# Patient Record
Sex: FEMALE | Race: WHITE | NOT HISPANIC OR LATINO | Employment: OTHER | URBAN - METROPOLITAN AREA
[De-identification: names, ages, dates, MRNs, and addresses within clinical notes are randomized per-mention and may not be internally consistent; named-entity substitution may affect disease eponyms.]

---

## 2017-07-06 ENCOUNTER — TRANSCRIBE ORDERS (OUTPATIENT)
Dept: PHYSICAL THERAPY | Facility: HOSPITAL | Age: 67
End: 2017-07-06

## 2017-07-06 DIAGNOSIS — M79.7 FIBROMYALGIA: Primary | ICD-10-CM

## 2017-07-25 ENCOUNTER — APPOINTMENT (OUTPATIENT)
Dept: PHYSICAL THERAPY | Facility: HOSPITAL | Age: 67
End: 2017-07-25

## 2017-08-01 ENCOUNTER — HOSPITAL ENCOUNTER (OUTPATIENT)
Dept: PHYSICAL THERAPY | Facility: HOSPITAL | Age: 67
Setting detail: THERAPIES SERIES
Discharge: HOME | End: 2017-08-01

## 2017-08-01 DIAGNOSIS — M25.561 CHRONIC PAIN OF RIGHT KNEE: ICD-10-CM

## 2017-08-01 DIAGNOSIS — G89.29 CHRONIC PAIN OF RIGHT KNEE: ICD-10-CM

## 2017-08-01 DIAGNOSIS — M79.7 FIBROMYALGIA: Primary | ICD-10-CM

## 2017-08-01 PROCEDURE — 97163 PT EVAL HIGH COMPLEX 45 MIN: CPT | Performed by: PHYSICAL THERAPIST

## 2017-08-01 NOTE — THERAPY EVALUATION
Outpatient Physical Therapy Ortho Initial Evaluation  [facility]     Patient Name: Clarissa Matos  : 1950  MRN: [id number]  Today's Date: 2017    Visit Date: 2017  There is no problem list on file for this patient.     Past Medical History:   Diagnosis Date   • Anxiety    • Arthritis    • Depression    • Fibromyalgia    • Hypertension       Past Surgical History:   Procedure Laterality Date   • HYSTERECTOMY     • REPLACEMENT TOTAL KNEE Right    • ROTATOR CUFF REPAIR     Visit Dx:     ICD-10-CM ICD-9-CM   1. Fibromyalgia M79.7 729.1   2. Chronic pain of right knee M25.561 719.46    G89.29 338.29           Patient History       17 1500          History    Chief Complaint Pain  -DM      Type of Pain Back pain;Neck pain;Shoulder pain;Knee pain  -DM      Date Current Problem(s) Began --   chronic  -DM      Brief Description of Current Complaint Pt is diagnosed with Fibromyalgia.  She was originally diagnosed with Fibromyalgia 6 years ago.  She has had progressively worsening pain, especially in her neck, upper and low back, shoulders and right knee.  She had right TKA in  but had complications during surgery with a ligament torn and was braced for 6 months.  Her right knee has been problematic since that time.  She is also being treated by Pain Management Dr. Beltre to include epidural injections and trigger point injections.  She has been treated with several medications including Cymbalta and Lyrica but had side effects that she could not tolerate.  She is now on Wellbutrin but is having HA that she relates to this medication.  She also suffers with depression and anxiety and does see a psychiatrist and has been referred to a Social Psychologist.  She has not had PT for fibromyalgia.  -DM      Previous treatment for THIS PROBLEM Injections;Pain Management;Chiropractor;Medication  -DM      Patient/Caregiver Goals Relieve pain;Improve mobility  -DM      Current Tobacco Use none  -DM       Patient's Rating of General Health Fair  -DM      Occupation/sports/leisure activities Retired RN  -DM      Patient seeing anyone else for problem(s)? Yes  -DM      Pain     Pain Location Neck;Back;Knee;Shoulder  -DM      Pain at Present 5  -DM      Pain at Best 3  -DM      Pain at Worst 7  -DM      Pain Frequency Constant/continuous  -DM      Pain Description Aching;Burning;Sore;Pins and needles;Tender;Tightness;Dull;Discomfort  -DM      What Performance Factors Make the Current Problem(s) WORSE? Prolonged sitting, walking and standing, Use of stairs  -DM      What Performance Factors Make the Current Problem(s) BETTER? Ice, pain meds, stretches  -DM      Tolerance Time- Standing 3-5 minutes  -DM      Tolerance Time- Sitting 20-30 minutes  -DM      Tolerance Time- Walking 10 minutes  -DM      Is your sleep disturbed? Yes  -DM      Is medication used to assist with sleep? Yes   just started ambein 10 mg  -DM      Difficulties with ADL's? yes  -DM      Difficulties with recreational activities? yes  -DM      Daily Activities    Primary Language English  -DM      Are you able to read Yes  -DM      Are you able to write Yes  -DM      How does patient learn best? Listening;Reading;Demonstration;Pictures/Video  -DM      Teaching needs identified Home Exercise Program;Management of Condition  -DM      Does patient have problems with the following? Depression;Anxiety  -DM      Barriers to learning None  -DM      Action taken for identified issues observation for worsening signs of depression.  -DM      Pt Participated in POC and Goals Yes  -DM      Safety    Are you being hurt, hit, or frightened by anyone at home or in your life? No  -DM      Are you being neglected by a caregiver No  -DM        User Key  (r) = Recorded By, (t) = Taken By, (c) = Cosigned By    Initials Name Provider Type    AKILAH Morataya, PT Physical Therapist              PT Ortho       08/01/17 1400    Posture/Observations    Posture/Observations  Comments Mild FH, rounded shoulders, increased lordosis, right knee in slight flexion  -DM    Cervical Palpation    Occiput Bilateral:;Tender;Guarded/taut  -DM    Suboccipital Bilateral:;Tender;Guarded/taut  -DM    Scalenes Bilateral:;Guarded/taut  -DM    Levator Scapula Bilateral:;Tender;Guarded/taut;Elicits spasm  -DM    Upper Traps Bilateral:;Tender;Guarded/taut;Trigger point  -DM    Middle Traps Bilateral:;Tender;Guarded/taut  -DM    Rhomboids Bilateral:;Guarded/taut  -DM    Lower Traps Bilateral:;Guarded/taut  -DM    Lumbosacral Palpation    Piriformis Bilateral:;Tender;Guarded/taut;Elicits spasm  -DM    Quadratus Lumborum Bilateral:;Tender;Guarded/taut  -DM    Erector Spinae (Paraspinals) Bilateral:;Tender;Guarded/taut  -DM    Knee Palpation    Pes Anserine Bursa Right:;Tender  -DM    Medial Joint Line Right:;Swollen;Guarded/taut  -DM    Lateral Joint Line Right:;Swollen;Guarded/taut  -DM    Lateral Condyle Right:;Swollen;Guarded/taut  -DM    Medial Condyle Right:;Swollen;Guarded/taut  -DM    Neck    Flexion AROM --   decreased 50%  -DM    Extension AROM --   decreased 50%  -DM    Left Lateral Flexion AROM --   decreased 50%  -DM    Right Lateral Flexion AROM --   decreased 50%  -DM    Left Rotation AROM --   decreased 25%  -DM    Right Rotation AROM --   decreased 25%  -DM    Trunk    Flexion AROM WNL (0-80 degrees)  -DM    Extension AROM --   decreased 50%  -DM    Left Lateral Flexion AROM --   decreased 50%  -DM    Right Lateral Flexion AROM --   decreased 50%  -DM    Trunk    Trunk Flexion Gross Movement (3+/5) fair plus  -DM    Trunk Extension Gross Movement (3+/5) fair plus  -DM    Left Shoulder    Flexion Gross Movement (4-/5) good minus  -DM    Extension Gross Movement (4-/5) good minus  -DM    ABduction Gross Movement (4-/5) good minus  -DM    Int Rotation Gross Movement (4-/5) good minus  -DM    Ext Rotation Gross Movement (4-/5) good minus  -DM    Right Shoulder    Flexion Gross Movement (4-/5) good  minus  -DM    Extension Gross Movement (4-/5) good minus  -DM    ABduction Gross Movement (4-/5) good minus  -DM    Int Rotation Gross Movement (4-/5) good minus  -DM    Ext Rotation Gross Movement (4-/5) good minus  -DM    Left Hip    Hip Flexion Gross Movement (4-/5) good minus  -DM    Hip Extension Gross Movement (3/5) fair  -DM    Hip ABduction Gross Movement (3+/5) fair plus  -DM    Right Hip    Hip Flexion Gross Movement (4-/5) good minus  -DM    Hip Extension Gross Movement (3+/5) fair plus  -DM    Hip ABduction Gross Movement (4-/5) good minus  -DM    Left Elbow/Forearm    Elbow Flexion Gross Movement (4-/5) good minus  -DM    Elbow Extension Gross Movement (4-/5) good minus  -DM    Right Elbow/Forearm    Elbow Flexion Gross Movement (4-/5) good minus  -DM    Elbow Extension Gross Movement (4-/5) good minus  -DM    Left Knee    Knee Extension Gross Movement (4+/5) good plus  -DM    Knee Flexion Gross Movement (4+/5) good plus  -DM    Right Knee    Knee Extension Gross Movement (4/5) good  -DM    Knee Flexion Gross Movement (4-/5) good minus  -DM    Upper Extremity Flexibility    Overall UE Flexibility Bilateral:  -DM    Lower Extremity Flexibility    Hamstrings Moderately limited  -DM    Hip Flexors Moderately limited  -DM    Hip External Rotators Moderately limited  -DM    Hip Internal Rotators Mildly limited  -DM      User Key  (r) = Recorded By, (t) = Taken By, (c) = Cosigned By    Initials Name Provider Type    DM Leny Morataya, PT Physical Therapist                PT OP Goals       08/01/17 1600       PT Short Term Goals    STG Date to Achieve 08/30/17  -DM     STG 1 Pt will be independend with water walking and flexiblity exercises for improved mobility with ADL's  -DM     STG 1 Progress New  -DM     STG 2 Pt will tolerate 45 minutes of continuous aquatic exercise without esacerbatin of symptoms.  -DM     STG 2 Progress New  -DM     STG 3 Pt will report pain decreased to 5/10 at worst with ADL's  -DM      STG 3 Progress New  -DM     STG 4 Pt will tolerate sitting for greater 30 minutes.  -DM     STG 4 Progress New  -DM     Long Term Goals    LTG Date to Achieve 09/13/17  -DM     LTG 1 Pt will be independent with advanced aquatic exercise program for improved strength and flexibility.  -DM     LTG 1 Progress New  -DM     LTG 2 Pt will be independent with land flexibility program for improved mobiltiy with all weight bearing tasks.  -DM     LTG 2 Progress New  -DM     LTG 3 Pt will demonstrate improved UE/LE strength by 1/2 grade for improved stability with all functional tasks.  -DM     LTG 3 Progress New  -DM     LTG 4 Pt will report improved function via FIQR from 70.88% to 50% or less disability.  -DM     LTG 4 Progress New  -DM     Time Calculation    PT Goal Re-Cert Due Date 08/30/17  -DM       User Key  (r) = Recorded By, (t) = Taken By, (c) = Cosigned By    Initials Name Provider Type    DM Leny Morataya, PT Physical Therapist              PT Assessment/Plan       08/01/17 1605       PT Assessment    Functional Limitations Limitation in home management;Limitations in community activities;Limitations in functional capacity and performance;Performance in leisure activities  -DM     Impairments Pain;Muscle strength;Range of motion;Impaired flexibility  -DM     Assessment Comments Pt is diagnosed with Fibromyalgia.  She also has complaints of right knee pain from complicated right TKA in 2014.  She presents with decreased cervical/lumbar ROM, decrease UE/LE and core strength, moderate amount of tenderness, especially in fibromyalgia trigger points, limited LE flexiblity and pain with all functional tasks.  She has not had formal physical therapy to address these issues.  She will benefit from a combination of aquatic and land skilled therapy for improved mobility with all functional tasks.   -DM     Please refer to paper survey for additional self-reported information Yes  -DM     Rehab Potential Good  -DM      Patient/caregiver participated in establishment of treatment plan and goals Yes  -DM     Patient would benefit from skilled therapy intervention Yes  -DM     PT Plan    PT Frequency 2x/week;3x/week  -DM     Predicted Duration of Therapy Intervention (days/wks) 6 weeks  -DM     Planned CPT's? PT EVAL HIGH COMPLEXITY: 70949;PT MANUAL THERAPY EA 15 MIN: 10986;PT NEUROMUSC RE-EDUCATION EA 15 MIN: 63828;PT AQUATIC THERAPY EA 15 MIN: 64716;PT HOT OR COLD PACK TREAT MCARE;PT ELECTRICAL STIM UNATTEND: ;PT ULTRASOUND EA 15 MIN: 97085;PT THER PROC EA 15 MIN: 37924  -DM     Physical Therapy Interventions (Optional Details) aquatics exercise;balance training;home exercise program;postural re-education;neuromuscular re-education;modalities;manual therapy techniques;lumbar stabilization;ROM (Range of Motion);strengthening;stretching  -DM     PT Plan Comments Begin aquatic therapy with focus on flexibility and strengthening and on land HEP plus modalities for knee pain.  -DM       User Key  (r) = Recorded By, (t) = Taken By, (c) = Cosigned By    Initials Name Provider Type    DM Leny Morataya, SHOBHA Physical Therapist              Outcome Measures       08/01/17 1500 08/01/17 1400       5 Times Sit to Stand    5 Times Sit to Stand (seconds)  15.55 seconds  -DM     5 Times Sit to Stand Comments  used arm rests  -DM     Functional Assessment    Outcome Measure Options --   FIQR 70.88  -DM 5x Sit to Stand  -DM       User Key  (r) = Recorded By, (t) = Taken By, (c) = Cosigned By    Initials Name Provider Type    DM Leny Morataya, SHOBHA Physical Therapist      Time Calculation:   Start Time: 1430  Stop Time: 1515  Time Calculation (min): 45 min     Therapy Charges for Today     Code Description Service Date Service Provider Modifiers Qty    00463155356 HC PT AQUA EVAL HIGH  COMPLEXITY 3 8/1/2017 Leny Morataya, PT GP 1        PT G-Codes  Outcome Measure Options:  (FIQR 70.88)     Leny Morataya, PT, DPT  8/1/2017

## 2017-08-03 ENCOUNTER — HOSPITAL ENCOUNTER (OUTPATIENT)
Dept: PHYSICAL THERAPY | Facility: HOSPITAL | Age: 67
Setting detail: THERAPIES SERIES
Discharge: HOME | End: 2017-08-03

## 2017-08-03 DIAGNOSIS — G89.29 CHRONIC PAIN OF RIGHT KNEE: ICD-10-CM

## 2017-08-03 DIAGNOSIS — M25.561 CHRONIC PAIN OF RIGHT KNEE: ICD-10-CM

## 2017-08-03 DIAGNOSIS — M79.7 FIBROMYALGIA: Primary | ICD-10-CM

## 2017-08-03 PROCEDURE — 97113 AQUATIC THERAPY/EXERCISES: CPT | Performed by: PHYSICAL THERAPIST

## 2017-08-03 NOTE — THERAPY TREATMENT NOTE
Outpatient Physical Therapy Ortho Treatment Note  [facility]     Patient Name: Clarissa Matos  : 1950  MRN: [id number]  Today's Date: 8/3/2017      Visit Date: 2017    Visit Dx:    ICD-10-CM ICD-9-CM   1. Fibromyalgia M79.7 729.1   2. Chronic pain of right knee M25.561 719.46    G89.29 338.29       There is no problem list on file for this patient.       Past Medical History:   Diagnosis Date   • Anxiety    • Arthritis    • Depression    • Fibromyalgia    • Hypertension         Past Surgical History:   Procedure Laterality Date   • HYSTERECTOMY     • REPLACEMENT TOTAL KNEE Right    • ROTATOR CUFF REPAIR               PT Ortho       17 1400    Posture/Observations    Posture/Observations Comments Mild FH, rounded shoulders, increased lordosis, right knee in slight flexion  -DM    Cervical Palpation    Occiput Bilateral:;Tender;Guarded/taut  -DM    Suboccipital Bilateral:;Tender;Guarded/taut  -DM    Scalenes Bilateral:;Guarded/taut  -DM    Levator Scapula Bilateral:;Tender;Guarded/taut;Elicits spasm  -DM    Upper Traps Bilateral:;Tender;Guarded/taut;Trigger point  -DM    Middle Traps Bilateral:;Tender;Guarded/taut  -DM    Rhomboids Bilateral:;Guarded/taut  -DM    Lower Traps Bilateral:;Guarded/taut  -DM    Lumbosacral Palpation    Piriformis Bilateral:;Tender;Guarded/taut;Elicits spasm  -DM    Quadratus Lumborum Bilateral:;Tender;Guarded/taut  -DM    Erector Spinae (Paraspinals) Bilateral:;Tender;Guarded/taut  -DM    Knee Palpation    Pes Anserine Bursa Right:;Tender  -DM    Medial Joint Line Right:;Swollen;Guarded/taut  -DM    Lateral Joint Line Right:;Swollen;Guarded/taut  -DM    Lateral Condyle Right:;Swollen;Guarded/taut  -DM    Medial Condyle Right:;Swollen;Guarded/taut  -DM    Neck    Flexion AROM --   decreased 50%  -DM    Extension AROM --   decreased 50%  -DM    Left Lateral Flexion AROM --   decreased 50%  -DM    Right Lateral Flexion AROM --   decreased 50%  -DM    Left Rotation  AROM --   decreased 25%  -DM    Right Rotation AROM --   decreased 25%  -DM    Trunk    Flexion AROM WNL (0-80 degrees)  -DM    Extension AROM --   decreased 50%  -DM    Left Lateral Flexion AROM --   decreased 50%  -DM    Right Lateral Flexion AROM --   decreased 50%  -DM    Trunk    Trunk Flexion Gross Movement (3+/5) fair plus  -DM    Trunk Extension Gross Movement (3+/5) fair plus  -DM    Left Shoulder    Flexion Gross Movement (4-/5) good minus  -DM    Extension Gross Movement (4-/5) good minus  -DM    ABduction Gross Movement (4-/5) good minus  -DM    Int Rotation Gross Movement (4-/5) good minus  -DM    Ext Rotation Gross Movement (4-/5) good minus  -DM    Right Shoulder    Flexion Gross Movement (4-/5) good minus  -DM    Extension Gross Movement (4-/5) good minus  -DM    ABduction Gross Movement (4-/5) good minus  -DM    Int Rotation Gross Movement (4-/5) good minus  -DM    Ext Rotation Gross Movement (4-/5) good minus  -DM    Left Hip    Hip Flexion Gross Movement (4-/5) good minus  -DM    Hip Extension Gross Movement (3/5) fair  -DM    Hip ABduction Gross Movement (3+/5) fair plus  -DM    Right Hip    Hip Flexion Gross Movement (4-/5) good minus  -DM    Hip Extension Gross Movement (3+/5) fair plus  -DM    Hip ABduction Gross Movement (4-/5) good minus  -DM    Left Elbow/Forearm    Elbow Flexion Gross Movement (4-/5) good minus  -DM    Elbow Extension Gross Movement (4-/5) good minus  -DM    Right Elbow/Forearm    Elbow Flexion Gross Movement (4-/5) good minus  -DM    Elbow Extension Gross Movement (4-/5) good minus  -DM    Left Knee    Knee Extension Gross Movement (4+/5) good plus  -DM    Knee Flexion Gross Movement (4+/5) good plus  -DM    Right Knee    Knee Extension Gross Movement (4/5) good  -DM    Knee Flexion Gross Movement (4-/5) good minus  -DM    Upper Extremity Flexibility    Overall UE Flexibility Bilateral:  -DM    Lower Extremity Flexibility    Hamstrings Moderately limited  -DM    Hip  "Flexors Moderately limited  -DM    Hip External Rotators Moderately limited  -DM    Hip Internal Rotators Mildly limited  -DM      User Key  (r) = Recorded By, (t) = Taken By, (c) = Cosigned By    Initials Name Provider Type    AKILAH Morataya, PT Physical Therapist                            PT Assessment/Plan       08/03/17 1310       PT Assessment    Assessment Comments pt stated she wore a brace just 6 weeks post op vs. 6 months as stated in eval. Still some stress at home caring for her spouse with pancreatic cancer and IDDM. 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   -ZK       User Key  (r) = Recorded By, (t) = Taken By, (c) = Cosigned By    Initials Name Provider Type    ZK Zorre Zeno Kimura, SHOBHA Physical Therapist                    Exercises       08/03/17 1300          Subjective Comments    Subjective Comments traffic jam thus 15 minutes late today. BIked at home last week but doesn't do this too often. Fibro affecting shoulders and back today but wants to work thru this. Uses ya warm on her back at home prn. Knee is stiff but no giving way  -ZK      Subjective Pain    Able to rate subjective pain? yes  -ZK      Pre-Treatment Pain Level 3  -ZK      Post-Treatment Pain Level 3  -ZK      Subjective Pain Comment mid back  -ZK      Aquatics    Aquatics performed? Yes  -ZK      Exercise 1    Exercise Name 1 intro to warm water. light LE AROM. easy ROM of shoulders. biking recumbent caused some neck strain thus pt shown youtube on DNF ex level one to try at home this weekend. 10 reps, 3\" holds. Progress to level 2 when ready.   -ZK      Exercise 2    Exercise Name 2 easy hamstring stretching and did not start resistive stomp push just yet. good tolerance to program today but bothered by heat of 93 degree pool but 82 too cold  -ZK        User Key  (r) = Recorded By, (t) = Taken By, (c) = Cosigned By    Initials Name Provider Type    ZK Zorre Zeno Kimura, SHOBHA Physical Therapist                 "                       Outcome Measures       08/01/17 1500 08/01/17 1400       5 Times Sit to Stand    5 Times Sit to Stand (seconds)  15.55 seconds  -DM     5 Times Sit to Stand Comments  used arm rests  -DM     Functional Assessment    Outcome Measure Options --   FIQR 70.88  -DM 5x Sit to Stand  -DM       User Key  (r) = Recorded By, (t) = Taken By, (c) = Cosigned By    Initials Name Provider Type    AKILAH Morataya, PT Physical Therapist            Time Calculation:   Start Time: 1130  Stop Time: 1200  Time Calculation (min): 30 min    Therapy Charges for Today     Code Description Service Date Service Provider Modifiers Qty    17123255660 HC PT AQUATIC THERAPY EA 15 MIN 8/3/2017 Zorre Zeno Kimura, PT GP 2                    Zorre Zeno Kimura, PT  8/3/2017

## 2017-08-07 ENCOUNTER — HOSPITAL ENCOUNTER (OUTPATIENT)
Dept: PHYSICAL THERAPY | Facility: HOSPITAL | Age: 67
Setting detail: THERAPIES SERIES
End: 2017-08-07

## 2017-08-09 ENCOUNTER — APPOINTMENT (OUTPATIENT)
Dept: PHYSICAL THERAPY | Facility: HOSPITAL | Age: 67
End: 2017-08-09

## 2017-08-10 ENCOUNTER — APPOINTMENT (OUTPATIENT)
Dept: PHYSICAL THERAPY | Facility: HOSPITAL | Age: 67
End: 2017-08-10

## 2017-08-16 ENCOUNTER — HOSPITAL ENCOUNTER (OUTPATIENT)
Dept: PHYSICAL THERAPY | Facility: HOSPITAL | Age: 67
Setting detail: THERAPIES SERIES
Discharge: HOME | End: 2017-08-16

## 2017-08-18 ENCOUNTER — APPOINTMENT (OUTPATIENT)
Dept: PHYSICAL THERAPY | Facility: HOSPITAL | Age: 67
End: 2017-08-18

## 2017-08-21 ENCOUNTER — HOSPITAL ENCOUNTER (OUTPATIENT)
Dept: PHYSICAL THERAPY | Facility: HOSPITAL | Age: 67
Setting detail: THERAPIES SERIES
End: 2017-08-21

## 2017-08-22 ENCOUNTER — DOCUMENTATION (OUTPATIENT)
Dept: PHYSICAL THERAPY | Facility: HOSPITAL | Age: 67
End: 2017-08-22

## 2017-08-22 ENCOUNTER — HOSPITAL ENCOUNTER (OUTPATIENT)
Dept: PHYSICAL THERAPY | Facility: HOSPITAL | Age: 67
Setting detail: THERAPIES SERIES
End: 2017-08-22

## 2017-08-22 NOTE — THERAPY DISCHARGE NOTE
Outpatient Physical Therapy Discharge Summary         Patient Name: Clarissa Matos  : 1950  MRN: [id number]    Today's Date: 2017    Visit Dx:  No diagnosis found.          PT OP Goals       17 1250       PT Short Term Goals    STG Date to Achieve 17  -ZK     STG 1 Pt will be independend with water walking and flexiblity exercises for improved mobility with ADL's  -ZK     STG 1 Progress Not Met  -ZK     STG 2 Pt will tolerate 45 minutes of continuous aquatic exercise without esacerbatin of symptoms.  -ZK     STG 2 Progress Not Met  -ZK     STG 3 Pt will report pain decreased to 5/10 at worst with ADL's  -ZK     STG 3 Progress Not Met  -ZK     STG 4 Pt will tolerate sitting for greater 30 minutes.  -ZK     STG 4 Progress Not Met  -ZK     Long Term Goals    LTG Date to Achieve 17  -ZK     LTG 1 Pt will be independent with advanced aquatic exercise program for improved strength and flexibility.  -ZK     LTG 1 Progress Not Met  -ZK     LTG 2 Pt will be independent with land flexibility program for improved mobiltiy with all weight bearing tasks.  -ZK     LTG 2 Progress Not Met  -ZK     LTG 3 Pt will demonstrate improved UE/LE strength by 1/2 grade for improved stability with all functional tasks.  -ZK     LTG 3 Progress Not Met  -ZK     LTG 4 Pt will report improved function via FIQR from 70.88% to 50% or less disability.  -ZK     LTG 4 Progress Not Met  -ZK       User Key  (r) = Recorded By, (t) = Taken By, (c) = Cosigned By    Initials Name Provider Type    ZK Zorre Zeno Kimura, PT Physical Therapist          OP PT Discharge Summary  Date of Discharge: 17  Reason for Discharge: Lack of progress, Unable to participate, Patient/Caregiver request  Outcomes Achieved: Unable to make functional progress toward goals at this time  Discharge Destination: Home without follow-up  Discharge Instructions: pt seen for eval then just one session in the pool. Since 8-3-17 had to cancel 7 of  "her appointments including today where she called and stated \"MD wants her to get meds under control before resuming PT.\"  No further visits planned at this time. Hopefully fibro and her OA pains and other health issues will allow for more participation in the future.       Time Calculation:                    Zorre Zeno Kimura, PT  8/22/2017         "

## 2017-08-24 ENCOUNTER — APPOINTMENT (OUTPATIENT)
Dept: PHYSICAL THERAPY | Facility: HOSPITAL | Age: 67
End: 2017-08-24

## 2017-08-28 ENCOUNTER — APPOINTMENT (OUTPATIENT)
Dept: PHYSICAL THERAPY | Facility: HOSPITAL | Age: 67
End: 2017-08-28

## 2017-08-31 ENCOUNTER — APPOINTMENT (OUTPATIENT)
Dept: PHYSICAL THERAPY | Facility: HOSPITAL | Age: 67
End: 2017-08-31

## 2017-10-03 ENCOUNTER — TRANSCRIBE ORDERS (OUTPATIENT)
Dept: PHYSICAL THERAPY | Facility: HOSPITAL | Age: 67
End: 2017-10-03

## 2017-10-03 DIAGNOSIS — M79.7 FIBROMYALGIA: Primary | ICD-10-CM

## 2017-10-26 ENCOUNTER — HOSPITAL ENCOUNTER (OUTPATIENT)
Dept: PHYSICAL THERAPY | Facility: HOSPITAL | Age: 67
Setting detail: THERAPIES SERIES
Discharge: HOME | End: 2017-10-26

## 2017-10-26 DIAGNOSIS — M79.7 FIBROMYALGIA: Primary | ICD-10-CM

## 2017-10-26 DIAGNOSIS — M25.572 ACUTE LEFT ANKLE PAIN: ICD-10-CM

## 2017-10-26 DIAGNOSIS — M54.5 CHRONIC BILATERAL LOW BACK PAIN, WITH SCIATICA PRESENCE UNSPECIFIED: ICD-10-CM

## 2017-10-26 DIAGNOSIS — M25.561 CHRONIC PAIN OF RIGHT KNEE: ICD-10-CM

## 2017-10-26 DIAGNOSIS — G89.29 CHRONIC PAIN OF RIGHT KNEE: ICD-10-CM

## 2017-10-26 DIAGNOSIS — M54.2 NECK PAIN: ICD-10-CM

## 2017-10-26 DIAGNOSIS — G89.29 CHRONIC BILATERAL LOW BACK PAIN, WITH SCIATICA PRESENCE UNSPECIFIED: ICD-10-CM

## 2017-10-26 PROCEDURE — 97163 PT EVAL HIGH COMPLEX 45 MIN: CPT | Performed by: PHYSICAL THERAPIST

## 2017-10-26 NOTE — THERAPY EVALUATION
Outpatient Physical Therapy Ortho Initial Evaluation  [facility]     Patient Name: Clarissa Matos  : 1950  MRN: [id number]  Today's Date: 10/26/2017      Visit Date: 10/26/2017    Past Medical History:   Diagnosis Date   • Anxiety    • Arthritis    • Depression    • Fibromyalgia    • Hypertension       Past Surgical History:   Procedure Laterality Date   • CATARACT EXTRACTION WITH INTRAOCULAR LENS IMPLANT     •  SECTION      x 2   • EYE SURGERY     • HYSTERECTOMY     • REPLACEMENT TOTAL KNEE Right    • ROTATOR CUFF REPAIR       Visit Dx:     ICD-10-CM ICD-9-CM   1. Fibromyalgia M79.7 729.1   2. Neck pain M54.2 723.1   3. Chronic bilateral low back pain, with sciatica presence unspecified M54.5 724.2    G89.29 338.29   4. Chronic pain of right knee M25.561 719.46    G89.29 338.29   5. Acute left ankle pain M25.572 719.47           Patient History       10/26/17 1100          History    Chief Complaint Pain  -DM      Type of Pain Back pain;Neck pain;Shoulder pain;Knee pain;Ankle pain   twisted left ankle walking on sand.  Wears a brace x 2 weeks  -DM      Date Current Problem(s) Began --   chronic  -DM      Brief Description of Current Complaint Pt is diagnosed with Fibromyalgia.  She was originally diagnosed with Fibromyalgia 6 years ago.  She has had progressively worsening pain, especially in her neck, upper and low back, shoulders and right knee.  She had right TKA in  but had complications during surgery with a ligament torn and was braced for 6 months.  Her right knee has been problematic since that time.  She is also being treated by Pain Management Dr. Beltre to include epidural injections and trigger point injections.  She has been treated with several medications including Welbutrion and Lyrica but had side effects that she could not tolerate. She had one session of aquatic PT but could not tolerate the warmth of the pool due to Wellbutrion.  Is now back on Cymbalta and is doing  better.  She also suffers with depression and anxiety and does see a psychiatrist and has being seen by a Social Psychologist. Is feeling better and treatment is helping.   -DM      Previous treatment for THIS PROBLEM Injections;Pain Management  -DM      Patient/Caregiver Goals Relieve pain;Improve mobility  -DM      Current Tobacco Use none  -DM      Smoking Status none  -DM      Patient's Rating of General Health Good   depression is less  -DM      Hand Dominance right-handed  -DM      Patient seeing anyone else for problem(s)? Yes  -DM      Are you or can you be pregnant No  -DM      Pain     Pain Location Ankle;Back;Hip;Neck;Shoulder  -DM      Pain at Present 4  -DM      Pain at Best 3  -DM      Pain at Worst 7  -DM      What Performance Factors Make the Current Problem(s) WORSE? Walking on uneven ground, stand to prepare meal, bending over picking things up, drive up to 30-40 minutes  -DM      What Performance Factors Make the Current Problem(s) BETTER? change position, lie down, ice to neck, back, knees, hydrocodone 3 a day   -DM      Tolerance Time- Standing 15-20 minutes  -DM      Tolerance Time- Sitting  up to 34-40 minutes  -DM      Tolerance Time- Walking 20 minutes  -DM      Is your sleep disturbed? Yes   occasionally  -DM      Is medication used to assist with sleep? Yes   ambein  -DM      Difficulties at work? retired  -DM      Difficulties with ADL's? yes  -DM      Difficulties with recreational activities? yes  -DM      Fall Risk Assessment    Any falls in the past year: No  -DM      Daily Activities    Primary Language English  -DM      Are you able to read Yes  -DM      Are you able to write Yes  -DM      How does patient learn best? Listening;Reading;Demonstration;Pictures/Video  -DM      Teaching needs identified Home Exercise Program;Management of Condition  -DM      Does patient have problems with the following? Depression;Anxiety  -DM      Barriers to learning None  -DM      Pt Participated in  POC and Goals Yes  -DM      Safety    Are you being hurt, hit, or frightened by anyone at home or in your life? No  -DM      Are you being neglected by a caregiver No  -DM        User Key  (r) = Recorded By, (t) = Taken By, (c) = Cosigned By    Initials Name Provider Type    DM Leny Morataya, PT Physical Therapist              PT Ortho       10/26/17 1100    Posture/Observations    Posture/Observations Comments Mild FH, rounded shoulders, increased lordosis, right knee in slight flexion  -DM    Ankle/Foot Special Tests    Anterior drawer (ATFL lesion) Negative  -DM    Inversion Stress Test Negative  -DM    Eversion Stress Test Negative  -DM    Trunk    Flexion AROM --   Decreased 25%  -DM    Extension AROM --   Decreased 25%  -DM    Left Lateral Flexion AROM --   Decreased 50%  -DM    Right Lateral Flexion AROM --   Decreased 25%  -DM    Trunk    Trunk Flexion Gross Movement (3+/5) fair plus  -DM    Trunk Extension Gross Movement (3+/5) fair plus  -DM    Left Shoulder    Flexion Gross Movement (4/5) good  -DM    Extension Gross Movement (4/5) good  -DM    ABduction Gross Movement (4/5) good  -DM    Int Rotation Gross Movement (4/5) good  -DM    Ext Rotation Gross Movement (4/5) good  -DM    Right Shoulder    Flexion Gross Movement (4/5) good  -DM    Extension Gross Movement (4/5) good  -DM    ABduction Gross Movement (4/5) good  -DM    Int Rotation Gross Movement (4/5) good  -DM    Ext Rotation Gross Movement (4/5) good  -DM    Left Hip    Hip Flexion Gross Movement (4-/5) good minus  -DM    Hip Extension Gross Movement (3+/5) fair plus  -DM    Hip ABduction Gross Movement (4-/5) good minus  -DM    Right Hip    Hip Flexion Gross Movement (4-/5) good minus  -DM    Hip Extension Gross Movement (3+/5) fair plus  -DM    Hip ABduction Gross Movement (4-/5) good minus  -DM    Left Elbow/Forearm    Elbow Flexion Gross Movement (5/5) normal  -DM    Elbow Extension Gross Movement (5/5) normal  -DM    Right Elbow/Forearm     Elbow Flexion Gross Movement (5/5) normal  -DM    Elbow Extension Gross Movement (5/5) normal  -DM    Left Knee    Knee Extension Gross Movement (4-/5) good minus  -DM    Knee Flexion Gross Movement (4-/5) good minus  -DM    Right Knee    Knee Extension Gross Movement (4-/5) good minus  -DM    Knee Flexion Gross Movement (4-/5) good minus  -DM    Left Ankle/Foot    Ankle PF Gross Movement (4-/5) good minus   only able to do 5 heel raises due to pain.  -DM    Ankle Dorsiflexion Gross Movement (3+/5) fair plus  -DM    Subtalar Inversion Gross Movement (3+/5) fair plus  -DM    Subtalar Eversion Gross Movement (3/5) fair   pain  -DM    Right Ankle/Foot    Ankle PF Gross Movement (5/5) normal  -DM    Ankle Dorsiflexion Gross Movement (5/5) normal  -DM    Subtalar Inversion Gross Movement (5/5) normal  -DM    Subtalar Eversion Gross Movement (5/5) normal  -DM      User Key  (r) = Recorded By, (t) = Taken By, (c) = Cosigned By    Initials Name Provider Type    DM Leny Morataya, PT Physical Therapist                PT OP Goals       10/26/17 1200       PT Short Term Goals    STG Date to Achieve 11/24/17  -DM     STG 1 Pt will be independend with water walking and flexiblity exercises for improved mobility with ADL's  -DM     STG 1 Progress New  -DM     STG 2 Pt will tolerate 45 minutes of continuous aquatic exercise without esacerbatin of symptoms.  -DM     STG 2 Progress New  -DM     STG 3 Pt will report pain decreased to 5/10 at worst with ADL's  -DM     STG 3 Progress New  -DM     STG 4 Pt will tolerate sitting for greater 60 minutes.  -DM     STG 4 Progress New  -DM     Long Term Goals    LTG 1 Pt will be independent with advanced aquatic exercise program for improved strength and flexibility.  -DM     LTG 1 Progress New  -DM     LTG 2 Pt will be independent with land flexibility program for improved mobiltiy with all weight bearing tasks.  -DM     LTG 2 Progress New  -DM     LTG 3 Pt will demonstrate improved UE/LE  strength by 1/2 grade for improved stability with all functional tasks.  -DM     LTG 3 Progress New  -DM     LTG 4 Pt will report improved function via FIQR from 57.3% to 40% or less disability.  -DM     LTG 4 Progress New  -DM     LTG 5 Pt will demonstrate improved 5 x sit to stand from 17.55 seconds to 14 seconds or less.   -DM     LTG 5 Progress New  -DM     Time Calculation    PT Goal Re-Cert Due Date 01/26/18  -DM       User Key  (r) = Recorded By, (t) = Taken By, (c) = Cosigned By    Initials Name Provider Type    DM Leny Morataya, PT Physical Therapist              PT Assessment/Plan       10/26/17 8525       PT Assessment    Functional Limitations Limitation in home management;Limitations in community activities;Limitations in functional capacity and performance;Performance in self-care ADL;Performance in leisure activities  -DM     Impairments Pain;Muscle strength;Endurance;Impaired flexibility  -DM     Assessment Comments 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 She reports progressively worsening pain, especially in her neck, upper and low back, shoulders and right knee. from complicated right TKA in 2014. she aslo has left ankle pain from a sprain while on vacation a few weeks ago.  She presents with decreased cervical/lumbar ROM, decrease UE/LE and core strength, moderate amount of tenderness, especially in fibromyalgia trigger points, limited LE flexiblity and pain with all functional tasks.  Functional outcome measure FIQR 57.3% places her in [place] FM range (43-59). She will benefit from a combination of aquatic and land skilled therapy for improved mobility with all functional tasks  -DM     Please refer to paper survey for additional self-reported information Yes  -DM     Rehab Potential Good  -DM     Patient/caregiver participated in establishment of treatment plan and goals Yes  -DM     Patient would benefit from skilled therapy intervention Yes  -DM     PT Plan     PT Frequency 2x/week  -DM     Predicted Duration of Therapy Intervention (days/wks) 6 weeks  -DM     Planned CPT's? PT EVAL HIGH COMPLEXITY: 99889;PT THER PROC EA 15 MIN: 79229;PT AQUATIC THERAPY EA 15 MIN: 54338;PT MANUAL THERAPY EA 15 MIN: 74034;PT NEUROMUSC RE-EDUCATION EA 15 MIN: 55407;PT HOT OR COLD PACK TREAT MCARE  -DM     Physical Therapy Interventions (Optional Details) aquatics exercise;home exercise program;patient/family education;postural re-education;neuromuscular re-education;modalities;strengthening;stretching  -DM     PT Plan Comments Begin with general ROM/flexiblity primarily in the pool with progression to strengthening.  Plan to see her 3 more times on land for HEP.   -DM       User Key  (r) = Recorded By, (t) = Taken By, (c) = Cosigned By    Initials Name Provider Type    DM Leny Morataya, PT Physical Therapist                Outcome Measures       10/26/17 1200 10/26/17 1100       5 Times Sit to Stand    5 Times Sit to Stand (seconds)  17.55 seconds  -DM     5 Times Sit to Stand Comments  did not use hand to push up  -DM     Functional Assessment    Outcome Measure Options --   FIQR 57.3% placing her in [place] FM range (43-59)  -DM 5x Sit to Stand  -DM       User Key  (r) = Recorded By, (t) = Taken By, (c) = Cosigned By    Initials Name Provider Type    DM Leny Morataya, SHOBHA Physical Therapist      Time Calculation:   Start Time: 1115  Stop Time: 1200  Time Calculation (min): 45 min     Therapy Charges for Today     Code Description Service Date Service Provider Modifiers Qty    08331687644  PT AQUA EVAL HIGH  COMPLEXITY 3 10/26/2017 Leny Morataya, PT GP 1        PT G-Codes  Outcome Measure Options:  (FIQR 57.3% placing her in [place] FM range (43-59))     Leny Morataya, PT, DPT  10/26/2017

## 2017-11-06 ENCOUNTER — HOSPITAL ENCOUNTER (OUTPATIENT)
Dept: PHYSICAL THERAPY | Facility: HOSPITAL | Age: 67
Setting detail: THERAPIES SERIES
Discharge: HOME | End: 2017-11-06

## 2017-11-06 DIAGNOSIS — G89.29 CHRONIC PAIN OF RIGHT KNEE: ICD-10-CM

## 2017-11-06 DIAGNOSIS — M25.572 ACUTE LEFT ANKLE PAIN: ICD-10-CM

## 2017-11-06 DIAGNOSIS — G89.29 CHRONIC BILATERAL LOW BACK PAIN, WITH SCIATICA PRESENCE UNSPECIFIED: ICD-10-CM

## 2017-11-06 DIAGNOSIS — M54.2 NECK PAIN: ICD-10-CM

## 2017-11-06 DIAGNOSIS — M54.5 CHRONIC BILATERAL LOW BACK PAIN, WITH SCIATICA PRESENCE UNSPECIFIED: ICD-10-CM

## 2017-11-06 DIAGNOSIS — M79.7 FIBROMYALGIA: Primary | ICD-10-CM

## 2017-11-06 DIAGNOSIS — M25.561 CHRONIC PAIN OF RIGHT KNEE: ICD-10-CM

## 2017-11-06 PROCEDURE — 97113 AQUATIC THERAPY/EXERCISES: CPT

## 2017-11-06 NOTE — THERAPY TREATMENT NOTE
Outpatient Physical Therapy Ortho Treatment Note  [facility]     Patient Name: Clarissa Matos  : 1950  MRN: [id number]  Today's Date: 2017      Visit Date: 2017    Visit Dx:    ICD-10-CM ICD-9-CM   1. Fibromyalgia M79.7 729.1   2. Neck pain M54.2 723.1   3. Chronic bilateral low back pain, with sciatica presence unspecified M54.5 724.2    G89.29 338.29   4. Chronic pain of right knee M25.561 719.46    G89.29 338.29   5. Acute left ankle pain M25.572 719.47       There is no problem list on file for this patient.       Past Medical History:   Diagnosis Date   • Anxiety    • Arthritis    • Depression    • Fibromyalgia    • Hypertension         Past Surgical History:   Procedure Laterality Date   • CATARACT EXTRACTION WITH INTRAOCULAR LENS IMPLANT     •  SECTION      x 2   • EYE SURGERY     • HYSTERECTOMY     • REPLACEMENT TOTAL KNEE Right    • ROTATOR CUFF REPAIR                               PT Assessment/Plan       17 1559       PT Assessment    Assessment Comments Pt tolerated therapy well. Pt performed exercises slowly and continues to appear very tight in B LE with stretches. Pt is appropriate to continue with aquatic therapy in order to decrease pain and improve strength.  -KH     PT Plan    PT Plan Comments (P)  Assess pt response to last session. Consider adding paddlework.  -NS       User Key  (r) = Recorded By, (t) = Taken By, (c) = Cosigned By    Initials Name Provider Type    TERESO Zamora, PT Physical Therapist    RAJAT Sheriff, PT Student PT Student                    Exercises       17 1500          Subjective Comments    Subjective Comments (P)  Doing okay today. I did something to my L ankle last week walking on the sand and it's hurting a little bit.  -NS      Subjective Pain    Able to rate subjective pain? (P)  yes  -NS      Pre-Treatment Pain Level (P)  3  -NS      Post-Treatment Pain Level 3  -KH      Aquatics    Aquatics performed? (P)  Yes  -NS       Aquatics LE    Water Walk (P)  forward;backward;side   x 3 laps  -NS      Stretch 1 (P)  HS sweep x10, SN  -NS      Stretch 2 (P)  Quad stretch x30s  -NS      Stretch 3 (P)  Piriformis stretch x30s  -NS      Stretch Other 1 (P)  calf stretch x 30s  -NS      Abdominals (P)  noodle   SN x 10  -NS      Hip Abd/Add (P)  x15  -NS      Hip Flex/Ext (P)  blue ring pushdown x 15  -NS      March in Place (P)  x 3 laps  -NS      Toe/Heel Raises (P)  tiptoe/tandem x 2 laps  -NS      Uni-Clock (P)  hip circles 10/10  -NS      Bicycle (P)  seated x 2 min  -NS        User Key  (r) = Recorded By, (t) = Taken By, (c) = Cosigned By    Initials Name Provider Type     Tere Zamora, PT Physical Therapist    RAJAT Sheriff, PT Student PT Student                                       Time Calculation:   Start Time: (P) 1520  Stop Time: (P) 1603  Time Calculation (min): (P) 43 min    Therapy Charges for Today     Code Description Service Date Service Provider Modifiers Qty    56594931261  PT AQUATIC THERAPY EA 15 MIN 11/6/2017 Ean Sheriff, PT Student GP 3                    Ean Sheriff PT Student  11/6/2017

## 2017-11-08 ENCOUNTER — HOSPITAL ENCOUNTER (OUTPATIENT)
Dept: PHYSICAL THERAPY | Facility: HOSPITAL | Age: 67
Setting detail: THERAPIES SERIES
Discharge: HOME | End: 2017-11-08

## 2017-11-08 DIAGNOSIS — G89.29 CHRONIC BILATERAL LOW BACK PAIN, WITH SCIATICA PRESENCE UNSPECIFIED: ICD-10-CM

## 2017-11-08 DIAGNOSIS — M25.561 CHRONIC PAIN OF RIGHT KNEE: ICD-10-CM

## 2017-11-08 DIAGNOSIS — M79.7 FIBROMYALGIA: Primary | ICD-10-CM

## 2017-11-08 DIAGNOSIS — M25.572 ACUTE LEFT ANKLE PAIN: ICD-10-CM

## 2017-11-08 DIAGNOSIS — M54.2 NECK PAIN: ICD-10-CM

## 2017-11-08 DIAGNOSIS — G89.29 CHRONIC PAIN OF RIGHT KNEE: ICD-10-CM

## 2017-11-08 DIAGNOSIS — M54.5 CHRONIC BILATERAL LOW BACK PAIN, WITH SCIATICA PRESENCE UNSPECIFIED: ICD-10-CM

## 2017-11-08 PROCEDURE — 97113 AQUATIC THERAPY/EXERCISES: CPT | Performed by: PHYSICAL THERAPIST

## 2017-11-08 NOTE — THERAPY TREATMENT NOTE
Outpatient Physical Therapy Ortho Treatment Note  [facility]     Patient Name: Clarissa Matos  : 1950  MRN: [id number]  Today's Date: 2017      Visit Date: 2017    Visit Dx:    ICD-10-CM ICD-9-CM   1. Fibromyalgia M79.7 729.1   2. Neck pain M54.2 723.1   3. Chronic bilateral low back pain, with sciatica presence unspecified M54.5 724.2    G89.29 338.29   4. Chronic pain of right knee M25.561 719.46    G89.29 338.29   5. Acute left ankle pain M25.572 719.47       There is no problem list on file for this patient.       Past Medical History:   Diagnosis Date   • Anxiety    • Arthritis    • Depression    • Fibromyalgia    • Hypertension         Past Surgical History:   Procedure Laterality Date   • CATARACT EXTRACTION WITH INTRAOCULAR LENS IMPLANT     •  SECTION      x 2   • EYE SURGERY     • HYSTERECTOMY     • REPLACEMENT TOTAL KNEE Right    • ROTATOR CUFF REPAIR                               PT Assessment/Plan       17 1603       PT Assessment    Assessment Comments cont aqua program and work on HEP and  chi handout for indep ex. Pt stressed lately caring for elderly parents who are far away in [place], but new help there should lessen this  -ZK       User Key  (r) = Recorded By, (t) = Taken By, (c) = Cosigned By    Initials Name Provider Type    ZK Zorre Zeno Kimura, PT Physical Therapist                    Exercises       17 1600          Subjective Comments    Subjective Comments not bad after last session. little sore. wants to work on flexibility mostly for fibro issues  -ZK      Subjective Pain    Able to rate subjective pain? yes  -ZK      Pre-Treatment Pain Level 2  -ZK      Post-Treatment Pain Level 1  -ZK      Subjective Pain Comment neck scap area  -ZK      Aquatics    Aquatics performed? Yes  -ZK      Aquatics LE    Stretch 1 HS sweep x10, SN  -ZK      Stretch 3 Piriformis stretch x30s  -ZK      Stretch Other 1 post sh and pec stretches using rail  -ZK       Stretch Other 2 neck fwd roll UT  -ZK      Vertical Traction LN  -ZK      Abdominals noodle  -ZK      Hip Flex/Ext SN  -ZK      Bicycle On LN  -ZK        User Key  (r) = Recorded By, (t) = Taken By, (c) = Cosigned By    Initials Name Provider Type    ZK Zorre Zeno Kimura, PT Physical Therapist                                       Time Calculation:   Start Time: 1515  Stop Time: 1600  Time Calculation (min): 45 min    Therapy Charges for Today     Code Description Service Date Service Provider Modifiers Qty    62798505248 HC PT AQUATIC THERAPY EA 15 MIN 11/8/2017 Zorre Zeno Kimura, PT GP 3                    Zorre Zeno Kimura, PT  11/8/2017

## 2017-11-13 ENCOUNTER — HOSPITAL ENCOUNTER (OUTPATIENT)
Dept: PHYSICAL THERAPY | Facility: HOSPITAL | Age: 67
Setting detail: THERAPIES SERIES
Discharge: HOME | End: 2017-11-13

## 2017-11-13 DIAGNOSIS — M54.2 NECK PAIN: ICD-10-CM

## 2017-11-13 DIAGNOSIS — G89.29 CHRONIC PAIN OF RIGHT KNEE: ICD-10-CM

## 2017-11-13 DIAGNOSIS — M25.572 ACUTE LEFT ANKLE PAIN: ICD-10-CM

## 2017-11-13 DIAGNOSIS — G89.29 CHRONIC BILATERAL LOW BACK PAIN, WITH SCIATICA PRESENCE UNSPECIFIED: ICD-10-CM

## 2017-11-13 DIAGNOSIS — M79.7 FIBROMYALGIA: Primary | ICD-10-CM

## 2017-11-13 DIAGNOSIS — M25.561 CHRONIC PAIN OF RIGHT KNEE: ICD-10-CM

## 2017-11-13 DIAGNOSIS — M54.5 CHRONIC BILATERAL LOW BACK PAIN, WITH SCIATICA PRESENCE UNSPECIFIED: ICD-10-CM

## 2017-11-13 PROCEDURE — 97113 AQUATIC THERAPY/EXERCISES: CPT | Performed by: PHYSICAL THERAPIST

## 2017-11-13 NOTE — THERAPY TREATMENT NOTE
Outpatient Physical Therapy Ortho Treatment Note  [facility]     Patient Name: Clarissa Matos  : 1950  MRN: [id number]  Today's Date: 2017      Visit Date: 2017    Visit Dx:    ICD-10-CM ICD-9-CM   1. Fibromyalgia M79.7 729.1   2. Neck pain M54.2 723.1   3. Chronic bilateral low back pain, with sciatica presence unspecified M54.5 724.2    G89.29 338.29   4. Chronic pain of right knee M25.561 719.46    G89.29 338.29   5. Acute left ankle pain M25.572 719.47       There is no problem list on file for this patient.       Past Medical History:   Diagnosis Date   • Anxiety    • Arthritis    • Depression    • Fibromyalgia    • Hypertension         Past Surgical History:   Procedure Laterality Date   • CATARACT EXTRACTION WITH INTRAOCULAR LENS IMPLANT     •  SECTION      x 2   • EYE SURGERY     • HYSTERECTOMY     • REPLACEMENT TOTAL KNEE Right    • ROTATOR CUFF REPAIR                               PT Assessment/Plan       17 1755       PT Assessment    Assessment Comments pt running late so shorter session today. will give pt  chi program next visit.   -ZK       User Key  (r) = Recorded By, (t) = Taken By, (c) = Cosigned By    Initials Name Provider Type    ZK Zorre Zeno Kimura, PT Physical Therapist                    Exercises       17 1700          Subjective Comments    Subjective Comments busy weekend celebrating her birthday. still with left neck issues and will try to get out her water pillow which is better for side sleeping  -ZK      Subjective Pain    Able to rate subjective pain? yes  -ZK      Pre-Treatment Pain Level 4  -ZK      Post-Treatment Pain Level 3  -ZK      Aquatics    Aquatics performed? Yes  -ZK      Aquatics LE    Stretch 1 HS sweep x10, SN  -ZK      Stretch 2 Quad stretch x30s  -ZK      Stretch 3 Piriformis stretch x30s  -ZK      Stretch Other 1 post sh and pec stretches using rail  -ZK      Stretch Other 2 neck fwd roll UT  -ZK      Vertical  Traction LN  -ZK      Abdominals noodle  -ZK      Hip Flex/Ext LN  -ZK      March in Place 10  -ZK      Mini Squat 10  -ZK      Toe/Heel Raises heel raises 15  -ZK      Uni-Clock hip circles 10/10  -ZK      Bicycle On LN  -ZK      Aquatics UE    Stretch 1 UE sweeps  -ZK        User Key  (r) = Recorded By, (t) = Taken By, (c) = Cosigned By    Initials Name Provider Type    ZK Zorre Zeno Kimura, PT Physical Therapist                                       Time Calculation:   Start Time: 1315  Stop Time: 1345  Time Calculation (min): 30 min    Therapy Charges for Today     Code Description Service Date Service Provider Modifiers Qty    09244310625 HC PT AQUATIC THERAPY EA 15 MIN 11/13/2017 Zorre Zeno Kimura, PT GP 2                    Zorre Zeno Kimura, PT  11/13/2017

## 2017-11-15 ENCOUNTER — HOSPITAL ENCOUNTER (OUTPATIENT)
Dept: PHYSICAL THERAPY | Facility: HOSPITAL | Age: 67
Setting detail: THERAPIES SERIES
Discharge: HOME | End: 2017-11-15

## 2017-11-15 DIAGNOSIS — M54.2 NECK PAIN: ICD-10-CM

## 2017-11-15 DIAGNOSIS — M54.5 CHRONIC BILATERAL LOW BACK PAIN, WITH SCIATICA PRESENCE UNSPECIFIED: ICD-10-CM

## 2017-11-15 DIAGNOSIS — M79.7 FIBROMYALGIA: Primary | ICD-10-CM

## 2017-11-15 DIAGNOSIS — G89.29 CHRONIC BILATERAL LOW BACK PAIN, WITH SCIATICA PRESENCE UNSPECIFIED: ICD-10-CM

## 2017-11-15 DIAGNOSIS — M25.561 CHRONIC PAIN OF RIGHT KNEE: ICD-10-CM

## 2017-11-15 DIAGNOSIS — G89.29 CHRONIC PAIN OF RIGHT KNEE: ICD-10-CM

## 2017-11-15 DIAGNOSIS — M25.572 ACUTE LEFT ANKLE PAIN: ICD-10-CM

## 2017-11-15 PROCEDURE — 97113 AQUATIC THERAPY/EXERCISES: CPT | Performed by: PHYSICAL THERAPIST

## 2017-11-15 NOTE — THERAPY TREATMENT NOTE
"    Outpatient Physical Therapy Ortho Treatment Note  [facility]     Patient Name: Clarissa Matos  : 1950  MRN: [id number]  Today's Date: 11/15/2017      Visit Date: 11/15/2017    Visit Dx:    ICD-10-CM ICD-9-CM   1. Fibromyalgia M79.7 729.1   2. Neck pain M54.2 723.1   3. Chronic bilateral low back pain, with sciatica presence unspecified M54.5 724.2    G89.29 338.29   4. Chronic pain of right knee M25.561 719.46    G89.29 338.29   5. Acute left ankle pain M25.572 719.47       There is no problem list on file for this patient.       Past Medical History:   Diagnosis Date   • Anxiety    • Arthritis    • Depression    • Fibromyalgia    • Hypertension         Past Surgical History:   Procedure Laterality Date   • CATARACT EXTRACTION WITH INTRAOCULAR LENS IMPLANT     •  SECTION      x 2   • EYE SURGERY     • HYSTERECTOMY     • REPLACEMENT TOTAL KNEE Right    • ROTATOR CUFF REPAIR                               PT Assessment/Plan       11/15/17 1621       PT Assessment    Assessment Comments following program well thus far. ended with ai chi ex just \"surrendering\" or suspending under water for 5 minutes. will incorporate more of the ai chi next rx.   -ZK       User Key  (r) = Recorded By, (t) = Taken By, (c) = Cosigned By    Initials Name Provider Type    ZK Zorre Zeno Kimura, PT Physical Therapist                    Exercises       11/15/17 1600          Subjective Comments    Subjective Comments busy taking care of mother in law now along with mother in [place]. also her spouse with recent health issues but getting better. likes water program thus far  -ZK      Subjective Pain    Able to rate subjective pain? yes  -ZK      Pre-Treatment Pain Level 3  -ZK      Post-Treatment Pain Level 2  -ZK      Aquatics    Aquatics performed? Yes  -ZK      Aquatics LE    Water Walk forward;backward;side  -ZK      Stretch 1 HS sweep x10, SN  -ZK      Stretch 3 Piriformis stretch x30s  -ZK      Stretch Other 1 post " sh and pec stretches using rail  -ZK      Stretch Other 2 neck fwd roll UT  -ZK      Vertical Traction LN  -ZK      Abdominals noodle  -ZK      Hip Flex/Ext flex only  -ZK      March in Place 10  -ZK      Mini Squat 10  -ZK      Uni-Clock hip circles 10/10  -ZK      Bicycle On LN  -ZK      Aquatics UE    Stretch 1 UE sweeps  -ZK        User Key  (r) = Recorded By, (t) = Taken By, (c) = Cosigned By    Initials Name Provider Type    ZK Zorre Zeno Kimura, PT Physical Therapist                                       Time Calculation:   Start Time: 1345  Stop Time: 1430  Time Calculation (min): 45 min    Therapy Charges for Today     Code Description Service Date Service Provider Modifiers Qty    22291956808 HC PT AQUATIC THERAPY EA 15 MIN 11/15/2017 Zorre Zeno Kimura, PT GP 3                    Zorre Zeno Kimura, PT  11/15/2017

## 2017-11-20 ENCOUNTER — HOSPITAL ENCOUNTER (OUTPATIENT)
Dept: PHYSICAL THERAPY | Facility: HOSPITAL | Age: 67
Setting detail: THERAPIES SERIES
Discharge: HOME | End: 2017-11-20

## 2017-11-20 DIAGNOSIS — M54.5 CHRONIC BILATERAL LOW BACK PAIN, WITH SCIATICA PRESENCE UNSPECIFIED: ICD-10-CM

## 2017-11-20 DIAGNOSIS — M25.561 CHRONIC PAIN OF RIGHT KNEE: ICD-10-CM

## 2017-11-20 DIAGNOSIS — G89.29 CHRONIC PAIN OF RIGHT KNEE: ICD-10-CM

## 2017-11-20 DIAGNOSIS — M25.572 ACUTE LEFT ANKLE PAIN: ICD-10-CM

## 2017-11-20 DIAGNOSIS — M54.2 NECK PAIN: ICD-10-CM

## 2017-11-20 DIAGNOSIS — G89.29 CHRONIC BILATERAL LOW BACK PAIN, WITH SCIATICA PRESENCE UNSPECIFIED: ICD-10-CM

## 2017-11-20 DIAGNOSIS — M79.7 FIBROMYALGIA: Primary | ICD-10-CM

## 2017-11-20 PROCEDURE — 97113 AQUATIC THERAPY/EXERCISES: CPT | Performed by: PHYSICAL THERAPIST

## 2017-11-20 NOTE — THERAPY TREATMENT NOTE
Outpatient Physical Therapy Ortho Treatment Note  [facility]     Patient Name: Clarissa Matos  : 1950  MRN: [id number]  Today's Date: 2017      Visit Date: 2017    Visit Dx:    ICD-10-CM ICD-9-CM   1. Fibromyalgia M79.7 729.1   2. Neck pain M54.2 723.1   3. Chronic bilateral low back pain, with sciatica presence unspecified M54.5 724.2    G89.29 338.29   4. Chronic pain of right knee M25.561 719.46    G89.29 338.29   5. Acute left ankle pain M25.572 719.47       There is no problem list on file for this patient.       Past Medical History:   Diagnosis Date   • Anxiety    • Arthritis    • Depression    • Fibromyalgia    • Hypertension         Past Surgical History:   Procedure Laterality Date   • CATARACT EXTRACTION WITH INTRAOCULAR LENS IMPLANT     •  SECTION      x 2   • EYE SURGERY     • HYSTERECTOMY     • REPLACEMENT TOTAL KNEE Right    • ROTATOR CUFF REPAIR                               PT Assessment/Plan       17 1632       PT Assessment    Assessment Comments pt doing well so far. getting better with program and adding on more ai chi stretches from handout on her own.   -ZK       User Key  (r) = Recorded By, (t) = Taken By, (c) = Cosigned By    Initials Name Provider Type    ZK Zorre Zeno Kimura, PT Physical Therapist                    Exercises       17 1600          Subjective Comments    Subjective Comments little sore but had a good weekend  -ZK      Subjective Pain    Able to rate subjective pain? yes  -ZK      Pre-Treatment Pain Level 3  -ZK      Post-Treatment Pain Level 2  -ZK      Aquatics    Aquatics performed? Yes  -ZK      Aquatics LE    Water Walk forward;backward;side   indep  -ZK      Stretch 1 HS sweep x10, SN  -ZK      Stretch 2 wall stretches for low back. calf stretching. see HEP  -ZK      Stretch 3 Piriformis stretch x30s  -ZK      Stretch Other 1 post sh and pec stretches using rail  -ZK      Stretch Other 2 neck fwd roll UT  -ZK       Vertical Traction LN  -ZK      Abdominals noodle  -ZK      Hip Flex/Ext flex only  -ZK      March in Place 10  -ZK      Mini Squat 10  -ZK      Toe/Heel Raises heel raises 15  -ZK      Uni-Clock hip circles 10/10  -ZK      Bicycle On LN  -ZK      Aquatics UE    Stretch 1 UE sweeps  -ZK      Stretch 2 side bend for trunk  -ZK      Stretch 3 wrist stretching  -ZK        User Key  (r) = Recorded By, (t) = Taken By, (c) = Cosigned By    Initials Name Provider Type    ZK Zorre Zeno Kimura, PT Physical Therapist                                       Time Calculation:   Start Time: 1345  Stop Time: 1430  Time Calculation (min): 45 min    Therapy Charges for Today     Code Description Service Date Service Provider Modifiers Qty    89635246444 HC PT AQUATIC THERAPY EA 15 MIN 11/20/2017 Zorre Zeno Kimura, PT GP 3                    Zorre Zeno Kimura, PT  11/20/2017

## 2017-11-22 ENCOUNTER — HOSPITAL ENCOUNTER (OUTPATIENT)
Dept: PHYSICAL THERAPY | Facility: HOSPITAL | Age: 67
Setting detail: THERAPIES SERIES
Discharge: HOME | End: 2017-11-22

## 2017-11-22 DIAGNOSIS — G89.29 CHRONIC BILATERAL LOW BACK PAIN, WITH SCIATICA PRESENCE UNSPECIFIED: ICD-10-CM

## 2017-11-22 DIAGNOSIS — M25.561 CHRONIC PAIN OF RIGHT KNEE: ICD-10-CM

## 2017-11-22 DIAGNOSIS — G89.29 CHRONIC PAIN OF RIGHT KNEE: ICD-10-CM

## 2017-11-22 DIAGNOSIS — M54.2 NECK PAIN: ICD-10-CM

## 2017-11-22 DIAGNOSIS — M79.7 FIBROMYALGIA: Primary | ICD-10-CM

## 2017-11-22 DIAGNOSIS — M54.5 CHRONIC BILATERAL LOW BACK PAIN, WITH SCIATICA PRESENCE UNSPECIFIED: ICD-10-CM

## 2017-11-22 DIAGNOSIS — M25.572 ACUTE LEFT ANKLE PAIN: ICD-10-CM

## 2017-11-22 PROCEDURE — 97113 AQUATIC THERAPY/EXERCISES: CPT

## 2017-11-22 NOTE — THERAPY TREATMENT NOTE
Outpatient Physical Therapy Ortho Treatment Note  [facility]     Patient Name: Clarissa Matos  : 1950  MRN: [id number]  Today's Date: 2017      Visit Date: 2017    Visit Dx:    ICD-10-CM ICD-9-CM   1. Fibromyalgia M79.7 729.1   2. Neck pain M54.2 723.1   3. Chronic bilateral low back pain, with sciatica presence unspecified M54.5 724.2    G89.29 338.29   4. Chronic pain of right knee M25.561 719.46    G89.29 338.29   5. Acute left ankle pain M25.572 719.47       There is no problem list on file for this patient.       Past Medical History:   Diagnosis Date   • Anxiety    • Arthritis    • Depression    • Fibromyalgia    • Hypertension         Past Surgical History:   Procedure Laterality Date   • CATARACT EXTRACTION WITH INTRAOCULAR LENS IMPLANT     •  SECTION      x 2   • EYE SURGERY     • HYSTERECTOMY     • REPLACEMENT TOTAL KNEE Right    • ROTATOR CUFF REPAIR                               PT Assessment/Plan       17 1503       PT Assessment    Assessment Comments Pt arrived at 2:45 for her 2:30 appt. Pt displayed minor difficulty with balance during abdominal push downs due to leaning back while performing and using rail to aid balance. Pt also displayed minor difficulty with maintaining balance with tandem walk and appeared slightly unsteady.  Pt performed exercises gingerly and appeared afraid of excessive movement.  -CK (r) NS (t) CK (c)     PT Plan    PT Plan Comments Continue with current exercises, consider adding paddlework to improve core strength.  -CK (r) NS (t) CK (c)       User Key  (r) = Recorded By, (t) = Taken By, (c) = Cosigned By    Initials Name Provider Type    CHRISTY Lagunas, PT Physical Therapist    RAJAT Sheriff PT Student PT Student                    Exercises       17 1400          Subjective Comments    Subjective Comments I'm doing well, still getting a headache during the day thanks to this weather. My neck and shoulder are hurting.   -CK (r) NS (t) CK (c)      Subjective Pain    Able to rate subjective pain? yes  -CK (r) NS (t) CK (c)      Pre-Treatment Pain Level 4  -CK (r) NS (t) CK (c)      Post-Treatment Pain Level 4  -CK (r) NS (t) CK (c)      Aquatics LE    Water Walk forward;side   x3 laps  -CK (r) NS (t) CK (c)      Stretch 1 HS sweep x10, LN  -CK (r) NS (t) CK (c)      Stretch 2 DKTC 5x10  -CK (r) NS (t) CK (c)      Stretch 3 Piriformis stretch x30s  -CK (r) NS (t) CK (c)      Stretch Other 1 B calf stretch x30s  -CK (r) NS (t) CK (c)      Vertical Traction x2 min  -CK (r) NS (t) CK (c)      Abdominals noodle   SN X 15  -CK (r) NS (t) CK (c)      Hip Abd/Add x 15  -CK (r) NS (t) CK (c)      Hip Flex/Ext x 15  -CK (r) NS (t) CK (c)      March in Place walking x 2 laps  -CK (r) NS (t) CK (c)      Mini Squat --  -CK (r) NS (t) CK (c)      Toe/Heel Raises tipetoe/tandem x 2 laps  -CK (r) NS (t) CK (c)      Uni-Clock hip circles 10/10  -CK (r) NS (t) CK (c)      Step Ups shoulder rolls x 15  -CK (r) NS (t) CK (c)      Bicycle x2 min  -CK (r) NS (t) CK (c)        User Key  (r) = Recorded By, (t) = Taken By, (c) = Cosigned By    Initials Name Provider Type    CK Rodolfo Lagunas PT Physical Therapist    RAJAT Sheriff, PT Student PT Student                                       Time Calculation:   Start Time: (P) 1430  Stop Time: (P) 1515  Time Calculation (min): (P) 45 min    Therapy Charges for Today     Code Description Service Date Service Provider Modifiers Qty    22427603986 HC PT AQUATIC THERAPY EA 15 MIN 11/22/2017 Ean Sheriff, PT Student GP 3                    Ean Sheriff PT Student  11/22/2017

## 2017-11-28 ENCOUNTER — APPOINTMENT (OUTPATIENT)
Dept: GENERAL RADIOLOGY | Facility: HOSPITAL | Age: 67
End: 2017-11-28

## 2017-11-28 ENCOUNTER — APPOINTMENT (OUTPATIENT)
Dept: ULTRASOUND IMAGING | Facility: HOSPITAL | Age: 67
End: 2017-11-28
Attending: EMERGENCY MEDICINE

## 2017-11-28 ENCOUNTER — HOSPITAL ENCOUNTER (OUTPATIENT)
Facility: HOSPITAL | Age: 67
Setting detail: OBSERVATION
Discharge: HOME | End: 2017-11-28
Attending: EMERGENCY MEDICINE | Admitting: SURGERY

## 2017-11-28 ENCOUNTER — ANESTHESIA (OUTPATIENT)
Dept: PERIOP | Facility: HOSPITAL | Age: 67
End: 2017-11-28

## 2017-11-28 ENCOUNTER — ANESTHESIA EVENT (OUTPATIENT)
Dept: PERIOP | Facility: HOSPITAL | Age: 67
End: 2017-11-28

## 2017-11-28 ENCOUNTER — APPOINTMENT (OUTPATIENT)
Dept: PHYSICAL THERAPY | Facility: HOSPITAL | Age: 67
End: 2017-11-28

## 2017-11-28 ENCOUNTER — APPOINTMENT (OUTPATIENT)
Dept: CT IMAGING | Facility: HOSPITAL | Age: 67
End: 2017-11-28

## 2017-11-28 VITALS
SYSTOLIC BLOOD PRESSURE: 131 MMHG | BODY MASS INDEX: 31.54 KG/M2 | DIASTOLIC BLOOD PRESSURE: 62 MMHG | RESPIRATION RATE: 16 BRPM | OXYGEN SATURATION: 95 % | HEIGHT: 63 IN | WEIGHT: 178 LBS | HEART RATE: 91 BPM | TEMPERATURE: 97.5 F

## 2017-11-28 DIAGNOSIS — K81.9 CHOLECYSTITIS: Primary | ICD-10-CM

## 2017-11-28 DIAGNOSIS — R91.1 PULMONARY NODULE: ICD-10-CM

## 2017-11-28 LAB
ALBUMIN SERPL-MCNC: 4.2 G/DL (ref 3.5–5.2)
ALBUMIN/GLOB SERPL: 1.5 G/DL
ALP SERPL-CCNC: 58 U/L (ref 39–117)
ALT SERPL W P-5'-P-CCNC: 78 U/L (ref 1–33)
ANION GAP SERPL CALCULATED.3IONS-SCNC: 11.5 MMOL/L
AST SERPL-CCNC: 115 U/L (ref 1–32)
BASOPHILS # BLD AUTO: 0.05 10*3/MM3 (ref 0–0.2)
BASOPHILS NFR BLD AUTO: 0.7 % (ref 0–1.5)
BILIRUB SERPL-MCNC: 1.3 MG/DL (ref 0.1–1.2)
BUN BLD-MCNC: 11 MG/DL (ref 8–23)
BUN/CREAT SERPL: 15.7 (ref 7–25)
CALCIUM SPEC-SCNC: 9.3 MG/DL (ref 8.6–10.5)
CHLORIDE SERPL-SCNC: 100 MMOL/L (ref 98–107)
CO2 SERPL-SCNC: 27.5 MMOL/L (ref 22–29)
CREAT BLD-MCNC: 0.7 MG/DL (ref 0.57–1)
DEPRECATED RDW RBC AUTO: 41.1 FL (ref 37–54)
EOSINOPHIL # BLD AUTO: 0.12 10*3/MM3 (ref 0–0.7)
EOSINOPHIL NFR BLD AUTO: 1.6 % (ref 0.3–6.2)
ERYTHROCYTE [DISTWIDTH] IN BLOOD BY AUTOMATED COUNT: 12.1 % (ref 11.7–13)
GFR SERPL CREATININE-BSD FRML MDRD: 84 ML/MIN/1.73
GLOBULIN UR ELPH-MCNC: 2.8 GM/DL
GLUCOSE BLD-MCNC: 113 MG/DL (ref 65–99)
HCT VFR BLD AUTO: 47.6 % (ref 35.6–45.5)
HGB BLD-MCNC: 16.1 G/DL (ref 11.9–15.5)
IMM GRANULOCYTES # BLD: 0 10*3/MM3 (ref 0–0.03)
IMM GRANULOCYTES NFR BLD: 0 % (ref 0–0.5)
LIPASE SERPL-CCNC: 15 U/L (ref 13–60)
LYMPHOCYTES # BLD AUTO: 1.24 10*3/MM3 (ref 0.9–4.8)
LYMPHOCYTES NFR BLD AUTO: 17 % (ref 19.6–45.3)
MCH RBC QN AUTO: 31.9 PG (ref 26.9–32)
MCHC RBC AUTO-ENTMCNC: 33.8 G/DL (ref 32.4–36.3)
MCV RBC AUTO: 94.3 FL (ref 80.5–98.2)
MONOCYTES # BLD AUTO: 0.54 10*3/MM3 (ref 0.2–1.2)
MONOCYTES NFR BLD AUTO: 7.4 % (ref 5–12)
NEUTROPHILS # BLD AUTO: 5.35 10*3/MM3 (ref 1.9–8.1)
NEUTROPHILS NFR BLD AUTO: 73.3 % (ref 42.7–76)
PLATELET # BLD AUTO: 242 10*3/MM3 (ref 140–500)
PMV BLD AUTO: 10.7 FL (ref 6–12)
POTASSIUM BLD-SCNC: 3.3 MMOL/L (ref 3.5–5.2)
PROT SERPL-MCNC: 7 G/DL (ref 6–8.5)
RBC # BLD AUTO: 5.05 10*6/MM3 (ref 3.9–5.2)
SODIUM BLD-SCNC: 139 MMOL/L (ref 136–145)
TROPONIN T SERPL-MCNC: <0.01 NG/ML (ref 0–0.03)
WBC NRBC COR # BLD: 7.3 10*3/MM3 (ref 4.5–10.7)

## 2017-11-28 PROCEDURE — 74300 X-RAY BILE DUCTS/PANCREAS: CPT

## 2017-11-28 PROCEDURE — 25010000002 DEXAMETHASONE PER 1 MG: Performed by: NURSE ANESTHETIST, CERTIFIED REGISTERED

## 2017-11-28 PROCEDURE — G0378 HOSPITAL OBSERVATION PER HR: HCPCS

## 2017-11-28 PROCEDURE — 25010000002 LEVOFLOXACIN PER 250 MG: Performed by: SURGERY

## 2017-11-28 PROCEDURE — 93010 ELECTROCARDIOGRAM REPORT: CPT | Performed by: INTERNAL MEDICINE

## 2017-11-28 PROCEDURE — 80053 COMPREHEN METABOLIC PANEL: CPT | Performed by: EMERGENCY MEDICINE

## 2017-11-28 PROCEDURE — 25010000002 NEOSTIGMINE PER 0.5 MG: Performed by: NURSE ANESTHETIST, CERTIFIED REGISTERED

## 2017-11-28 PROCEDURE — 93005 ELECTROCARDIOGRAM TRACING: CPT | Performed by: EMERGENCY MEDICINE

## 2017-11-28 PROCEDURE — 25010000002 FENTANYL CITRATE (PF) 100 MCG/2ML SOLUTION: Performed by: NURSE ANESTHETIST, CERTIFIED REGISTERED

## 2017-11-28 PROCEDURE — 85025 COMPLETE CBC W/AUTO DIFF WBC: CPT | Performed by: EMERGENCY MEDICINE

## 2017-11-28 PROCEDURE — 25010000002 FENTANYL CITRATE (PF) 100 MCG/2ML SOLUTION: Performed by: ANESTHESIOLOGY

## 2017-11-28 PROCEDURE — 0 IOPAMIDOL 61 % SOLUTION: Performed by: EMERGENCY MEDICINE

## 2017-11-28 PROCEDURE — 83690 ASSAY OF LIPASE: CPT | Performed by: EMERGENCY MEDICINE

## 2017-11-28 PROCEDURE — 96374 THER/PROPH/DIAG INJ IV PUSH: CPT

## 2017-11-28 PROCEDURE — 0 IOTHALAMATE 60 % SOLUTION: Performed by: SURGERY

## 2017-11-28 PROCEDURE — 25010000002 MIDAZOLAM PER 1 MG: Performed by: ANESTHESIOLOGY

## 2017-11-28 PROCEDURE — 25010000002 HYDROMORPHONE PER 4 MG: Performed by: NURSE ANESTHETIST, CERTIFIED REGISTERED

## 2017-11-28 PROCEDURE — 76705 ECHO EXAM OF ABDOMEN: CPT

## 2017-11-28 PROCEDURE — 99284 EMERGENCY DEPT VISIT MOD MDM: CPT

## 2017-11-28 PROCEDURE — S0260 H&P FOR SURGERY: HCPCS | Performed by: SURGERY

## 2017-11-28 PROCEDURE — 25010000002 ONDANSETRON PER 1 MG: Performed by: EMERGENCY MEDICINE

## 2017-11-28 PROCEDURE — 25010000002 ONDANSETRON PER 1 MG: Performed by: NURSE ANESTHETIST, CERTIFIED REGISTERED

## 2017-11-28 PROCEDURE — 25010000002 PROPOFOL 10 MG/ML EMULSION: Performed by: NURSE ANESTHETIST, CERTIFIED REGISTERED

## 2017-11-28 PROCEDURE — 25010000002 KETOROLAC TROMETHAMINE PER 15 MG: Performed by: NURSE ANESTHETIST, CERTIFIED REGISTERED

## 2017-11-28 PROCEDURE — 47563 LAPARO CHOLECYSTECTOMY/GRAPH: CPT | Performed by: SURGERY

## 2017-11-28 PROCEDURE — 88304 TISSUE EXAM BY PATHOLOGIST: CPT | Performed by: SURGERY

## 2017-11-28 PROCEDURE — 84484 ASSAY OF TROPONIN QUANT: CPT | Performed by: EMERGENCY MEDICINE

## 2017-11-28 PROCEDURE — 74177 CT ABD & PELVIS W/CONTRAST: CPT

## 2017-11-28 RX ORDER — PROMETHAZINE HYDROCHLORIDE 25 MG/1
25 SUPPOSITORY RECTAL ONCE AS NEEDED
Status: DISCONTINUED | OUTPATIENT
Start: 2017-11-28 | End: 2017-11-28 | Stop reason: HOSPADM

## 2017-11-28 RX ORDER — PHENOL 1.4 %
600 AEROSOL, SPRAY (ML) MUCOUS MEMBRANE EVERY MORNING
COMMUNITY
End: 2019-07-23 | Stop reason: HOSPADM

## 2017-11-28 RX ORDER — LOSARTAN POTASSIUM AND HYDROCHLOROTHIAZIDE 12.5; 5 MG/1; MG/1
1 TABLET ORAL EVERY MORNING
COMMUNITY
End: 2020-08-10

## 2017-11-28 RX ORDER — PROMETHAZINE HYDROCHLORIDE 25 MG/1
12.5 TABLET ORAL ONCE AS NEEDED
Status: DISCONTINUED | OUTPATIENT
Start: 2017-11-28 | End: 2017-11-28 | Stop reason: HOSPADM

## 2017-11-28 RX ORDER — DEXAMETHASONE SODIUM PHOSPHATE 10 MG/ML
INJECTION INTRAMUSCULAR; INTRAVENOUS AS NEEDED
Status: DISCONTINUED | OUTPATIENT
Start: 2017-11-28 | End: 2017-11-28 | Stop reason: SURG

## 2017-11-28 RX ORDER — SODIUM CHLORIDE, SODIUM LACTATE, POTASSIUM CHLORIDE, CALCIUM CHLORIDE 600; 310; 30; 20 MG/100ML; MG/100ML; MG/100ML; MG/100ML
9 INJECTION, SOLUTION INTRAVENOUS CONTINUOUS PRN
Status: DISCONTINUED | OUTPATIENT
Start: 2017-11-28 | End: 2017-11-28 | Stop reason: HOSPADM

## 2017-11-28 RX ORDER — LIDOCAINE HYDROCHLORIDE 20 MG/ML
INJECTION, SOLUTION INFILTRATION; PERINEURAL AS NEEDED
Status: DISCONTINUED | OUTPATIENT
Start: 2017-11-28 | End: 2017-11-28 | Stop reason: SURG

## 2017-11-28 RX ORDER — HYDRALAZINE HYDROCHLORIDE 20 MG/ML
5 INJECTION INTRAMUSCULAR; INTRAVENOUS
Status: DISCONTINUED | OUTPATIENT
Start: 2017-11-28 | End: 2017-11-28 | Stop reason: HOSPADM

## 2017-11-28 RX ORDER — LEVOFLOXACIN 5 MG/ML
500 INJECTION, SOLUTION INTRAVENOUS EVERY 24 HOURS
Status: COMPLETED | OUTPATIENT
Start: 2017-11-28 | End: 2017-11-28

## 2017-11-28 RX ORDER — ZOLPIDEM TARTRATE 10 MG/1
10 TABLET ORAL NIGHTLY PRN
COMMUNITY

## 2017-11-28 RX ORDER — ONDANSETRON 2 MG/ML
4 INJECTION INTRAMUSCULAR; INTRAVENOUS ONCE AS NEEDED
Status: DISCONTINUED | OUTPATIENT
Start: 2017-11-28 | End: 2017-11-28 | Stop reason: HOSPADM

## 2017-11-28 RX ORDER — MIDAZOLAM HYDROCHLORIDE 1 MG/ML
2 INJECTION INTRAMUSCULAR; INTRAVENOUS
Status: DISCONTINUED | OUTPATIENT
Start: 2017-11-28 | End: 2017-11-28 | Stop reason: HOSPADM

## 2017-11-28 RX ORDER — ECHINACEA 500 MG
2 CAPSULE ORAL AS NEEDED
COMMUNITY
End: 2019-07-23 | Stop reason: HOSPADM

## 2017-11-28 RX ORDER — ACETAMINOPHEN, ASPIRIN AND CAFFEINE 250; 250; 65 MG/1; MG/1; MG/1
1 TABLET, FILM COATED ORAL EVERY 6 HOURS PRN
Status: ON HOLD | COMMUNITY
End: 2018-07-06

## 2017-11-28 RX ORDER — SODIUM CHLORIDE 0.9 % (FLUSH) 0.9 %
1-10 SYRINGE (ML) INJECTION AS NEEDED
Status: DISCONTINUED | OUTPATIENT
Start: 2017-11-28 | End: 2017-11-28 | Stop reason: HOSPADM

## 2017-11-28 RX ORDER — HYDROCODONE BITARTRATE AND ACETAMINOPHEN 7.5; 325 MG/1; MG/1
1 TABLET ORAL ONCE AS NEEDED
Status: COMPLETED | OUTPATIENT
Start: 2017-11-28 | End: 2017-11-28

## 2017-11-28 RX ORDER — OXYCODONE AND ACETAMINOPHEN 7.5; 325 MG/1; MG/1
1 TABLET ORAL ONCE AS NEEDED
Status: DISCONTINUED | OUTPATIENT
Start: 2017-11-28 | End: 2017-11-28 | Stop reason: HOSPADM

## 2017-11-28 RX ORDER — ONDANSETRON 2 MG/ML
4 INJECTION INTRAMUSCULAR; INTRAVENOUS ONCE
Status: COMPLETED | OUTPATIENT
Start: 2017-11-28 | End: 2017-11-28

## 2017-11-28 RX ORDER — ROCURONIUM BROMIDE 10 MG/ML
INJECTION, SOLUTION INTRAVENOUS AS NEEDED
Status: DISCONTINUED | OUTPATIENT
Start: 2017-11-28 | End: 2017-11-28 | Stop reason: SURG

## 2017-11-28 RX ORDER — PROMETHAZINE HYDROCHLORIDE 25 MG/1
25 TABLET ORAL ONCE AS NEEDED
Status: DISCONTINUED | OUTPATIENT
Start: 2017-11-28 | End: 2017-11-28 | Stop reason: HOSPADM

## 2017-11-28 RX ORDER — ONDANSETRON 2 MG/ML
INJECTION INTRAMUSCULAR; INTRAVENOUS AS NEEDED
Status: DISCONTINUED | OUTPATIENT
Start: 2017-11-28 | End: 2017-11-28 | Stop reason: SURG

## 2017-11-28 RX ORDER — DULOXETIN HYDROCHLORIDE 30 MG/1
40 CAPSULE, DELAYED RELEASE ORAL DAILY
COMMUNITY
End: 2018-07-02

## 2017-11-28 RX ORDER — BUPIVACAINE HYDROCHLORIDE AND EPINEPHRINE 2.5; 5 MG/ML; UG/ML
INJECTION, SOLUTION INFILTRATION; PERINEURAL AS NEEDED
Status: DISCONTINUED | OUTPATIENT
Start: 2017-11-28 | End: 2017-11-28 | Stop reason: HOSPADM

## 2017-11-28 RX ORDER — HYDROMORPHONE HYDROCHLORIDE 1 MG/ML
0.5 INJECTION, SOLUTION INTRAMUSCULAR; INTRAVENOUS; SUBCUTANEOUS
Status: COMPLETED | OUTPATIENT
Start: 2017-11-28 | End: 2017-11-28

## 2017-11-28 RX ORDER — LORAZEPAM 0.5 MG/1
0.5 TABLET ORAL 2 TIMES DAILY PRN
COMMUNITY

## 2017-11-28 RX ORDER — MIDAZOLAM HYDROCHLORIDE 1 MG/ML
1 INJECTION INTRAMUSCULAR; INTRAVENOUS
Status: DISCONTINUED | OUTPATIENT
Start: 2017-11-28 | End: 2017-11-28 | Stop reason: HOSPADM

## 2017-11-28 RX ORDER — FENTANYL CITRATE 50 UG/ML
INJECTION, SOLUTION INTRAMUSCULAR; INTRAVENOUS AS NEEDED
Status: DISCONTINUED | OUTPATIENT
Start: 2017-11-28 | End: 2017-11-28 | Stop reason: SURG

## 2017-11-28 RX ORDER — EPHEDRINE SULFATE 50 MG/ML
5 INJECTION, SOLUTION INTRAVENOUS ONCE AS NEEDED
Status: DISCONTINUED | OUTPATIENT
Start: 2017-11-28 | End: 2017-11-28 | Stop reason: HOSPADM

## 2017-11-28 RX ORDER — FENTANYL CITRATE 50 UG/ML
50 INJECTION, SOLUTION INTRAMUSCULAR; INTRAVENOUS
Status: DISCONTINUED | OUTPATIENT
Start: 2017-11-28 | End: 2017-11-28 | Stop reason: HOSPADM

## 2017-11-28 RX ORDER — HYDROCODONE BITARTRATE AND ACETAMINOPHEN 10; 325 MG/1; MG/1
1 TABLET ORAL AS NEEDED
Status: ON HOLD | COMMUNITY
End: 2018-07-05

## 2017-11-28 RX ORDER — PROMETHAZINE HYDROCHLORIDE 25 MG/ML
12.5 INJECTION, SOLUTION INTRAMUSCULAR; INTRAVENOUS ONCE AS NEEDED
Status: DISCONTINUED | OUTPATIENT
Start: 2017-11-28 | End: 2017-11-28 | Stop reason: HOSPADM

## 2017-11-28 RX ORDER — FLUMAZENIL 0.1 MG/ML
0.2 INJECTION INTRAVENOUS AS NEEDED
Status: DISCONTINUED | OUTPATIENT
Start: 2017-11-28 | End: 2017-11-28 | Stop reason: HOSPADM

## 2017-11-28 RX ORDER — NALOXONE HCL 0.4 MG/ML
0.2 VIAL (ML) INJECTION AS NEEDED
Status: DISCONTINUED | OUTPATIENT
Start: 2017-11-28 | End: 2017-11-28 | Stop reason: HOSPADM

## 2017-11-28 RX ORDER — LABETALOL HYDROCHLORIDE 5 MG/ML
5 INJECTION, SOLUTION INTRAVENOUS
Status: DISCONTINUED | OUTPATIENT
Start: 2017-11-28 | End: 2017-11-28 | Stop reason: HOSPADM

## 2017-11-28 RX ORDER — PROMETHAZINE HYDROCHLORIDE 12.5 MG/1
12.5 TABLET ORAL EVERY 6 HOURS PRN
Qty: 10 TABLET | Refills: 0 | Status: SHIPPED | OUTPATIENT
Start: 2017-11-28 | End: 2017-12-28

## 2017-11-28 RX ORDER — FAMOTIDINE 10 MG/ML
20 INJECTION, SOLUTION INTRAVENOUS
Status: DISCONTINUED | OUTPATIENT
Start: 2017-11-28 | End: 2017-11-28 | Stop reason: HOSPADM

## 2017-11-28 RX ORDER — DIPHENHYDRAMINE HYDROCHLORIDE 50 MG/ML
12.5 INJECTION INTRAMUSCULAR; INTRAVENOUS
Status: DISCONTINUED | OUTPATIENT
Start: 2017-11-28 | End: 2017-11-28 | Stop reason: HOSPADM

## 2017-11-28 RX ORDER — FENTANYL CITRATE 50 UG/ML
25 INJECTION, SOLUTION INTRAMUSCULAR; INTRAVENOUS
Status: DISCONTINUED | OUTPATIENT
Start: 2017-11-28 | End: 2017-11-28 | Stop reason: HOSPADM

## 2017-11-28 RX ORDER — CETIRIZINE HYDROCHLORIDE 10 MG/1
10 TABLET ORAL EVERY MORNING
COMMUNITY
End: 2018-07-02

## 2017-11-28 RX ORDER — GLYCOPYRROLATE 0.2 MG/ML
INJECTION INTRAMUSCULAR; INTRAVENOUS AS NEEDED
Status: DISCONTINUED | OUTPATIENT
Start: 2017-11-28 | End: 2017-11-28 | Stop reason: SURG

## 2017-11-28 RX ORDER — KETOROLAC TROMETHAMINE 30 MG/ML
INJECTION, SOLUTION INTRAMUSCULAR; INTRAVENOUS AS NEEDED
Status: DISCONTINUED | OUTPATIENT
Start: 2017-11-28 | End: 2017-11-28 | Stop reason: SURG

## 2017-11-28 RX ORDER — LIDOCAINE HYDROCHLORIDE 40 MG/ML
SOLUTION TOPICAL AS NEEDED
Status: DISCONTINUED | OUTPATIENT
Start: 2017-11-28 | End: 2017-11-28 | Stop reason: SURG

## 2017-11-28 RX ORDER — OXYCODONE HYDROCHLORIDE AND ACETAMINOPHEN 5; 325 MG/1; MG/1
1 TABLET ORAL EVERY 4 HOURS PRN
Qty: 30 TABLET | Refills: 0 | Status: SHIPPED | OUTPATIENT
Start: 2017-11-28 | End: 2017-12-08

## 2017-11-28 RX ORDER — PROPOFOL 10 MG/ML
VIAL (ML) INTRAVENOUS AS NEEDED
Status: DISCONTINUED | OUTPATIENT
Start: 2017-11-28 | End: 2017-11-28 | Stop reason: SURG

## 2017-11-28 RX ORDER — SODIUM CHLORIDE 0.9 % (FLUSH) 0.9 %
10 SYRINGE (ML) INJECTION AS NEEDED
Status: DISCONTINUED | OUTPATIENT
Start: 2017-11-28 | End: 2017-11-28 | Stop reason: HOSPADM

## 2017-11-28 RX ADMIN — NEOSTIGMINE METHYLSULFATE 2 MG: 1 INJECTION INTRAMUSCULAR; INTRAVENOUS; SUBCUTANEOUS at 16:08

## 2017-11-28 RX ADMIN — ONDANSETRON 4 MG: 2 INJECTION INTRAMUSCULAR; INTRAVENOUS at 16:00

## 2017-11-28 RX ADMIN — PROPOFOL 50 MG: 10 INJECTION, EMULSION INTRAVENOUS at 15:55

## 2017-11-28 RX ADMIN — LIDOCAINE HYDROCHLORIDE 60 MG: 20 INJECTION, SOLUTION INFILTRATION; PERINEURAL at 15:06

## 2017-11-28 RX ADMIN — GLYCOPYRROLATE 0.4 MG: 0.2 INJECTION INTRAMUSCULAR; INTRAVENOUS at 16:08

## 2017-11-28 RX ADMIN — FENTANYL CITRATE 100 MCG: 50 INJECTION, SOLUTION INTRAMUSCULAR; INTRAVENOUS at 15:06

## 2017-11-28 RX ADMIN — HYDROMORPHONE HYDROCHLORIDE 0.5 MG: 2 INJECTION INTRAMUSCULAR; INTRAVENOUS; SUBCUTANEOUS at 17:11

## 2017-11-28 RX ADMIN — FENTANYL CITRATE 50 MCG: 50 INJECTION, SOLUTION INTRAMUSCULAR; INTRAVENOUS at 15:55

## 2017-11-28 RX ADMIN — DEXAMETHASONE SODIUM PHOSPHATE 4 MG: 10 INJECTION INTRAMUSCULAR; INTRAVENOUS at 15:15

## 2017-11-28 RX ADMIN — HYDROCODONE BITARTRATE AND ACETAMINOPHEN 1 TABLET: 7.5; 325 TABLET ORAL at 17:00

## 2017-11-28 RX ADMIN — LEVOFLOXACIN 500 MG: 5 INJECTION, SOLUTION INTRAVENOUS at 14:47

## 2017-11-28 RX ADMIN — HYDROMORPHONE HYDROCHLORIDE 0.5 MG: 2 INJECTION INTRAMUSCULAR; INTRAVENOUS; SUBCUTANEOUS at 16:42

## 2017-11-28 RX ADMIN — FENTANYL CITRATE 25 MCG: 50 INJECTION, SOLUTION INTRAMUSCULAR; INTRAVENOUS at 14:47

## 2017-11-28 RX ADMIN — SODIUM CHLORIDE, POTASSIUM CHLORIDE, SODIUM LACTATE AND CALCIUM CHLORIDE: 600; 310; 30; 20 INJECTION, SOLUTION INTRAVENOUS at 16:10

## 2017-11-28 RX ADMIN — KETOROLAC TROMETHAMINE 30 MG: 30 INJECTION, SOLUTION INTRAMUSCULAR; INTRAVENOUS at 15:55

## 2017-11-28 RX ADMIN — LIDOCAINE HYDROCHLORIDE 1 EACH: 40 SPRAY LARYNGEAL; TRANSTRACHEAL at 15:08

## 2017-11-28 RX ADMIN — ROCURONIUM BROMIDE 35 MG: 10 INJECTION INTRAVENOUS at 15:06

## 2017-11-28 RX ADMIN — PROPOFOL 150 MG: 10 INJECTION, EMULSION INTRAVENOUS at 15:06

## 2017-11-28 RX ADMIN — FENTANYL CITRATE 25 MCG: 50 INJECTION, SOLUTION INTRAMUSCULAR; INTRAVENOUS at 13:53

## 2017-11-28 RX ADMIN — FENTANYL CITRATE 50 MCG: 50 INJECTION, SOLUTION INTRAMUSCULAR; INTRAVENOUS at 16:35

## 2017-11-28 RX ADMIN — FAMOTIDINE 20 MG: 10 INJECTION, SOLUTION INTRAVENOUS at 13:53

## 2017-11-28 RX ADMIN — IOPAMIDOL 85 ML: 612 INJECTION, SOLUTION INTRAVENOUS at 11:31

## 2017-11-28 RX ADMIN — ONDANSETRON 4 MG: 2 INJECTION INTRAMUSCULAR; INTRAVENOUS at 09:24

## 2017-11-28 RX ADMIN — FENTANYL CITRATE 50 MCG: 50 INJECTION, SOLUTION INTRAMUSCULAR; INTRAVENOUS at 16:52

## 2017-11-28 RX ADMIN — FENTANYL CITRATE 50 MCG: 50 INJECTION, SOLUTION INTRAMUSCULAR; INTRAVENOUS at 16:28

## 2017-11-28 RX ADMIN — HYDROMORPHONE HYDROCHLORIDE 0.5 MG: 2 INJECTION INTRAMUSCULAR; INTRAVENOUS; SUBCUTANEOUS at 17:01

## 2017-11-28 RX ADMIN — MIDAZOLAM 1 MG: 1 INJECTION INTRAMUSCULAR; INTRAVENOUS at 14:47

## 2017-11-28 RX ADMIN — HYDROMORPHONE HYDROCHLORIDE 0.5 MG: 2 INJECTION INTRAMUSCULAR; INTRAVENOUS; SUBCUTANEOUS at 16:36

## 2017-11-28 RX ADMIN — SODIUM CHLORIDE, POTASSIUM CHLORIDE, SODIUM LACTATE AND CALCIUM CHLORIDE 9 ML/HR: 600; 310; 30; 20 INJECTION, SOLUTION INTRAVENOUS at 13:53

## 2017-11-28 NOTE — PLAN OF CARE
Problem: Patient Care Overview (Adult)  Goal: Plan of Care Review  Outcome: Ongoing (interventions implemented as appropriate)    11/28/17 6599   Coping/Psychosocial Response Interventions   Plan Of Care Reviewed With patient   Patient Care Overview   Progress improving   Outcome Evaluation   Outcome Summary/Follow up Plan VSS. Pain controlled with pain medication. Tolerating PO. Denies nausea. Updated family.

## 2017-11-28 NOTE — ANESTHESIA PROCEDURE NOTES
Airway  Urgency: elective    Date/Time: 11/28/2017 3:08 PM  Airway not difficult    General Information and Staff    Patient location during procedure: OR  Anesthesiologist: ED HUANG  CRNA: ATTILA SKINNER    Indications and Patient Condition  Indications for airway management: airway protection    Preoxygenated: yes  MILS maintained throughout  Mask difficulty assessment: 1 - vent by mask    Final Airway Details  Final airway type: endotracheal airway      Successful airway: ETT  Cuffed: yes   Successful intubation technique: direct laryngoscopy  Facilitating devices/methods: cricoid pressure  Endotracheal tube insertion site: oral  Blade: Shorty  Blade size: #3  ETT size: 7.0 mm  Cormack-Lehane Classification: grade IIa - partial view of glottis  Placement verified by: chest auscultation and capnometry   Measured from: teeth  ETT to teeth (cm): 22  Number of attempts at approach: 1

## 2017-11-28 NOTE — ANESTHESIA PREPROCEDURE EVALUATION
Anesthesia Evaluation     Patient summary reviewed   NPO Solid Status: > 8 hours       Airway   Mallampati: II  Neck ROM: full  no difficulty expected  Dental      Pulmonary    Cardiovascular     Rhythm: regular    (+) hypertension,       Neuro/Psych  GI/Hepatic/Renal/Endo      Musculoskeletal     Abdominal    Substance History      OB/GYN          Other                                        Anesthesia Plan    ASA 3     general     intravenous induction   Anesthetic plan and risks discussed with patient.  Use of blood products discussed with patient .

## 2017-11-28 NOTE — ED NOTES
Patient undressed.   Belongings at bedside with daughter. Shoes, bra, sweater, pants, socks, underwear.      Inderjit Carlos  11/28/17 6947

## 2017-11-28 NOTE — ANESTHESIA POSTPROCEDURE EVALUATION
Patient: Clarissa Matos    Procedure Summary     Date Anesthesia Start Anesthesia Stop Room / Location    11/28/17 8133 0194  [facility] / [facility]       Procedure Diagnosis Surgeon Provider    CHOLECYSTECTOMY LAPAROSCOPIC INTRAOPERATIVE CHOLANGIOGRAM (N/A Abdomen) No diagnosis on file. MD Radha Ramirez MD          Anesthesia Type: general  Last vitals  BP   131/62 (11/28/17 1815)   Temp   36.4 °C (97.5 °F) (11/28/17 1715)   Pulse   91 (11/28/17 1815)   Resp   16 (11/28/17 1815)     SpO2   95 % (11/28/17 1815)     Post Anesthesia Care and Evaluation    Patient location during evaluation: bedside  Pain management: adequate  Airway patency: patent  Anesthetic complications: No anesthetic complications    Cardiovascular status: acceptable  Respiratory status: acceptable  Hydration status: acceptable

## 2017-11-28 NOTE — PLAN OF CARE
Problem: Patient Care Overview (Adult)  Goal: Plan of Care Review  Outcome: Outcome(s) achieved Date Met:  11/28/17 11/28/17 1837   Coping/Psychosocial Response Interventions   Plan Of Care Reviewed With patient;spouse;daughter   Patient Care Overview   Progress improving   Outcome Evaluation   Outcome Summary/Follow up Plan ready for discharge       Goal: Adult Individualization and Mutuality  Outcome: Outcome(s) achieved Date Met:  11/28/17  Goal: Discharge Needs Assessment  Outcome: Outcome(s) achieved Date Met:  11/28/17    Problem: Perioperative Period (Adult)  Goal: Signs and Symptoms of Listed Potential Problems Will be Absent or Manageable (Perioperative Period)  Outcome: Outcome(s) achieved Date Met:  11/28/17 11/28/17 1837   Perioperative Period   Problems Assessed (Perioperative Period) pain;situational response;physiologic stress response;wound complications;perioperative injury   Problems Present (Perioperative Period) pain

## 2017-11-28 NOTE — DISCHARGE INSTRUCTIONS
GALLBLADDER  POST OP RECOMMENDATIONS  DR. SAM  [phone]  ACTIVITIES:  1. Expect to rest most of the day of surgery, but do get up several times daily to reduce the risk of getting a clot in your legs.  2. No strenuous activity or lifting over 10 lbs. for 2 week.  3. Do not drive while on pain medicine.  You must be off pain meds 24 hours before driving.    SYMPTOMS:  1. Diarrhea and loose stools are common after gall bladder surgery.  This should resolve over the next few weeks.  Constipation is common when taking pain medication for surgery.  Over the counter laxatives, such as Miralax, can be used temporarily.   2. Fatigue and decreased stamina is not unusual for about a week or so after surgery.  Try to take walks and some mild activity between resting.  3. Shoulder pain is not unusual from the “gas” used in laparoscopy which will dissipate within 1-3 days.  If it does not, call your physician.    WOUND SITE:  1. Dressings can be removed 2 days after procedure if desired.  The “tega-derm” may be removed in 2 days if instructed to.  2. Dressings may occasionally have spots of blood on them.  As long as it is dry, these do not need to be changed.  If it is soaked, then the dressing should be removed and a new dressing placed.  3. Skin irritation, redness or itching can prompt removal of the bandage earlier if present.  4. Steri-strips are to be left in place until they fall off on their own in 1-2 weeks.  If they are irritating then they may be removed sooner.  5. Showering may occur while the “tega-derm” is in place.  If it is off, then you must wait 2 days after surgery before showering.    MEALS:  1. Eat and Drink very lightly when returning home following surgery.  Jell-O, ginger ale, chicken noodle soup and crackers are good examples.  The day after surgery you may broaden your diet.  2. Do NOT take pain pills on an empty stomach.    WORK:  1. In general if you have a sedentary job, you can return to work  in 7-10 days.  If heavy lifting is required it may be 2 weeks.    2. Use discretion and remember that your stamina will be decreased post operatively.  3. Return to work notes can be provided at the time of your post-operative appointment.    FOLLOW UP:  1. Call and make a post-operative appointment for approximately 2 weeks after the procedure.      Demi Madden MD  General, Minimally Invasive and Endoscopic Surgery  [facility]    [address], [address]  [place], [place], [address]  P: [phone]  F: [phone]    Cc:  Yaquelin Valdez MD

## 2017-11-28 NOTE — ED PROVIDER NOTES
EMERGENCY DEPARTMENT ENCOUNTER    CHIEF COMPLAINT  Chief Complaint: abd pain  History given by: patient  History limited by: nothing  Room Number:   PMD: Yaquelin Valdez MD      HPI:  Pt is a 67 y.o. female who presents complaining of RUQ abd pain, which began around 0130 this morning. Pt states that the pain then began to radiate to her right lower chest and right posterior shoulder. Pt states that she took her BP and it was 140/90 at the onset of her pain. Pt also complains of nausea but denies vomiting. Pt states that she then took GasX, Pepto Bismol and Ativan without relief of her pain. Pt denies a history of gallbladder issues, liver issues or similar symptoms previously.      Duration:  7.5 hours  Onset: gradual  Timing: constant  Location: RUQ  Radiation: right lower chest, right posterior shoulder  Quality: unspecified  Intensity/Severity: moderate  Progression: worsening  Associated Symptoms: nausea  Aggravating Factors: none  Alleviating Factors: none  Previous Episodes: Pt denies similar symptoms previously  Treatment before arrival: Pt states that she then took GasX, Pepto Bismol and Ativan without relief of her pain.    PAST MEDICAL HISTORY  Active Ambulatory Problems     Diagnosis Date Noted   • No Active Ambulatory Problems     Resolved Ambulatory Problems     Diagnosis Date Noted   • No Resolved Ambulatory Problems     Past Medical History:   Diagnosis Date   • Anxiety    • Arthritis    • Depression    • Fibromyalgia    • Hypertension        PAST SURGICAL HISTORY  Past Surgical History:   Procedure Laterality Date   • CATARACT EXTRACTION WITH INTRAOCULAR LENS IMPLANT     •  SECTION      x 2   • EYE SURGERY     • HYSTERECTOMY     • REPLACEMENT TOTAL KNEE Right    • ROTATOR CUFF REPAIR         FAMILY HISTORY  History reviewed. No pertinent family history.    SOCIAL HISTORY  Social History     Social History   • Marital status:      Spouse name: N/A   • Number of children: N/A   •  Years of education: N/A     Occupational History   • Not on file.     Social History Main Topics   • Smoking status: Never Smoker   • Smokeless tobacco: Not on file   • Alcohol use Not on file   • Drug use: Not on file   • Sexual activity: Not on file     Other Topics Concern   • Not on file     Social History Narrative       ALLERGIES  Penicillins    REVIEW OF SYSTEMS  Review of Systems   Constitutional: Negative for fever.   HENT: Negative for sore throat.    Eyes: Negative.    Respiratory: Negative for cough and shortness of breath.    Cardiovascular: Negative for chest pain.   Gastrointestinal: Positive for abdominal pain and nausea. Negative for diarrhea and vomiting.   Genitourinary: Negative for dysuria.   Musculoskeletal: Negative for neck pain.   Skin: Negative for rash.   Allergic/Immunologic: Negative.    Neurological: Negative for weakness, numbness and headaches.   Hematological: Negative.    Psychiatric/Behavioral: Negative.    All other systems reviewed and are negative.      PHYSICAL EXAM  ED Triage Vitals   Temp Heart Rate Resp BP SpO2   11/28/17 0845 11/28/17 0845 11/28/17 0845 -- 11/28/17 0845   97.9 °F (36.6 °C) 92 20  97 %      Temp src Heart Rate Source Patient Position BP Location FiO2 (%)   11/28/17 0845 11/28/17 0845 -- -- --   Tympanic Monitor          Physical Exam   Constitutional: She is oriented to person, place, and time and well-developed, well-nourished, and in no distress. No distress.   HENT:   Head: Normocephalic and atraumatic.   Eyes: EOM are normal. Pupils are equal, round, and reactive to light.   Neck: Normal range of motion. Neck supple.   Cardiovascular: Normal rate, regular rhythm and normal heart sounds.    Pulmonary/Chest: Effort normal and breath sounds normal. No respiratory distress.   Abdominal: Soft. There is tenderness (right mid-abdomen, RUQ and epigastric). There is no rebound and no guarding.   Musculoskeletal: Normal range of motion. She exhibits no edema.    Neurological: She is alert and oriented to person, place, and time. She has normal sensation and normal strength.   Skin: Skin is warm and dry. No rash noted.   Psychiatric: Mood and affect normal.   Nursing note and vitals reviewed.      LAB RESULTS  Lab Results (last 24 hours)     Procedure Component Value Units Date/Time    CBC & Differential [[id number]] Collected:  11/28/17 0920    Specimen:  Blood Updated:  11/28/17 0927    Narrative:       The following orders were created for panel order CBC & Differential.  Procedure                               Abnormality         Status                     ---------                               -----------         ------                     CBC Auto Differential[[id number]]        Abnormal            Final result                 Please view results for these tests on the individual orders.    Comprehensive Metabolic Panel [[id number]]  (Abnormal) Collected:  11/28/17 0920    Specimen:  Blood Updated:  11/28/17 0952     Glucose 113 (H) mg/dL      BUN 11 mg/dL      Creatinine 0.70 mg/dL      Sodium 139 mmol/L      Potassium 3.3 (L) mmol/L      Chloride 100 mmol/L      CO2 27.5 mmol/L      Calcium 9.3 mg/dL      Total Protein 7.0 g/dL      Albumin 4.20 g/dL      ALT (SGPT) 78 (H) U/L      AST (SGOT) 115 (H) U/L      Alkaline Phosphatase 58 U/L      Total Bilirubin 1.3 (H) mg/dL      eGFR Non African Amer 84 mL/min/1.73      Globulin 2.8 gm/dL      A/G Ratio 1.5 g/dL      BUN/Creatinine Ratio 15.7     Anion Gap 11.5 mmol/L     Lipase [[id number]]  (Normal) Collected:  11/28/17 0920    Specimen:  Blood Updated:  11/28/17 0952     Lipase 15 U/L     Troponin [[id number]]  (Normal) Collected:  11/28/17 0920    Specimen:  Blood Updated:  11/28/17 0952     Troponin T <0.010 ng/mL     Narrative:       Troponin T Reference Ranges:  Less than 0.03 ng/mL:    Negative for AMI  0.03 to 0.09 ng/mL:      Indeterminant for AMI  Greater than 0.09 ng/mL: Positive for AMI    CBC Auto  Differential [[id number]]  (Abnormal) Collected:  11/28/17 0920    Specimen:  Blood Updated:  11/28/17 0927     WBC 7.30 10*3/mm3      RBC 5.05 10*6/mm3      Hemoglobin 16.1 (H) g/dL      Hematocrit 47.6 (H) %      MCV 94.3 fL      MCH 31.9 pg      MCHC 33.8 g/dL      RDW 12.1 %      RDW-SD 41.1 fl      MPV 10.7 fL      Platelets 242 10*3/mm3      Neutrophil % 73.3 %      Lymphocyte % 17.0 (L) %      Monocyte % 7.4 %      Eosinophil % 1.6 %      Basophil % 0.7 %      Immature Grans % 0.0 %      Neutrophils, Absolute 5.35 10*3/mm3      Lymphocytes, Absolute 1.24 10*3/mm3      Monocytes, Absolute 0.54 10*3/mm3      Eosinophils, Absolute 0.12 10*3/mm3      Basophils, Absolute 0.05 10*3/mm3      Immature Grans, Absolute 0.00 10*3/mm3           I ordered the above labs and reviewed the results    RADIOLOGY  US Gallbladder    (Results Pending)   CT Abdomen Pelvis With Contrast    (Results Pending)      1110- Reviewed pt's US Gallbladder, which shows nothing acute. Independently viewed by me. Interpreted by radiologist. Discussed with US technician.    1153- Reviewed pt's CT Abd/Pelvis, which shows pericholecystic fluid, gallbladder is distended and gallbladder wall is slightly thickened. There is also a left lower lobe nodule that requires follow up. Independently viewed by me. Interpreted by radiologist. Discussed with Dr. Chambers.    I ordered the above noted radiological studies. Interpreted by radiologist. Discussed with radiologist (Dr. Chambers). Reviewed by me in PACS.       PROCEDURES  Procedures  EKG           EKG time: 0850  Rhythm/Rate: NSR with PVCs, 72  P waves and NH: normal  QRS, axis: normal   ST and T waves: normal     Interpreted Contemporaneously by me, independently viewed  changed compared to prior on 2-23-10 (PVCs are new)    PROGRESS AND CONSULTS  ED Course     0858- D/w pt that her symptoms are c/w gallbladder pain. Discussed the plan to order a gallbladder US for further evaluation. Pt and family agree  with the plan and all questions were addressed.    0859- Ordered blood work, lipase, troponin and US Gallbladder for further evaluation. Ordered zofran for nausea.    1113- Ordered CT Abd/Pelvis for further evaluation.    1143- Rechecked pt. Pt is resting comfortably. Notified pt and family of the pt's unremarkable Gallbladder US and lab results and that I am waiting on the official read of the CT. Pt and family agree with the plan and all questions were addressed.    1157- Rechecked pt. Pt is resting comfortably. Notified pt and family of the pt's CT Abd/Pelvis results. Discussed the plan to consult general surgery on call for further evaluation. Pt and family agree with the plan and all questions were addressed.    12:01 PM  Latest vital signs   BP- 137/70 HR- 63 Temp- 97.9 °F (36.6 °C) (Tympanic) O2 sat- 92%    1202- Placed call to general surgery for consult.    1210- Discussed the pt's case with Dr. Madden (general surgery), who agrees to admit the pt to a Med/Surg bed.    MEDICAL DECISION MAKING  Results were reviewed/discussed with the patient and they were also made aware of online access. Pt also made aware that some labs, such as cultures, will not be resulted during ER visit and follow up with PMD is necessary.     MDM  Number of Diagnoses or Management Options  Cholecystitis:   Pulmonary nodule:      Amount and/or Complexity of Data Reviewed  Clinical lab tests: ordered and reviewed (Troponin=<0.010, LFTs are elevated)  Tests in the radiology section of CPT®: ordered and reviewed (CT abd/pelvis shows pericholecystic fluid, gallbladder is distended and gallbladder wall is slightly thickened. There is also a left lower lobe nodule that requires follow up.)  Tests in the medicine section of CPT®: ordered and reviewed (See EKG procedure note)  Discussion of test results with the performing providers: yes (D/w Dr. Chambers)  Decide to obtain previous medical records or to obtain history from someone other than  the patient: yes  Review and summarize past medical records: yes (Pt has numerous PT visits for fibromyalgia and chronic pain.)  Discuss the patient with other providers: yes (D/w Dr. Madden (general surgery))  Independent visualization of images, tracings, or specimens: yes    Patient Progress  Patient progress: stable         DIAGNOSIS  Final diagnoses:   Cholecystitis   Pulmonary nodule       DISPOSITION  ADMISSION    Discussed treatment plan and reason for admission with pt/family and admitting physician.  Pt/family voiced understanding of the plan for admission for further testing/treatment as needed.         Latest Documented Vital Signs:  As of 12:17 PM  BP- 137/70 HR- 63 Temp- 97.9 °F (36.6 °C) (Tympanic) O2 sat- 92%    --  Documentation assistance provided by katie Raygoza for Dr. Underwood.  Information recorded by the katie was done at my direction and has been verified and validated by me.     Meghann Raygoza  11/28/17 1202       Meghann Raygoza  11/28/17 1218       Jose Udnerwood MD  11/28/17 1201

## 2017-11-28 NOTE — OP NOTE
Operative Note:    Pre-op Dx:  Acute Cholecystitis    Post-op Dx: Same    Procedure:  Laparoscopic Cholecystectomy with IOC    Surgeon:  Demi Madden M.D.    Assistant:  OCTAVIANO Lawrence    Anesthesia:  GET    EBL:  Minium    Specimen:  Gallbladder    Complications:  None    Findings: Normal CBD, inflamed GB    Indications:  This is a pleasant 67 y.o. female  that presented to ER with RUQ abdominal pain, CT scan and US revealed acute cholecystitis.    Procedure:   After the patient underwent general endotracheal anesthesia, patient was prepped and draped in the usual sterile fashion.  An infraumbilical linear incision was performed with an 11 blade scalpel and a verus needle was inserted.  A saline drop test was performed which revealed the needle to be in appropriate position.  The abdomen was insufflated to 15 mmHg.  Using an Optiview trocar and a 0° scope, the abdomen was entered under direct visualization.  Upon entering the abdomen, a 10 mm trocar was placed in the subxiphoid area under direct visualization as well as two 5 mm trochars in the right upper quadrant.  Patient was positioned in supine position, reverse Trendelenburg and tilted toward the left. The gallbladder was distended and thickened. A decompression needle was used and 60cc of bilious aspirate was obtained. The gallbladder was grasped with a gallbladder grasper and elevated cephalad and retracted laterally.  The cystic duct and cystic artery were identified and dissected at the base of the gallbladder neck.  A distal clip was placed on the cystic duct and it was incised.  A cholangiogram catheter was inserted and a cholangiogram was performed.  The cholangiogram revealed no filling defects, duodenum filled readily and the right and left intrahepatic biliary ducts were visualized.  The catheter was removed and double clips were placed proximally.  The cystic duct was transected sharply.  The cystic artery was double clipped proximally and  one distal clip and divided sharply.  The gallbladder was then dissected from the gallbladder bed using hook cautery.  The gallbladder was delivered infraumbilically and submitted to pathology.  Copious irrigation and meticulous hemostasis was maintained throughout the procedure.  All trochars were removed under direct visualization without evidence of trocar site bleeding.  The infraumbilical trocar site was closed in a 2 layer fashion.  Closing the fascia with a figure 8, 0 Vicryl and the skin with a 4.0 Vicryl.  All incisions were infiltrated with 1/4% Marcaine and epinephrine and closed with 4.0 Vicryl.  Sterile bandages were applied and all needles and sponge counts were correct ×2.  Patient was transferred to recovery room in stable condition.    Demi Madden MD  General, Minimally Invasive and Endoscopic Surgery  [facility]    [address] [place] [address], [address]  [place], [place], [address]  P: [phone]  F: [phone]    Cc:  Yaquelin Valdez MD

## 2017-11-28 NOTE — PROGRESS NOTES
Clinical Pharmacy Services: Medication History    Clarissa Matos is a 67 y.o. female presenting to [facility] for Cholecystitis [K81.9]    She  has a past medical history of Anxiety; Arthritis; Depression; Fibromyalgia; and Hypertension.    Allergies as of 11/28/2017 - [place] as Reviewed 11/28/2017   Allergen Reaction Noted   • Penicillins  08/01/2017       Medication information was obtained from: Patient and Pharmacy   Pharmacy and Phone Number: [facility], [phone]    Prior to Admission Medications       Prescriptions Last Dose Informant Patient Reported? Taking?      aspirin-acetaminophen-caffeine (EXCEDRIN MIGRAINE) 250-250-65 MG per tablet  Self Yes Yes    Take 1 tablet by mouth Every 6 (Six) Hours As Needed for Headache.    calcium carbonate (OS-CRESCENCIO) 600 MG tablet  Self Yes Yes    Take 600 mg by mouth Daily.    cetirizine (zyrTEC) 10 MG tablet  Self Yes Yes    Take 10 mg by mouth Daily.    DULoxetine (CYMBALTA) 30 MG capsule  Pharmacy Yes Yes    Take 30 mg by mouth Daily.    Echinacea 500 MG capsule  Self Yes Yes    Take 2 tablets by mouth Daily.    HYDROcodone-acetaminophen (NORCO)  MG per tablet  Pharmacy Yes Yes    Take 1 tablet by mouth 5 (Five) Times a Day.    LORazepam (ATIVAN) 0.5 MG tablet  Pharmacy Yes Yes    Take 0.5 mg by mouth 2 (Two) Times a Day As Needed for Anxiety.    losartan-hydrochlorothiazide (HYZAAR) 50-12.5 MG per tablet  Pharmacy Yes Yes    Take 1 tablet by mouth Daily.    Multiple Vitamins-Minerals (MULTIVITAMIN ADULT EXTRA C PO)  Self Yes Yes    Take 1 tablet by mouth Daily.    zolpidem (AMBIEN) 10 MG tablet  Pharmacy Yes Yes    Take 10 mg by mouth At Night As Needed for Sleep.                Medication notes: Spoke to patient in the room and she gave me a medication list but it was incomplete. Called [facility] pharmacy to verify all medication dosages and frequency.    This medication list is complete to the best of my knowledge as of 11/28/2017    Please call if  questions.    Vicente Chiang  PharmD Candidate   11/28/2017 12:43 PM

## 2017-11-28 NOTE — H&P
Chief complaint: Right upper quadrant abdominal pain     Patient is a 67 y.o. female who had been having intermittent right upper quadrant abdominal pain for approximately 2 months after eating.  Patient reports she does not eat a fatty or spicy foods.  At approximately 2 AM this morning patient was awoken from her sleep with right upper quadrant abdominal pain and could not get comfortable.  Patient tried a heating pad and asses and repositioning.  Patient reports nothing was alleviated her pain and she started having a lot of belching and nausea but no vomiting or diarrhea.  The pain then radiated into her right chest and right shoulder.  Patient describes the pain as a constant squeezing sensation.  When the symptoms did not jerry she presented to the emergency room.  Patient denies fever, chills, jaundice, luis a colored stools or dark urine.     Past Medical History:   Diagnosis Date   • Anxiety    • Arthritis    • Depression    • Fibromyalgia    • Hypertension      Past Surgical History:   Procedure Laterality Date   • CATARACT EXTRACTION WITH INTRAOCULAR LENS IMPLANT     •  SECTION      x 2   • EYE SURGERY     • HYSTERECTOMY     • REPLACEMENT TOTAL KNEE Right    • ROTATOR CUFF REPAIR       History reviewed. No pertinent family history.  Social History   Substance Use Topics   • Smoking status: Never Smoker   • Smokeless tobacco: None   • Alcohol use None         Current Facility-Administered Medications:   •  famotidine (PEPCID) injection 20 mg, 20 mg, Intravenous, 60 Min Pre-Op, Nicolás Concepcion MD, 20 mg at 17 1353  •  fentaNYL citrate (PF) (SUBLIMAZE) injection 25 mcg, 25 mcg, Intravenous, Q10 Min PRN, Nicolás Concepcion MD, 25 mcg at 17 1353  •  lactated ringers infusion, 9 mL/hr, Intravenous, Continuous PRN, Nicolás Concepcion MD, Last Rate: 9 mL/hr at 17 1353, 9 mL/hr at 17 1353  •  levoFLOXacin (LEVAQUIN) 500 mg/100 mL D5W (premix) (LEVAQUIN) 500 mg, 500 mg, Intravenous, Q24H,  Demi Madden MD  •  midazolam (VERSED) injection 1 mg, 1 mg, Intravenous, Q5 Min PRN **OR** midazolam (VERSED) injection 2 mg, 2 mg, Intravenous, Q5 Min PRN, Nicolás Concepcion MD  •  sodium chloride 0.9 % flush 1-10 mL, 1-10 mL, Intravenous, PRN, Nicolás Concepcion MD  •  Insert peripheral IV, , , Once **AND** [MAR Hold] sodium chloride 0.9 % flush 10 mL, 10 mL, Intravenous, PRN, Jose Underwood MD    Review of Systems   General: Patient reports good health  Eyes: No eye problems  Ears, nose, mouth and throat: No rhinitis, no hearing problems, no chronic cough  Cardiovascular/heart: Denies palpitations, syncope or chest pain  Respiratory/lung: Denies shortness of breath, hemoptysis, or dyspnea on exertion   Genital/urinary: No frequency, hematuria or dysuria  Hematological/lymphatic: Denies anemia or other problems  Musculoskeletal: Fibromyalgia, joint pain  Skin: No psoriasis or other skin issues  Neurological: No seizures or other neurological problems  Psychiatric: Depression and anxiety  Endocrine: Negative  Gastro-intestinal: No constipation, no diarrhea, no melena, no hematochezia  All other systems have been reviewed and are negative.  Vitals:    11/28/17 1241 11/28/17 1242 11/28/17 1244 11/28/17 1307   BP:    147/87   BP Location:    Left arm   Patient Position:       Pulse: 65   66   Resp:  16  16   Temp:   98.4 °F (36.9 °C) 98 °F (36.7 °C)   TempSrc:   Tympanic Oral   SpO2: 96%   99%   Weight:       Height:           Physical Exam  General/physcological:   Alert and oriented x3 in no acute distress  HEENT: Normal cephalic, atraumatic, PERRLA, EOMI, sclera anicteric, moist mucous membranes, neck is supple, no JVD, no carotid bruits, no thyromegaly no adenopathy  Respiratory: CTA and percussion  CVA: RRR, normal S1-S2, no murmurs, no gallops or rubs  GI: Positive BS, soft, nondistended, positive right upper quadrant abdominal tenderness, no rebound, no guarding, no hernias, no organomegaly and no  masses  Musculoskeletal: Full range of motion, no clubbing, no cyanosis or edema  Neurovascular: Grossly intact    CT scan was reviewed which revealed acute cholecystitis  Ultrasound was reviewed which revealed acute cholecystitis    Results from last 7 days  Lab Units 11/28/17  0920   SODIUM mmol/L 139   POTASSIUM mmol/L 3.3*   CHLORIDE mmol/L 100   CO2 mmol/L 27.5   BUN mg/dL 11   CREATININE mg/dL 0.70   CALCIUM mg/dL 9.3   BILIRUBIN mg/dL 1.3*   ALK PHOS U/L 58   ALT (SGPT) U/L 78*   AST (SGOT) U/L 115*   GLUCOSE mg/dL 113*       Results from last 7 days  Lab Units 11/28/17  0920   WBC 10*3/mm3 7.30   HEMOGLOBIN g/dL 16.1*   HEMATOCRIT % 47.6*   PLATELETS 10*3/mm3 242     Assessment:  Acute cholecystitis  Plan:  I have recommended that the patient undergo a laparoscopic cholecystectomy and intraoperative cholangiogram.  I have discussed this procedure in detail with the patient and her  who is at her bedside.  I have discussed the risks, benefits and alternatives.  I have discussed the risk of anesthesia, bleeding, infection, bowel injuries and common bile duct injuries.  Patient understands these risks, benefits and alternatives and wishes to proceed.    Demi Madden MD  General, Minimally Invasive and Endoscopic Surgery  [facility]    [facility] [facility], [address]  [place], [place], [address]  P: [phone]  F: [phone]    Cc:  Yaquelin Valdez MD

## 2017-11-30 ENCOUNTER — APPOINTMENT (OUTPATIENT)
Dept: PHYSICAL THERAPY | Facility: HOSPITAL | Age: 67
End: 2017-11-30

## 2017-11-30 LAB
LAB AP CASE REPORT: NORMAL
Lab: NORMAL
PATH REPORT.FINAL DX SPEC: NORMAL
PATH REPORT.GROSS SPEC: NORMAL

## 2017-12-04 ENCOUNTER — APPOINTMENT (OUTPATIENT)
Dept: PHYSICAL THERAPY | Facility: HOSPITAL | Age: 67
End: 2017-12-04

## 2017-12-06 ENCOUNTER — APPOINTMENT (OUTPATIENT)
Dept: PHYSICAL THERAPY | Facility: HOSPITAL | Age: 67
End: 2017-12-06

## 2017-12-07 ENCOUNTER — OFFICE VISIT (OUTPATIENT)
Dept: OBSTETRICS AND GYNECOLOGY | Age: 67
End: 2017-12-07

## 2017-12-07 VITALS
HEIGHT: 63 IN | WEIGHT: 177.2 LBS | DIASTOLIC BLOOD PRESSURE: 88 MMHG | BODY MASS INDEX: 31.4 KG/M2 | SYSTOLIC BLOOD PRESSURE: 140 MMHG

## 2017-12-07 DIAGNOSIS — I10 ESSENTIAL HYPERTENSION: Primary | ICD-10-CM

## 2017-12-07 DIAGNOSIS — M79.7 FIBROMYALGIA: ICD-10-CM

## 2017-12-07 DIAGNOSIS — Z00.00 ANNUAL PHYSICAL EXAM: ICD-10-CM

## 2017-12-07 PROCEDURE — 99397 PER PM REEVAL EST PAT 65+ YR: CPT | Performed by: OBSTETRICS & GYNECOLOGY

## 2017-12-07 RX ORDER — ESTRADIOL 10 UG/1
1 INSERT VAGINAL 2 TIMES WEEKLY
Qty: 24 TABLET | Refills: 1 | Status: SHIPPED | OUTPATIENT
Start: 2017-12-07 | End: 2018-06-28

## 2017-12-07 RX ORDER — CYCLOBENZAPRINE HCL 10 MG
TABLET ORAL
Refills: 1 | COMMUNITY
Start: 2017-09-07 | End: 2018-06-28

## 2017-12-07 RX ORDER — ONDANSETRON 4 MG/1
TABLET, FILM COATED ORAL
Refills: 0 | COMMUNITY
Start: 2017-11-29 | End: 2018-06-28

## 2017-12-07 RX ORDER — TIZANIDINE 4 MG/1
TABLET ORAL
Refills: 0 | COMMUNITY
Start: 2017-11-29 | End: 2018-06-28

## 2017-12-07 NOTE — PROGRESS NOTES
Subjective   Clarissa Matos is a 67 y.o. female is being seen today for an annual exam.  Chief Complaint   Patient presents with   • Gynecologic Exam   .    History of Present Illness  Patient is here for an annual check.  She is on recently very recently underwent a cholecystectomy with good results.  She will once a [place] use Vagifem seems to help little vaginal irritation not so much with sex but occasionally just dryness and irritation and I will prescribe that today.  Her daughter is here back from [place] for now trying to locate getting architectural degree delivered her 31 years ago.  Mother had some heart disease had a stent put in an ablation father both about 85 and they will start to need more help soon.  Thousand bladder doing well no other complaints  The following portions of the patient's history were reviewed and updated as appropriate: allergies, current medications, past family history, past medical history, past social history, past surgical history and problem list.    Vitals:    17 1328   BP: 140/88       PAST MEDICAL HISTORY  Past Medical History:   Diagnosis Date   • Anxiety    • Arthritis    • Depression    • Fibromyalgia    • Hypertension      OB History      Para Term  AB Living    2 2 2       SAB TAB Ectopic Multiple Live Births                Past Surgical History:   Procedure Laterality Date   • BREAST BIOPSY     • CATARACT EXTRACTION WITH INTRAOCULAR LENS IMPLANT     •  SECTION      x 2   • CHOLECYSTECTOMY WITH INTRAOPERATIVE CHOLANGIOGRAM N/A 2017    Procedure: CHOLECYSTECTOMY LAPAROSCOPIC INTRAOPERATIVE CHOLANGIOGRAM;  Surgeon: Demi Madden MD;  Location: [facility];  Service:    • EYE SURGERY     • HYSTERECTOMY     • REPLACEMENT TOTAL KNEE Right    • ROTATOR CUFF REPAIR       Family History   Problem Relation Age of Onset   • No Known Problems Mother    • No Known Problems Father    • No Known Problems Sister    • No Known Problems  Brother    • No Known Problems Daughter    • No Known Problems Son    • No Known Problems Maternal Grandmother    • No Known Problems Paternal Grandmother    • No Known Problems Maternal Aunt    • No Known Problems Paternal Aunt    • BRCA 1/2 Neg Hx    • Breast cancer Neg Hx    • Colon cancer Neg Hx    • Endometrial cancer Neg Hx    • Ovarian cancer Neg Hx      History   Smoking Status   • Never Smoker   Smokeless Tobacco   • Never Used       Current Outpatient Prescriptions:   •  aspirin-acetaminophen-caffeine (EXCEDRIN MIGRAINE) 250-250-65 MG per tablet, Take 1 tablet by mouth Every 6 (Six) Hours As Needed for Headache., Disp: , Rfl:   •  calcium carbonate (OS-CRESCENCIO) 600 MG tablet, Take 600 mg by mouth Daily., Disp: , Rfl:   •  cetirizine (zyrTEC) 10 MG tablet, Take 10 mg by mouth Daily., Disp: , Rfl:   •  cyclobenzaprine (FLEXERIL) 10 MG tablet, TK 1/2 TO 1 T PO BID, Disp: , Rfl: 1  •  DULoxetine (CYMBALTA) 30 MG capsule, Take 30 mg by mouth Daily., Disp: , Rfl:   •  Echinacea 500 MG capsule, Take 2 tablets by mouth Daily., Disp: , Rfl:   •  estradiol (VAGIFEM) 10 MCG tablet vaginal tablet, Insert 1 tablet into the vagina 2 (Two) Times a Week., Disp: 24 tablet, Rfl: 1  •  HYDROcodone-acetaminophen (NORCO)  MG per tablet, Take 1 tablet by mouth 5 (Five) Times a Day., Disp: , Rfl:   •  LORazepam (ATIVAN) 0.5 MG tablet, Take 0.5 mg by mouth 2 (Two) Times a Day As Needed for Anxiety., Disp: , Rfl:   •  losartan-hydrochlorothiazide (HYZAAR) 50-12.5 MG per tablet, Take 1 tablet by mouth Daily., Disp: , Rfl:   •  Multiple Vitamins-Minerals (MULTIVITAMIN ADULT EXTRA C PO), Take 1 tablet by mouth Daily., Disp: , Rfl:   •  ondansetron (ZOFRAN) 4 MG tablet, TK 1 T PO Q 4 H PRN, Disp: , Rfl: 0  •  oxyCODONE-acetaminophen (PERCOCET) 5-325 MG per tablet, Take 1 tablet by mouth Every 4 (Four) Hours As Needed (as needed for pain) for up to 10 days., Disp: 30 tablet, Rfl: 0  •  promethazine (PHENERGAN) 12.5 MG tablet, Take 1  tablet by mouth Every 6 (Six) Hours As Needed for Nausea or Vomiting for up to 30 days., Disp: 10 tablet, Rfl: 0  •  tiZANidine (ZANAFLEX) 4 MG tablet, TK 1 T PO  BID, Disp: , Rfl: 0  •  zolpidem (AMBIEN) 10 MG tablet, Take 10 mg by mouth At Night As Needed for Sleep., Disp: , Rfl:     There is no immunization history on file for this patient.    Review of Systems   Constitutional: Negative for chills, fatigue, fever and unexpected weight change.   Respiratory: Negative for shortness of breath and wheezing.    Cardiovascular: Positive for palpitations. Negative for chest pain.   Gastrointestinal: Negative for abdominal distention, abdominal pain, blood in stool, constipation, diarrhea and nausea.   Genitourinary: Negative for difficulty urinating, dyspareunia, dysuria, frequency, hematuria, menstrual problem, pelvic pain, urgency and vaginal discharge.   Skin: Negative for rash.       Objective   Physical Exam   Constitutional: She is oriented to person, place, and time. Vital signs are normal. She appears well-developed and well-nourished.   Neck: No thyromegaly present.   Cardiovascular: Normal rate, regular rhythm and normal heart sounds.    Pulmonary/Chest: Effort normal. Right breast exhibits no inverted nipple, no mass, no nipple discharge, no skin change and no tenderness. Left breast exhibits no inverted nipple, no mass, no nipple discharge, no skin change and no tenderness. Breasts are symmetrical. There is no breast swelling.   Abdominal: Soft.   Genitourinary: Vagina normal and uterus normal. No breast tenderness, discharge or bleeding. Pelvic exam was performed with patient supine. No labial fusion. There is no rash, tenderness, lesion or injury on the right labia. There is no rash, tenderness, lesion or injury on the left labia. Cervix exhibits no motion tenderness, no discharge and no friability. Right adnexum displays no mass, no tenderness and no fullness. Left adnexum displays no mass, no tenderness  and no fullness.   Neurological: She is alert and oriented to person, place, and time.   Psychiatric: She has a normal mood and affect.   Vitals reviewed.        Assessment/Plan   Clarissa was seen today for gynecologic exam.    Diagnoses and all orders for this visit:    Essential hypertension    Fibromyalgia    Annual physical exam    Other orders  -     estradiol (VAGIFEM) 10 MCG tablet vaginal tablet; Insert 1 tablet into the vagina 2 (Two) Times a Week.    Normal exam post-hysterectomy.  She mentioned a little area right at the introitus about 3-4 clock this irritating besides a little scar tissue I did not see anything abnormal.  Pap smear not done today mammogram is being done today.  Recommended colonoscopy again.  And bone density last year was normal.  Diet and excise were discussed come back in a year

## 2017-12-11 ENCOUNTER — TELEPHONE (OUTPATIENT)
Dept: MAMMOGRAPHY | Age: 67
End: 2017-12-11

## 2017-12-11 NOTE — TELEPHONE ENCOUNTER
Informed pt her mammogram was abnormal .  Recommends RT spot and possible US of right breast.  [facility] schedule one will call her to set up appt.  Order in [facility].

## 2017-12-12 ENCOUNTER — APPOINTMENT (OUTPATIENT)
Dept: PHYSICAL THERAPY | Facility: HOSPITAL | Age: 67
End: 2017-12-12

## 2017-12-14 ENCOUNTER — HOSPITAL ENCOUNTER (OUTPATIENT)
Dept: PHYSICAL THERAPY | Facility: HOSPITAL | Age: 67
Setting detail: THERAPIES SERIES
Discharge: HOME | End: 2017-12-14

## 2017-12-14 ENCOUNTER — OFFICE VISIT (OUTPATIENT)
Dept: SURGERY | Facility: CLINIC | Age: 67
End: 2017-12-14

## 2017-12-14 VITALS
WEIGHT: 177.8 LBS | OXYGEN SATURATION: 98 % | BODY MASS INDEX: 31.5 KG/M2 | DIASTOLIC BLOOD PRESSURE: 80 MMHG | SYSTOLIC BLOOD PRESSURE: 140 MMHG | HEIGHT: 63 IN | HEART RATE: 76 BPM

## 2017-12-14 DIAGNOSIS — M54.5 CHRONIC BILATERAL LOW BACK PAIN, WITH SCIATICA PRESENCE UNSPECIFIED: ICD-10-CM

## 2017-12-14 DIAGNOSIS — G89.29 CHRONIC BILATERAL LOW BACK PAIN, WITH SCIATICA PRESENCE UNSPECIFIED: ICD-10-CM

## 2017-12-14 DIAGNOSIS — M25.561 CHRONIC PAIN OF RIGHT KNEE: ICD-10-CM

## 2017-12-14 DIAGNOSIS — M54.2 NECK PAIN: Primary | ICD-10-CM

## 2017-12-14 DIAGNOSIS — M25.572 ACUTE LEFT ANKLE PAIN: ICD-10-CM

## 2017-12-14 DIAGNOSIS — M79.7 FIBROMYALGIA: ICD-10-CM

## 2017-12-14 DIAGNOSIS — Z09 FOLLOW UP: Primary | ICD-10-CM

## 2017-12-14 DIAGNOSIS — G89.29 CHRONIC PAIN OF RIGHT KNEE: ICD-10-CM

## 2017-12-14 PROCEDURE — 99024 POSTOP FOLLOW-UP VISIT: CPT | Performed by: SURGERY

## 2017-12-14 PROCEDURE — 97110 THERAPEUTIC EXERCISES: CPT | Performed by: PHYSICAL THERAPIST

## 2017-12-14 NOTE — THERAPY PROGRESS REPORT/RE-CERT
Outpatient Physical Therapy Ortho Re-Assessment  [facility]     Patient Name: Clarissa Matos  : 1950  MRN: [id number]  Today's Date: 2017      Visit Date: 2017    Patient Active Problem List   Diagnosis   • Cholecystitis   • Fibromyalgia   • Essential hypertension        Past Medical History:   Diagnosis Date   • Anxiety    • Arthritis    • Depression    • Fibromyalgia    • Hypertension         Past Surgical History:   Procedure Laterality Date   • BREAST BIOPSY     • CATARACT EXTRACTION WITH INTRAOCULAR LENS IMPLANT     •  SECTION      x 2   • CHOLECYSTECTOMY WITH INTRAOPERATIVE CHOLANGIOGRAM N/A 2017    Procedure: CHOLECYSTECTOMY LAPAROSCOPIC INTRAOPERATIVE CHOLANGIOGRAM;  Surgeon: Demi Madden MD;  Location: [facility];  Service:    • EYE SURGERY     • HYSTERECTOMY     • REPLACEMENT TOTAL KNEE Right    • ROTATOR CUFF REPAIR         Visit Dx:     ICD-10-CM ICD-9-CM   1. Neck pain M54.2 723.1   2. Fibromyalgia M79.7 729.1   3. Chronic bilateral low back pain, with sciatica presence unspecified M54.5 724.2    G89.29 338.29   4. Chronic pain of right knee M25.561 719.46    G89.29 338.29   5. Acute left ankle pain M25.572 719.47                 PT Ortho       17 1300    Subjective Comments    Subjective Comments I had to have gallbladder surgery the Tuesday after  and is just now released to return to PT.  Having increased pain in the knees and shoulders.   -DM    Subjective Pain    Able to rate subjective pain? yes  -DM    Pre-Treatment Pain Level 4  -DM    Posture/Observations    Posture/Observations Comments Mild FH, rounded shoulders, increased lordosis, right knee in slight flexion  -DM    Ankle/Foot Special Tests    Anterior drawer (ATFL lesion) Negative  -DM    Inversion Stress Test Negative  -DM    Eversion Stress Test Negative  -DM    Trunk    Flexion AROM --   Decreased 25%  -DM    Extension AROM --   Decreased 25%  -DM    Left Lateral  Flexion AROM --   Decreased 25%  -DM    Right Lateral Flexion AROM --   Decreased 25%  -DM    Left Shoulder    Flexion Gross Movement (4/5) good  -DM    Extension Gross Movement (4/5) good  -DM    ABduction Gross Movement (4/5) good  -DM    Int Rotation Gross Movement (4/5) good  -DM    Ext Rotation Gross Movement (4/5) good  -DM    Right Shoulder    Flexion Gross Movement (4/5) good  -DM    Extension Gross Movement (4/5) good  -DM    ABduction Gross Movement (4/5) good  -DM    Int Rotation Gross Movement (4/5) good  -DM    Ext Rotation Gross Movement (4/5) good  -DM    Left Hip    Hip Flexion Gross Movement (4-/5) good minus  -DM    Hip Extension Gross Movement (4-/5) good minus  -DM    Right Hip    Hip Flexion Gross Movement (4-/5) good minus  -DM    Hip Extension Gross Movement (4-/5) good minus  -DM    Left Elbow/Forearm    Elbow Flexion Gross Movement (5/5) normal  -DM    Elbow Extension Gross Movement (5/5) normal  -DM    Right Elbow/Forearm    Elbow Flexion Gross Movement (5/5) normal  -DM    Elbow Extension Gross Movement (5/5) normal  -DM    Left Knee    Knee Extension Gross Movement (4-/5) good minus  -DM    Knee Flexion Gross Movement (4-/5) good minus  -DM    Right Knee    Knee Extension Gross Movement (4-/5) good minus  -DM    Knee Flexion Gross Movement (4-/5) good minus  -DM    Left Ankle/Foot    Ankle PF Gross Movement (4-/5) good minus  -DM    Ankle Dorsiflexion Gross Movement (4-/5) good minus  -DM    Subtalar Inversion Gross Movement (4-/5) good minus  -DM    Subtalar Eversion Gross Movement (3+/5) fair plus  -DM    Right Ankle/Foot    Ankle PF Gross Movement (5/5) normal  -DM    Ankle Dorsiflexion Gross Movement (5/5) normal  -DM    Subtalar Inversion Gross Movement (5/5) normal  -DM    Subtalar Eversion Gross Movement (5/5) normal  -DM      User Key  (r) = Recorded By, (t) = Taken By, (c) = Cosigned By    Initials Name Provider Type    DM Leny Morataya, PT Physical Therapist                                   PT OP Goals       12/14/17 1300       PT Short Term Goals    STG 1 Pt will be independent with water walking and flexiblity exercises for improved mobility with ADL's  -DM     STG 1 Progress Met  -DM     STG 2 Pt will tolerate 45 minutes of continuous aquatic exercise without esacerbatin of symptoms.  -DM     STG 2 Progress Progressing  -DM     STG 3 Pt will report pain decreased to 5/10 at worst with ADL's  -DM     STG 3 Progress Met  -DM     STG 3 Progress Comments Pain is at 4-4/12.  Is worse in knees and shoulders.   -DM     STG 4 Pt will tolerate sitting for greater 60 minutes.  -DM     STG 4 Progress Progressing  -DM     STG 4 Progress Comments Doing better.  Is now able to sit for 45 minutes prior to making change in position.   -DM     Long Term Goals    LTG 1 Pt will be independent with advanced aquatic exercise program for improved strength and flexibility.  -DM     LTG 1 Progress Ongoing  -DM     LTG 2 Pt will be independent with land flexibility program for improved mobiltiy with all weight bearing tasks.  -DM     LTG 2 Progress Ongoing  -DM     LTG 3 Pt will demonstrate improved UE/LE strength by 1/2 grade for improved stability with all functional tasks.  -DM     LTG 3 Progress Ongoing  -DM     LTG 4 Pt will report improved function via FIQR from 57.3% to 40% or less disability.  -DM     LTG 4 Progress Ongoing  -DM     LTG 5 Pt will demonstrate improved 5 x sit to stand from 17.55 seconds to 14 seconds or less.   -DM     LTG 5 Progress Progressing  -DM     LTG 5 Progress Comments Improved to 15.30 seconds  -DM       User Key  (r) = Recorded By, (t) = Taken By, (c) = Cosigned By    Initials Name Provider Type    AKILAH Morataya, PT Physical Therapist                PT Assessment/Plan       12/14/17 4088       PT Assessment    Assessment Comments Clarissa Matos has completed 9 sessions of physical therapy for fibromyalgia, neck pain, back pain, knee pain and ankle pain. She  missed two weeks of physical therapy for an emergency gallbladder surgery and returns today to resume PT.  Overall her mobility is improved and she demonstrates slight improvment in strength.  She would benefit from continuing aquatic therapy for improved strength and stability with functional tasks.   -DM     PT Plan    PT Plan Comments Resume aquatic PT  -DM       User Key  (r) = Recorded By, (t) = Taken By, (c) = Cosigned By    Initials Name Provider Type    AKILAH Morataya, PT Physical Therapist                Modalities       12/14/17 1300          Moist Heat    MH Applied Yes  -DM      Location LB, bilateral knees  -DM      Rx Minutes 15 mins  -DM        User Key  (r) = Recorded By, (t) = Taken By, (c) = Cosigned By    Initials Name Provider Type    AKILAH Morataya, PT Physical Therapist              Exercises       12/14/17 1300          Subjective Comments    Subjective Comments I had to have gallbladder surgery the Tuesday after Thanksgiving and is just now released to return to PT.  Having increased pain in the knees and shoulders.   -DM      Subjective Pain    Able to rate subjective pain? yes  -DM      Pre-Treatment Pain Level 4  -DM      Exercise 1    Exercise Name 1 Instructed in basic HEP to compliment her aquatic program as outlined on flow sheet.  -DM      Cueing 1 Verbal;Tactile;Demo  -DM      Exercise 2    Exercise Name 2 REasessment completed.   -DM        User Key  (r) = Recorded By, (t) = Taken By, (c) = Cosigned By    Initials Name Provider Type    AKILAH Morataya, PT Physical Therapist                        Outcome Measure Options: 5x Sit to Stand  5 Times Sit to Stand  5 Times Sit to Stand (seconds): 15.3 seconds         Time Calculation:   Start Time: 1300  Stop Time: 1345  Time Calculation (min): 45 min     Therapy Charges for Today     Code Description Service Date Service Provider Modifiers Qty    54811117429  PT THER PROC EA 15 MIN 12/14/2017 Leny Morataya, PT GP 2    [id number]  HC PT HOT OR COLD PACK TREAT MCARE 12/14/2017 Leny Morataya, PT GP 1          PT G-Codes  Outcome Measure Options: 5x Sit to Stand         Leny Morataya, PT  12/14/2017

## 2017-12-14 NOTE — PROGRESS NOTES
"Chief complaint:  Post-op  Follow up    History of Present Illness    This is Clarissa Matos 67 y.o. status post laparoscopic cholecystectomy and is doing very well.  Patient denies fever, chills, nausea or vomiting.  Patient's pain is well-controlled.      The following portions of the patient's history were reviewed and updated as appropriate: allergies, current medications, past family history, past medical history, past social history, past surgical history and problem list.    Vitals:    12/14/17 1013   BP: 140/80   Pulse: 76   SpO2: 98%   Weight: 80.6 kg (177 lb 12.8 oz)   Height: 160 cm (63\")       Physical Exam  Incision is well-healed without evidence of infection, herniation or seroma.    Patient does not use tobacco products currently and I have counseled the patient not to start using tobacco products in the future.    Assessment/plan:    This is Clarissa Matos 67 y.o. status post laparoscopic cholecystectomy and is doing very well.  I have instructed the patient not lift greater than 10 pounds for total of 6 weeks from the time of surgery. I have instructed the patient follow-up as needed.    Demi Madden MD  General, Minimally Invasive and Endoscopic Surgery  [facility] [place], operated by [facility]    [address], [address]  [place], [place], [address]  P: [phone]  F: [phone]    Cc:  Yaquelin Valdez MD  "

## 2017-12-15 ENCOUNTER — APPOINTMENT (OUTPATIENT)
Dept: MAMMOGRAPHY | Facility: HOSPITAL | Age: 67
End: 2017-12-15
Attending: OBSTETRICS & GYNECOLOGY

## 2017-12-19 ENCOUNTER — HOSPITAL ENCOUNTER (OUTPATIENT)
Dept: PHYSICAL THERAPY | Facility: HOSPITAL | Age: 67
Setting detail: THERAPIES SERIES
Discharge: HOME | End: 2017-12-19

## 2017-12-19 ENCOUNTER — APPOINTMENT (OUTPATIENT)
Dept: MAMMOGRAPHY | Facility: HOSPITAL | Age: 67
End: 2017-12-19
Attending: OBSTETRICS & GYNECOLOGY

## 2017-12-19 DIAGNOSIS — G89.29 CHRONIC PAIN OF RIGHT KNEE: ICD-10-CM

## 2017-12-19 DIAGNOSIS — M25.561 CHRONIC PAIN OF RIGHT KNEE: ICD-10-CM

## 2017-12-19 DIAGNOSIS — M54.2 NECK PAIN: Primary | ICD-10-CM

## 2017-12-19 DIAGNOSIS — M79.7 FIBROMYALGIA: ICD-10-CM

## 2017-12-19 PROCEDURE — 97113 AQUATIC THERAPY/EXERCISES: CPT | Performed by: PHYSICAL THERAPIST

## 2017-12-19 NOTE — THERAPY TREATMENT NOTE
Outpatient Physical Therapy Ortho Treatment Note  [facility]     Patient Name: Clarissa Matos  : 1950  MRN: [id number]  Today's Date: 2017      Visit Date: 2017    Visit Dx:    ICD-10-CM ICD-9-CM   1. Neck pain M54.2 723.1   2. Fibromyalgia M79.7 729.1   3. Chronic pain of right knee M25.561 719.46    G89.29 338.29       Patient Active Problem List   Diagnosis   • Cholecystitis   • Fibromyalgia   • Essential hypertension        Past Medical History:   Diagnosis Date   • Anxiety    • Arthritis    • Depression    • Fibromyalgia    • Hypertension         Past Surgical History:   Procedure Laterality Date   • BREAST BIOPSY     • CATARACT EXTRACTION WITH INTRAOCULAR LENS IMPLANT     •  SECTION      x 2   • CHOLECYSTECTOMY WITH INTRAOPERATIVE CHOLANGIOGRAM N/A 2017    Procedure: CHOLECYSTECTOMY LAPAROSCOPIC INTRAOPERATIVE CHOLANGIOGRAM;  Surgeon: Demi Madden MD;  Location: [facility];  Service:    • EYE SURGERY     • HYSTERECTOMY     • REPLACEMENT TOTAL KNEE Right    • ROTATOR CUFF REPAIR                               PT Assessment/Plan       17 1321       PT Assessment    Assessment Comments pt hasn't really lost any ground since last seen in the pool on 17. Just needs some refreshing of her routine til indep and also make sure she is okay with her land based program. 7 visits planned but will try to dc one fully indep  -ZK       User Key  (r) = Recorded By, (t) = Taken By, (c) = Cosigned By    Initials Name Provider Type    ZK Zorre Zeno Kimura, PT Physical Therapist                    Exercises       17 1000          Subjective Comments    Subjective Comments able to do land ex ok. still with neck and knee pain but ankle and back is better  -ZK      Subjective Pain    Able to rate subjective pain? yes  -ZK      Pre-Treatment Pain Level 3  -ZK      Post-Treatment Pain Level 2  -ZK      Aquatics    Aquatics performed? Yes  -ZK      Aquatics LE    Water  Walk forward;side;backward  -ZK      Stretch 1 HS sweep x10, LN  -ZK      Stretch 3 Piriformis stretch x30s  -ZK      Stretch Other 1 B calf stretch x30s  -ZK      Vertical Traction x2 min  -ZK      Abdominals noodle  -ZK      Hip Flex/Ext x 15  -ZK      Mini Squat 10  -ZK      Uni-Clock hip circles 10/10  -ZK      Step Ups shoulder rolls x 15  -ZK      Bicycle x2 min  -ZK        User Key  (r) = Recorded By, (t) = Taken By, (c) = Cosigned By    Initials Name Provider Type    ZK Zorre Zeno Kimura, PT Physical Therapist                                            Time Calculation:   Start Time: 0945  Stop Time: 1030  Time Calculation (min): 45 min    Therapy Charges for Today     Code Description Service Date Service Provider Modifiers Qty    66161021646 HC PT AQUATIC THERAPY EA 15 MIN 12/19/2017 Zorre Zeno Kimura, PT GP 3                    Zorre Zeno Kimura, PT  12/19/2017

## 2017-12-20 ENCOUNTER — HOSPITAL ENCOUNTER (OUTPATIENT)
Dept: MAMMOGRAPHY | Facility: HOSPITAL | Age: 67
Discharge: HOME | End: 2017-12-20
Attending: OBSTETRICS & GYNECOLOGY | Admitting: OBSTETRICS & GYNECOLOGY

## 2017-12-20 DIAGNOSIS — R92.2 BREAST DENSITY: ICD-10-CM

## 2017-12-20 PROCEDURE — G0206 DX MAMMO INCL CAD UNI: HCPCS

## 2017-12-21 ENCOUNTER — HOSPITAL ENCOUNTER (OUTPATIENT)
Dept: PHYSICAL THERAPY | Facility: HOSPITAL | Age: 67
Setting detail: THERAPIES SERIES
End: 2017-12-21

## 2017-12-26 ENCOUNTER — HOSPITAL ENCOUNTER (OUTPATIENT)
Dept: PHYSICAL THERAPY | Facility: HOSPITAL | Age: 67
Setting detail: THERAPIES SERIES
Discharge: HOME | End: 2017-12-26

## 2017-12-26 DIAGNOSIS — G89.29 CHRONIC PAIN OF RIGHT KNEE: ICD-10-CM

## 2017-12-26 DIAGNOSIS — M25.572 ACUTE LEFT ANKLE PAIN: ICD-10-CM

## 2017-12-26 DIAGNOSIS — M25.561 CHRONIC PAIN OF RIGHT KNEE: ICD-10-CM

## 2017-12-26 DIAGNOSIS — G89.29 CHRONIC BILATERAL LOW BACK PAIN, WITH SCIATICA PRESENCE UNSPECIFIED: ICD-10-CM

## 2017-12-26 DIAGNOSIS — M79.7 FIBROMYALGIA: ICD-10-CM

## 2017-12-26 DIAGNOSIS — M54.2 NECK PAIN: Primary | ICD-10-CM

## 2017-12-26 DIAGNOSIS — M54.5 CHRONIC BILATERAL LOW BACK PAIN, WITH SCIATICA PRESENCE UNSPECIFIED: ICD-10-CM

## 2017-12-26 PROCEDURE — 97113 AQUATIC THERAPY/EXERCISES: CPT | Performed by: PHYSICAL THERAPIST

## 2017-12-26 NOTE — THERAPY TREATMENT NOTE
Outpatient Physical Therapy Ortho Treatment Note  [facility]     Patient Name: Clarissa Matos  : 1950  MRN: [id number]  Today's Date: 2017      Visit Date: 2017    Visit Dx:    ICD-10-CM ICD-9-CM   1. Neck pain M54.2 723.1   2. Fibromyalgia M79.7 729.1   3. Chronic pain of right knee M25.561 719.46    G89.29 338.29   4. Chronic bilateral low back pain, with sciatica presence unspecified M54.5 724.2    G89.29 338.29   5. Acute left ankle pain M25.572 719.47       Patient Active Problem List   Diagnosis   • Cholecystitis   • Fibromyalgia   • Essential hypertension        Past Medical History:   Diagnosis Date   • Anxiety    • Arthritis    • Depression    • Fibromyalgia    • Hypertension         Past Surgical History:   Procedure Laterality Date   • BREAST BIOPSY     • CATARACT EXTRACTION WITH INTRAOCULAR LENS IMPLANT     •  SECTION      x 2   • CHOLECYSTECTOMY WITH INTRAOPERATIVE CHOLANGIOGRAM N/A 2017    Procedure: CHOLECYSTECTOMY LAPAROSCOPIC INTRAOPERATIVE CHOLANGIOGRAM;  Surgeon: Demi Madden MD;  Location: [facility];  Service:    • EYE SURGERY     • HYSTERECTOMY     • REPLACEMENT TOTAL KNEE Right    • ROTATOR CUFF REPAIR                               PT Assessment/Plan       17 1658       PT Assessment    Assessment Comments pt running late and forgot her home pool ex but has made some new year resolutions to change this issue. doing well overall with mild general aches and pains and able to do L LE stretch without aggravating LBP.   -ZK       User Key  (r) = Recorded By, (t) = Taken By, (c) = Cosigned By    Initials Name Provider Type    ZK Zorre Zeno Kimura, PT Physical Therapist                    Exercises       17 1600          Subjective Comments    Subjective Comments busy with holidays and glad to get some rest today.   -ZK      Subjective Pain    Able to rate subjective pain? yes  -ZK      Pre-Treatment Pain Level 4  -ZK      Post-Treatment  Pain Level 3  -ZK      Subjective Pain Comment some left sided sciatica but okay at the moment  -ZK      Aquatics    Aquatics performed? Yes  -ZK      Aquatics LE    Water Walk forward;side  -ZK      Stretch 1 HS sweep x10, LN  -ZK      Stretch 3 Piriformis stretch x30s  -ZK      Vertical Traction x2 min  -ZK      Abdominals noodle  -ZK      Hip Abd/Add 10  -ZK      Hip Flex/Ext x 15  -ZK      March in Place 10  -ZK      Mini Squat 10  -ZK      Toe/Heel Raises heel raises  -ZK      Uni-Clock hip circles 10/10  -ZK      Step Ups shoulder rolls x 15  -ZK      Bicycle x2 min  -ZK      Aquatics UE    Stretch 1 UE sweeps  -ZK      Stretch 2 side bend for trunk  -ZK      Stretch 3 wrist stretching  -ZK        User Key  (r) = Recorded By, (t) = Taken By, (c) = Cosigned By    Initials Name Provider Type    JOSE MANUELK Zorre Zeno Kimura, PT Physical Therapist                                            Time Calculation:   Start Time: 1445  Stop Time: 1530  Time Calculation (min): 45 min    Therapy Charges for Today     Code Description Service Date Service Provider Modifiers Qty    77211957931 HC PT AQUATIC THERAPY EA 15 MIN 12/26/2017 Zorre Zeno Kimura, PT GP 3                    Zorre Zeno Kimura, PT  12/26/2017

## 2018-01-03 ENCOUNTER — APPOINTMENT (OUTPATIENT)
Dept: PHYSICAL THERAPY | Facility: HOSPITAL | Age: 68
End: 2018-01-03

## 2018-01-04 ENCOUNTER — HOSPITAL ENCOUNTER (OUTPATIENT)
Dept: PHYSICAL THERAPY | Facility: HOSPITAL | Age: 68
Setting detail: THERAPIES SERIES
Discharge: HOME | End: 2018-01-04

## 2018-01-04 DIAGNOSIS — G89.29 CHRONIC PAIN OF RIGHT KNEE: ICD-10-CM

## 2018-01-04 DIAGNOSIS — M54.5 CHRONIC BILATERAL LOW BACK PAIN, WITH SCIATICA PRESENCE UNSPECIFIED: ICD-10-CM

## 2018-01-04 DIAGNOSIS — M25.561 CHRONIC PAIN OF RIGHT KNEE: ICD-10-CM

## 2018-01-04 DIAGNOSIS — G89.29 CHRONIC BILATERAL LOW BACK PAIN, WITH SCIATICA PRESENCE UNSPECIFIED: ICD-10-CM

## 2018-01-04 DIAGNOSIS — M54.2 NECK PAIN: Primary | ICD-10-CM

## 2018-01-04 DIAGNOSIS — M79.7 FIBROMYALGIA: ICD-10-CM

## 2018-01-04 PROCEDURE — 97113 AQUATIC THERAPY/EXERCISES: CPT | Performed by: PHYSICAL THERAPIST

## 2018-01-04 NOTE — THERAPY TREATMENT NOTE
Outpatient Physical Therapy Ortho Treatment Note  [facility]     Patient Name: Clarissa Matos  : 1950  MRN: [id number]  Today's Date: 2018      Visit Date: 2018    Visit Dx:    ICD-10-CM ICD-9-CM   1. Neck pain M54.2 723.1   2. Fibromyalgia M79.7 729.1   3. Chronic pain of right knee M25.561 719.46    G89.29 338.29   4. Chronic bilateral low back pain, with sciatica presence unspecified M54.5 724.2    G89.29 338.29       Patient Active Problem List   Diagnosis   • Cholecystitis   • Fibromyalgia   • Essential hypertension        Past Medical History:   Diagnosis Date   • Anxiety    • Arthritis    • Depression    • Fibromyalgia    • Hypertension         Past Surgical History:   Procedure Laterality Date   • BREAST BIOPSY     • CATARACT EXTRACTION WITH INTRAOCULAR LENS IMPLANT     •  SECTION      x 2   • CHOLECYSTECTOMY WITH INTRAOPERATIVE CHOLANGIOGRAM N/A 2017    Procedure: CHOLECYSTECTOMY LAPAROSCOPIC INTRAOPERATIVE CHOLANGIOGRAM;  Surgeon: Demi Madden MD;  Location[facility];  Service:    • EYE SURGERY     • HYSTERECTOMY     • REPLACEMENT TOTAL KNEE Right    • ROTATOR CUFF REPAIR                               PT Assessment/Plan       18 1524       PT Assessment    Assessment Comments pt feeling lethargic but okay with pain today. getting better at initiating her ex program with less cueing  -JOSE MANUELK       User Key  (r) = Recorded By, (t) = Taken By, (c) = Cosigned By    Initials Name Provider Type    JOSE MANUELK Zorre Zeno Kimura, PT Physical Therapist                    Exercises       18 1500          Subjective Comments    Subjective Comments doing okay with most of ADLs. just tight and sore in general  -ZK      Subjective Pain    Able to rate subjective pain? yes  -ZK      Pre-Treatment Pain Level 3  -ZK      Post-Treatment Pain Level 2  -ZK      Aquatics    Aquatics performed? Yes  -ZK      Aquatics LE    Stretch 1 HS sweep x10, LN  -ZK      Stretch 2 pec and  post shoulder stretching using railing  -ZK      Stretch 3 Piriformis stretch x30s  -ZK      Stretch Other 1 B calf stretch x30s  -ZK      Stretch Other 2 neck fwd roll UT  -ZK      Vertical Traction x2 min  -ZK      Abdominals noodle  -ZK      Hip Flex/Ext x 15  -ZK      March in Place 10  -ZK      Mini Squat 10  -ZK      Uni-Squat 10 each using LN for balance  -ZK      Uni-Clock hip circles 10/10  -ZK      Step Ups shoulder rolls x 15  -ZK      Aquatics UE    Stretch 1 UE sweeps  -ZK      Stretch 2 side bend for trunk  -ZK      Stretch 3 wrist stretching  -ZK      Stretch Other 1 ai chi ex 1,3, 6, 13  -ZK        User Key  (r) = Recorded By, (t) = Taken By, (c) = Cosigned By    Initials Name Provider Type    ZK Zorre Zeno Kimura, PT Physical Therapist                                            Time Calculation:   Start Time: 1430  Stop Time: 1515  Time Calculation (min): 45 min    Therapy Charges for Today     Code Description Service Date Service Provider Modifiers Qty    60212876923 HC PT AQUATIC THERAPY EA 15 MIN 1/4/2018 Zorre Zeno Kimura, PT GP 3                    Zorre Zeno Kimura, PT  1/4/2018

## 2018-01-05 ENCOUNTER — TELEPHONE (OUTPATIENT)
Dept: OBSTETRICS AND GYNECOLOGY | Age: 68
End: 2018-01-05

## 2018-01-05 RX ORDER — FLUCONAZOLE 150 MG/1
150 TABLET ORAL ONCE
Qty: 2 TABLET | Refills: 0 | Status: SHIPPED | OUTPATIENT
Start: 2018-01-05 | End: 2018-01-05

## 2018-01-05 NOTE — TELEPHONE ENCOUNTER
I sent in diflucan for possible yeast, but could be vaginal atrophy/atrophic vaginitis so would recommend otc vagisil or replenz/rephresh topically. If s/s don't improve, I would recommend an appt to further eval

## 2018-01-05 NOTE — TELEPHONE ENCOUNTER
Dr RICHARD pt, who rx'd estradiol vag insert. Pt started exp irritation, redness, itch no d/c, discontinued the estradiol insert a week ago used Monistat 3 insert and has not had any improvement. Please advise

## 2018-01-09 ENCOUNTER — HOSPITAL ENCOUNTER (OUTPATIENT)
Dept: PHYSICAL THERAPY | Facility: HOSPITAL | Age: 68
Setting detail: THERAPIES SERIES
Discharge: HOME | End: 2018-01-09

## 2018-01-09 DIAGNOSIS — M25.572 ACUTE LEFT ANKLE PAIN: ICD-10-CM

## 2018-01-09 DIAGNOSIS — G89.29 CHRONIC PAIN OF RIGHT KNEE: ICD-10-CM

## 2018-01-09 DIAGNOSIS — M54.5 CHRONIC BILATERAL LOW BACK PAIN, WITH SCIATICA PRESENCE UNSPECIFIED: ICD-10-CM

## 2018-01-09 DIAGNOSIS — M79.7 FIBROMYALGIA: ICD-10-CM

## 2018-01-09 DIAGNOSIS — M54.2 NECK PAIN: Primary | ICD-10-CM

## 2018-01-09 DIAGNOSIS — M25.561 CHRONIC PAIN OF RIGHT KNEE: ICD-10-CM

## 2018-01-09 DIAGNOSIS — G89.29 CHRONIC BILATERAL LOW BACK PAIN, WITH SCIATICA PRESENCE UNSPECIFIED: ICD-10-CM

## 2018-01-09 PROCEDURE — 97113 AQUATIC THERAPY/EXERCISES: CPT | Performed by: PHYSICAL THERAPIST

## 2018-01-09 NOTE — THERAPY TREATMENT NOTE
Outpatient Physical Therapy Ortho Treatment Note  [facility]     Patient Name: Clarissa Matos  : 1950  MRN: [id number]  Today's Date: 2018      Visit Date: 2018    Visit Dx:    ICD-10-CM ICD-9-CM   1. Neck pain M54.2 723.1   2. Fibromyalgia M79.7 729.1   3. Chronic pain of right knee M25.561 719.46    G89.29 338.29   4. Chronic bilateral low back pain, with sciatica presence unspecified M54.5 724.2    G89.29 338.29   5. Acute left ankle pain M25.572 719.47       Patient Active Problem List   Diagnosis   • Cholecystitis   • Fibromyalgia   • Essential hypertension        Past Medical History:   Diagnosis Date   • Anxiety    • Arthritis    • Depression    • Fibromyalgia    • Hypertension         Past Surgical History:   Procedure Laterality Date   • BREAST BIOPSY     • CATARACT EXTRACTION WITH INTRAOCULAR LENS IMPLANT     •  SECTION      x 2   • CHOLECYSTECTOMY WITH INTRAOPERATIVE CHOLANGIOGRAM N/A 2017    Procedure: CHOLECYSTECTOMY LAPAROSCOPIC INTRAOPERATIVE CHOLANGIOGRAM;  Surgeon: Demi Madden MD;  Location: [facility];  Service:    • EYE SURGERY     • HYSTERECTOMY     • REPLACEMENT TOTAL KNEE Right    • ROTATOR CUFF REPAIR                               PT Assessment/Plan       18 1521       PT Assessment    Assessment Comments took it easy today with shoulder stretches due to pain with excessive adduction from one particular ai chi ex. still needs cueing but understands her goal to be indep. right now figuring out steps for ex after dc.   -YULIANA       User Key  (r) = Recorded By, (t) = Taken By, (c) = Cosigned By    Initials Name Provider Type    ZK Zorre Zeno Kimura, PT Physical Therapist                    Exercises       18 1500          Subjective Comments    Subjective Comments sore from ai chi shoulder stretch last week. affected L posterior shoulder for about 5 days after rx.   -YULIANA      Subjective Pain    Able to rate subjective pain? yes  -YULIANA       Pre-Treatment Pain Level 5  -ZK      Post-Treatment Pain Level 3  -ZK      Aquatics    Aquatics performed? Yes  -ZK      Aquatics LE    Stretch 1 HS sweep x10, LN  -ZK      Stretch 2 pec and post shoulder stretching using railing  -ZK      Stretch 3 Piriformis stretch x30s  -ZK      Stretch Other 1 B calf stretch x30s  -ZK      Stretch Other 2 neck fwd roll UT  -ZK      Vertical Traction x2 min  -ZK      Abdominals noodle  -ZK      Hip Flex/Ext x 15  -ZK      March in Place 10  -ZK      Mini Squat 10  -ZK      Uni-Squat 10 each using LN for balance  -ZK      Uni-Clock hip circles 10/10  -ZK      Step Ups shoulder rolls x 15  -ZK      Bicycle x2 min  -ZK      Aquatics UE    Stretch 1 UE sweeps  -ZK      Stretch 2 side bend for trunk  -ZK      Stretch 3 wrist stretching  -ZK        User Key  (r) = Recorded By, (t) = Taken By, (c) = Cosigned By    Initials Name Provider Type    ZK Zorre Zeno Kimura, PT Physical Therapist                                            Time Calculation:   Start Time: 1400  Stop Time: 1445  Time Calculation (min): 45 min    Therapy Charges for Today     Code Description Service Date Service Provider Modifiers Qty    85732470821 HC PT AQUATIC THERAPY EA 15 MIN 1/9/2018 Zorre Zeno Kimura, PT GP 3                    Zorre Zeno Kimura, PT  1/9/2018

## 2018-01-11 ENCOUNTER — HOSPITAL ENCOUNTER (OUTPATIENT)
Dept: PHYSICAL THERAPY | Facility: HOSPITAL | Age: 68
Setting detail: THERAPIES SERIES
Discharge: HOME | End: 2018-01-11

## 2018-01-11 DIAGNOSIS — M25.572 ACUTE LEFT ANKLE PAIN: ICD-10-CM

## 2018-01-11 DIAGNOSIS — M25.561 CHRONIC PAIN OF RIGHT KNEE: ICD-10-CM

## 2018-01-11 DIAGNOSIS — G89.29 CHRONIC BILATERAL LOW BACK PAIN, WITH SCIATICA PRESENCE UNSPECIFIED: ICD-10-CM

## 2018-01-11 DIAGNOSIS — M79.7 FIBROMYALGIA: ICD-10-CM

## 2018-01-11 DIAGNOSIS — G89.29 CHRONIC PAIN OF RIGHT KNEE: ICD-10-CM

## 2018-01-11 DIAGNOSIS — M54.2 NECK PAIN: Primary | ICD-10-CM

## 2018-01-11 DIAGNOSIS — M54.5 CHRONIC BILATERAL LOW BACK PAIN, WITH SCIATICA PRESENCE UNSPECIFIED: ICD-10-CM

## 2018-01-11 PROCEDURE — 97113 AQUATIC THERAPY/EXERCISES: CPT | Performed by: PHYSICAL THERAPIST

## 2018-01-11 NOTE — THERAPY TREATMENT NOTE
Outpatient Physical Therapy Ortho Treatment Note  [facility]     Patient Name: Clarissa Matos  : 1950  MRN: [id number]  Today's Date: 2018      Visit Date: 2018    Visit Dx:    ICD-10-CM ICD-9-CM   1. Neck pain M54.2 723.1   2. Fibromyalgia M79.7 729.1   3. Chronic pain of right knee M25.561 719.46    G89.29 338.29   4. Chronic bilateral low back pain, with sciatica presence unspecified M54.5 724.2    G89.29 338.29   5. Acute left ankle pain M25.572 719.47       Patient Active Problem List   Diagnosis   • Cholecystitis   • Fibromyalgia   • Essential hypertension        Past Medical History:   Diagnosis Date   • Anxiety    • Arthritis    • Depression    • Fibromyalgia    • Hypertension         Past Surgical History:   Procedure Laterality Date   • BREAST BIOPSY     • CATARACT EXTRACTION WITH INTRAOCULAR LENS IMPLANT     •  SECTION      x 2   • CHOLECYSTECTOMY WITH INTRAOPERATIVE CHOLANGIOGRAM N/A 2017    Procedure: CHOLECYSTECTOMY LAPAROSCOPIC INTRAOPERATIVE CHOLANGIOGRAM;  Surgeon: Demi Madden MD;  Location: [facility];  Service:    • EYE SURGERY     • HYSTERECTOMY     • REPLACEMENT TOTAL KNEE Right    • ROTATOR CUFF REPAIR                               PT Assessment/Plan       18 1449       PT Assessment    Assessment Comments followed her HEP today and pt close to being indep. 2 more sessions til dc. proud of pt being on time today and set a goal for better health thus joined for 3 months to ex during winter.   -YULIANA       User Key  (r) = Recorded By, (t) = Taken By, (c) = Cosigned By    Initials Name Provider Type    ZK Zorre Zeno Kimura, PT Physical Therapist                    Exercises       18 1400          Subjective Comments    Subjective Comments L shoulder still sore but better. Joined this gym for 3 months and will have a  for 2 sessions for land based gym ex.   -YULIANA      Subjective Pain    Able to rate subjective pain? yes  -YULIANA       Pre-Treatment Pain Level 3  -ZK      Post-Treatment Pain Level 2  -ZK      Aquatics    Aquatics performed? Yes  -ZK      Aquatics LE    Water Walk forward;side;backward  -ZK      Stretch 1 HS sweep x10, LN  -ZK      Stretch 2 pec and post shoulder stretching using railing  -ZK      Stretch 3 Piriformis stretch x30s  -ZK      Stretch Other 1 B calf stretch x30s  -ZK      Stretch Other 2 neck fwd roll UT  -ZK      Vertical Traction x2 min  -ZK      Abdominals noodle  -ZK      Hip Flex/Ext x 15  -ZK      March in Place 10  -ZK      Mini Squat 10  -ZK      Uni-Clock hip circles 10/10  -ZK      Step Ups shoulder rolls x 15  -ZK      Bicycle x2 min  -ZK      Flutter/Scissor next on bench  -ZK      Aquatics UE    Stretch 1 UE sweeps  -ZK      Stretch 2 side bend for trunk  -ZK      Stretch 3 wrist stretching  -ZK      Stretch Other 1 wall stretches single and DL for low back hams  -ZK      Stretch Other 2 easy trunk rotation using LN  -ZK        User Key  (r) = Recorded By, (t) = Taken By, (c) = Cosigned By    Initials Name Provider Type    ZK Zorre Zeno Kimura, PT Physical Therapist                                            Time Calculation:   Start Time: 1345  Stop Time: 1430  Time Calculation (min): 45 min    Therapy Charges for Today     Code Description Service Date Service Provider Modifiers Qty    07458210797 HC PT AQUATIC THERAPY EA 15 MIN 1/11/2018 Zorre Zeno Kimura, PT GP 3                    Zorre Zeno Kimura, PT  1/11/2018

## 2018-01-16 ENCOUNTER — HOSPITAL ENCOUNTER (OUTPATIENT)
Dept: PHYSICAL THERAPY | Facility: HOSPITAL | Age: 68
Setting detail: THERAPIES SERIES
End: 2018-01-16

## 2018-01-18 ENCOUNTER — HOSPITAL ENCOUNTER (OUTPATIENT)
Dept: PHYSICAL THERAPY | Facility: HOSPITAL | Age: 68
Setting detail: THERAPIES SERIES
Discharge: HOME | End: 2018-01-18

## 2018-01-18 ENCOUNTER — DOCUMENTATION (OUTPATIENT)
Dept: PHYSICAL THERAPY | Facility: HOSPITAL | Age: 68
End: 2018-01-18

## 2018-01-18 NOTE — THERAPY DISCHARGE NOTE
Outpatient Physical Therapy Discharge Summary         Patient Name: Clarissa Matos  : 1950  MRN: [id number]    Today's Date: 2018    Visit Dx:  No diagnosis found.          PT OP Goals       18 1156       PT Short Term Goals    STG Date to Achieve 17  -ZK     STG 1 Pt will be independent with water walking and flexiblity exercises for improved mobility with ADL's  -ZK     STG 1 Progress Met  -ZK     STG 2 Pt will tolerate 45 minutes of continuous aquatic exercise without esacerbatin of symptoms.  -ZK     STG 2 Progress Met  -ZK     STG 3 Pt will report pain decreased to 5/10 at worst with ADL's  -ZK     STG 3 Progress Met  -ZK     STG 4 Pt will tolerate sitting for greater 60 minutes.  -ZK     STG 4 Progress Met  -ZK     Long Term Goals    LTG Date to Achieve 17  -ZK     LTG 1 Pt will be independent with advanced aquatic exercise program for improved strength and flexibility.  -ZK     LTG 1 Progress Met  -ZK     LTG 2 Pt will be independent with land flexibility program for improved mobiltiy with all weight bearing tasks.  -ZK     LTG 2 Progress Partially Met  -ZK     LTG 2 Progress Comments will work with a  for some gym ex as she joined our center last week  -ZK     LTG 3 Pt will demonstrate improved UE/LE strength by 1/2 grade for improved stability with all functional tasks.  -ZK     LTG 3 Progress Progressing;Not Met  -ZK     LTG 4 Pt will report improved function via FIQR from 57.3% to 40% or less disability.  -ZK     LTG 4 Progress Ongoing  -ZK     LTG 4 Progress Comments did not show for last 2 sessions so not documented   -ZK     LTG 5 Pt will demonstrate improved 5 x sit to stand from 17.55 seconds to 14 seconds or less.   -ZK     LTG 5 Progress Progressing  -ZK     LTG 5 Progress Comments did not show for last session so not tested  -       User Key  (r) = Recorded By, (t) = Taken By, (c) = Cosigned By    Initials Name Provider Type    ZK Zorre Zeno Kimura, PT  Physical Therapist          OP PT Discharge Summary  Date of Discharge: 01/18/18  Reason for Discharge: Independent, Patient/Caregiver request  Outcomes Achieved: Patient able to partially acheive established goals  Discharge Destination: Home with home program  Discharge Instructions: pt seen 14 times with 13 sessions in the pool. stopped for 3 weeks in the middle of our rx due to minor abdominal surgery but bounced back pretty quickly. pt needed lots of encouragement to ex on a regular basis so I'm happy she has joined her for 3 months to keep her motivated, hopefully.       Time Calculation:                    Zorre Zeno Kimura, PT  1/18/2018

## 2018-01-26 ENCOUNTER — TELEPHONE (OUTPATIENT)
Dept: OBSTETRICS AND GYNECOLOGY | Age: 68
End: 2018-01-26

## 2018-01-26 RX ORDER — DIPHENHYDRAMINE, LIDOCAINE, NYSTATIN
5 KIT ORAL 4 TIMES DAILY
Qty: 60 ML | Refills: 0 | Status: SHIPPED | OUTPATIENT
Start: 2018-01-26 | End: 2018-06-28

## 2018-01-26 NOTE — TELEPHONE ENCOUNTER
Dr RICHARD pt has been treated with Diflucan for a yeast inf which has gotten better, she now has thrush, her pcp sent in lozengers - clotrimazole - it is not working. Pt requesting we send in a mouth rinse. Allergic to pcn

## 2018-03-22 ENCOUNTER — TRANSCRIBE ORDERS (OUTPATIENT)
Dept: ADMINISTRATIVE | Facility: HOSPITAL | Age: 68
End: 2018-03-22

## 2018-03-22 DIAGNOSIS — R10.13 DYSPEPSIA: Primary | ICD-10-CM

## 2018-04-10 ENCOUNTER — APPOINTMENT (OUTPATIENT)
Dept: GENERAL RADIOLOGY | Facility: HOSPITAL | Age: 68
End: 2018-04-10

## 2018-04-25 ENCOUNTER — HOSPITAL ENCOUNTER (OUTPATIENT)
Dept: GENERAL RADIOLOGY | Facility: HOSPITAL | Age: 68
Discharge: HOME | End: 2018-04-25
Admitting: INTERNAL MEDICINE

## 2018-04-25 DIAGNOSIS — R10.13 DYSPEPSIA: ICD-10-CM

## 2018-04-25 PROCEDURE — 74247: CPT

## 2018-04-25 RX ADMIN — ANTACID/ANTIFLATULENT 1 TABLET: 380; 550; 10; 10 GRANULE, EFFERVESCENT ORAL at 10:45

## 2018-04-25 RX ADMIN — BARIUM SULFATE 183 ML: 960 POWDER, FOR SUSPENSION ORAL at 10:40

## 2018-04-25 RX ADMIN — BARIUM SULFATE 135 ML: 980 POWDER, FOR SUSPENSION ORAL at 10:40

## 2018-06-28 ENCOUNTER — APPOINTMENT (OUTPATIENT)
Dept: CT IMAGING | Facility: HOSPITAL | Age: 68
End: 2018-06-28

## 2018-06-28 ENCOUNTER — APPOINTMENT (OUTPATIENT)
Dept: GENERAL RADIOLOGY | Facility: HOSPITAL | Age: 68
End: 2018-06-28

## 2018-06-28 ENCOUNTER — HOSPITAL ENCOUNTER (EMERGENCY)
Facility: HOSPITAL | Age: 68
Discharge: HOME | End: 2018-06-28
Attending: EMERGENCY MEDICINE | Admitting: EMERGENCY MEDICINE

## 2018-06-28 VITALS
BODY MASS INDEX: 33.31 KG/M2 | RESPIRATION RATE: 16 BRPM | HEART RATE: 80 BPM | OXYGEN SATURATION: 94 % | DIASTOLIC BLOOD PRESSURE: 71 MMHG | TEMPERATURE: 97.4 F | WEIGHT: 188 LBS | HEIGHT: 63 IN | SYSTOLIC BLOOD PRESSURE: 131 MMHG

## 2018-06-28 DIAGNOSIS — S42.92XA CLOSED FRACTURE OF LEFT SHOULDER, INITIAL ENCOUNTER: Primary | ICD-10-CM

## 2018-06-28 PROCEDURE — 99284 EMERGENCY DEPT VISIT MOD MDM: CPT

## 2018-06-28 PROCEDURE — 96376 TX/PRO/DX INJ SAME DRUG ADON: CPT

## 2018-06-28 PROCEDURE — 73200 CT UPPER EXTREMITY W/O DYE: CPT

## 2018-06-28 PROCEDURE — 73070 X-RAY EXAM OF ELBOW: CPT

## 2018-06-28 PROCEDURE — 96374 THER/PROPH/DIAG INJ IV PUSH: CPT

## 2018-06-28 PROCEDURE — 73030 X-RAY EXAM OF SHOULDER: CPT

## 2018-06-28 PROCEDURE — 25010000002 HYDROMORPHONE PER 4 MG: Performed by: EMERGENCY MEDICINE

## 2018-06-28 PROCEDURE — 73110 X-RAY EXAM OF WRIST: CPT

## 2018-06-28 PROCEDURE — 25010000002 MORPHINE PER 10 MG: Performed by: NURSE PRACTITIONER

## 2018-06-28 PROCEDURE — 73060 X-RAY EXAM OF HUMERUS: CPT

## 2018-06-28 PROCEDURE — 96375 TX/PRO/DX INJ NEW DRUG ADDON: CPT

## 2018-06-28 PROCEDURE — 25010000002 ONDANSETRON PER 1 MG: Performed by: NURSE PRACTITIONER

## 2018-06-28 RX ORDER — SODIUM CHLORIDE 0.9 % (FLUSH) 0.9 %
10 SYRINGE (ML) INJECTION AS NEEDED
Status: DISCONTINUED | OUTPATIENT
Start: 2018-06-28 | End: 2018-06-29 | Stop reason: HOSPADM

## 2018-06-28 RX ORDER — CYCLOBENZAPRINE HCL 10 MG
10 TABLET ORAL 3 TIMES DAILY PRN
Qty: 10 TABLET | Refills: 0 | Status: SHIPPED | OUTPATIENT
Start: 2018-06-28 | End: 2019-06-25

## 2018-06-28 RX ORDER — ONDANSETRON 2 MG/ML
4 INJECTION INTRAMUSCULAR; INTRAVENOUS ONCE
Status: COMPLETED | OUTPATIENT
Start: 2018-06-28 | End: 2018-06-28

## 2018-06-28 RX ADMIN — HYDROMORPHONE HYDROCHLORIDE 0.5 MG: 1 INJECTION, SOLUTION INTRAMUSCULAR; INTRAVENOUS; SUBCUTANEOUS at 21:50

## 2018-06-28 RX ADMIN — HYDROMORPHONE HYDROCHLORIDE 1 MG: 1 INJECTION, SOLUTION INTRAMUSCULAR; INTRAVENOUS; SUBCUTANEOUS at 19:13

## 2018-06-28 RX ADMIN — ONDANSETRON 4 MG: 2 INJECTION, SOLUTION INTRAMUSCULAR; INTRAVENOUS at 18:01

## 2018-06-28 RX ADMIN — MORPHINE SULFATE 4 MG: 4 INJECTION INTRAVENOUS at 18:01

## 2018-06-29 ENCOUNTER — OFFICE VISIT (OUTPATIENT)
Dept: ORTHOPEDIC SURGERY | Facility: CLINIC | Age: 68
End: 2018-06-29

## 2018-06-29 ENCOUNTER — TELEPHONE (OUTPATIENT)
Dept: ORTHOPEDIC SURGERY | Facility: CLINIC | Age: 68
End: 2018-06-29

## 2018-06-29 VITALS — BODY MASS INDEX: 31.89 KG/M2 | WEIGHT: 180 LBS | HEIGHT: 63 IN | TEMPERATURE: 98.8 F

## 2018-06-29 DIAGNOSIS — S42.209A CLOSED FRACTURE OF PROXIMAL END OF HUMERUS, UNSPECIFIED FRACTURE MORPHOLOGY, UNSPECIFIED LATERALITY, INITIAL ENCOUNTER: Primary | ICD-10-CM

## 2018-06-29 PROCEDURE — 99204 OFFICE O/P NEW MOD 45 MIN: CPT | Performed by: ORTHOPAEDIC SURGERY

## 2018-06-29 RX ORDER — CEFAZOLIN SODIUM 2 G/100ML
2 INJECTION, SOLUTION INTRAVENOUS ONCE
Status: CANCELLED | OUTPATIENT
Start: 2018-07-05 | End: 2018-07-05

## 2018-06-29 RX ORDER — OXYCODONE AND ACETAMINOPHEN 7.5; 325 MG/1; MG/1
1 TABLET ORAL EVERY 4 HOURS PRN
Qty: 50 TABLET | Refills: 0 | Status: SHIPPED | OUTPATIENT
Start: 2018-06-29 | End: 2018-07-02

## 2018-06-29 RX ORDER — PREGABALIN 75 MG/1
150 CAPSULE ORAL ONCE
Status: CANCELLED | OUTPATIENT
Start: 2018-07-05 | End: 2018-07-05

## 2018-06-29 RX ORDER — MELOXICAM 15 MG/1
15 TABLET ORAL ONCE
Status: CANCELLED | OUTPATIENT
Start: 2018-07-05 | End: 2018-07-05

## 2018-06-29 NOTE — TELEPHONE ENCOUNTER
Li had a fall on 6/28 while walking up her front steps. She went to the ED last night because she landed on her lt arm. She had Xr's done and she has a Comminuted left humeral head fracture with distraction of the  dominant humeral head fragment away from the glenoid with gross disarticulation. Li req to be seen by [facility] today, please advise. Thank you!

## 2018-07-01 NOTE — PROGRESS NOTES
Patient: Clarissa Matos    YOB: 1950    Medical Record Number: [id number]    Attending Physician: Lydia att. providers found    Chief Complaints: Left shoulder injury    History of Present Illness: 67 y.o. female presents for evaluation of the left shoulder.  The injury was sustained in a fall walking up some stairs into her home.  She landed awkwardly on the left side.  She was seen in the emergency room after the injury and diagnosed with a proximal humerus fracture.  She was placed in a sling.  She does tell me that she has a history of a rotator cuff repair on the right side.  She had been having significant pain in the left shoulder even prior to this injury and she tells me that she had been concerned that she may have a tear in the shoulder as well.  Current pain is described as moderate, constant, and aching.  Rest, the sling, and pain medicine and have all helped.  The patient reports good use and function of the arm prior to this incident.    Allergies:   Allergies   Allergen Reactions   • Nsaids GI Intolerance   • Penicillins        Home Medications:      Current Outpatient Prescriptions:   •  aspirin-acetaminophen-caffeine (EXCEDRIN MIGRAINE) 250-250-65 MG per tablet, Take 1 tablet by mouth Every 6 (Six) Hours As Needed for Headache., Disp: , Rfl:   •  calcium carbonate (OS-CRESCENCIO) 600 MG tablet, Take 600 mg by mouth Daily., Disp: , Rfl:   •  cetirizine (zyrTEC) 10 MG tablet, Take 10 mg by mouth Daily., Disp: , Rfl:   •  cyclobenzaprine (FLEXERIL) 10 MG tablet, Take 1 tablet by mouth 3 (Three) Times a Day As Needed for Muscle Spasms., Disp: 10 tablet, Rfl: 0  •  diclofenac (FLECTOR) 1.3 % patch patch, Apply 1 patch topically 2 (Two) Times a Day As Needed., Disp: , Rfl:   •  DULoxetine (CYMBALTA) 30 MG capsule, Take 40 mg by mouth Daily., Disp: , Rfl:   •  Echinacea 500 MG capsule, Take 2 tablets by mouth Daily., Disp: , Rfl:   •  HYDROcodone-acetaminophen (NORCO)  MG per tablet, Take 1  tablet by mouth 5 (Five) Times a Day., Disp: , Rfl:   •  LORazepam (ATIVAN) 0.5 MG tablet, Take 0.5 mg by mouth 2 (Two) Times a Day As Needed for Anxiety., Disp: , Rfl:   •  losartan-hydrochlorothiazide (HYZAAR) 50-12.5 MG per tablet, Take 1 tablet by mouth Daily., Disp: , Rfl:   •  Multiple Vitamins-Minerals (MULTIVITAMIN ADULT EXTRA C PO), Take 1 tablet by mouth Daily., Disp: , Rfl:   •  Unable to find, 1 each 1 (One) Time. CBD oil, Disp: , Rfl:   •  zolpidem (AMBIEN) 10 MG tablet, Take 10 mg by mouth At Night As Needed for Sleep., Disp: , Rfl:   •  oxyCODONE-acetaminophen (PERCOCET) 7.5-325 MG per tablet, Take 1 tablet by mouth Every 4 (Four) Hours As Needed for Moderate Pain ., Disp: 50 tablet, Rfl: 0    Past Medical History:   Diagnosis Date   • Anxiety    • Arthritis    • Depression    • Fibromyalgia    • Hypertension           Past Surgical History:   Procedure Laterality Date   • BREAST BIOPSY Right    • CATARACT EXTRACTION WITH INTRAOCULAR LENS IMPLANT     •  SECTION      x 2   • CHOLECYSTECTOMY  2017   • CHOLECYSTECTOMY WITH INTRAOPERATIVE CHOLANGIOGRAM N/A 2017    Procedure: CHOLECYSTECTOMY LAPAROSCOPIC INTRAOPERATIVE CHOLANGIOGRAM;  Surgeon: Demi Madden MD;  Location: [facility];  Service:    • EYE SURGERY Bilateral     eye lense implants   • HYSTERECTOMY     • REPLACEMENT TOTAL KNEE Right    • ROTATOR CUFF REPAIR            Social History     Occupational History   • Not on file.     Social History Main Topics   • Smoking status: Never Smoker   • Smokeless tobacco: Never Used   • Alcohol use No   • Drug use: Unknown   • Sexual activity: Defer      Social History     Social History Narrative   • No narrative on file          Family History   Problem Relation Age of Onset   • Heart disease Mother    • Hypertension Mother    • Heart disease Father    • Hypertension Father    • No Known Problems Sister    • No Known Problems Brother    • No Known Problems Daughter    • No Known  "Problems Son    • No Known Problems Maternal Grandmother    • No Known Problems Paternal Grandmother    • No Known Problems Maternal Aunt    • No Known Problems Paternal Aunt    • BRCA 1/2 Neg Hx    • Breast cancer Neg Hx    • Colon cancer Neg Hx    • Endometrial cancer Neg Hx    • Ovarian cancer Neg Hx        Review of Systems:      Constitutional: Denies fever, shaking or chills   Eyes: Denies change in visual acuity   HEENT: Denies nasal congestion or sore throat   Respiratory: Denies cough or shortness of breath   Cardiovascular: Denies chest pain or edema  Endocrine: Denies tremors, palpitations, intolerance of heat or cold, polyuria, polydipsia.  GI: Denies abdominal pain, nausea, vomiting, bloody stools or diarrhea  : Denies frequency, urgency, incontinence, retention, or nocturia.  Musculoskeletal: Denies numbness tingling or loss of motor function except as above  Integument: Denies rash, lesion or ulceration   Neurologic: Denies headache or focal weakness, deficits  Heme: Denies epistaxis, spontaneous or excessive bleeding, epistaxis, hematuria, melena, fatigue, enlarged or tender lymph nodes.      All other pertinent positives and negatives as noted above in HPI.    Physical Exam: 67 y.o. female    Vitals:    06/29/18 1303   Temp: 98.8 °F (37.1 °C)   Weight: 81.6 kg (180 lb)   Height: 160 cm (63\")       General:  Patient is awake and alert.  Appears in no acute distress or discomfort.    Psych:  Affect and demeanor are appropriate.    Eyes:  Conjunctiva and sclera appear grossly normal.  Eyes track well and EOM seem to be intact.    Dentition:  No gross abnormalities noted.    Ears:  No gross abnormalities.  Hearing adequate for the exam.    Cardiovascular:  Regular rate and rhythm.    Lungs:  Good chest expansion.  Breathing unlabored.    Lymph:  No palpable masses or adenopathy in the affected extremity    Left upper extremity:  Sling was in place and removed.  There is anterior edema.  Skin appears " benign.  No gross malalignment, lacerations or abrasions.  Focal tenderness noted over the proximal humerus and upper arm.  No tenderness down along the lower arm, elbow, forearm, wrist or hand.  No palpable masses or adenopathy.  Compartments soft.  Painful, limited ROM of the shoulder.  Could not assess stability due to discomfort with shoulder motion.  Good strength in the wrist with flexion and extension as well as , pinch, finger and thumb abduction.  Intact sensation. Palpable radial pulse.  Brisk capillary refill.    Diagnostic Tests:  Lab Results   Component Value Date    GLUCOSE 113 (H) 11/28/2017    CALCIUM 9.3 11/28/2017     11/28/2017    K 3.3 (L) 11/28/2017    CO2 27.5 11/28/2017     11/28/2017    BUN 11 11/28/2017    CREATININE 0.70 11/28/2017    EGFRIFNONA 84 11/28/2017    BCR 15.7 11/28/2017    ANIONGAP 11.5 11/28/2017     Lab Results   Component Value Date    WBC 7.30 11/28/2017    HGB 16.1 (H) 11/28/2017    HCT 47.6 (H) 11/28/2017    MCV 94.3 11/28/2017     11/28/2017     No results found for: INR, PROTIME    Imaging:  Outside AP and lateral views of the left shoulder are reviewed along with the associated report.  There is a displaced fracture of the proximal humerus.  There is an apparent head split component and the fracture appears quite comminuted.  CT scan of the shoulder is reviewed along with the associated report.  She has a comminuted, four-part, head split proximal humerus fracture    Assessment:  Displaced left proximal humerus fracture    Plan:  We had a thorough discussion regarding the patient's options and the risks of non-surgical management versus surgery.  I explained that there are 3 options at this point:  conservative treatment, surgical fixation of the fracture or an arthroplasty.   Although there is good evidence that patients can function well with proximal humerus malunions, the chances of a poor outcome with this particular fracture pattern are  high.  Surgical fixation of the fracture is also likely to have a poor success rate given the head split and degree of comminution.  I think that an arthroplasty is probably the best option with the most predictable outcome, barring any complications.      We had a thorough discussion regarding the risks, benefits and alternatives to an arthroplasty and non-surgical management versus surgery.  I explained that surgical risks include infection, hematoma, hardware related complications including failure of fixation, loosening, fracture, persistent pain and/or loss of motion, iatrogenic nerve and/or blood vessel injury resulting in permanent weakness, numbness or dysfunction, DVT, PE, positioning related neuropraxia, and anesthesia related complications resulting in death.  We discussed the indication for a reverse as opposed to a standard total shoulder and the risks inherent to the reverse including notching, glenoid loosening, instability, and traction related neuropraxia, any of which could result in persistent pain or problems requiring further surgery.  Lastly, we discussed the rehab and all that will be expected of the patient post operatively to ensure an optimal outcome.  The patient voiced understanding of the risks, benefits, and alternative forms of treatment that were discussed and the patient consents to proceed with the reverse arthroplasty.  I did agree to give her a prescription for Percocet.  Risks were discussed.    Date: 7/1/2018    Louis Prasad MD

## 2018-07-02 ENCOUNTER — APPOINTMENT (OUTPATIENT)
Dept: PREADMISSION TESTING | Facility: HOSPITAL | Age: 68
End: 2018-07-02

## 2018-07-02 VITALS
HEART RATE: 84 BPM | WEIGHT: 189 LBS | TEMPERATURE: 97.8 F | BODY MASS INDEX: 33.49 KG/M2 | DIASTOLIC BLOOD PRESSURE: 70 MMHG | OXYGEN SATURATION: 98 % | HEIGHT: 63 IN | SYSTOLIC BLOOD PRESSURE: 128 MMHG | RESPIRATION RATE: 20 BRPM

## 2018-07-02 DIAGNOSIS — S42.209A CLOSED FRACTURE OF PROXIMAL END OF HUMERUS, UNSPECIFIED FRACTURE MORPHOLOGY, UNSPECIFIED LATERALITY, INITIAL ENCOUNTER: ICD-10-CM

## 2018-07-02 LAB
ANION GAP SERPL CALCULATED.3IONS-SCNC: 9.1 MMOL/L
BACTERIA UR QL AUTO: ABNORMAL /HPF
BILIRUB UR QL STRIP: NEGATIVE
BUN BLD-MCNC: 9 MG/DL (ref 8–23)
BUN/CREAT SERPL: 12.9 (ref 7–25)
CALCIUM SPEC-SCNC: 9.3 MG/DL (ref 8.6–10.5)
CHLORIDE SERPL-SCNC: 94 MMOL/L (ref 98–107)
CLARITY UR: CLEAR
CO2 SERPL-SCNC: 32.9 MMOL/L (ref 22–29)
COLOR UR: YELLOW
CREAT BLD-MCNC: 0.7 MG/DL (ref 0.57–1)
DEPRECATED RDW RBC AUTO: 42.6 FL (ref 37–54)
ERYTHROCYTE [DISTWIDTH] IN BLOOD BY AUTOMATED COUNT: 12.5 % (ref 11.7–13)
GFR SERPL CREATININE-BSD FRML MDRD: 84 ML/MIN/1.73
GLUCOSE BLD-MCNC: 134 MG/DL (ref 65–99)
GLUCOSE UR STRIP-MCNC: NEGATIVE MG/DL
HCT VFR BLD AUTO: 43.6 % (ref 35.6–45.5)
HGB BLD-MCNC: 15.1 G/DL (ref 11.9–15.5)
HGB UR QL STRIP.AUTO: NEGATIVE
HYALINE CASTS UR QL AUTO: ABNORMAL /LPF
KETONES UR QL STRIP: NEGATIVE
LEUKOCYTE ESTERASE UR QL STRIP.AUTO: ABNORMAL
MCH RBC QN AUTO: 32.6 PG (ref 26.9–32)
MCHC RBC AUTO-ENTMCNC: 34.6 G/DL (ref 32.4–36.3)
MCV RBC AUTO: 94.2 FL (ref 80.5–98.2)
NITRITE UR QL STRIP: NEGATIVE
PH UR STRIP.AUTO: 7 [PH] (ref 5–8)
PLATELET # BLD AUTO: 277 10*3/MM3 (ref 140–500)
PMV BLD AUTO: 10.4 FL (ref 6–12)
POTASSIUM BLD-SCNC: 3.6 MMOL/L (ref 3.5–5.2)
PROT UR QL STRIP: NEGATIVE
RBC # BLD AUTO: 4.63 10*6/MM3 (ref 3.9–5.2)
RBC # UR: ABNORMAL /HPF
REF LAB TEST METHOD: ABNORMAL
SODIUM BLD-SCNC: 136 MMOL/L (ref 136–145)
SP GR UR STRIP: 1.01 (ref 1–1.03)
SQUAMOUS #/AREA URNS HPF: ABNORMAL /HPF
UROBILINOGEN UR QL STRIP: ABNORMAL
WBC NRBC COR # BLD: 8.87 10*3/MM3 (ref 4.5–10.7)
WBC UR QL AUTO: ABNORMAL /HPF

## 2018-07-02 PROCEDURE — 85027 COMPLETE CBC AUTOMATED: CPT | Performed by: ORTHOPAEDIC SURGERY

## 2018-07-02 PROCEDURE — 36415 COLL VENOUS BLD VENIPUNCTURE: CPT

## 2018-07-02 PROCEDURE — 93005 ELECTROCARDIOGRAM TRACING: CPT

## 2018-07-02 PROCEDURE — 81001 URINALYSIS AUTO W/SCOPE: CPT | Performed by: ORTHOPAEDIC SURGERY

## 2018-07-02 PROCEDURE — 80048 BASIC METABOLIC PNL TOTAL CA: CPT | Performed by: ORTHOPAEDIC SURGERY

## 2018-07-02 PROCEDURE — 93010 ELECTROCARDIOGRAM REPORT: CPT | Performed by: INTERNAL MEDICINE

## 2018-07-02 RX ORDER — DULOXETIN HYDROCHLORIDE 20 MG/1
40 CAPSULE, DELAYED RELEASE ORAL EVERY MORNING
COMMUNITY
End: 2019-06-25

## 2018-07-02 RX ORDER — OMEPRAZOLE 20 MG/1
20 CAPSULE, DELAYED RELEASE ORAL EVERY MORNING
COMMUNITY
End: 2020-08-10

## 2018-07-02 RX ORDER — MAGNESIUM 30 MG
30 TABLET ORAL NIGHTLY
COMMUNITY
End: 2019-07-23 | Stop reason: HOSPADM

## 2018-07-02 RX ORDER — LORATADINE 10 MG/1
10 TABLET ORAL EVERY MORNING
COMMUNITY
End: 2020-08-10

## 2018-07-02 NOTE — DISCHARGE INSTRUCTIONS
Take the following medications the morning of surgery with a small sip of water:  prilosec,  Pain med if needed        General Instructions:  • Do not eat solid food after midnight the night before surgery.  • You may drink clear liquids day of surgery but must stop at least one hour before your hospital arrival time.  • It is beneficial for you to have a clear drink that contains carbohydrates the day of surgery.  We suggest a 12 to 20 ounce bottle of Gatorade or Powerade for non-diabetic patients or a 12 to 20 ounce bottle of G2 or Powerade Zero for diabetic patients. (Pediatric patients, are not advised to drink a 12 to 20 ounce carbohydrate drink)    Clear liquids are liquids you can see through.  Nothing red in color.     Plain water                               Sports drinks  Sodas                                   Gelatin (Jell-O)  Fruit juices without pulp such as white grape juice and apple juice  Popsicles that contain no fruit or yogurt  Tea or coffee (no cream or milk added)  Gatorade / Powerade  G2 / Powerade Zero    • Infants may have breast milk up to four hours before surgery.  • Infants drinking formula may drink formula up to six hours before surgery.   • Patients who avoid smoking, chewing tobacco and alcohol for 4 weeks prior to surgery have a reduced risk of post-operative complications.  Quit smoking as many days before surgery as you can.  • Do not smoke, use chewing tobacco or drink alcohol the day of surgery.   • If applicable bring your C-PAP/ BI-PAP machine.  • Bring any papers given to you in the doctor’s office.  • Wear clean comfortable clothes and socks.  • Do not wear contact lenses or make-up.  Bring a case for your glasses.   • Bring crutches or walker if applicable.  • Remove all piercings.  Leave jewelry and any other valuables at home.  • Hair extensions with metal clips must be removed prior to surgery.  • The Pre-Admission Testing nurse will instruct you to bring medications  if unable to obtain an accurate list in Pre-Admission Testing.        If you were given a blood bank ID arm band remember to bring it with you the day of surgery.    Preventing a Surgical Site Infection:  • For 2 to 3 days before surgery, avoid shaving with a razor because the razor can irritate skin and make it easier to develop an infection.    • Any areas of open skin can increase the risk of a post-operative wound infection by allowing bacteria to enter and travel throughout the body.  Notify your surgeon if you have any skin wounds / rashes even if it is not near the expected surgical site.  The area will need assessed to determine if surgery should be delayed until it is healed.  • The night prior to surgery sleep in a clean bed with clean clothing.  Do not allow pets to sleep with you.  • Shower on the morning of surgery using a fresh bar of anti-bacterial soap (such as Dial) and clean washcloth.  Dry with a clean towel and dress in clean clothing.  • Ask your surgeon if you will be receiving antibiotics prior to surgery.  • Make sure you, your family, and all healthcare providers clean their hands with soap and water or an alcohol based hand  before caring for you or your wound.    Day of surgery:  Upon arrival, a Pre-op nurse and Anesthesiologist will review your health history, obtain vital signs, and answer questions you may have.  The only belongings needed at this time will be your home medications and if applicable your C-PAP/BI-PAP machine.  If you are staying overnight your family can leave the rest of your belongings in the car and bring them to your room later.  A Pre-op nurse will start an IV and you may receive medication in preparation for surgery, including something to help you relax.  Your family will be able to see you in the Pre-op area.  While you are in surgery your family should notify the waiting room  if they leave the waiting room area and provide a contact phone  number.    Please be aware that surgery does come with discomfort.  We want to make every effort to control your discomfort so please discuss any uncontrolled symptoms with your nurse.   Your doctor will most likely have prescribed pain medications.      If you are going home after surgery you will receive individualized written care instructions before being discharged.  A responsible adult must drive you to and from the hospital on the day of your surgery and stay with you for 24 hours.    If you are staying overnight following surgery, you will be transported to your hospital room following the recovery period.  [facility] has all private rooms.    You have received a list of surgical assistants for your reference.  If you have any questions please call Pre-Admission Testing at [phone].  Deductibles and co-payments are collected on the day of service. Please be prepared to pay the required co-pay, deductible or deposit on the day of service as defined by your plan.    2% CHLORAHEXIDINE GLUCONATE* CLOTH  Preparing or “prepping” skin before surgery can reduce the risk of infection at the surgical site. To make the process easier, [facility] has chosen disposable cloths moistened with a rinse-free, 2% Chlorhexidine Gluconate (CHG) antiseptic solution. The steps below outline the prepping process and should be carefully followed.        Use the prep cloth on the area that is circled in the diagram             Directions Night before Surgery  1) Shower using a fresh bar of anti-bacterial soap (such as Dial) and clean washcloth.  Use a clean towel to completely dry your skin.  2) Do not use any lotions, oils or creams on your skin.  3) Open the package and remove 1 cloth, wipe your skin for 30 seconds in a circular motion.  Allow to dry for 3 minutes.  4) Repeat #3 with second cloth.  5) Do not touch your eyes, ears, or mouth with the prep cloth.  6) Allow the wet prep solution to air  dry.  7) Discard the prep cloth and wash your hands with soap and water.   8) Dress in clean bed clothes and sleep on fresh clean bed sheets.   9) You may experience some temporary itching after the prep.    Directions Day of Surgery  1) Repeat steps 1,2,3,4,5,6,7, and 9.   2) Dress in clean clothes before coming to the hospital.    BACTROBAN NASAL OINTMENT  There are many germs normally in your nose. Bactroban is an ointment that will help reduce these germs. Please follow these instructions for Bactroban use:      ____The day before surgery in the morning  Date________    ____The day before surgery in the evening              Date________    ____The day of surgery in the morning    Date________    **Squirt ½ package of Bactroban Ointment onto a cotton applicator and apply to inside of 1st nostril.  Squirt the remaining Bactroban and apply to the inside of the other nostril.    PERIDEX- ORAL:  Use only if your surgeon has ordered  Use the night before and morning of surgery - Swish, gargle, and spit - do not swallow.

## 2018-07-05 ENCOUNTER — ANESTHESIA EVENT (OUTPATIENT)
Dept: PERIOP | Facility: HOSPITAL | Age: 68
End: 2018-07-05

## 2018-07-05 ENCOUNTER — APPOINTMENT (OUTPATIENT)
Dept: GENERAL RADIOLOGY | Facility: HOSPITAL | Age: 68
End: 2018-07-05

## 2018-07-05 ENCOUNTER — HOSPITAL ENCOUNTER (INPATIENT)
Facility: HOSPITAL | Age: 68
LOS: 1 days | Discharge: HOME | End: 2018-07-06
Attending: ORTHOPAEDIC SURGERY | Admitting: ORTHOPAEDIC SURGERY

## 2018-07-05 ENCOUNTER — ANESTHESIA (OUTPATIENT)
Dept: PERIOP | Facility: HOSPITAL | Age: 68
End: 2018-07-05

## 2018-07-05 DIAGNOSIS — S42.209A CLOSED FRACTURE OF PROXIMAL END OF HUMERUS, UNSPECIFIED FRACTURE MORPHOLOGY, UNSPECIFIED LATERALITY, INITIAL ENCOUNTER: Primary | ICD-10-CM

## 2018-07-05 PROBLEM — M12.819 ROTATOR CUFF ARTHROPATHY: Status: ACTIVE | Noted: 2018-07-05

## 2018-07-05 PROCEDURE — 25010000002 ROPIVACAINE PER 1 MG: Performed by: ANESTHESIOLOGY

## 2018-07-05 PROCEDURE — 23472 RECONSTRUCT SHOULDER JOINT: CPT | Performed by: ORTHOPAEDIC SURGERY

## 2018-07-05 PROCEDURE — 25010000002 MIDAZOLAM PER 1 MG: Performed by: ANESTHESIOLOGY

## 2018-07-05 PROCEDURE — 25010000002 NEOSTIGMINE 0.5 MG/ML SOLUTION: Performed by: NURSE ANESTHETIST, CERTIFIED REGISTERED

## 2018-07-05 PROCEDURE — C1776 JOINT DEVICE (IMPLANTABLE): HCPCS | Performed by: ORTHOPAEDIC SURGERY

## 2018-07-05 PROCEDURE — 73020 X-RAY EXAM OF SHOULDER: CPT

## 2018-07-05 PROCEDURE — L3670 SO ACRO/CLAV CAN WEB PRE OTS: HCPCS | Performed by: ORTHOPAEDIC SURGERY

## 2018-07-05 PROCEDURE — 25010000002 DEXAMETHASONE PER 1 MG: Performed by: NURSE ANESTHETIST, CERTIFIED REGISTERED

## 2018-07-05 PROCEDURE — 25010000003 CEFAZOLIN IN DEXTROSE 2-4 GM/100ML-% SOLUTION: Performed by: ORTHOPAEDIC SURGERY

## 2018-07-05 PROCEDURE — 25010000002 FENTANYL CITRATE (PF) 100 MCG/2ML SOLUTION: Performed by: NURSE ANESTHETIST, CERTIFIED REGISTERED

## 2018-07-05 PROCEDURE — C1713 ANCHOR/SCREW BN/BN,TIS/BN: HCPCS | Performed by: ORTHOPAEDIC SURGERY

## 2018-07-05 PROCEDURE — 25010000002 METHYLPREDNISOLONE PER 125 MG: Performed by: ANESTHESIOLOGY

## 2018-07-05 PROCEDURE — 25010000002 FENTANYL CITRATE (PF) 100 MCG/2ML SOLUTION: Performed by: ANESTHESIOLOGY

## 2018-07-05 PROCEDURE — 25010000002 PHENYLEPHRINE PER 1 ML: Performed by: NURSE ANESTHETIST, CERTIFIED REGISTERED

## 2018-07-05 PROCEDURE — 25010000002 VANCOMYCIN 10 G RECONSTITUTED SOLUTION: Performed by: ORTHOPAEDIC SURGERY

## 2018-07-05 PROCEDURE — 25010000002 KETOROLAC TROMETHAMINE PER 15 MG: Performed by: ORTHOPAEDIC SURGERY

## 2018-07-05 PROCEDURE — 0RRK00Z REPLACEMENT OF LEFT SHOULDER JOINT WITH REVERSE BALL AND SOCKET SYNTHETIC SUBSTITUTE, OPEN APPROACH: ICD-10-PCS | Performed by: ORTHOPAEDIC SURGERY

## 2018-07-05 PROCEDURE — 25010000002 SUCCINYLCHOLINE PER 20 MG: Performed by: NURSE ANESTHETIST, CERTIFIED REGISTERED

## 2018-07-05 PROCEDURE — 25010000002 ONDANSETRON PER 1 MG: Performed by: NURSE ANESTHETIST, CERTIFIED REGISTERED

## 2018-07-05 PROCEDURE — 25010000002 PROPOFOL 10 MG/ML EMULSION: Performed by: NURSE ANESTHETIST, CERTIFIED REGISTERED

## 2018-07-05 DEVICE — SCRW FIX LK HEX 4.75X30MM: Type: IMPLANTABLE DEVICE | Site: SHOULDER | Status: FUNCTIONAL

## 2018-07-05 DEVICE — PLUG IM CANAL 8TO10MM SM: Type: IMPLANTABLE DEVICE | Site: SHOULDER | Status: FUNCTIONAL

## 2018-07-05 DEVICE — SCRW FIX LK HEX 4.75X15MM: Type: IMPLANTABLE DEVICE | Site: SHOULDER | Status: FUNCTIONAL

## 2018-07-05 DEVICE — STEM SHLDR PPS 8X122MM: Type: IMPLANTABLE DEVICE | Site: SHOULDER | Status: FUNCTIONAL

## 2018-07-05 DEVICE — BASEPLT GLEN COMPR MINI W TPR ADAPTR 25: Type: IMPLANTABLE DEVICE | Site: SHOULDER | Status: FUNCTIONAL

## 2018-07-05 DEVICE — GLENOSPHERE VERSA DIAL FIX STD 36MM: Type: IMPLANTABLE DEVICE | Site: SHOULDER | Status: FUNCTIONAL

## 2018-07-05 DEVICE — SCRW FIX LK HEX 4.75X25MM: Type: IMPLANTABLE DEVICE | Site: SHOULDER | Status: FUNCTIONAL

## 2018-07-05 DEVICE — IMPLANTABLE DEVICE: Type: IMPLANTABLE DEVICE | Site: SHOULDER | Status: FUNCTIONAL

## 2018-07-05 DEVICE — CMT BONE PALACOS R HI/VISC 1X40: Type: IMPLANTABLE DEVICE | Site: SHOULDER | Status: FUNCTIONAL

## 2018-07-05 DEVICE — TRY HUM STD COBLT 44MM: Type: IMPLANTABLE DEVICE | Site: SHOULDER | Status: FUNCTIONAL

## 2018-07-05 DEVICE — SCRW COMPRNSV CNTRL 6.5X30MM REUS: Type: IMPLANTABLE DEVICE | Site: SHOULDER | Status: FUNCTIONAL

## 2018-07-05 DEVICE — BEAR HUM COMPRNSV ARCOMXL PLS3 44 36: Type: IMPLANTABLE DEVICE | Site: SHOULDER | Status: FUNCTIONAL

## 2018-07-05 RX ORDER — LORAZEPAM 0.5 MG/1
0.5 TABLET ORAL 2 TIMES DAILY PRN
Status: DISCONTINUED | OUTPATIENT
Start: 2018-07-05 | End: 2018-07-06 | Stop reason: HOSPADM

## 2018-07-05 RX ORDER — NALOXONE HCL 0.4 MG/ML
0.1 VIAL (ML) INJECTION
Status: DISCONTINUED | OUTPATIENT
Start: 2018-07-05 | End: 2018-07-06 | Stop reason: HOSPADM

## 2018-07-05 RX ORDER — HYDROCODONE BITARTRATE AND ACETAMINOPHEN 10; 325 MG/1; MG/1
2 TABLET ORAL EVERY 4 HOURS PRN
Status: DISCONTINUED | OUTPATIENT
Start: 2018-07-05 | End: 2018-07-06 | Stop reason: HOSPADM

## 2018-07-05 RX ORDER — LIDOCAINE HYDROCHLORIDE 20 MG/ML
INJECTION, SOLUTION INFILTRATION; PERINEURAL AS NEEDED
Status: DISCONTINUED | OUTPATIENT
Start: 2018-07-05 | End: 2018-07-05 | Stop reason: SURG

## 2018-07-05 RX ORDER — DOCUSATE SODIUM 100 MG/1
100 CAPSULE, LIQUID FILLED ORAL 2 TIMES DAILY
Status: DISCONTINUED | OUTPATIENT
Start: 2018-07-05 | End: 2018-07-06 | Stop reason: HOSPADM

## 2018-07-05 RX ORDER — CYCLOBENZAPRINE HCL 10 MG
10 TABLET ORAL 3 TIMES DAILY PRN
Status: DISCONTINUED | OUTPATIENT
Start: 2018-07-05 | End: 2018-07-06 | Stop reason: HOSPADM

## 2018-07-05 RX ORDER — TRANEXAMIC ACID 100 MG/ML
INJECTION, SOLUTION INTRAVENOUS AS NEEDED
Status: DISCONTINUED | OUTPATIENT
Start: 2018-07-05 | End: 2018-07-05 | Stop reason: SURG

## 2018-07-05 RX ORDER — OXYCODONE AND ACETAMINOPHEN 7.5; 325 MG/1; MG/1
1 TABLET ORAL ONCE AS NEEDED
Status: DISCONTINUED | OUTPATIENT
Start: 2018-07-05 | End: 2018-07-05 | Stop reason: HOSPADM

## 2018-07-05 RX ORDER — ONDANSETRON 2 MG/ML
INJECTION INTRAMUSCULAR; INTRAVENOUS AS NEEDED
Status: DISCONTINUED | OUTPATIENT
Start: 2018-07-05 | End: 2018-07-05 | Stop reason: SURG

## 2018-07-05 RX ORDER — PROPOFOL 10 MG/ML
VIAL (ML) INTRAVENOUS AS NEEDED
Status: DISCONTINUED | OUTPATIENT
Start: 2018-07-05 | End: 2018-07-05 | Stop reason: SURG

## 2018-07-05 RX ORDER — MELOXICAM 15 MG/1
15 TABLET ORAL ONCE
Status: COMPLETED | OUTPATIENT
Start: 2018-07-05 | End: 2018-07-05

## 2018-07-05 RX ORDER — ONDANSETRON 4 MG/1
4 TABLET, FILM COATED ORAL EVERY 6 HOURS PRN
Status: DISCONTINUED | OUTPATIENT
Start: 2018-07-05 | End: 2018-07-06 | Stop reason: HOSPADM

## 2018-07-05 RX ORDER — MIDAZOLAM HYDROCHLORIDE 1 MG/ML
2 INJECTION INTRAMUSCULAR; INTRAVENOUS
Status: DISCONTINUED | OUTPATIENT
Start: 2018-07-05 | End: 2018-07-05 | Stop reason: HOSPADM

## 2018-07-05 RX ORDER — DIPHENHYDRAMINE HYDROCHLORIDE 50 MG/ML
12.5 INJECTION INTRAMUSCULAR; INTRAVENOUS
Status: DISCONTINUED | OUTPATIENT
Start: 2018-07-05 | End: 2018-07-05 | Stop reason: HOSPADM

## 2018-07-05 RX ORDER — DULOXETIN HYDROCHLORIDE 20 MG/1
40 CAPSULE, DELAYED RELEASE ORAL EVERY MORNING
Status: DISCONTINUED | OUTPATIENT
Start: 2018-07-06 | End: 2018-07-06 | Stop reason: HOSPADM

## 2018-07-05 RX ORDER — ROPIVACAINE HYDROCHLORIDE 5 MG/ML
INJECTION, SOLUTION EPIDURAL; INFILTRATION; PERINEURAL AS NEEDED
Status: DISCONTINUED | OUTPATIENT
Start: 2018-07-05 | End: 2018-07-05 | Stop reason: SURG

## 2018-07-05 RX ORDER — MEPERIDINE HYDROCHLORIDE 25 MG/ML
12.5 INJECTION INTRAMUSCULAR; INTRAVENOUS; SUBCUTANEOUS
Status: DISCONTINUED | OUTPATIENT
Start: 2018-07-05 | End: 2018-07-05 | Stop reason: HOSPADM

## 2018-07-05 RX ORDER — FENTANYL CITRATE 50 UG/ML
50 INJECTION, SOLUTION INTRAMUSCULAR; INTRAVENOUS
Status: DISCONTINUED | OUTPATIENT
Start: 2018-07-05 | End: 2018-07-05 | Stop reason: HOSPADM

## 2018-07-05 RX ORDER — CEFAZOLIN SODIUM 2 G/100ML
2 INJECTION, SOLUTION INTRAVENOUS ONCE
Status: COMPLETED | OUTPATIENT
Start: 2018-07-05 | End: 2018-07-05

## 2018-07-05 RX ORDER — FLUMAZENIL 0.1 MG/ML
0.2 INJECTION INTRAVENOUS AS NEEDED
Status: DISCONTINUED | OUTPATIENT
Start: 2018-07-05 | End: 2018-07-05 | Stop reason: HOSPADM

## 2018-07-05 RX ORDER — SODIUM CHLORIDE 0.9 % (FLUSH) 0.9 %
1-10 SYRINGE (ML) INJECTION AS NEEDED
Status: DISCONTINUED | OUTPATIENT
Start: 2018-07-05 | End: 2018-07-05 | Stop reason: HOSPADM

## 2018-07-05 RX ORDER — NALOXONE HCL 0.4 MG/ML
0.2 VIAL (ML) INJECTION AS NEEDED
Status: DISCONTINUED | OUTPATIENT
Start: 2018-07-05 | End: 2018-07-05 | Stop reason: HOSPADM

## 2018-07-05 RX ORDER — SODIUM CHLORIDE, SODIUM LACTATE, POTASSIUM CHLORIDE, CALCIUM CHLORIDE 600; 310; 30; 20 MG/100ML; MG/100ML; MG/100ML; MG/100ML
9 INJECTION, SOLUTION INTRAVENOUS CONTINUOUS
Status: DISCONTINUED | OUTPATIENT
Start: 2018-07-05 | End: 2018-07-05 | Stop reason: HOSPADM

## 2018-07-05 RX ORDER — LIDOCAINE HYDROCHLORIDE 10 MG/ML
0.5 INJECTION, SOLUTION EPIDURAL; INFILTRATION; INTRACAUDAL; PERINEURAL ONCE AS NEEDED
Status: DISCONTINUED | OUTPATIENT
Start: 2018-07-05 | End: 2018-07-05 | Stop reason: HOSPADM

## 2018-07-05 RX ORDER — PREGABALIN 75 MG/1
150 CAPSULE ORAL ONCE
Status: COMPLETED | OUTPATIENT
Start: 2018-07-05 | End: 2018-07-05

## 2018-07-05 RX ORDER — LABETALOL HYDROCHLORIDE 5 MG/ML
5 INJECTION, SOLUTION INTRAVENOUS
Status: DISCONTINUED | OUTPATIENT
Start: 2018-07-05 | End: 2018-07-05 | Stop reason: HOSPADM

## 2018-07-05 RX ORDER — SODIUM CHLORIDE, SODIUM LACTATE, POTASSIUM CHLORIDE, CALCIUM CHLORIDE 600; 310; 30; 20 MG/100ML; MG/100ML; MG/100ML; MG/100ML
100 INJECTION, SOLUTION INTRAVENOUS CONTINUOUS
Status: DISCONTINUED | OUTPATIENT
Start: 2018-07-05 | End: 2018-07-06 | Stop reason: HOSPADM

## 2018-07-05 RX ORDER — PROMETHAZINE HYDROCHLORIDE 25 MG/1
25 SUPPOSITORY RECTAL ONCE AS NEEDED
Status: DISCONTINUED | OUTPATIENT
Start: 2018-07-05 | End: 2018-07-05 | Stop reason: HOSPADM

## 2018-07-05 RX ORDER — HYDROCODONE BITARTRATE AND ACETAMINOPHEN 7.5; 325 MG/1; MG/1
1 TABLET ORAL ONCE AS NEEDED
Status: DISCONTINUED | OUTPATIENT
Start: 2018-07-05 | End: 2018-07-05 | Stop reason: HOSPADM

## 2018-07-05 RX ORDER — ALBUTEROL SULFATE 2.5 MG/3ML
2.5 SOLUTION RESPIRATORY (INHALATION) ONCE AS NEEDED
Status: DISCONTINUED | OUTPATIENT
Start: 2018-07-05 | End: 2018-07-05 | Stop reason: HOSPADM

## 2018-07-05 RX ORDER — GLYCOPYRROLATE 0.2 MG/ML
INJECTION INTRAMUSCULAR; INTRAVENOUS AS NEEDED
Status: DISCONTINUED | OUTPATIENT
Start: 2018-07-05 | End: 2018-07-05 | Stop reason: SURG

## 2018-07-05 RX ORDER — PROMETHAZINE HYDROCHLORIDE 25 MG/1
12.5 TABLET ORAL ONCE AS NEEDED
Status: DISCONTINUED | OUTPATIENT
Start: 2018-07-05 | End: 2018-07-05 | Stop reason: HOSPADM

## 2018-07-05 RX ORDER — ROCURONIUM BROMIDE 10 MG/ML
INJECTION, SOLUTION INTRAVENOUS AS NEEDED
Status: DISCONTINUED | OUTPATIENT
Start: 2018-07-05 | End: 2018-07-05 | Stop reason: SURG

## 2018-07-05 RX ORDER — OXYCODONE AND ACETAMINOPHEN 7.5; 325 MG/1; MG/1
2 TABLET ORAL EVERY 4 HOURS PRN
Status: DISCONTINUED | OUTPATIENT
Start: 2018-07-05 | End: 2018-07-06 | Stop reason: HOSPADM

## 2018-07-05 RX ORDER — ONDANSETRON 2 MG/ML
4 INJECTION INTRAMUSCULAR; INTRAVENOUS EVERY 6 HOURS PRN
Status: DISCONTINUED | OUTPATIENT
Start: 2018-07-05 | End: 2018-07-06 | Stop reason: HOSPADM

## 2018-07-05 RX ORDER — ONDANSETRON 2 MG/ML
4 INJECTION INTRAMUSCULAR; INTRAVENOUS ONCE AS NEEDED
Status: DISCONTINUED | OUTPATIENT
Start: 2018-07-05 | End: 2018-07-05 | Stop reason: HOSPADM

## 2018-07-05 RX ORDER — FENTANYL CITRATE 50 UG/ML
INJECTION, SOLUTION INTRAMUSCULAR; INTRAVENOUS AS NEEDED
Status: DISCONTINUED | OUTPATIENT
Start: 2018-07-05 | End: 2018-07-05 | Stop reason: SURG

## 2018-07-05 RX ORDER — ACETAMINOPHEN 325 MG/1
650 TABLET ORAL EVERY 4 HOURS PRN
Status: DISCONTINUED | OUTPATIENT
Start: 2018-07-05 | End: 2018-07-06 | Stop reason: HOSPADM

## 2018-07-05 RX ORDER — PROMETHAZINE HYDROCHLORIDE 25 MG/1
25 TABLET ORAL ONCE AS NEEDED
Status: DISCONTINUED | OUTPATIENT
Start: 2018-07-05 | End: 2018-07-05 | Stop reason: HOSPADM

## 2018-07-05 RX ORDER — EPHEDRINE SULFATE 50 MG/ML
5 INJECTION, SOLUTION INTRAVENOUS ONCE AS NEEDED
Status: DISCONTINUED | OUTPATIENT
Start: 2018-07-05 | End: 2018-07-05 | Stop reason: HOSPADM

## 2018-07-05 RX ORDER — MAGNESIUM HYDROXIDE 1200 MG/15ML
LIQUID ORAL AS NEEDED
Status: DISCONTINUED | OUTPATIENT
Start: 2018-07-05 | End: 2018-07-05 | Stop reason: HOSPADM

## 2018-07-05 RX ORDER — BISACODYL 10 MG
10 SUPPOSITORY, RECTAL RECTAL DAILY PRN
Status: DISCONTINUED | OUTPATIENT
Start: 2018-07-05 | End: 2018-07-06 | Stop reason: HOSPADM

## 2018-07-05 RX ORDER — DEXAMETHASONE SODIUM PHOSPHATE 10 MG/ML
INJECTION INTRAMUSCULAR; INTRAVENOUS AS NEEDED
Status: DISCONTINUED | OUTPATIENT
Start: 2018-07-05 | End: 2018-07-05 | Stop reason: SURG

## 2018-07-05 RX ORDER — KETOROLAC TROMETHAMINE 30 MG/ML
30 INJECTION, SOLUTION INTRAMUSCULAR; INTRAVENOUS EVERY 6 HOURS PRN
Status: DISCONTINUED | OUTPATIENT
Start: 2018-07-05 | End: 2018-07-06 | Stop reason: HOSPADM

## 2018-07-05 RX ORDER — ZOLPIDEM TARTRATE 5 MG/1
10 TABLET ORAL NIGHTLY PRN
Status: DISCONTINUED | OUTPATIENT
Start: 2018-07-05 | End: 2018-07-06 | Stop reason: HOSPADM

## 2018-07-05 RX ORDER — CEFAZOLIN SODIUM 2 G/100ML
2 INJECTION, SOLUTION INTRAVENOUS EVERY 8 HOURS
Status: COMPLETED | OUTPATIENT
Start: 2018-07-05 | End: 2018-07-06

## 2018-07-05 RX ORDER — PROMETHAZINE HYDROCHLORIDE 25 MG/ML
12.5 INJECTION, SOLUTION INTRAMUSCULAR; INTRAVENOUS ONCE AS NEEDED
Status: DISCONTINUED | OUTPATIENT
Start: 2018-07-05 | End: 2018-07-05 | Stop reason: HOSPADM

## 2018-07-05 RX ORDER — PANTOPRAZOLE SODIUM 40 MG/1
40 TABLET, DELAYED RELEASE ORAL EVERY MORNING
Status: DISCONTINUED | OUTPATIENT
Start: 2018-07-06 | End: 2018-07-06 | Stop reason: HOSPADM

## 2018-07-05 RX ORDER — FAMOTIDINE 10 MG/ML
20 INJECTION, SOLUTION INTRAVENOUS ONCE
Status: COMPLETED | OUTPATIENT
Start: 2018-07-05 | End: 2018-07-05

## 2018-07-05 RX ORDER — MIDAZOLAM HYDROCHLORIDE 1 MG/ML
1 INJECTION INTRAMUSCULAR; INTRAVENOUS
Status: DISCONTINUED | OUTPATIENT
Start: 2018-07-05 | End: 2018-07-05 | Stop reason: HOSPADM

## 2018-07-05 RX ORDER — METHYLPREDNISOLONE SODIUM SUCCINATE 125 MG/2ML
INJECTION, POWDER, LYOPHILIZED, FOR SOLUTION INTRAMUSCULAR; INTRAVENOUS AS NEEDED
Status: DISCONTINUED | OUTPATIENT
Start: 2018-07-05 | End: 2018-07-05 | Stop reason: SURG

## 2018-07-05 RX ORDER — ONDANSETRON 4 MG/1
4 TABLET, ORALLY DISINTEGRATING ORAL EVERY 6 HOURS PRN
Status: DISCONTINUED | OUTPATIENT
Start: 2018-07-05 | End: 2018-07-06 | Stop reason: HOSPADM

## 2018-07-05 RX ORDER — SUCCINYLCHOLINE CHLORIDE 20 MG/ML
INJECTION INTRAMUSCULAR; INTRAVENOUS AS NEEDED
Status: DISCONTINUED | OUTPATIENT
Start: 2018-07-05 | End: 2018-07-05 | Stop reason: SURG

## 2018-07-05 RX ORDER — PROMETHAZINE HYDROCHLORIDE 25 MG/ML
5 INJECTION, SOLUTION INTRAMUSCULAR; INTRAVENOUS
Status: DISCONTINUED | OUTPATIENT
Start: 2018-07-05 | End: 2018-07-05 | Stop reason: HOSPADM

## 2018-07-05 RX ADMIN — CEFAZOLIN SODIUM 2 G: 2 INJECTION, SOLUTION INTRAVENOUS at 20:33

## 2018-07-05 RX ADMIN — SODIUM CHLORIDE, POTASSIUM CHLORIDE, SODIUM LACTATE AND CALCIUM CHLORIDE: 600; 310; 30; 20 INJECTION, SOLUTION INTRAVENOUS at 11:42

## 2018-07-05 RX ADMIN — FAMOTIDINE 20 MG: 10 INJECTION INTRAVENOUS at 09:59

## 2018-07-05 RX ADMIN — PROPOFOL 50 MG: 10 INJECTION, EMULSION INTRAVENOUS at 12:00

## 2018-07-05 RX ADMIN — VANCOMYCIN HYDROCHLORIDE 1250 MG: 10 INJECTION, POWDER, LYOPHILIZED, FOR SOLUTION INTRAVENOUS at 09:51

## 2018-07-05 RX ADMIN — MIDAZOLAM 1 MG: 1 INJECTION INTRAMUSCULAR; INTRAVENOUS at 09:59

## 2018-07-05 RX ADMIN — PHENYLEPHRINE HYDROCHLORIDE 100 MCG: 10 INJECTION INTRAVENOUS at 13:13

## 2018-07-05 RX ADMIN — HYDROCODONE BITARTRATE AND ACETAMINOPHEN 2 TABLET: 10; 325 TABLET ORAL at 22:03

## 2018-07-05 RX ADMIN — ONDANSETRON 4 MG: 2 INJECTION INTRAMUSCULAR; INTRAVENOUS at 13:29

## 2018-07-05 RX ADMIN — KETOROLAC TROMETHAMINE 30 MG: 30 INJECTION, SOLUTION INTRAMUSCULAR at 15:06

## 2018-07-05 RX ADMIN — PHENYLEPHRINE HYDROCHLORIDE 100 MCG: 10 INJECTION INTRAVENOUS at 13:23

## 2018-07-05 RX ADMIN — NEOSTIGMINE METHYLSULFATE 3 MG: 5 INJECTION, SOLUTION INTRAMUSCULAR; INTRAVENOUS; SUBCUTANEOUS at 13:29

## 2018-07-05 RX ADMIN — ROPIVACAINE HYDROCHLORIDE 25 ML: 5 INJECTION, SOLUTION EPIDURAL; INFILTRATION; PERINEURAL at 10:06

## 2018-07-05 RX ADMIN — DOCUSATE SODIUM 100 MG: 100 CAPSULE, LIQUID FILLED ORAL at 21:39

## 2018-07-05 RX ADMIN — PHENYLEPHRINE HYDROCHLORIDE 100 MCG: 10 INJECTION INTRAVENOUS at 12:40

## 2018-07-05 RX ADMIN — PHENYLEPHRINE HYDROCHLORIDE 100 MCG: 10 INJECTION INTRAVENOUS at 12:20

## 2018-07-05 RX ADMIN — SODIUM CHLORIDE, POTASSIUM CHLORIDE, SODIUM LACTATE AND CALCIUM CHLORIDE 500 ML: 600; 310; 30; 20 INJECTION, SOLUTION INTRAVENOUS at 09:05

## 2018-07-05 RX ADMIN — PREGABALIN 150 MG: 75 CAPSULE ORAL at 09:20

## 2018-07-05 RX ADMIN — ROCURONIUM BROMIDE 20 MG: 10 INJECTION INTRAVENOUS at 11:57

## 2018-07-05 RX ADMIN — PHENYLEPHRINE HYDROCHLORIDE 200 MCG: 10 INJECTION INTRAVENOUS at 12:48

## 2018-07-05 RX ADMIN — TRANEXAMIC ACID 1000 MG: 100 INJECTION, SOLUTION INTRAVENOUS at 12:04

## 2018-07-05 RX ADMIN — ROCURONIUM BROMIDE 5 MG: 10 INJECTION INTRAVENOUS at 11:49

## 2018-07-05 RX ADMIN — ZOLPIDEM TARTRATE 10 MG: 5 TABLET ORAL at 22:04

## 2018-07-05 RX ADMIN — MUPIROCIN: 20 OINTMENT TOPICAL at 21:39

## 2018-07-05 RX ADMIN — METHYLPREDNISOLONE SODIUM SUCCINATE 40 MG: 125 INJECTION, POWDER, FOR SOLUTION INTRAMUSCULAR; INTRAVENOUS at 10:06

## 2018-07-05 RX ADMIN — FENTANYL CITRATE 25 MCG: 50 INJECTION INTRAMUSCULAR; INTRAVENOUS at 13:50

## 2018-07-05 RX ADMIN — MELOXICAM 15 MG: 15 TABLET ORAL at 09:20

## 2018-07-05 RX ADMIN — CEFAZOLIN SODIUM 2 G: 2 INJECTION, SOLUTION INTRAVENOUS at 11:42

## 2018-07-05 RX ADMIN — PHENYLEPHRINE HYDROCHLORIDE 200 MCG: 10 INJECTION INTRAVENOUS at 12:53

## 2018-07-05 RX ADMIN — PHENYLEPHRINE HYDROCHLORIDE 100 MCG: 10 INJECTION INTRAVENOUS at 12:15

## 2018-07-05 RX ADMIN — SUCCINYLCHOLINE CHLORIDE 100 MG: 20 INJECTION, SOLUTION INTRAMUSCULAR; INTRAVENOUS; PARENTERAL at 11:49

## 2018-07-05 RX ADMIN — PROPOFOL 50 MG: 10 INJECTION, EMULSION INTRAVENOUS at 11:57

## 2018-07-05 RX ADMIN — PROPOFOL 50 MG: 10 INJECTION, EMULSION INTRAVENOUS at 12:02

## 2018-07-05 RX ADMIN — GLYCOPYRROLATE 0.4 MG: 0.2 INJECTION INTRAMUSCULAR; INTRAVENOUS at 13:29

## 2018-07-05 RX ADMIN — PROPOFOL 50 MG: 10 INJECTION, EMULSION INTRAVENOUS at 11:53

## 2018-07-05 RX ADMIN — PROPOFOL 50 MG: 10 INJECTION, EMULSION INTRAVENOUS at 11:50

## 2018-07-05 RX ADMIN — FENTANYL CITRATE 25 MCG: 50 INJECTION INTRAMUSCULAR; INTRAVENOUS at 13:45

## 2018-07-05 RX ADMIN — PROPOFOL 150 MG: 10 INJECTION, EMULSION INTRAVENOUS at 11:49

## 2018-07-05 RX ADMIN — FENTANYL CITRATE 50 MCG: 50 INJECTION, SOLUTION INTRAMUSCULAR; INTRAVENOUS at 09:59

## 2018-07-05 RX ADMIN — VANCOMYCIN HYDROCHLORIDE 1250 MG: 10 INJECTION, POWDER, LYOPHILIZED, FOR SOLUTION INTRAVENOUS at 22:06

## 2018-07-05 RX ADMIN — PHENYLEPHRINE HYDROCHLORIDE 100 MCG: 10 INJECTION INTRAVENOUS at 12:25

## 2018-07-05 RX ADMIN — HYDROCODONE BITARTRATE AND ACETAMINOPHEN 2 TABLET: 10; 325 TABLET ORAL at 17:58

## 2018-07-05 RX ADMIN — PHENYLEPHRINE HYDROCHLORIDE 100 MCG: 10 INJECTION INTRAVENOUS at 12:58

## 2018-07-05 RX ADMIN — DEXAMETHASONE SODIUM PHOSPHATE 10 MG: 10 INJECTION INTRAMUSCULAR; INTRAVENOUS at 12:10

## 2018-07-05 RX ADMIN — LIDOCAINE HYDROCHLORIDE 40 MG: 20 INJECTION, SOLUTION INFILTRATION; PERINEURAL at 11:48

## 2018-07-05 NOTE — ANESTHESIA PREPROCEDURE EVALUATION
Anesthesia Evaluation     no history of anesthetic complications:  NPO Solid Status: > 8 hours             Airway   Mallampati: III  Small opening  Dental      Pulmonary - normal exam   (-) COPD, asthma, sleep apnea, not a smoker  Cardiovascular - normal exam    (+) hypertension,   (-) CAD, angina, BERUMEN      Neuro/Psych  (+) psychiatric history Anxiety and Depression,     GI/Hepatic/Renal/Endo    (+) obesity,       Musculoskeletal     Abdominal    Substance History      OB/GYN          Other                        Anesthesia Plan    ASA 3     general   (Block placed for postoperative pain control per surgeon request.  )  Anesthetic plan and risks discussed with patient.

## 2018-07-05 NOTE — BRIEF OP NOTE
TOTAL SHOULDER REVERSE ARTHROPLASTY  Progress Note    Clarissa Matos  7/5/2018    Pre-op Diagnosis:   Closed fracture of proximal end of humerus, unspecified fracture morphology, unspecified laterality, initial encounter [S42.209A]       Post-Op Diagnosis Codes:     * Closed fracture of proximal end of humerus, unspecified fracture morphology, unspecified laterality, initial encounter [S42.209A]    Procedure/CPT® Codes:      Procedure(s):  Reverse Total Shoulder Arthroplasty    Surgeon(s):  Louis Prasad MD    Anesthesia: General with Block    Staff:   Circulator: Tennille eMyer RN  Scrub Person: Alexandrea Packer; Ruthie Mazariegos    Estimated Blood Loss: minimal    Urine Voided: * No values recorded between 7/5/2018 12:00 AM and 7/5/2018 12:15 PM *    Specimens:                None      Drains:      Findings: see dictation    Complications: none      Louis Prasad MD     Date: 7/5/2018  Time: 12:15 PM

## 2018-07-05 NOTE — ANESTHESIA PROCEDURE NOTES
Airway  Urgency: elective    Airway not difficult    General Information and Staff    Patient location during procedure: OR  Anesthesiologist: ORLANDO MENCHACA  CRNA: CAMILLE SPENCER    Indications and Patient Condition  Indications for airway management: airway protection    Preoxygenated: yes  MILS maintained throughout  Mask difficulty assessment: 1 - vent by mask    Final Airway Details  Final airway type: endotracheal airway      Successful airway: ETT  Cuffed: yes   Successful intubation technique: direct laryngoscopy  Endotracheal tube insertion site: oral  Blade: CMAC  ETT size: 7.0 mm  Cormack-Lehane Classification: grade III - view of epiglottis only  Placement verified by: chest auscultation and capnometry   Cuff volume (mL): 6  Measured from: lips  ETT to lips (cm): 21  Number of attempts at approach: 3 or more    Additional Comments  Very anterior, small mouth opening, attempted DL with 3mac unsuccessful with boogie. Attempted with CMAC D blade view of cords, anterior, unable to pass tube on first 2 attempts. Pt vent with ease. Pillow under shoulders and attempted better sniffing, stylet with severe J on tube able to pass ETT direct view of VC. Stylet removed, pt vent with ease.  Teeth as pre-op.  BLEBS.  -ABD sounds.  +ET CO2.  Secured to face.

## 2018-07-05 NOTE — ANESTHESIA PROCEDURE NOTES
Peripheral Block    Patient location during procedure: holding area  Start time: 7/5/2018 10:00 AM  Stop time: 7/5/2018 10:06 AM  Reason for block: at surgeon's request and post-op pain management  Performed by  Anesthesiologist: ERON MARLOW  Preanesthetic Checklist  Completed: patient identified and risks and benefits discussed  Prep:  Pt Position: supine  Sterile barriers:gloves  Prep: ChloraPrep  Patient monitoring: blood pressure monitoring, continuous pulse oximetry and EKG  Procedure  Sedation:yes  Performed under: local infiltration  Guidance:ultrasound guided  ULTRASOUND INTERPRETATION.  Using ultrasound guidance a 22 G gauge needle was placed in close proximity to the brachial plexus nerve, at which point, under ultrasound guidance anesthetic was injected in the area of the nerve and spread of the anesthesia was seen on ultrasound in close proximity thereto.  There were no abnormalities seen on ultrasound; a digital image was taken; and the patient tolerated the procedure with no complications. Images:still images obtained    Laterality:left  Block Type:interscalene  Injection Technique:single-shot  Needle Type:short-bevel  Needle Gauge:22 G  Resistance on Injection: none  Medications  Depomedrol:40 mg  Comment:    Local Injected:bupivacaine 0.5% with epinephrine and ropivacaine 0.5% Local Amount Injected:25mL  Post Assessment  Injection Assessment: negative aspiration for heme, no paresthesia on injection and incremental injection  Patient Tolerance:comfortable throughout block  Complications:no  Additional Notes  Ultrasound Interpretation:  Using ultrasound guidance the needle was placed in close proximity to the brachial plexus and anesthetic was injected in the area of the brachial plexus and spread of the anesthetic was seen on ultrasound in close proximity thereto.  There were no abnormalities seen on ultrasound; a digital image was taken; and the patient tolerated the procedure with no  complications.    Block placed for postoperative pain control per surgeon request.

## 2018-07-05 NOTE — ANESTHESIA POSTPROCEDURE EVALUATION
"Patient: Clarissa Matos    Procedure Summary     Date:  07/05/18 Room / Location:  [facility] OR [address] [place] [address] MAIN OR    Anesthesia Start:  1142 Anesthesia Stop:  1405    Procedure:  Reverse Total Shoulder Arthroplsty for fracture (Left Shoulder) Diagnosis:       Closed fracture of proximal end of humerus, unspecified fracture morphology, unspecified laterality, initial encounter      (Closed fracture of proximal end of humerus, unspecified fracture morphology, unspecified laterality, initial encounter [S42.847Q])    Surgeon:  Louis Prasad MD Provider:  Tammy Boogie MD    Anesthesia Type:  general ASA Status:  3          Anesthesia Type: general  Last vitals  BP   137/84 (07/05/18 1510)   Temp   36.9 °C (98.4 °F) (07/05/18 1400)   Pulse   84 (07/05/18 1440)   Resp   16 (07/05/18 1510)     SpO2   98 % (07/05/18 1440)     Post Anesthesia Care and Evaluation    Patient location during evaluation: bedside  Patient participation: complete - patient participated  Level of consciousness: awake and alert  Pain management: adequate  Airway patency: patent  Anesthetic complications: No anesthetic complications    Cardiovascular status: acceptable  Respiratory status: acceptable  Hydration status: acceptable    Comments: /84   Pulse 84   Temp 36.9 °C (98.4 °F) (Oral)   Resp 16   Ht 160 cm (62.99\")   Wt 85.7 kg (189 lb)   LMP  (LMP Unknown)   SpO2 98%   Breastfeeding? No   BMI 33.49 kg/m²           "

## 2018-07-05 NOTE — H&P (VIEW-ONLY)
Patient: Clarissa Matos    YOB: 1950    Medical Record Number: [id number]    Attending Physician: Lydia att. providers found    Chief Complaints: Left shoulder injury    History of Present Illness: 67 y.o. female presents for evaluation of the left shoulder.  The injury was sustained in a fall walking up some stairs into her home.  She landed awkwardly on the left side.  She was seen in the emergency room after the injury and diagnosed with a proximal humerus fracture.  She was placed in a sling.  She does tell me that she has a history of a rotator cuff repair on the right side.  She had been having significant pain in the left shoulder even prior to this injury and she tells me that she had been concerned that she may have a tear in the shoulder as well.  Current pain is described as moderate, constant, and aching.  Rest, the sling, and pain medicine and have all helped.  The patient reports good use and function of the arm prior to this incident.    Allergies:   Allergies   Allergen Reactions   • Nsaids GI Intolerance   • Penicillins        Home Medications:      Current Outpatient Prescriptions:   •  aspirin-acetaminophen-caffeine (EXCEDRIN MIGRAINE) 250-250-65 MG per tablet, Take 1 tablet by mouth Every 6 (Six) Hours As Needed for Headache., Disp: , Rfl:   •  calcium carbonate (OS-CRESCENCIO) 600 MG tablet, Take 600 mg by mouth Daily., Disp: , Rfl:   •  cetirizine (zyrTEC) 10 MG tablet, Take 10 mg by mouth Daily., Disp: , Rfl:   •  cyclobenzaprine (FLEXERIL) 10 MG tablet, Take 1 tablet by mouth 3 (Three) Times a Day As Needed for Muscle Spasms., Disp: 10 tablet, Rfl: 0  •  diclofenac (FLECTOR) 1.3 % patch patch, Apply 1 patch topically 2 (Two) Times a Day As Needed., Disp: , Rfl:   •  DULoxetine (CYMBALTA) 30 MG capsule, Take 40 mg by mouth Daily., Disp: , Rfl:   •  Echinacea 500 MG capsule, Take 2 tablets by mouth Daily., Disp: , Rfl:   •  HYDROcodone-acetaminophen (NORCO)  MG per tablet, Take 1  tablet by mouth 5 (Five) Times a Day., Disp: , Rfl:   •  LORazepam (ATIVAN) 0.5 MG tablet, Take 0.5 mg by mouth 2 (Two) Times a Day As Needed for Anxiety., Disp: , Rfl:   •  losartan-hydrochlorothiazide (HYZAAR) 50-12.5 MG per tablet, Take 1 tablet by mouth Daily., Disp: , Rfl:   •  Multiple Vitamins-Minerals (MULTIVITAMIN ADULT EXTRA C PO), Take 1 tablet by mouth Daily., Disp: , Rfl:   •  Unable to find, 1 each 1 (One) Time. CBD oil, Disp: , Rfl:   •  zolpidem (AMBIEN) 10 MG tablet, Take 10 mg by mouth At Night As Needed for Sleep., Disp: , Rfl:   •  oxyCODONE-acetaminophen (PERCOCET) 7.5-325 MG per tablet, Take 1 tablet by mouth Every 4 (Four) Hours As Needed for Moderate Pain ., Disp: 50 tablet, Rfl: 0    Past Medical History:   Diagnosis Date   • Anxiety    • Arthritis    • Depression    • Fibromyalgia    • Hypertension           Past Surgical History:   Procedure Laterality Date   • BREAST BIOPSY Right    • CATARACT EXTRACTION WITH INTRAOCULAR LENS IMPLANT     •  SECTION      x 2   • CHOLECYSTECTOMY  2017   • CHOLECYSTECTOMY WITH INTRAOPERATIVE CHOLANGIOGRAM N/A 2017    Procedure: CHOLECYSTECTOMY LAPAROSCOPIC INTRAOPERATIVE CHOLANGIOGRAM;  Surgeon: Demi Madden MD;  Location: [facility];  Service:    • EYE SURGERY Bilateral     eye lense implants   • HYSTERECTOMY     • REPLACEMENT TOTAL KNEE Right    • ROTATOR CUFF REPAIR            Social History     Occupational History   • Not on file.     Social History Main Topics   • Smoking status: Never Smoker   • Smokeless tobacco: Never Used   • Alcohol use No   • Drug use: Unknown   • Sexual activity: Defer      Social History     Social History Narrative   • No narrative on file          Family History   Problem Relation Age of Onset   • Heart disease Mother    • Hypertension Mother    • Heart disease Father    • Hypertension Father    • No Known Problems Sister    • No Known Problems Brother    • No Known Problems Daughter    • No Known  "Problems Son    • No Known Problems Maternal Grandmother    • No Known Problems Paternal Grandmother    • No Known Problems Maternal Aunt    • No Known Problems Paternal Aunt    • BRCA 1/2 Neg Hx    • Breast cancer Neg Hx    • Colon cancer Neg Hx    • Endometrial cancer Neg Hx    • Ovarian cancer Neg Hx        Review of Systems:      Constitutional: Denies fever, shaking or chills   Eyes: Denies change in visual acuity   HEENT: Denies nasal congestion or sore throat   Respiratory: Denies cough or shortness of breath   Cardiovascular: Denies chest pain or edema  Endocrine: Denies tremors, palpitations, intolerance of heat or cold, polyuria, polydipsia.  GI: Denies abdominal pain, nausea, vomiting, bloody stools or diarrhea  : Denies frequency, urgency, incontinence, retention, or nocturia.  Musculoskeletal: Denies numbness tingling or loss of motor function except as above  Integument: Denies rash, lesion or ulceration   Neurologic: Denies headache or focal weakness, deficits  Heme: Denies epistaxis, spontaneous or excessive bleeding, epistaxis, hematuria, melena, fatigue, enlarged or tender lymph nodes.      All other pertinent positives and negatives as noted above in HPI.    Physical Exam: 67 y.o. female    Vitals:    06/29/18 1303   Temp: 98.8 °F (37.1 °C)   Weight: 81.6 kg (180 lb)   Height: 160 cm (63\")       General:  Patient is awake and alert.  Appears in no acute distress or discomfort.    Psych:  Affect and demeanor are appropriate.    Eyes:  Conjunctiva and sclera appear grossly normal.  Eyes track well and EOM seem to be intact.    Dentition:  No gross abnormalities noted.    Ears:  No gross abnormalities.  Hearing adequate for the exam.    Cardiovascular:  Regular rate and rhythm.    Lungs:  Good chest expansion.  Breathing unlabored.    Lymph:  No palpable masses or adenopathy in the affected extremity    Left upper extremity:  Sling was in place and removed.  There is anterior edema.  Skin appears " benign.  No gross malalignment, lacerations or abrasions.  Focal tenderness noted over the proximal humerus and upper arm.  No tenderness down along the lower arm, elbow, forearm, wrist or hand.  No palpable masses or adenopathy.  Compartments soft.  Painful, limited ROM of the shoulder.  Could not assess stability due to discomfort with shoulder motion.  Good strength in the wrist with flexion and extension as well as , pinch, finger and thumb abduction.  Intact sensation. Palpable radial pulse.  Brisk capillary refill.    Diagnostic Tests:  Lab Results   Component Value Date    GLUCOSE 113 (H) 11/28/2017    CALCIUM 9.3 11/28/2017     11/28/2017    K 3.3 (L) 11/28/2017    CO2 27.5 11/28/2017     11/28/2017    BUN 11 11/28/2017    CREATININE 0.70 11/28/2017    EGFRIFNONA 84 11/28/2017    BCR 15.7 11/28/2017    ANIONGAP 11.5 11/28/2017     Lab Results   Component Value Date    WBC 7.30 11/28/2017    HGB 16.1 (H) 11/28/2017    HCT 47.6 (H) 11/28/2017    MCV 94.3 11/28/2017     11/28/2017     No results found for: INR, PROTIME    Imaging:  Outside AP and lateral views of the left shoulder are reviewed along with the associated report.  There is a displaced fracture of the proximal humerus.  There is an apparent head split component and the fracture appears quite comminuted.  CT scan of the shoulder is reviewed along with the associated report.  She has a comminuted, four-part, head split proximal humerus fracture    Assessment:  Displaced left proximal humerus fracture    Plan:  We had a thorough discussion regarding the patient's options and the risks of non-surgical management versus surgery.  I explained that there are 3 options at this point:  conservative treatment, surgical fixation of the fracture or an arthroplasty.   Although there is good evidence that patients can function well with proximal humerus malunions, the chances of a poor outcome with this particular fracture pattern are  high.  Surgical fixation of the fracture is also likely to have a poor success rate given the head split and degree of comminution.  I think that an arthroplasty is probably the best option with the most predictable outcome, barring any complications.      We had a thorough discussion regarding the risks, benefits and alternatives to an arthroplasty and non-surgical management versus surgery.  I explained that surgical risks include infection, hematoma, hardware related complications including failure of fixation, loosening, fracture, persistent pain and/or loss of motion, iatrogenic nerve and/or blood vessel injury resulting in permanent weakness, numbness or dysfunction, DVT, PE, positioning related neuropraxia, and anesthesia related complications resulting in death.  We discussed the indication for a reverse as opposed to a standard total shoulder and the risks inherent to the reverse including notching, glenoid loosening, instability, and traction related neuropraxia, any of which could result in persistent pain or problems requiring further surgery.  Lastly, we discussed the rehab and all that will be expected of the patient post operatively to ensure an optimal outcome.  The patient voiced understanding of the risks, benefits, and alternative forms of treatment that were discussed and the patient consents to proceed with the reverse arthroplasty.  I did agree to give her a prescription for Percocet.  Risks were discussed.    Date: 7/1/2018    Louis Prasad MD

## 2018-07-05 NOTE — OP NOTE
Orthopaedic Operative Note    Facility: [facility]    Patient: Clarissa Matos    Medical Record Number: [id number]    YOB: 1950    Dictating Surgeon: Louis Prasad M.D.*    Primary Care Physician: Yaquelin Valdez MD    Date of Operation: 7/5/2018    Pre-Operative Diagnosis:  Comminuted, displaced left proximal humerus fracture    Post-Operative Diagnosis:  Comminuted, displaced left proximal humerus fracture    Procedure Performed:  1.  Left reverse total shoulder arthroplasty  2.  Tenodesis of long head of biceps tendon    Surgeon: Louis Prasad MD     Assistant: Jermaine Thomas CFA    Anesthesia: Regional followed by Gen.    Complications: None.     Estimated Blood Loss: Less than 150 mL.     Implants: Biomet size 8 comprehensive fracture stem with 44 standard tray and 36 plus 3 mm poly insert  2.  36 standard glenosphere 3.  25 mm mini glenoid baseplate with 1 central compression screw and 4 peripheral locking screws    Specimens: * No orders in the log *    Brief Operative Indication:  Ms. Matos sustained a comminuted, displaced left proximal humerus fracture in a fall.  The risks, benefits, and alternatives to conservative treatment versus attempted ORIF versus an arthroplasty were discussed with the patient in detail.  She acknowledged understanding of this information and consented to proceed with the arthroplasty.    Description of the procedure in detail:  The patient and operative site were identified in the preoperative holding area.  The surgical site was marked.  Adequate regional anesthesia of the left upper extremity was administered.  The patient was then taken to the operating room.  She was placed in the supine position.  Adequate general anesthesia was administered.  She was then repositioned on the operating table in the modified beach chair position.  The head and neck were placed in neutral alignment.  All bony prominences were carefully padded and  protected.  Once we had the patient appropriately positioned, a timeout was taken and preoperative antibiotics administered.  The left upper extremity was then prepped and draped in the standard, sterile fashion.  The extremity was cleaned with an alcohol solution.  A Hibiclens scrub was performed and then the extremity was prepped with 2 ChloraPrep preps.  I allowed those to dry for 3 minutes before the draping procedure was carried out.    I began the procedure itself by fashioning a standard 8 cm incision over the anterior aspect of the shoulder.  Full-thickness medial and lateral skin flaps were developed.  I then carefully developed the interval between the deltoid and pectoralis.  The cephalic vein was dissected out and identified.  This structure was kept protected throughout the duration of the case.  The underlying clavipectoral fascia was divided and the strap muscles retracted medially.  There was a large fracture hematoma which was evacuated.  There was a head split fracture and a portion of the head had extruded from the joint and was sitting down beneath the subscapularis sitting right on the axillary nerve.  This was devoid of soft tissue attachment and was removed.  There was a lot of fragmentation down in the axillary vault which was removed as well, taking care to keep the axillary nerve protected.  There was comminution extending down the shaft.  2 #5 max braid sutures were used for cerclage fixation of this split fracture using 6 throw surgeon's knots.  Did take care to make sure that these sutures were directly adjacent to the bone to hopefully minimize the risk of iatrogenic nerve injury.  2 #5 max braid sutures were placed at the bone tendon junction of the greater tuberosity fragment.  This structure was kept protected to incorporate into the prosthesis.  There was some intact posterior and posterioroinferior rotator cuff to hopefully allow for active external rotation postoperatively.  There  was a portion of the head still attached to the greater tuberosity and this was removed with a rongeur.    I then directed my attention to the glenoid.  The axillary nerve was identified and protected.  Anterior, posterior and inferior glenoid retractors were carefully positioned.  The long head of the biceps was already torn.  There was some stump still in place which was removed but the tendon itself was retracted and not visualized during the case.  The anterior and inferior glenoid labrum were carefully removed to allow for exposure of the caudal rim of the glenoid.  The centering guide for the baseplate was inserted.  The center pin was drilled, taking care to maintain appropriate inferior tilt.  Once the reaming preparations of the glenoid was completed, the baseplate was impacted.  It seated well.  This was secured with the central compression screw which got excellent purchase followed by all 4 locking screws which were confirmed to lock into the plate.  I trialed with a 36 standard glenosphere.  It fit well but I did have to adjust the offset to allow for adequate inferior overhang to hopefully minimize the risk of notching.  The trial component was removed and the final component impacted.  I made sure that the Patterson taper was fully engaged by passing a right angle clamp circumferentially around the component and pulling up.  When doing so, the entire scapula seemed to move.  I was satisfied that this was well fixed and well positioned.  I then directed my attention to the humerus.    The humerus was reamed and broached up to an 10.  I elected to go with a 8 fracture stem.  The canal preparations were carried out in the typical fashion.  A distal cement restrictor was placed.  The canal was cleaned and irrigated.  I had my assistant mix 1 batch of [place] bone cement on the back table using current generation cement mixing technique.  Once the cement was prepared, the cement was pressurized down into the  canal.  The implant was impacted, taking care to maintain appropriate retroversion.  The excess extruded cement was removed with a [place] elevator.  I held the implant in appropriate retroversion until the cement had fully cured.  Once the implant was stable, the tuberosity was repaired to the implant using the previously placed #5 max braid sutures.  Next I directed my attention to the trialing process.    Multiple trial implants were inserted.  The plus 3 mm trial fit best.  It restored excellent motion and stability without impingement.  The trial component was removed.  The final component impacted.  It seated well and I made sure that the Patterson taper was fully engaged.  The shoulder was reduced and carried through range of motion.  Again, the shoulder demonstrated excellent motion without any impingement or instability.  There was good tension in the periarticular soft tissue structures in the shoulder demonstrated full fluid motion.      The wound was irrigated out with 500 cc of a Betadine-containing saline solution followed by 3 L of sterile saline mixed with bacitracin via pulsatile lavage.  I made sure that we had good hemostasis.  A 10 [place] Hemovac drain was placed.  The wound was then sequentially closed in a layered fashion using #2 Vicryl for the subcutaneous tissues, a running subcuticular Monocryl stitch for the skin followed by Dermabond.  Sterile dressings were applied.  The drapes were withdrawn.  The arm was placed in a shoulder immobilizer.  The patient was awakened and taken to the recovery room in good condition.  She tolerated the procedure well.        Louis Prasad MD  07/05/18

## 2018-07-06 ENCOUNTER — TELEPHONE (OUTPATIENT)
Dept: ORTHOPEDIC SURGERY | Facility: HOSPITAL | Age: 68
End: 2018-07-06

## 2018-07-06 VITALS
HEIGHT: 63 IN | SYSTOLIC BLOOD PRESSURE: 144 MMHG | OXYGEN SATURATION: 97 % | DIASTOLIC BLOOD PRESSURE: 68 MMHG | WEIGHT: 189 LBS | RESPIRATION RATE: 18 BRPM | TEMPERATURE: 97 F | BODY MASS INDEX: 33.49 KG/M2 | HEART RATE: 90 BPM

## 2018-07-06 LAB
HCT VFR BLD AUTO: 34.7 % (ref 35.6–45.5)
HGB BLD-MCNC: 11.3 G/DL (ref 11.9–15.5)

## 2018-07-06 PROCEDURE — 97161 PT EVAL LOW COMPLEX 20 MIN: CPT

## 2018-07-06 PROCEDURE — 85018 HEMOGLOBIN: CPT | Performed by: ORTHOPAEDIC SURGERY

## 2018-07-06 PROCEDURE — 25010000003 CEFAZOLIN IN DEXTROSE 2-4 GM/100ML-% SOLUTION: Performed by: ORTHOPAEDIC SURGERY

## 2018-07-06 PROCEDURE — G8980 MOBILITY D/C STATUS: HCPCS

## 2018-07-06 PROCEDURE — G8979 MOBILITY GOAL STATUS: HCPCS

## 2018-07-06 PROCEDURE — 97110 THERAPEUTIC EXERCISES: CPT

## 2018-07-06 PROCEDURE — 85014 HEMATOCRIT: CPT | Performed by: ORTHOPAEDIC SURGERY

## 2018-07-06 PROCEDURE — 99024 POSTOP FOLLOW-UP VISIT: CPT | Performed by: NURSE PRACTITIONER

## 2018-07-06 PROCEDURE — G8978 MOBILITY CURRENT STATUS: HCPCS

## 2018-07-06 RX ORDER — ACETAMINOPHEN, ASPIRIN AND CAFFEINE 250; 250; 65 MG/1; MG/1; MG/1
1 TABLET, FILM COATED ORAL EVERY 6 HOURS PRN
Start: 2018-07-06

## 2018-07-06 RX ORDER — HYDROCODONE BITARTRATE AND ACETAMINOPHEN 10; 325 MG/1; MG/1
TABLET ORAL
Qty: 50 TABLET | Refills: 0
Start: 2018-07-06 | End: 2019-03-18 | Stop reason: SDUPTHER

## 2018-07-06 RX ORDER — ACETAMINOPHEN 325 MG/1
650 TABLET ORAL EVERY 4 HOURS PRN
Start: 2018-07-06

## 2018-07-06 RX ORDER — PSEUDOEPHEDRINE HCL 30 MG
100 TABLET ORAL 2 TIMES DAILY
Start: 2018-07-06

## 2018-07-06 RX ADMIN — DOCUSATE SODIUM 100 MG: 100 CAPSULE, LIQUID FILLED ORAL at 10:55

## 2018-07-06 RX ADMIN — MUPIROCIN: 20 OINTMENT TOPICAL at 10:57

## 2018-07-06 RX ADMIN — LORAZEPAM 0.5 MG: 0.5 TABLET ORAL at 02:04

## 2018-07-06 RX ADMIN — CEFAZOLIN SODIUM 2 G: 2 INJECTION, SOLUTION INTRAVENOUS at 04:06

## 2018-07-06 RX ADMIN — HYDROCODONE BITARTRATE AND ACETAMINOPHEN 2 TABLET: 10; 325 TABLET ORAL at 10:56

## 2018-07-06 RX ADMIN — HYDROCODONE BITARTRATE AND ACETAMINOPHEN 2 TABLET: 10; 325 TABLET ORAL at 06:43

## 2018-07-06 RX ADMIN — LOSARTAN POTASSIUM: 50 TABLET, FILM COATED ORAL at 10:55

## 2018-07-06 RX ADMIN — HYDROCODONE BITARTRATE AND ACETAMINOPHEN 2 TABLET: 10; 325 TABLET ORAL at 02:04

## 2018-07-06 RX ADMIN — PANTOPRAZOLE SODIUM 40 MG: 40 TABLET, DELAYED RELEASE ORAL at 06:42

## 2018-07-06 RX ADMIN — DULOXETINE 40 MG: 20 CAPSULE, DELAYED RELEASE ORAL at 06:42

## 2018-07-06 NOTE — PLAN OF CARE
Problem: Patient Care Overview  Goal: Plan of Care Review  Outcome: Ongoing (interventions implemented as appropriate)   07/05/18 2034   Coping/Psychosocial   Plan of Care Reviewed With patient   Plan of Care Review   Progress improving   OTHER   Outcome Summary PO LTSA - Rev from a fall. VSS, NVS, voiding. Pain well controlled up to about 6pm, gave oral med's for pain of shoulder/Headache. Patient performs IS independently, but needs encouragement. Plan is to go home in am with family.      Goal: Individualization and Mutuality  Outcome: Outcome(s) achieved Date Met: 07/05/18    Goal: Discharge Needs Assessment  Outcome: Ongoing (interventions implemented as appropriate)    Goal: Interprofessional Rounds/Family Conf  Outcome: Ongoing (interventions implemented as appropriate)      Problem: Fall Risk (Adult)  Goal: Identify Related Risk Factors and Signs and Symptoms  Outcome: Ongoing (interventions implemented as appropriate)    Goal: Absence of Fall  Outcome: Outcome(s) achieved Date Met: 07/05/18      Problem: Pain, Acute (Adult)  Goal: Acceptable Pain Control/Comfort Level  Outcome: Ongoing (interventions implemented as appropriate)      Problem: Fractured Hip (Adult)  Goal: Signs and Symptoms of Listed Potential Problems Will be Absent, Minimized or Managed (Fractured Hip)  Outcome: Ongoing (interventions implemented as appropriate)

## 2018-07-06 NOTE — PLAN OF CARE
Problem: Patient Care Overview  Goal: Plan of Care Review   07/06/18 0918   Coping/Psychosocial   Plan of Care Reviewed With patient;spouse   OTHER   Outcome Summary Pt. is a pleasant 67 y.o. female s/p TSA-POD1 admitted for a closed fracture of the upper end of the L humerus. Pt had sustained a fall on the stairs at home causing the fx. PLOF was independent with all household and community mobility. At eval, was supervision only for sit-stand/stand-sit transfers and CGA for gait and stair training. Pt is not appropriate for skilled PT acutely at this time but is recommended to participate in OP PT to regain shoulder strength & ROM and to improve UE functional independence.

## 2018-07-06 NOTE — DISCHARGE SUMMARY
Date of Admission:  7/5/2018    Date of Discharge:  7/6/2018    Discharge Diagnosis: s/p Left reverse total shoulder arthroplasty    Admitting Physician: Louis Prasad    Consults: none    DETAILS OF HOSPITAL STAY:  Patient is a 67 y.o. female was admitted to the floor following a reverse total shoulder arthroplasty.  Post-operatively the patient was transferred to the post-operative floor where the patient underwent physical therapy including both active and passive ROM exercises. Opioids were titrated to achieve appropriate pain management to allow for participation in mobilization exercises. Vital signs are now stable. The incision is benign without signs or symptoms of infection. Operative extremity neurovascular status remains intact. Appropriate education re: incision care, activity levels, medications, and follow up visits was completed and all questions were answered. The patient is now deemed stable for discharge to home.    Condition on Discharge:  Stable    Discharge Medications     Discharge Medications      New Medications      Instructions Start Date   acetaminophen 325 MG tablet  Commonly known as:  TYLENOL  Notes to patient:  DO NOT TAKE IF TAKING HYDROCODONE BECAUSE THAT CONTAINS ACETAMINOPHEN AS WELL   650 mg, Oral, Every 4 Hours PRN      docusate sodium 100 MG capsule  Notes to patient:  7/6/18 PM   100 mg, Oral, 2 Times Daily      HYDROcodone-acetaminophen  MG per tablet  Commonly known as:  NORCO   Take 1-2 tabs po q 4-6 hours prn pain      nystatin 070904 UNIT/ML suspension  Commonly known as:  MYCOSTATIN  Notes to patient:  7/6/18 PM   500,000 Units, Swish & Swallow, 4 Times Daily         Changes to Medications      Instructions Start Date   aspirin-acetaminophen-caffeine 250-250-65 MG per tablet  Commonly known as:  EXCEDRIN MIGRAINE  What changed:  additional instructions   1 tablet, Oral, Every 6 Hours PRN, May resume on 07/07/18.      diclofenac 1.3 % patch patch  Commonly known as:   DANIA  What changed:  · reasons to take this  · additional instructions   1 patch, Topical, 2 Times Daily PRN, Hold until seen in office to discuss with Dr. Prasad when to resume.         Continue These Medications      Instructions Start Date   calcium carbonate 600 MG tablet  Commonly known as:  OS-CRESCENCIO  Notes to patient:  7/7/18   600 mg, Oral, Every Morning      CLARITIN 10 MG tablet  Generic drug:  loratadine  Notes to patient:  7/7/18   10 mg, Oral, Every Morning      cyclobenzaprine 10 MG tablet  Commonly known as:  FLEXERIL   10 mg, Oral, 3 Times Daily PRN      DULoxetine 20 MG capsule  Commonly known as:  CYMBALTA  Notes to patient:  7/7/18   40 mg, Oral, Every Morning, 2 tabs       Echinacea 500 MG capsule   2 tablets, Oral, As Needed      LORazepam 0.5 MG tablet  Commonly known as:  ATIVAN   0.5 mg, Oral, 2 Times Daily PRN      losartan-hydrochlorothiazide 50-12.5 MG per tablet  Commonly known as:  HYZAAR  Notes to patient:  7/7/18   1 tablet, Oral, Every Morning      magnesium 30 MG tablet  Notes to patient:  7/6/18 PM   30 mg, Oral, Nightly      MUCINEX ALLERGY PO   1 tablet, Oral, As Needed      omeprazole 20 MG capsule  Commonly known as:  priLOSEC  Notes to patient:  7/7/18   20 mg, Oral, Every Morning      vitamin D3 5000 units capsule capsule  Notes to patient:  7/6/18 PM   5,000 Units, Oral, Nightly      zolpidem 10 MG tablet  Commonly known as:  AMBIEN   10 mg, Oral, Nightly PRN         Stop These Medications    Chlorhexidine Gluconate 2 % pads     mupirocin 2 % ointment  Commonly known as:  BACTROBAN     Unable to find              Discharge Diet: regular    Activity at Discharge: as tolerated    Discharge Instructions:   1)  Patient is to continue with physical therapy exercises daily and continue working with the physical therapist as ordered.  2)  Continue to follow precautions as instructed.   3)  Patient is instructed to continue use of the sling except when performing their physical  therapy exercising and while dressing or showering.  4)  Continue to use ice regularly.  Patient also instructed on incentive spirometer during hospitalization and encouraged to continue to use at home regularly.   5)  The dressing should be left in place. If waterproof dressing is intact the patient may shower immediately following discharge. If the dressing becomes disloged or saturated it should be changed and patient must wait until POD #5 to shower. If dressing is changed, apply dry sterile dressing after showering.  6)  Follow up appointment in 2 weeks - patient to call the office at [phone] to schedule.     Follow-up Appointments  2 weeks with Dr Osmar Moreno, RUBY  07/06/18  9:04 AM

## 2018-07-06 NOTE — THERAPY EVALUATION
Acute Care - Physical Therapy Initial Evaluation  [facility]     Patient Name: Clarissa Matos  : 1950  MRN: [id number]  Today's Date: 2018   Onset of Illness/Injury or Date of Surgery: 18  Date of Referral to PT: 18  Referring Physician: Dr. Prasad      Admit Date: 2018    Visit Dx:     ICD-10-CM ICD-9-CM   1. Closed fracture of proximal end of humerus, unspecified fracture morphology, unspecified laterality, initial encounter S42.209A 812.00     Patient Active Problem List   Diagnosis   • Cholecystitis   • Fibromyalgia   • Essential hypertension   • Closed fracture of upper end of humerus   • Rotator cuff arthropathy     Past Medical History:   Diagnosis Date   • Anxiety    • Arthritis    • Depression    • Fibromyalgia    • Headache    • Hiatal hernia    • Hypertension    • Irregular heart rate      Past Surgical History:   Procedure Laterality Date   • BREAST BIOPSY Right    • CATARACT EXTRACTION WITH INTRAOCULAR LENS IMPLANT     •  SECTION      x 2   • CHOLECYSTECTOMY  2017   • CHOLECYSTECTOMY WITH INTRAOPERATIVE CHOLANGIOGRAM N/A 2017    Procedure: CHOLECYSTECTOMY LAPAROSCOPIC INTRAOPERATIVE CHOLANGIOGRAM;  Surgeon: Demi Madden MD;  Location: [facility];  Service:    • EYE SURGERY Bilateral     eye lense implants   • HYSTERECTOMY     • REPLACEMENT TOTAL KNEE Right    • ROTATOR CUFF REPAIR     • TOTAL SHOULDER ARTHROPLASTY W/ DISTAL CLAVICLE EXCISION Left 2018    Procedure: Reverse Total Shoulder Arthroplsty for fracture;  Surgeon: Louis Prasad MD;  Location: [facility];  Service: Orthopedics        PT ASSESSMENT (last 12 hours)      Physical Therapy Evaluation     Row Name 18 0800          PT Evaluation Time/Intention    Subjective Information complains of;pain;weakness  -MA (r) YB (t) MA (c)     Document Type evaluation  -MA (r) YB (t) MA (c)     Mode of Treatment physical therapy  -MA (r) YB (t) MA (c)     Patient Effort good  -MA  (r) YB (t) MA (c)     Symptoms Noted During/After Treatment increased pain  -MA (r) YB (t) MA (c)     Comment Cooperative with PT, pt has mild post-op shoulder pain at this time.   -MA (r) YB (t) MA (c)     Row Name 07/06/18 0800          General Information    Patient Profile Reviewed? yes  -MA (r) YB (t) MA (c)     Onset of Illness/Injury or Date of Surgery 07/05/18  -MA (r) YB (t) MA (c)     Referring Physician Dr. Prasad  -MA (r) YB (t) MA (c)     Patient Observations alert;cooperative;agree to therapy  -MA (r) YB (t) MA (c)     Patient/Family Observations Pt sitting in chair with  in room upon PT arrival.  -MA (r) YB (t) MA (c)     Prior Level of Function independent:;all household mobility;community mobility  -MA (r) YB (t) MA (c)     Pertinent History of Current Functional Problem Pt admitted for closed fx of upper humerus from fall on stairs. S/p L TSA.  -MA (r) YB (t) MA (c)     Existing Precautions/Restrictions fall;shoulder  -MA (r) YB (t) MA (c)     Equipment Issued to Patient gait belt  -MA (r) YB (t) MA (c)     Risks Reviewed patient:;family:;increased discomfort;dizziness  -MA (r) YB (t) MA (c)     Benefits Reviewed patient:;family:;improve function;increase independence;increase strength;decrease pain;increase balance  -MA (r) YB (t) MA (c)     Barriers to Rehab none identified  -MA (r) YB (t) MA (c)     Row Name 07/06/18 0800          Relationship/Environment    Primary Source of Support/Comfort spouse  -MA (r) YB (t) MA (c)     Lives With spouse  -MA (r) YB (t) MA (c)     Row Name 07/06/18 0800          Resource/Environmental Concerns    Current Living Arrangements home/apartment/condo  -MA (r) YB (t) MA (c)     Resource/Environmental Concerns none  -MA (r) YB (t) MA (c)     Row Name 07/06/18 0800          Cognitive Assessment/Intervention- PT/OT    Orientation Status (Cognition) oriented x 4  -MA (r) YB (t) MA (c)     Follows Commands (Cognition) follows three step commands;75-90% accuracy   -MA (r) YB (t) MA (c)     Safety Deficit (Cognitive) mild deficit;ability to follow commands  -MA (r) YB (t) MA (c)     Personal Safety Interventions gait belt;fall prevention program maintained;nonskid shoes/slippers when out of bed  -MA (r) YB (t) MA (c)     Row Name 07/06/18 0800          Safety Issues, Functional Mobility    Safety Issues Affecting Function (Mobility) ability to follow commands  -MA (r) YB (t) MA (c)     Impairments Affecting Function (Mobility) pain;endurance/activity tolerance;range of motion (ROM);strength;balance  -MA (r) YB (t) MA (c)     Row Name 07/06/18 0800          Mobility Assessment/Treatment    Extremity Weight-bearing Status left upper extremity  -MA (r) YB (t) MA (c)     Left Upper Extremity (Weight-bearing Status) weight-bearing as tolerated (WBAT)  -MA (r) YB (t) MA (c)     Row Name 07/06/18 0800          Bed Mobility Assessment/Treatment    Comment (Bed Mobility) NT- Pt was seated in chair upon arrival and returned to chair following eval.   -MA (r) YB (t) MA (c)     Row Name 07/06/18 0800          Transfer Assessment/Treatment    Transfer Assessment/Treatment sit-stand transfer;stand-sit transfer  -MA (r) YB (t) MA (c)     Maintains Weight-bearing Status (Transfers) verbal cues to maintain  -MA (r) YB (t) MA (c)     Comment (Transfers) Used non-operative arm to push off of.  -MA (r) YB (t) MA (c)     Sit-Stand [place] (Transfers) supervision;verbal cues  -MA (r) YB (t) MA (c)     Stand-Sit [place] (Transfers) supervision;verbal cues  -MA (r) YB (t) MA (c)     Row Name 07/06/18 0800          Gait/Stairs Assessment/Training    Gait/Stairs Assessment/Training gait/ambulation independence;distance ambulated  -MA (r) YB (t) MA (c)     [place] Level (Gait) 1 person assist;stand by assist;verbal cues;nonverbal cues (demo/gesture)  -MA     Distance in Feet (Gait) 200  -MA (r) YB (t) MA (c)     Pattern (Gait) step-through  -MA (r) YB (t) MA (c)     Deviations/Abnormal  Patterns (Gait) and[place] decreased;gait speed decreased;stride length decreased  -MA (r) YB (t) MA (c)     Negotiation (Stairs) handrail location;number of steps;maintains weight-bearing status  -MA (r) YB (t) MA (c)     [place] Level (Stairs) contact guard;1 person assist;verbal cues  -MA (r) YB (t) MA (c)     Handrail Location (Stairs) both sides  -MA (r) YB (t) MA (c)     Number of Steps (Stairs) 3  -MA (r) YB (t) MA (c)     Ascending Technique (Stairs) step-to-step  -MA (r) YB (t) MA (c)     Descending Technique (Stairs) step-to-step  -MA (r) YB (t) MA (c)     Maintains Weight-bearing Status (Stairs) able to maintain  -MA (r) YB (t) MA (c)     Comment (Gait/Stairs) Gait distance limited 2' fatigue. Used non-operative UE for rail support with stairs.   -MA (r) YB (t) MA (c)     Row Name 07/06/18 0800          General ROM    GENERAL ROM COMMENTS B LE & RUE WFL, LUE post-op decreased ROM.  -MA (r) YB (t) MA (c)     Row Name 07/06/18 0800          General Assessment (Manual Muscle Testing)    Comment, General Manual Muscle Testing (MMT) Assessment B LE & RUE grossly WFL, LUE post-op weakness.  -MA (r) YB (t) MA (c)     Row Name 07/06/18 0800          Motor Assessment/Intervention    Additional Documentation Balance (Group);Therapeutic Exercise Interventions (Group)  -MA (r) YB (t) MA (c)     Row Name 07/06/18 0800          Therapeutic Exercise    Sets/Reps (Therapeutic Exercise) 10x5-6/day  -MA (r) YB (t) MA (c)     Comment (Therapeutic Exercise) L shoulder s/p TSA protocol for ROM.  -MA (r) YB (t) MA (c)     Row Name 07/06/18 0800          Balance    Balance static sitting balance;static standing balance  -MA (r) YB (t) MA (c)     Row Name 07/06/18 0800          Static Sitting Balance    Level of [place] (Supported Sitting, Static Balance) supervision  -MA (r) YB (t) MA (c)     Sitting Position (Supported Sitting, Static Balance) sitting in chair  -MA (r) YB (t) MA (c)     Time Able to Maintain Position  (Supported Sitting, Static Balance) 3 to 4 minutes  -MA (r) YB (t) MA (c)     Row Name 07/06/18 0800          Static Standing Balance    Level of [place] (Unsupported Standing, Static Balance) standby assist  -MA     Time Able to Maintain Position (Unsupported Standing, Static Balance) 1 to 2 minutes  -MA (r) YB (t) MA (c)     Row Name 07/06/18 0800          Sensory Assessment/Intervention    Sensory General Assessment no sensation deficits identified  -MA (r) YB (t) MA (c)     Row Name 07/06/18 0800          Pain Assessment    Additional Documentation Pain Scale: Numbers Pre/Post-Treatment (Group)  -MA (r) YB (t) MA (c)     Row Name 07/06/18 0800          Pain Scale: Numbers Pre/Post-Treatment    Pain Scale: Numbers, Pretreatment 2/10  -MA (r) YB (t) MA (c)     Pain Scale: Numbers, Post-Treatment 3/10  -MA (r) YB (t) MA (c)     Pain Location - Side Left  -MA (r) YB (t) MA (c)     Pain Location shoulder  -MA (r) YB (t) MA (c)     Pain Intervention(s) Cold applied;Rest;Repositioned  -MA (r) YB (t) MA (c)     Row Name             Wound 07/05/18 1240 Left shoulder incision    Wound - Properties Group Date first assessed: 07/05/18  -MB Time first assessed: 1240  -MB Side: Left  -MB Location: shoulder  -MB Type: incision  -MB    Row Name 07/06/18 0800          Plan of Care Review    Plan of Care Reviewed With patient;family  -MA (r) YB (t) MA (c)     Row Name 07/06/18 0800          Physical Therapy Clinical Impression    Date of Referral to PT 07/06/18  -MA (r) YB (t) MA (c)     PT Diagnosis (PT Clinical Impression) Post-op shoulder weakness.  -MA (r) YB (t) MA (c)     Prognosis (PT Clinical Impression) Good, due to family support and motivation to complete PT.   -MA (r) YB (t) MA (c)     Pathology/Pathophysiology Noted (Describe Specifically for Each System) musculoskeletal  -MA (r) YB (t) MA (c)     Impairments Found (describe specific impairments) ROM;muscle performance;joint integrity and mobility;aerobic  capacity/endurance  -MA (r) YB (t) MA (c)     Functional Limitations in Following Categories (Describe Specific Limitations) self-care  -MA (r) YB (t) MA (c)     Rehab Potential (PT Clinical Summary) --  -MA     Care Plan Review (PT) care plan/treatment goals reviewed;evaluation/treatment results reviewed;risks/benefits reviewed  -MA (r) YB (t) MA (c)     Care Plan Review, Other Participant (PT Clinical Impression) family  -MA (r) YB (t) MA (c)     Row Name 07/06/18 0800          Vital Signs    O2 Delivery Pre Treatment room air  -MA (r) YB (t) MA (c)     O2 Delivery Intra Treatment room air  -MA (r) YB (t) MA (c)     O2 Delivery Post Treatment room air  -MA (r) YB (t) MA (c)     Pre Patient Position Sitting  -MA (r) YB (t) MA (c)     Post Patient Position Sitting  -MA (r) YB (t) MA (c)     Row Name 07/06/18 0800          Positioning and Restraints    Pre-Treatment Position sitting in chair/recliner  -MA (r) YB (t) MA (c)     Post Treatment Position chair  -MA (r) YB (t) MA (c)     In Chair notified nsg;reclined;sitting;call light within reach;encouraged to call for assist;with family/caregiver  -MA (r) YB (t) MA (c)     Row Name 07/06/18 0800          Living Environment    Home Accessibility stairs to enter home  -MA (r) YB (t) MA (c)       User Key  (r) = Recorded By, (t) = Taken By, (c) = Cosigned By    Initials Name Provider Type    JIAN Meyer, RN Registered Nurse    VU Arreola, PT Physical Therapist    ALEXANDRA Carrington, PT Student PT Student          Physical Therapy Education     Title: PT OT SLP Therapies (Done)     Topic: Physical Therapy (Done)     Point: Mobility training (Done)    Learning Progress Summary     Learner Status Readiness Method Response Comment Documented by    Patient Done Acceptance E [facility]   07/06/18 0848    Family Done Acceptance E [facility] 07/06/18 0848          Point: Home exercise program (Done)    Learning Progress Summary     Learner Status Readiness Method Response  Comment Documented by    Patient Done Acceptance E [facility] 07/06/18 0848    Family Done Acceptance E [facility] 07/06/18 0848          Point: Body mechanics (Done)    Learning Progress Summary     Learner Status Readiness Method Response Comment Documented by    Patient Done Acceptance E [facility] 07/06/18 0848    Family Done Acceptance E [facility] 07/06/18 0848          Point: Precautions (Done)    Learning Progress Summary     Learner Status Readiness Method Response Comment Documented by    Patient Done Acceptance E [facility] 07/06/18 0848    Family Done Acceptance E [facility] 07/06/18 0848                      User Key     Initials Effective Dates Name Provider Type Discipline     06/22/18 -  Michelle Carrington, PT Student PT Student PT                PT Recommendation and Plan  Anticipated Discharge Disposition (PT): home with OP services, home  Therapy Frequency (PT Clinical Impression): evaluation only  Outcome Summary/Treatment Plan (PT)  Anticipated Discharge Disposition (PT): home with OP services, home  Plan of Care Reviewed With: patient, spouse  Outcome Summary: Pt. is a pleasant 67 y.o. female s/p TSA-POD1 admitted for a closed fracture of the upper end of the L humerus. Pt had sustained a fall on the stairs at home. PLOF was independent with all household and community mobility. At [facility], was supervision for sit-stand/stand-sit transfers and CGA for gait and stair training. Pt is not appropriate for skilled PT acutely at this time but is recommended to partake in OP PT to regain shoulder strength & ROM and  to improve UE functional activity independence.           Outcome Measures     Row Name 07/06/18 0800             How much help from another person do you currently need...    Turning from your back to your side while in flat bed without using bedrails? 3  -MA (r) YB (t) MA (c)      Moving from lying on back to sitting on the side of a flat bed without bedrails? 3  -MA (r) YB (t) MA (c)      Moving to and from a bed to a  chair (including a wheelchair)? 3  -MA (r) YB (t) MA (c)      Standing up from a chair using your arms (e.g., wheelchair, bedside chair)? 3  -MA (r) YB (t) MA (c)      Climbing 3-5 steps with a railing? 3  -MA (r) YB (t) MA (c)      To walk in hospital room? 3  -MA (r) YB (t) MA (c)      AM-PAC 6 Clicks Score 18  -MA (r) YB (t)         Functional Assessment    Outcome Measure Options AM-PAC 6 Clicks Basic Mobility (PT)  -MA (r) YB (t) MA (c)        User Key  (r) = Recorded By, (t) = Taken By, (c) = Cosigned By    Initials Name Provider Type    VU Arreola, PT Physical Therapist    ALEXANDRA Carrington, PT Student PT Student           Time Calculation:         PT Charges     Row Name 07/06/18 0846             Time Calculation    Start Time 0834  -MA (r) YB (t) MA (c)      Stop Time 0845  -MA (r) YB (t) MA (c)      Time Calculation (min) 11 min  -MA (r) YB (t)      PT Received On 07/06/18  -MA (r) YB (t) MA (c)        User Key  (r) = Recorded By, (t) = Taken By, (c) = Cosigned By    Initials Name Provider Type    VU Arreola, PT Physical Therapist    ALEXANDRA Carrington, PT Student PT Student        Therapy Suggested Charges     Code   Minutes Charges    None           Therapy Charges for Today     Code Description Service Date Service Provider Modifiers Qty    [id number] HC PT THER PROC EA 15 MIN 7/6/2018 Michelle Carrington, PT Student GP 1    [id number]  PT EVAL LOW COMPLEXITY 2 7/6/2018 Michelle Carrington, PT Student GP 1          PT G-Codes  Outcome Measure Options: AM-PAC 6 Clicks Basic Mobility (PT)      Michelle Carrington PT Student  7/6/2018

## 2018-07-06 NOTE — PLAN OF CARE
Problem: Patient Care Overview  Goal: Plan of Care Review  Outcome: Ongoing (interventions implemented as appropriate)   07/06/18 0026   Coping/Psychosocial   Plan of Care Reviewed With patient   Plan of Care Review   Progress improving   OTHER   Outcome Summary Pt is a post op of left reversr total shoulder. Dressing is clean dry and intact. Pt continues with the tegaderm and telfa dressing. Pt continues with the arm sling and ice. Pt continues with the hemovac drain. Pt educated on the importance of monitoring blood pressures related to comorbidity of HTN. Pt voiced understanding. Pt has ambulated to the bathroom with an assist x1. Pt using the incentive spirometer effectively. Pt is resting at this time, will contiue to monitor. Plans to be discharged home in AM.     Goal: Individualization and Mutuality  Outcome: Ongoing (interventions implemented as appropriate)    Goal: Discharge Needs Assessment  Outcome: Ongoing (interventions implemented as appropriate)    Goal: Interprofessional Rounds/Family Conf  Outcome: Ongoing (interventions implemented as appropriate)      Problem: Fall Risk (Adult)  Goal: Identify Related Risk Factors and Signs and Symptoms  Outcome: Ongoing (interventions implemented as appropriate)      Problem: Pain, Acute (Adult)  Goal: Identify Related Risk Factors and Signs and Symptoms  Outcome: Ongoing (interventions implemented as appropriate)    Goal: Acceptable Pain Control/Comfort Level  Outcome: Ongoing (interventions implemented as appropriate)      Problem: Fractured Hip (Adult)  Goal: Signs and Symptoms of Listed Potential Problems Will be Absent, Minimized or Managed (Fractured Hip)  Outcome: Ongoing (interventions implemented as appropriate)

## 2018-07-17 ENCOUNTER — TELEPHONE (OUTPATIENT)
Dept: ORTHOPEDIC SURGERY | Facility: CLINIC | Age: 68
End: 2018-07-17

## 2018-07-17 ENCOUNTER — TELEPHONE (OUTPATIENT)
Dept: ORTHOPEDIC SURGERY | Facility: HOSPITAL | Age: 68
End: 2018-07-17

## 2018-07-17 RX ORDER — FLUCONAZOLE 150 MG/1
150 TABLET ORAL ONCE
Qty: 1 TABLET | Refills: 1 | Status: SHIPPED | OUTPATIENT
Start: 2018-07-17 | End: 2018-07-18

## 2018-07-17 NOTE — TELEPHONE ENCOUNTER
PATIENT SAYS THAT THE ANTIBIOTIC THAT SHE WAS GIVEN AT THE TIME OF HER SX HAS GIVEN HER A YEAST INFECTION AND SHE WOULD LIKE A SCRIPT FOR THAT SENT TO HER PHARMACY.

## 2018-07-17 NOTE — TELEPHONE ENCOUNTER
Spoke to patient. Reports she has developed a vaginal yeast infection after taking the antibiotics prescribed by Dr. Prasad. I discussed with her the treatment and medication administration instructions. Patient acknowledged understanding.

## 2018-07-18 ENCOUNTER — OFFICE VISIT (OUTPATIENT)
Dept: ORTHOPEDIC SURGERY | Facility: CLINIC | Age: 68
End: 2018-07-18

## 2018-07-18 VITALS — TEMPERATURE: 97.4 F | HEIGHT: 63 IN | WEIGHT: 187 LBS | BODY MASS INDEX: 33.13 KG/M2

## 2018-07-18 DIAGNOSIS — Z96.612 STATUS POST REPLACEMENT OF LEFT SHOULDER JOINT: Primary | ICD-10-CM

## 2018-07-18 PROCEDURE — 99024 POSTOP FOLLOW-UP VISIT: CPT | Performed by: ORTHOPAEDIC SURGERY

## 2018-07-18 PROCEDURE — 73030 X-RAY EXAM OF SHOULDER: CPT | Performed by: ORTHOPAEDIC SURGERY

## 2018-07-18 RX ORDER — HYDROCODONE BITARTRATE AND ACETAMINOPHEN 10; 325 MG/1; MG/1
1 TABLET ORAL EVERY 4 HOURS PRN
Qty: 50 TABLET | Refills: 0 | Status: SHIPPED | OUTPATIENT
Start: 2018-07-18 | End: 2019-06-25

## 2018-07-18 NOTE — PROGRESS NOTES
"Clarissa Matos : 1950 MRN: [id number] DATE: 2018    DIAGNOSIS:  2 week follow up left shoulder arthroplasty      SUBJECTIVE:  Patient returns today for 2 week follow up of left shoulder replacement. Patient reports doing well with no unusual complaints.      OBJECTIVE:    Temp 97.4 °F (36.3 °C)   Ht 160 cm (62.99\")   Wt 84.8 kg (187 lb)   LMP  (LMP Unknown)   BMI 33.13 kg/m²     Exam:. The incision is well approximated. No erythema or drainage. Shoulder moves fluidly with pendulums . The arm is soft and nontender.  Good strength in hand and wrist.  Distal sensation intact.  Palpable radial pulse.    DIAGNOSTIC STUDIES    Xrays: 2 views of the left shoulder (AP, scapular Y) were ordered and reviewed for evaluation of shoulder replacement.  They demonstrate a well positioned, well aligned replacement without complicating factors noted.  In comparison with previous films, there has been no change.    ASSESSMENT: 2 week follow up left shoulder replacement.    PLAN:   1.  Begin PT per protocol--prescription given along with 2 copies of my protocol.  2.  Continue sling until next visit.  3.  Counseled patient about appropriate activity modifications and restrictions, including no driving at this point.  4.  I did agree to refill her pain medicine.  Risks were discussed.    Louis Prasad MD    "

## 2018-07-26 ENCOUNTER — TREATMENT (OUTPATIENT)
Dept: PHYSICAL THERAPY | Facility: CLINIC | Age: 68
End: 2018-07-26

## 2018-07-26 DIAGNOSIS — Z96.612 AFTERCARE FOLLOWING LEFT SHOULDER JOINT REPLACEMENT SURGERY: Primary | ICD-10-CM

## 2018-07-26 DIAGNOSIS — M25.512 ACUTE POSTOPERATIVE PAIN OF LEFT SHOULDER: ICD-10-CM

## 2018-07-26 DIAGNOSIS — G89.18 ACUTE POSTOPERATIVE PAIN OF LEFT SHOULDER: ICD-10-CM

## 2018-07-26 DIAGNOSIS — Z96.612 STATUS POST REPLACEMENT OF LEFT SHOULDER JOINT: ICD-10-CM

## 2018-07-26 DIAGNOSIS — Z47.1 AFTERCARE FOLLOWING LEFT SHOULDER JOINT REPLACEMENT SURGERY: Primary | ICD-10-CM

## 2018-07-26 DIAGNOSIS — M25.612 STIFFNESS OF LEFT SHOULDER JOINT: ICD-10-CM

## 2018-07-26 PROCEDURE — G8985 CARRY GOAL STATUS: HCPCS | Performed by: PHYSICAL THERAPIST

## 2018-07-26 PROCEDURE — G0283 ELEC STIM OTHER THAN WOUND: HCPCS | Performed by: PHYSICAL THERAPIST

## 2018-07-26 PROCEDURE — 97161 PT EVAL LOW COMPLEX 20 MIN: CPT | Performed by: PHYSICAL THERAPIST

## 2018-07-26 PROCEDURE — 97140 MANUAL THERAPY 1/> REGIONS: CPT | Performed by: PHYSICAL THERAPIST

## 2018-07-26 PROCEDURE — 97110 THERAPEUTIC EXERCISES: CPT | Performed by: PHYSICAL THERAPIST

## 2018-07-26 PROCEDURE — G8984 CARRY CURRENT STATUS: HCPCS | Performed by: PHYSICAL THERAPIST

## 2018-07-26 NOTE — PROGRESS NOTES
Physical Therapy Initial Evaluation and Plan of Care        Patient: Clarissa Matos   : 1950  Diagnosis/ICD-10 Code:  Aftercare following left shoulder joint replacement surgery [Z47.1, Z96.612]  Referring practitioner: Louis Prasad MD  Date of Initial Visit: 2018  Today's Date: 2018  Patient seen for 1 sessions           Subjective Questionnaire: QuickDASH: 77.27%      Subjective Evaluation    History of Present Illness  Date of surgery: 2018  Mechanism of injury: Patient reports miss steped and fell into patio door resulting in left shoulder fracture-had RC reconstruction and shoulder replacement surgery.    PMH: Right RCR, Right TKA, Hypertension; Fibromyalgia; Irregular heart rate; Hiatal hernia, arthritis, depression, anxiety.    Subjective comment: Patient reports left shoulder replacement.  Patient Occupation: Retired Quality of life: good    Pain  Current pain ratin  At best pain ratin  At worst pain ratin  Location: Left shoulder  Quality: throbbing, dull ache and tight  Relieving factors: ice and medications  Aggravating factors: movement and sleeping  Progression: improved    Social Support  Lives in: multiple-level home  Lives with: spouse    Hand dominance: right    Diagnostic Tests  Abnormal x-ray: left shoulder-surgical healing is on target.    Patient Goals  Patient goals for therapy: decreased pain, increased strength, increased motion and independence with ADLs/IADLs             Objective       Observations   Left Shoulder   Positive for edema and incision. Negative for drainage.     Palpation   Left   Hypertonic in the levator scapulae, pectoralis major, pectoralis minor and upper trapezius.   Tenderness of the biceps, levator scapulae, pectoralis major, pectoralis minor and upper trapezius.     Additional Palpation Details  Left shoulder girdle grossly TTP    Passive Range of Motion   Left Shoulder   Flexion: 80 degrees   Abduction: 70 degrees   External  rotation 0°: 0 degrees   Internal rotation 0°: 40 degrees     Scapular Mobility   Left Shoulder   Scapular mobility: poor    Additional Scapular Mobility Details  Guarded    Strength/Myotome Testing     Additional Strength Details  Deferred due to post-op status.    Tests     Additional Tests Details  Deferred due to post-op status.  Left Shoulder Flexibility Comments:   Decreased left upper quadrant/shoulder girdle muscle flexibility.  Right Shoulder Flexibility Comments:   Decreased left upper quadrant/shoulder girdle muscle flexibility.         Assessment & Plan     Assessment  Impairments: abnormal muscle tone, abnormal or restricted ROM, activity intolerance, impaired physical strength and pain with function  Assessment details: Clarissa Matos is a pleasant 67 y.o. female that presents with signs and symptoms consistent with the above diagnosis. Pt would benefit from skilled PT services in order to address listed impairments and increase tolerance to normal daily activities including ADLs, IADLs and recreational activities.    Prognosis: good  Prognosis details: Pt is a good candidate for skilled PT intervention to restore functional AROM and strength to return to previous level of ADL's.    Functional Limitations: sleeping and uncomfortable because of pain  Goals  Plan Goals: Short Term (8 wks):  1. Patient to have increased left shoulder PROM to WNLs to restore functional joint mobility.  2. Patient will have increased right shoulder strength to 3+/5 to allow for increased joint stability and to decrease joint/soft tissue stresses.  3. Patient will have increased bilateral scapular mobility to WNLs for improved functional mobility.  4. Patient will have increased GHJ mobility to WNLs to allow for restoration of normal joint mechanics and decrease joint/soft tissue stresses.    Long Term Goal (12 wks):  1.  Patient will have improved DASH score of 20% or less.  2.  Patient will reports decreased shoulder pain  to 0/10 to allow for restorative sleep and return to PLOF/Leisure activities.  3.  Patient will have increased left shoulder strength to 4+/5.  4.  Patient will be independent in performance of HEP for carryover upon discharge from skilled PT services.      Plan  Therapy options: will be seen for skilled physical therapy services  Planned modality interventions: TENS, cryotherapy, thermotherapy (hydrocollator packs) and high voltage pulsed current (pain management)  Planned therapy interventions: manual therapy, soft tissue mobilization, strengthening, stretching, functional ROM exercises, joint mobilization and home exercise program  Frequency: 2x week  Duration in weeks: 12  Treatment plan discussed with: patient  Plan details: Pt was educated on the importance of their HEP and their current need for continued skilled physical therapy. Patients goals and potential limitations were discussed and pt is in agreement with current plan of care and treatment emphasis.            Manual Therapy:    20     mins  11921;  Therapeutic Exercise:    15     mins  90462;     Neuromuscular Krysta:        mins  06497;    Therapeutic Activity:          mins  71251;     Gait Training:           mins  71802;     Ultrasound:          mins  52875;    Electrical Stimulation:    15     mins  89891 ( );  Dry Needling          mins self-pay    Timed Treatment:   35   mins   Total Treatment:     70   mins    PT SIGNATURE: Tere Mcqueen, PT   DATE TREATMENT INITIATED: 7/27/2018    Initial Certification  Certification Period: 10/25/2018  I certify that the therapy services are furnished while this patient is under my care.  The services outlined above are required by this patient, and will be reviewed every 90 days.     PHYSICIAN: Louis Prasad MD      DATE:     Please sign and return via fax to [phone].. Thank you, [facility].

## 2018-08-08 ENCOUNTER — TELEPHONE (OUTPATIENT)
Dept: ORTHOPEDIC SURGERY | Facility: CLINIC | Age: 68
End: 2018-08-08

## 2018-08-08 ENCOUNTER — OFFICE VISIT (OUTPATIENT)
Dept: PHYSICAL THERAPY | Facility: CLINIC | Age: 68
End: 2018-08-08

## 2018-08-08 DIAGNOSIS — M25.512 ACUTE POSTOPERATIVE PAIN OF LEFT SHOULDER: ICD-10-CM

## 2018-08-08 DIAGNOSIS — Z96.612 AFTERCARE FOLLOWING LEFT SHOULDER JOINT REPLACEMENT SURGERY: Primary | ICD-10-CM

## 2018-08-08 DIAGNOSIS — Z47.1 AFTERCARE FOLLOWING LEFT SHOULDER JOINT REPLACEMENT SURGERY: Primary | ICD-10-CM

## 2018-08-08 DIAGNOSIS — M25.612 STIFFNESS OF LEFT SHOULDER JOINT: ICD-10-CM

## 2018-08-08 DIAGNOSIS — G89.18 ACUTE POSTOPERATIVE PAIN OF LEFT SHOULDER: ICD-10-CM

## 2018-08-08 DIAGNOSIS — Z96.612 STATUS POST REPLACEMENT OF LEFT SHOULDER JOINT: ICD-10-CM

## 2018-08-08 PROCEDURE — 97530 THERAPEUTIC ACTIVITIES: CPT | Performed by: PHYSICAL THERAPIST

## 2018-08-08 PROCEDURE — G0283 ELEC STIM OTHER THAN WOUND: HCPCS | Performed by: PHYSICAL THERAPIST

## 2018-08-08 PROCEDURE — 97140 MANUAL THERAPY 1/> REGIONS: CPT | Performed by: PHYSICAL THERAPIST

## 2018-08-08 NOTE — TELEPHONE ENCOUNTER
Rescheduled patient from 8/13 to 8/15 due to [facility] on call weekend. Patient needs late appt for her  to bring her, so moved to 8/15. We had already moved her once due to [facility] [place] ([place]8). She wants to make sure ok to see for 2nd post op visit at 4 weeks now instead of 3 weeks. She is in the splint and doing therapy and seems to be fine. Please advise. Can leave message.

## 2018-08-09 NOTE — TELEPHONE ENCOUNTER
Called and left answering machine message informing patient that f/u on 8/15 will be fine./[place]

## 2018-08-09 NOTE — PROGRESS NOTES
Physical Therapy Daily Progress Note    Time In 1730  Time Out 1840    Clarissa Matos reports: shoulder not as painful as on first visit, but she is stiff in her left shoulder from brace and has noted some spasms in her shoulder blade.  Saw MD in [facility] and discussed with him progress to date(at 4 week debi).  Per patient, MD indicated to patient that she could begin to wean herself from her abduction brace.    Subjective     Objective   -presents to clinic wearing abduction brace.  Able to don/doff Independently.    -noted that left elbow in sling is being held at 45 deg angle vs. 90 deg angle.    See Exercise, Manual, and Modality Logs for complete treatment.   Exercise rationale/ pain free exercise performance   Anatomy and structure of affected musculature  Posture/Postural awareness of shoulder blades  Sleeping positions with pillows    Added: posterior shoulder rolls, pain free cervical ROM(rot and lat flex), UT and Levator stretch B and scap retraction    Adjusted left elbow position in brace to 90/90 angle    Reviewed and discussed MD protocol and progression with patient.      Assessment/Plan  Decreased left shoulder pain versus initial visit but still with noted stiffness, soreness and scapular tightness/spasm.  Exhibits increased ability to perform painfree exercise with report of lessening  upper trap/levator/scapular discomfort.    Other  Progress ROM activities per protocol to begin AAROM as able.  Continue PROM left shoulder           Manual Therapy:  15    mins  86011;  Therapeutic Exercise:         mins  95181;     Neuromuscular Krysta:      mins  10257;    Therapeutic Activity:    20      mins  49734;     Gait Training:          mins  57834;     Ultrasound:        mins  79183;    Electrical Stimulation:      15  mins  58338 ( );      Timed Treatment:   35   mins   Total Treatment:     70   mins    Yordan Ribeiro PTA    Physical Therapist Assistant [place] [id number]

## 2018-08-10 ENCOUNTER — OFFICE VISIT (OUTPATIENT)
Dept: PHYSICAL THERAPY | Facility: CLINIC | Age: 68
End: 2018-08-10

## 2018-08-10 DIAGNOSIS — Z96.612 STATUS POST REPLACEMENT OF LEFT SHOULDER JOINT: ICD-10-CM

## 2018-08-10 DIAGNOSIS — Z47.1 AFTERCARE FOLLOWING LEFT SHOULDER JOINT REPLACEMENT SURGERY: Primary | ICD-10-CM

## 2018-08-10 DIAGNOSIS — G89.18 ACUTE POSTOPERATIVE PAIN OF LEFT SHOULDER: ICD-10-CM

## 2018-08-10 DIAGNOSIS — M25.612 STIFFNESS OF LEFT SHOULDER JOINT: ICD-10-CM

## 2018-08-10 DIAGNOSIS — Z96.612 AFTERCARE FOLLOWING LEFT SHOULDER JOINT REPLACEMENT SURGERY: Primary | ICD-10-CM

## 2018-08-10 DIAGNOSIS — M25.512 ACUTE POSTOPERATIVE PAIN OF LEFT SHOULDER: ICD-10-CM

## 2018-08-10 PROCEDURE — 97140 MANUAL THERAPY 1/> REGIONS: CPT | Performed by: PHYSICAL THERAPIST

## 2018-08-10 PROCEDURE — 97110 THERAPEUTIC EXERCISES: CPT | Performed by: PHYSICAL THERAPIST

## 2018-08-15 ENCOUNTER — TELEPHONE (OUTPATIENT)
Dept: ORTHOPEDIC SURGERY | Facility: CLINIC | Age: 68
End: 2018-08-15

## 2018-08-15 ENCOUNTER — OFFICE VISIT (OUTPATIENT)
Dept: ORTHOPEDIC SURGERY | Facility: CLINIC | Age: 68
End: 2018-08-15

## 2018-08-15 VITALS — WEIGHT: 190 LBS | HEIGHT: 63 IN | BODY MASS INDEX: 33.66 KG/M2

## 2018-08-15 DIAGNOSIS — Z96.612 S/P SHOULDER REPLACEMENT, LEFT: Primary | ICD-10-CM

## 2018-08-15 PROCEDURE — 99024 POSTOP FOLLOW-UP VISIT: CPT | Performed by: ORTHOPAEDIC SURGERY

## 2018-08-15 PROCEDURE — 73030 X-RAY EXAM OF SHOULDER: CPT | Performed by: ORTHOPAEDIC SURGERY

## 2018-08-15 RX ORDER — HYDROCODONE BITARTRATE AND ACETAMINOPHEN 10; 325 MG/1; MG/1
1 TABLET ORAL EVERY 4 HOURS PRN
Qty: 50 TABLET | Refills: 0 | Status: SHIPPED | OUTPATIENT
Start: 2018-08-15 | End: 2019-07-23 | Stop reason: HOSPADM

## 2018-08-15 RX ORDER — HYDROCODONE BITARTRATE AND ACETAMINOPHEN 10; 325 MG/1; MG/1
1 TABLET ORAL EVERY 4 HOURS PRN
Qty: 50 TABLET | Refills: 0 | Status: SHIPPED | OUTPATIENT
Start: 2018-08-15 | End: 2019-06-25

## 2018-08-15 NOTE — PROGRESS NOTES
"Clarissa Matos : 1950 MRN: [id number] DATE: 8/15/2018    DIAGNOSIS: 6 week follow up left shoulder arthroplasty      SUBJECTIVE:  Patient returns today for 6 week follow up of left shoulder replacement. Patient reports doing well with no unusual complaints.     OBJECTIVE:    Ht 160 cm (63\")   Wt 86.2 kg (190 lb)   LMP  (LMP Unknown)   BMI 33.66 kg/m²     Exam:. The incision is well healed. No erythema or drainage. Shoulder moves fluidly with pendulums.  Motion is on track per protocol.  The arm is soft and nontender.  Good motor and sensory function.  Palpable distal pulses.     DIAGNOSTIC STUDIES    Xrays: 2 views of the left shoulder (AP, scapular Y) were ordered and reviewed for evaluation of shoulder replacement. They demonstrate a well positioned, well aligned replacement without complicating factors noted. In comparison with previous films there has been no change.    ASSESSMENT: 6 week follow up left shoulder replacement.    PLAN:   1.  Continue PT per protocol.  2.  Discontinue sling and begin working on progressing ROM as tolerated.  3.  Counseled patient about appropriate activity modifications and restrictions--released to drive at this point.    Louis Prasad MD    "

## 2018-08-15 NOTE — TELEPHONE ENCOUNTER
It is for Norco.  Yes.  I am prescribing her medicine right now.  She will switch back to her pain medicine doctor after this prescription.  This is the last prescription through our office.

## 2018-08-21 ENCOUNTER — TREATMENT (OUTPATIENT)
Dept: PHYSICAL THERAPY | Facility: CLINIC | Age: 68
End: 2018-08-21

## 2018-08-21 DIAGNOSIS — Z47.1 AFTERCARE FOLLOWING LEFT SHOULDER JOINT REPLACEMENT SURGERY: Primary | ICD-10-CM

## 2018-08-21 DIAGNOSIS — Z96.612 AFTERCARE FOLLOWING LEFT SHOULDER JOINT REPLACEMENT SURGERY: Primary | ICD-10-CM

## 2018-08-21 PROCEDURE — 97110 THERAPEUTIC EXERCISES: CPT | Performed by: PHYSICAL THERAPIST

## 2018-08-21 PROCEDURE — 97140 MANUAL THERAPY 1/> REGIONS: CPT | Performed by: PHYSICAL THERAPIST

## 2018-08-21 PROCEDURE — G0283 ELEC STIM OTHER THAN WOUND: HCPCS | Performed by: PHYSICAL THERAPIST

## 2018-08-21 NOTE — PROGRESS NOTES
"   Physical Therapy Daily Progress Note        Patient: Clarissa Matos   : 1950  Diagnosis/ICD-10 Code:  Aftercare following left shoulder joint replacement surgery [Z47.1, Z96.612]  Referring practitioner: Louis Prasad MD  Date of Initial Visit: Type: THERAPY  Noted: 2018  Today's Date: 2018  Patient seen for 3 sessions         Clarissa Matos reports:       Subjective   Patient reports her shoulder and arm are sore and she has been having inner elbow region discomfort and at times \"nerve\" pains.  States her  has not assisted in ROM at home and does not want to.      Objective   See Exercise, Manual, and Modality Logs for complete treatment.       Assessment/Plan  Patient presented to PT session with significant left shoulder tightness and limited ROM below 90 deg. FF and abduction.  She tolerated treatment well with no unusual complaints and no adverse symptoms. Reported decreased soreness post treatment.  Encouraged patient to perform ROM exercises 4x/day and apply gentle stretch and use ice after each session.  Patient was resistant to encouragement and was fixated on the severity of her fracture.  Patient was educated on post-op protocol and ensured that her initial activity restrictions are not in place anymore.  Progress per Plan of Care           Manual Therapy:    20     mins  30442;  Therapeutic Exercise:    15     mins  99744;     Neuromuscular Krysta:        mins  51315;    Therapeutic Activity:          mins  09366;     Gait Training:           mins  50212;     Ultrasound:          mins  11707;    Electrical Stimulation:    15     mins  15956 ( );  Dry Needling          mins self-pay    Timed Treatment:   35   mins   Total Treatment:     50   mins    Tere Mcqueen, PT  Physical Therapist                  "

## 2018-08-23 ENCOUNTER — TREATMENT (OUTPATIENT)
Dept: PHYSICAL THERAPY | Facility: CLINIC | Age: 68
End: 2018-08-23

## 2018-08-23 DIAGNOSIS — M25.512 ACUTE POSTOPERATIVE PAIN OF LEFT SHOULDER: ICD-10-CM

## 2018-08-23 DIAGNOSIS — Z96.612 AFTERCARE FOLLOWING LEFT SHOULDER JOINT REPLACEMENT SURGERY: Primary | ICD-10-CM

## 2018-08-23 DIAGNOSIS — Z96.612 STATUS POST REPLACEMENT OF LEFT SHOULDER JOINT: ICD-10-CM

## 2018-08-23 DIAGNOSIS — G89.18 ACUTE POSTOPERATIVE PAIN OF LEFT SHOULDER: ICD-10-CM

## 2018-08-23 DIAGNOSIS — Z47.1 AFTERCARE FOLLOWING LEFT SHOULDER JOINT REPLACEMENT SURGERY: Primary | ICD-10-CM

## 2018-08-23 DIAGNOSIS — M25.612 STIFFNESS OF LEFT SHOULDER JOINT: ICD-10-CM

## 2018-08-23 PROCEDURE — 97140 MANUAL THERAPY 1/> REGIONS: CPT | Performed by: PHYSICAL THERAPIST

## 2018-08-23 PROCEDURE — G0283 ELEC STIM OTHER THAN WOUND: HCPCS | Performed by: PHYSICAL THERAPIST

## 2018-08-23 PROCEDURE — 97110 THERAPEUTIC EXERCISES: CPT | Performed by: PHYSICAL THERAPIST

## 2018-08-23 NOTE — PROGRESS NOTES
Physical Therapy Daily Progress Note        Patient: Clarissa Matos   : 1950  Diagnosis/ICD-10 Code:  Aftercare following left shoulder joint replacement surgery [Z47.1, Z96.612]  Referring practitioner: Louis Prasad MD  Date of Initial Visit: Type: THERAPY  Noted: 2018  Today's Date: 2018  Patient seen for 4 sessions         Clarissa Matos reports:      Subjective   Patient reports her shoulder is still painful and she has to get up during the night to put ice on her shoulder.    Objective   See Exercise, Manual, and Modality Logs for complete treatment.       Assessment/Plan  Improved PROM from last session as well as significantly less muscle guarding.    Progress per Plan of Care           Manual Therapy:    15     mins  47530;  Therapeutic Exercise:     13    mins  44770;     Neuromuscular Krysta:        mins  75805;    Therapeutic Activity:          mins  98022;     Gait Training:           mins  45391;     Ultrasound:          mins  46503;    Electrical Stimulation:    15     mins  83917 (MC );  Dry Needling          mins self-pay    Timed Treatment:   28   mins   Total Treatment:     45   mins    Tere Mcqueen PT  Physical Therapist

## 2018-08-30 NOTE — PROGRESS NOTES
Physical Therapy Daily Progress Note    Time In   Time Out    Clarissa Matos reports: adjustment of sling decreased pain left elbow pain.  Have been weaning self out of sling a little bit when at home and not doing any activity.    Subjective     Objective   - presents to clinic wearing abduction brace.  Able to don/doff Independently.    See Exercise, Manual, and Modality Logs for complete treatment.   There ex performed:  UBE fwd/retro x 1 min each direction, 3 way pulley x 2 min each, pain free cervical ROM(rot and lat flex), post sh rolls and UT/Levator stretch. Added cane assisted ER L UE.    Manual therapy x 15 min left shoulder with PROM/PROM Stretching with over pressure, gentle joint mobs/capsular mobs grade 2 as well was joint oscillations/distractions    Assessment/Plan  Left shoulder mobility progressing welll in regards to decreased pain complaints with passive stretching and positioning.  Still with range limitations due to protocol and evident decreased capsular mobility left shoulder due to recent surgery and sling wear.  Able to perform exercises per HEP without increased symptoms though fatigues easily.    Other  See one more time prior to MD follow up week of 8/13           Manual Therapy:  15       mins  47449;  Therapeutic Exercise:    18     mins  56073;     Neuromuscular Krysta:        mins  74518;    Therapeutic Activity:          mins  86153;     Gait Training:           mins  67698;     Ultrasound:        mins  46071;    Electrical Stimulation:         mins  21154 ( );      Timed Treatment:  33  mins   Total Treatment:   60  mins    Yordan Ribeiro PTA    Physical Therapist Assistant [place] [phone]

## 2018-08-31 ENCOUNTER — OFFICE VISIT (OUTPATIENT)
Dept: PHYSICAL THERAPY | Facility: CLINIC | Age: 68
End: 2018-08-31

## 2018-08-31 DIAGNOSIS — Z47.1 AFTERCARE FOLLOWING LEFT SHOULDER JOINT REPLACEMENT SURGERY: Primary | ICD-10-CM

## 2018-08-31 DIAGNOSIS — M25.612 STIFFNESS OF LEFT SHOULDER JOINT: ICD-10-CM

## 2018-08-31 DIAGNOSIS — Z96.612 STATUS POST REPLACEMENT OF LEFT SHOULDER JOINT: ICD-10-CM

## 2018-08-31 DIAGNOSIS — Z96.612 AFTERCARE FOLLOWING LEFT SHOULDER JOINT REPLACEMENT SURGERY: Primary | ICD-10-CM

## 2018-08-31 PROCEDURE — 97110 THERAPEUTIC EXERCISES: CPT | Performed by: PHYSICAL THERAPIST

## 2018-08-31 PROCEDURE — 97140 MANUAL THERAPY 1/> REGIONS: CPT | Performed by: PHYSICAL THERAPIST

## 2018-08-31 PROCEDURE — 97530 THERAPEUTIC ACTIVITIES: CPT | Performed by: PHYSICAL THERAPIST

## 2018-08-31 PROCEDURE — G0283 ELEC STIM OTHER THAN WOUND: HCPCS | Performed by: PHYSICAL THERAPIST

## 2018-09-04 ENCOUNTER — TREATMENT (OUTPATIENT)
Dept: PHYSICAL THERAPY | Facility: CLINIC | Age: 68
End: 2018-09-04

## 2018-09-04 DIAGNOSIS — Z47.1 AFTERCARE FOLLOWING LEFT SHOULDER JOINT REPLACEMENT SURGERY: Primary | ICD-10-CM

## 2018-09-04 DIAGNOSIS — M25.512 ACUTE POSTOPERATIVE PAIN OF LEFT SHOULDER: ICD-10-CM

## 2018-09-04 DIAGNOSIS — M25.612 STIFFNESS OF LEFT SHOULDER JOINT: ICD-10-CM

## 2018-09-04 DIAGNOSIS — Z96.612 STATUS POST REPLACEMENT OF LEFT SHOULDER JOINT: ICD-10-CM

## 2018-09-04 DIAGNOSIS — G89.18 ACUTE POSTOPERATIVE PAIN OF LEFT SHOULDER: ICD-10-CM

## 2018-09-04 DIAGNOSIS — Z96.612 AFTERCARE FOLLOWING LEFT SHOULDER JOINT REPLACEMENT SURGERY: Primary | ICD-10-CM

## 2018-09-04 PROCEDURE — 97140 MANUAL THERAPY 1/> REGIONS: CPT | Performed by: PHYSICAL THERAPIST

## 2018-09-04 PROCEDURE — 97110 THERAPEUTIC EXERCISES: CPT | Performed by: PHYSICAL THERAPIST

## 2018-09-04 PROCEDURE — G0283 ELEC STIM OTHER THAN WOUND: HCPCS | Performed by: PHYSICAL THERAPIST

## 2018-09-04 NOTE — PROGRESS NOTES
Physical Therapy Re-Assessment / Re-Certification        Patient: Clarissa Matos   : 1950  Diagnosis/ICD-10 Code:  Aftercare following left shoulder joint replacement surgery [Z47.1, Z96.612]  Referring practitioner: Louis Prasad MD  Date of Initial Visit: Type: THERAPY  Noted: 2018  Today's Date: 2018  Patient seen for 6 sessions      Subjective:   Clarissa Matos reports:   Subjective Questionnaire: QuickDASH: 70.45%  Clinical Progress: improved  Home Program Compliance: Yes  Treatment has included: therapeutic exercise, manual therapy, electrical stimulation and cryotherapy    Subjective   Patient reports her left shoulder continues to be fairly painful and stiff.  Reports she is still not sleeping very well.  Reports she has been doing her home exercises and they are getting easier.    Objective       Passive Range of Motion     Right Shoulder   Flexion: 120 degrees   Abduction: 100 degrees   External rotation 45°: 32 degrees   Internal rotation 45°: 50 degrees     Strength/Myotome Testing     Additional Strength Details  Grossly 3-/5         Assessment & Plan       Goals  Plan Goals: Short Term (8 wks):  1. Patient to have increased left shoulder PROM to WNLs to restore functional joint mobility.  2. Patient will have increased right shoulder strength to 3+/5 to allow for increased joint stability and to decrease joint/soft tissue stresses.  3. Patient will have increased bilateral scapular mobility to WNLs for improved functional mobility.  4. Patient will have increased GHJ mobility to WNLs to allow for restoration of normal joint mechanics and decrease joint/soft tissue stresses.    Long Term Goal (12 wks):  1.  Patient will have improved DASH score of 20% or less.  2.  Patient will reports decreased shoulder pain to 0/10 to allow for restorative sleep and return to PLOF/Leisure activities.  3.  Patient will have increased left shoulder strength to 4+/5.  4.  Patient will be independent in  performance of HEP for carryover upon discharge from skilled PT services.          Plan  Frequency: 2x week  Duration in visits: 20        PT Signature: Tere Mcqueen PT      Based upon review of the patient's progress and continued therapy plan, it is my medical opinion that Clarissa Matos should continue physical therapy treatment at [facility] PHYSICAL THERAP[facility]  [facility], [address] [address]  [phone].    Signature: __________________________________  Louis Prasad MD    Manual Therapy:    25     mins  41808;  Therapeutic Exercise:    10     mins  78776;     Neuromuscular Krysta:        mins  24925;    Therapeutic Activity:          mins  99618;     Gait Training:           mins  78556;     Ultrasound:          mins  52680;    Electrical Stimulation:    15     mins  69259 ( );  Dry Needling          mins self-pay    Timed Treatment:   35   mins   Total Treatment:     53   mins

## 2018-09-05 PROCEDURE — G8984 CARRY CURRENT STATUS: HCPCS | Performed by: PHYSICAL THERAPIST

## 2018-09-05 PROCEDURE — G8985 CARRY GOAL STATUS: HCPCS | Performed by: PHYSICAL THERAPIST

## 2018-09-12 ENCOUNTER — TREATMENT (OUTPATIENT)
Dept: PHYSICAL THERAPY | Facility: CLINIC | Age: 68
End: 2018-09-12

## 2018-09-12 DIAGNOSIS — G89.18 ACUTE POSTOPERATIVE PAIN OF LEFT SHOULDER: ICD-10-CM

## 2018-09-12 DIAGNOSIS — M25.612 STIFFNESS OF LEFT SHOULDER JOINT: ICD-10-CM

## 2018-09-12 DIAGNOSIS — Z96.612 STATUS POST REPLACEMENT OF LEFT SHOULDER JOINT: ICD-10-CM

## 2018-09-12 DIAGNOSIS — M25.512 ACUTE POSTOPERATIVE PAIN OF LEFT SHOULDER: ICD-10-CM

## 2018-09-12 DIAGNOSIS — Z96.612 AFTERCARE FOLLOWING LEFT SHOULDER JOINT REPLACEMENT SURGERY: Primary | ICD-10-CM

## 2018-09-12 DIAGNOSIS — Z47.1 AFTERCARE FOLLOWING LEFT SHOULDER JOINT REPLACEMENT SURGERY: Primary | ICD-10-CM

## 2018-09-12 PROCEDURE — 97140 MANUAL THERAPY 1/> REGIONS: CPT | Performed by: PHYSICAL THERAPIST

## 2018-09-12 PROCEDURE — G0283 ELEC STIM OTHER THAN WOUND: HCPCS | Performed by: PHYSICAL THERAPIST

## 2018-09-12 PROCEDURE — 97110 THERAPEUTIC EXERCISES: CPT | Performed by: PHYSICAL THERAPIST

## 2018-09-12 NOTE — PROGRESS NOTES
Physical Therapy Daily Progress Note        Patient: Clarissa Matos   : 1950  Diagnosis/ICD-10 Code:  Aftercare following left shoulder joint replacement surgery [Z47.1, Z96.612]  Referring practitioner: Louis Prasad MD  Date of Initial Visit: Type: THERAPY  Noted: 2018  Today's Date: 2018  Patient seen for 7 sessions         Clarissa Matos reports:     Subjective   Patient reports she got a cramp in the back of her shoulder while doing the supine cane flexion so she has avoided that since Friday.  Reports she has been doing a lot of ice and has been having increased pain.    Objective   See Exercise, Manual, and Modality Logs for complete treatment.       Assessment/Plan  Patient tolerated treatment well with no adverse symptoms.  ROM is improving although limitations persist.  Progress per Plan of Care           Manual Therapy:    22     mins  66557;  Therapeutic Exercise:    10     mins  91958;     Neuromuscular Krysta:        mins  85530;    Therapeutic Activity:          mins  48198;     Gait Training:           mins  94590;     Ultrasound:          mins  89358;    Electrical Stimulation:    15     mins  29840 ( );  Dry Needling          mins self-pay    Timed Treatment:   32   mins   Total Treatment:     50   mins    Tere Mcqueen PT  Physical Therapist

## 2018-09-19 ENCOUNTER — OFFICE VISIT (OUTPATIENT)
Dept: PHYSICAL THERAPY | Facility: CLINIC | Age: 68
End: 2018-09-19

## 2018-09-19 DIAGNOSIS — Z96.612 STATUS POST REPLACEMENT OF LEFT SHOULDER JOINT: ICD-10-CM

## 2018-09-19 DIAGNOSIS — Z47.1 AFTERCARE FOLLOWING LEFT SHOULDER JOINT REPLACEMENT SURGERY: Primary | ICD-10-CM

## 2018-09-19 DIAGNOSIS — G89.18 ACUTE POSTOPERATIVE PAIN OF LEFT SHOULDER: ICD-10-CM

## 2018-09-19 DIAGNOSIS — Z96.612 AFTERCARE FOLLOWING LEFT SHOULDER JOINT REPLACEMENT SURGERY: Primary | ICD-10-CM

## 2018-09-19 DIAGNOSIS — M25.512 ACUTE POSTOPERATIVE PAIN OF LEFT SHOULDER: ICD-10-CM

## 2018-09-19 DIAGNOSIS — M25.612 STIFFNESS OF LEFT SHOULDER JOINT: ICD-10-CM

## 2018-09-19 PROCEDURE — 97530 THERAPEUTIC ACTIVITIES: CPT | Performed by: PHYSICAL THERAPIST

## 2018-09-19 PROCEDURE — 97140 MANUAL THERAPY 1/> REGIONS: CPT | Performed by: PHYSICAL THERAPIST

## 2018-09-19 PROCEDURE — 97110 THERAPEUTIC EXERCISES: CPT | Performed by: PHYSICAL THERAPIST

## 2018-09-20 ENCOUNTER — TREATMENT (OUTPATIENT)
Dept: PHYSICAL THERAPY | Facility: CLINIC | Age: 68
End: 2018-09-20

## 2018-09-20 DIAGNOSIS — Z96.612 STATUS POST REPLACEMENT OF LEFT SHOULDER JOINT: ICD-10-CM

## 2018-09-20 DIAGNOSIS — M25.512 ACUTE POSTOPERATIVE PAIN OF LEFT SHOULDER: ICD-10-CM

## 2018-09-20 DIAGNOSIS — Z47.1 AFTERCARE FOLLOWING LEFT SHOULDER JOINT REPLACEMENT SURGERY: Primary | ICD-10-CM

## 2018-09-20 DIAGNOSIS — G89.18 ACUTE POSTOPERATIVE PAIN OF LEFT SHOULDER: ICD-10-CM

## 2018-09-20 DIAGNOSIS — M25.612 STIFFNESS OF LEFT SHOULDER JOINT: ICD-10-CM

## 2018-09-20 DIAGNOSIS — Z96.612 AFTERCARE FOLLOWING LEFT SHOULDER JOINT REPLACEMENT SURGERY: Primary | ICD-10-CM

## 2018-09-20 PROCEDURE — 97110 THERAPEUTIC EXERCISES: CPT | Performed by: PHYSICAL THERAPIST

## 2018-09-20 PROCEDURE — 97140 MANUAL THERAPY 1/> REGIONS: CPT | Performed by: PHYSICAL THERAPIST

## 2018-09-20 PROCEDURE — G0283 ELEC STIM OTHER THAN WOUND: HCPCS | Performed by: PHYSICAL THERAPIST

## 2018-09-20 NOTE — PROGRESS NOTES
I have reviewed the notes, assessments, and/or procedures performed by Josh Ortega Student PT, I concur with her/his documentation of Clarissa Matos.

## 2018-09-20 NOTE — PROGRESS NOTES
Physical Therapy Daily Progress Note      Patient: Clarissa Matos   : 1950  Diagnosis/ICD-10 Code:  Aftercare following left shoulder joint replacement surgery [Z47.1, Z96.612]  Referring practitioner: Louis Prasad MD  Date of Initial Visit: Type: THERAPY  Noted: 2018  Today's Date: 2018  Patient seen for 9 sessions         Clarissa Matos reports:      Subjective   Pt stated that there is a lot of soreness, not pain, in shoulder from yesterdays session.    Objective   See Exercise, Manual, and Modality Logs for complete treatment.       Assessment/Plan  Pt tolerated STM and ROM exercises.  Pt benefited from each, increasing mobility and decreasing pain.  Pt was educated about continuing HEP focusing on mobility exercises.  Progression to strengthening will occur when more mobility is gained.     Progress per Plan of Care           Manual Therapy:    30     mins  45495;  Therapeutic Exercise:    8     mins  55572;     Neuromuscular Krysta:        mins  13929;    Therapeutic Activity:          mins  80777;     Gait Training:           mins  04627;     Ultrasound:          mins  85651;    Electrical Stimulation:    15     mins  93601 ( );  Dry Needling          mins self-pay    Timed Treatment:   38   mins   Total Treatment:     60   mins    Tere Mcqueen, PT  Physical Therapist

## 2018-10-19 ENCOUNTER — TREATMENT (OUTPATIENT)
Dept: PHYSICAL THERAPY | Facility: CLINIC | Age: 68
End: 2018-10-19

## 2018-10-19 DIAGNOSIS — Z96.612 AFTERCARE FOLLOWING LEFT SHOULDER JOINT REPLACEMENT SURGERY: Primary | ICD-10-CM

## 2018-10-19 DIAGNOSIS — M25.512 ACUTE POSTOPERATIVE PAIN OF LEFT SHOULDER: ICD-10-CM

## 2018-10-19 DIAGNOSIS — M25.612 STIFFNESS OF LEFT SHOULDER JOINT: ICD-10-CM

## 2018-10-19 DIAGNOSIS — Z96.612 STATUS POST REPLACEMENT OF LEFT SHOULDER JOINT: ICD-10-CM

## 2018-10-19 DIAGNOSIS — Z47.1 AFTERCARE FOLLOWING LEFT SHOULDER JOINT REPLACEMENT SURGERY: Primary | ICD-10-CM

## 2018-10-19 DIAGNOSIS — G89.18 ACUTE POSTOPERATIVE PAIN OF LEFT SHOULDER: ICD-10-CM

## 2018-10-19 PROCEDURE — 97140 MANUAL THERAPY 1/> REGIONS: CPT | Performed by: PHYSICAL THERAPIST

## 2018-10-19 PROCEDURE — 97110 THERAPEUTIC EXERCISES: CPT | Performed by: PHYSICAL THERAPIST

## 2018-10-19 NOTE — PROGRESS NOTES
"Physical Therapy Re-Assessment / Re-Certification      Patient: Clarissa Matos   : 1950  Diagnosis/ICD-10 Code:  Aftercare following left shoulder joint replacement surgery [Z47.1, Z96.612]  Referring practitioner: Louis Prasad MD  Date of Initial Visit: Type: THERAPY  Noted: 2018  Today's Date: 10/19/2018  Patient seen for 10 sessions      Subjective:   Clarissa Matos reports:   Subjective Questionnaire: QuickDASH: 45.45%  Clinical Progress: Improved from initial status, minimal gains recently due to not having attended PT since 18  Home Program Compliance: Patient reports she has been performing HEP  Treatment has included: therapeutic exercise, manual therapy, electrical stimulation and cryotherapy    Subjective   Patient reports she has been sick for about 3 wks due to sinus injection and then had transportation difficulty resulting in her not being able to attend PT sessions.  Reports her left shoulder/arm have been doing \"bad\".  States she has been getting random sharp pain in her arm but does note that is has been less lately.  States she has been having pain and tightness in her left elbow.  States she does not see the doctor for her shoulder until November but she sees him for her knee next week.  Patient reports she feels a popping sensation in her left shoulder at times with certain reaching motions.      Objective       Active Range of Motion   Left Shoulder   Flexion: 85 degrees   Extension: 30 degrees   Abduction: 65 degrees   External rotation 45°: 20 degrees   Internal rotation 45°: 47 degrees     Passive Range of Motion   Left Shoulder   Flexion: 120 degrees   Abduction: 100 degrees   External rotation 45°: 45 degrees   External rotation 90°: 35 degrees   Internal rotation 45°: 55 degrees   Internal rotation 90°: 43 degrees        Assessment/Plan   Patient presents with continues ROM and strength limitation in left shoulder.  Her pain reports have improved although she has not " made progress in ROM since last re-assessment.  Encouraged patient to perform HEP and not avoid functional use of her left UE.  Emphasized importance of attending PT regularly.  Progress toward previous goals: Progressing slowly    New Goals  Short Term (4 wks):  1. Patient to have increased left shoulder PROM to WNLs to restore functional joint mobility.  2. Patient will have increased right shoulder strength to 3+/5 to allow for increased joint stability and to decrease joint/soft tissue stresses.  3. Patient will have increased bilateral scapular mobility to WNLs for improved functional mobility.  4. Patient will have increased GHJ mobility to WNLs to allow for restoration of normal joint mechanics and decrease joint/soft tissue stresses.    Long Term Goal (8 wks):  1.  Patient will have improved DASH score of 20% or less.  2.  Patient will reports decreased shoulder pain to 0/10 to allow for restorative sleep and return to PLOF/Leisure activities.  3.  Patient will have increased left shoulder strength to 4+/5.  4.  Patient will be independent in performance of HEP for carryover upon discharge from skilled PT services.           Recommendations: Continue as planned  Timeframe: 1 month  Prognosis to achieve goals: fair    PT Signature: Tere Mcqueen, PT      Based upon review of the patient's progress and continued therapy plan, it is my medical opinion that Clarissa Matos should continue physical therapy treatment at [facility]  [address] [place] [id number] [phone].    Signature: __________________________________      Manual Therapy:    15     mins  11330;  Therapeutic Exercise:    10     mins  06383;     Neuromuscular Krysta:        mins  35665;    Therapeutic Activity:          mins  11352;     Gait Training:           mins  86199;     Ultrasound:          mins  82106;    Electrical Stimulation:         mins  96409 ( );  Dry  Needling          mins self-pay    Timed Treatment:   25   mins   Total Treatment:     35   mins

## 2018-10-24 ENCOUNTER — OFFICE VISIT (OUTPATIENT)
Dept: ORTHOPEDIC SURGERY | Facility: CLINIC | Age: 68
End: 2018-10-24

## 2018-10-24 ENCOUNTER — OFFICE VISIT (OUTPATIENT)
Dept: PHYSICAL THERAPY | Facility: CLINIC | Age: 68
End: 2018-10-24

## 2018-10-24 VITALS — WEIGHT: 188 LBS | HEIGHT: 63 IN | BODY MASS INDEX: 33.31 KG/M2 | TEMPERATURE: 99 F

## 2018-10-24 DIAGNOSIS — G89.29 CHRONIC PAIN OF LEFT KNEE: Primary | ICD-10-CM

## 2018-10-24 DIAGNOSIS — M25.562 CHRONIC PAIN OF LEFT KNEE: Primary | ICD-10-CM

## 2018-10-24 DIAGNOSIS — M17.10 ARTHRITIS OF KNEE: ICD-10-CM

## 2018-10-24 DIAGNOSIS — Z96.612 AFTERCARE FOLLOWING LEFT SHOULDER JOINT REPLACEMENT SURGERY: Primary | ICD-10-CM

## 2018-10-24 DIAGNOSIS — M25.512 ACUTE POSTOPERATIVE PAIN OF LEFT SHOULDER: ICD-10-CM

## 2018-10-24 DIAGNOSIS — M25.612 STIFFNESS OF LEFT SHOULDER JOINT: ICD-10-CM

## 2018-10-24 DIAGNOSIS — G89.18 ACUTE POSTOPERATIVE PAIN OF LEFT SHOULDER: ICD-10-CM

## 2018-10-24 DIAGNOSIS — Z96.612 STATUS POST REPLACEMENT OF LEFT SHOULDER JOINT: ICD-10-CM

## 2018-10-24 DIAGNOSIS — Z47.1 AFTERCARE FOLLOWING LEFT SHOULDER JOINT REPLACEMENT SURGERY: Primary | ICD-10-CM

## 2018-10-24 PROCEDURE — 97110 THERAPEUTIC EXERCISES: CPT | Performed by: PHYSICAL THERAPIST

## 2018-10-24 PROCEDURE — G0283 ELEC STIM OTHER THAN WOUND: HCPCS | Performed by: PHYSICAL THERAPIST

## 2018-10-24 PROCEDURE — 20610 DRAIN/INJ JOINT/BURSA W/O US: CPT | Performed by: ORTHOPAEDIC SURGERY

## 2018-10-24 PROCEDURE — 97140 MANUAL THERAPY 1/> REGIONS: CPT | Performed by: PHYSICAL THERAPIST

## 2018-10-24 PROCEDURE — 99214 OFFICE O/P EST MOD 30 MIN: CPT | Performed by: ORTHOPAEDIC SURGERY

## 2018-10-24 PROCEDURE — 73562 X-RAY EXAM OF KNEE 3: CPT | Performed by: ORTHOPAEDIC SURGERY

## 2018-10-24 RX ORDER — DULOXETIN HYDROCHLORIDE 60 MG/1
CAPSULE, DELAYED RELEASE ORAL
Refills: 5 | COMMUNITY
Start: 2018-09-21 | End: 2019-06-25

## 2018-10-24 RX ADMIN — LIDOCAINE HYDROCHLORIDE 4 ML: 20 INJECTION, SOLUTION EPIDURAL; INFILTRATION; INTRACAUDAL; PERINEURAL at 14:40

## 2018-10-24 NOTE — PROGRESS NOTES
"Physical Therapy Daily Progress Note    Time In  Time Out     Clarissa Matos reports:  Late for appt due to seeing ortho for left knee(received injection and brace).  States she is using left UE more at home in regards to ADL's and functional tasks.  Feels that shoulder is \"coming along\".    Subjective     Objective   -Active Assisted flexion and abd 120 deg respectively    See Exercise, Manual, and Modality Logs for complete treatment.   Added supine serratus punches 1 lb B UE's, supine alphabet x 2  Postural awareness in regards scapular positioning/awareness       Assessment/Plan  Complaints of left shoulder discomfort decreasing per subjective report with subsequent increased usage reported at home.  Objectively she presents with improved, though still limited from full left shoulder flexion and abduction mobility.  Able to begin low level scap stabilizaiton activities without increased symptoms or discomfort.  Will benefit from continued PT intervention/progression but needs more consistent attendance to maximize benefit.    Other  Re eval per primary PT for updated goals, measurements, etc.           Manual Therapy:    15     mins  98666;  Therapeutic Exercise:     10    mins  33808;     Neuromuscular Krysta:      mins  27548;    Therapeutic Activity:    10    mins  18526;     Gait Training:           mins  03864;     Ultrasound:          mins  65561;    Electrical Stimulation:   15    mins  09245 ( );      Timed Treatment:  35    mins   Total Treatment:   50   mins    Yordan Ribeiro PTA    Physical Therapist Assistant [place] [id number]    "

## 2018-10-24 NOTE — PROGRESS NOTES
Patient: Clarissa Matos    YOB: 1950    Medical Record Number: [id number]    Chief Complaints:  Left knee pain    History of Present Illness:     67 y.o. female patient who presents for a new complaint of left knee pain.  She reports a long history of progressively worsening, gradual onset pain.  She has previously seen another physician for this.  She describes the pain as moderate to severe, constant and aching.  The pain is worse with prolonged standing and walking.  She does not recall having noticed any alleviating factors.  She had one previous injection about 3 years ago which did not help.  She has a history of similar problems on the right for which she underwent a total knee arthroplasty many years ago.  She continues to have significant right knee pain though.    Allergies:   Allergies   Allergen Reactions   • Nsaids GI Intolerance   • Percocet [Oxycodone-Acetaminophen] Itching   • Penicillins Rash       Home Medications:    Current Outpatient Prescriptions:   •  acetaminophen (TYLENOL) 325 MG tablet, Take 2 tablets by mouth Every 4 (Four) Hours As Needed for Fever (greater than 101 F)., Disp: , Rfl:   •  aspirin-acetaminophen-caffeine (EXCEDRIN MIGRAINE) 250-250-65 MG per tablet, Take 1 tablet by mouth Every 6 (Six) Hours As Needed for Headache. May resume on 07/07/18., Disp: , Rfl:   •  calcium carbonate (OS-CRESCENCIO) 600 MG tablet, Take 600 mg by mouth Every Morning., Disp: , Rfl:   •  Cholecalciferol (VITAMIN D3) 5000 units capsule capsule, Take 5,000 Units by mouth Every Night., Disp: , Rfl:   •  cyclobenzaprine (FLEXERIL) 10 MG tablet, Take 1 tablet by mouth 3 (Three) Times a Day As Needed for Muscle Spasms., Disp: 10 tablet, Rfl: 0  •  diclofenac (FLECTOR) 1.3 % patch patch, Apply 1 patch topically 2 (Two) Times a Day As Needed (Pain). Hold until seen in office to discuss with Dr. Prasad when to resume., Disp: 60 patch, Rfl:   •  DULoxetine (CYMBALTA) 20 MG capsule, Take 40 mg by mouth  Every Morning. 2 tabs, Disp: , Rfl:   •  DULoxetine (CYMBALTA) 60 MG capsule, TK 1 C PO QAM, Disp: , Rfl: 5  •  Echinacea 500 MG capsule, Take 2 tablets by mouth As Needed., Disp: , Rfl:   •  Fexofenadine HCl (MUCINEX ALLERGY PO), Take 1 tablet by mouth As Needed., Disp: , Rfl:   •  HYDROcodone-acetaminophen (NORCO)  MG per tablet, Take 1 tablet by mouth Every 4 (Four) Hours As Needed for Moderate Pain ., Disp: 50 tablet, Rfl: 0  •  loratadine (CLARITIN) 10 MG tablet, Take 10 mg by mouth Every Morning., Disp: , Rfl:   •  LORazepam (ATIVAN) 0.5 MG tablet, Take 0.5 mg by mouth 2 (Two) Times a Day As Needed for Anxiety., Disp: , Rfl:   •  losartan-hydrochlorothiazide (HYZAAR) 50-12.5 MG per tablet, Take 1 tablet by mouth Every Morning., Disp: , Rfl:   •  magnesium 30 MG tablet, Take 30 mg by mouth Every Night., Disp: , Rfl:   •  nystatin (MYCOSTATIN) 852162 UNIT/ML suspension, Swish and swallow 5 mL 4 (Four) Times a Day., Disp: 140 mL, Rfl: 0  •  omeprazole (priLOSEC) 20 MG capsule, Take 20 mg by mouth Every Morning., Disp: , Rfl:   •  zolpidem (AMBIEN) 10 MG tablet, Take 10 mg by mouth At Night As Needed for Sleep., Disp: , Rfl:   •  docusate sodium 100 MG capsule, Take 100 mg by mouth 2 (Two) Times a Day., Disp: , Rfl:   •  HYDROcodone-acetaminophen (NORCO)  MG per tablet, Take 1-2 tabs po q 4-6 hours prn pain, Disp: 50 tablet, Rfl: 0  •  HYDROcodone-acetaminophen (NORCO)  MG per tablet, Take 1 tablet by mouth Every 4 (Four) Hours As Needed for Moderate Pain ., Disp: 50 tablet, Rfl: 0  •  HYDROcodone-acetaminophen (NORCO)  MG per tablet, Take 1 tablet by mouth Every 4 (Four) Hours As Needed for Moderate Pain ., Disp: 50 tablet, Rfl: 0    Past Medical History:   Diagnosis Date   • Anxiety    • Arthritis    • Depression    • Fibromyalgia    • Headache    • Hiatal hernia    • Hypertension    • Irregular heart rate        Past Surgical History:   Procedure Laterality Date   • BREAST BIOPSY Right     • CATARACT EXTRACTION WITH INTRAOCULAR LENS IMPLANT     •  SECTION      x 2   • CHOLECYSTECTOMY  2017   • CHOLECYSTECTOMY WITH INTRAOPERATIVE CHOLANGIOGRAM N/A 2017    Procedure: CHOLECYSTECTOMY LAPAROSCOPIC INTRAOPERATIVE CHOLANGIOGRAM;  Surgeon: Demi Madden MD;  Location: [facility];  Service:    • EYE SURGERY Bilateral     eye lense implants   • HYSTERECTOMY     • REPLACEMENT TOTAL KNEE Right    • ROTATOR CUFF REPAIR     • TOTAL SHOULDER ARTHROPLASTY W/ DISTAL CLAVICLE EXCISION Left 2018    Procedure: Reverse Total Shoulder Arthroplsty for fracture;  Surgeon: Louis Prasad MD;  Location: [facility] OR;  Service: Orthopedics       Social History     Occupational History   • Not on file.     Social History Main Topics   • Smoking status: Never Smoker   • Smokeless tobacco: Never Used   • Alcohol use No   • Drug use: No   • Sexual activity: Defer      Social History     Social History Narrative   • No narrative on file       Family History   Problem Relation Age of Onset   • Heart disease Mother    • Hypertension Mother    • Heart disease Father    • Hypertension Father    • No Known Problems Sister    • No Known Problems Brother    • No Known Problems Daughter    • No Known Problems Son    • No Known Problems Maternal Grandmother    • No Known Problems Paternal Grandmother    • No Known Problems Maternal Aunt    • No Known Problems Paternal Aunt    • BRCA 1/2 Neg Hx    • Breast cancer Neg Hx    • Colon cancer Neg Hx    • Endometrial cancer Neg Hx    • Ovarian cancer Neg Hx    • Malig Hyperthermia Neg Hx        Review of Systems:      Constitutional: Denies fever, shaking or chills   Eyes: Denies change in visual acuity   HEENT: Denies nasal congestion or sore throat   Respiratory: Denies cough or shortness of breath   Cardiovascular: Denies chest pain or edema  Endocrine: Denies tremors, palpitations, intolerance of heat or cold, polyuria, polydipsia.  GI: Denies abdominal  "pain, nausea, vomiting, bloody stools or diarrhea  : Denies frequency, urgency, incontinence, retention, or nocturia.  Musculoskeletal: Denies numbness, tingling or loss of motor function except as above  Integument: Denies rash, lesion or ulceration   Neurologic: Denies headache or focal weakness, deficits  Heme: Denies spontaneous or excessive bleeding, epistaxis, hematuria, melena, fatigue, enlarged or tender lymph nodes.      All other pertinent positives and negatives as noted above in HPI.    Physical Exam: 67 y.o. female  Vitals:    10/24/18 1418   Temp: 99 °F (37.2 °C)   TempSrc: Temporal Artery    Weight: 85.3 kg (188 lb)   Height: 160 cm (63\")       General:  Patient is awake and alert.  Appears in no acute distress or discomfort.    Psych:  Affect and demeanor are appropriate.    Eyes:  Conjunctiva and sclera appear grossly normal.  Eyes track well and EOM seem to be intact.    Ears:  No gross abnormalities.  Hearing adequate for the exam.    Cardiovascular:  Regular rate and rhythm.    Lungs:  Good chest expansion.  Breathing unlabored.    Extremities: Left knee is examined.  Skin is benign.  Trace effusion.  Moderate medial joint line tenderness.  She lacks a few degrees of terminal extension.  Flexion is to approximately 110°.  No instability.  Good motor and sensory function distally.  Palpable pedal pulses.  Brisk capillary refill.    Imaging:  Bilateral standing AP views, bilateral merchants views and a lateral view of the left knee are ordered by myself and reviewed to evaluate the patient's complaint.  No comparison films are immediately available.  The x-rays show significant degenerative arthritis including joint space narrowing, osteophyte formation, and subchondral sclerosis.  The majority of the degenerative changes appear to involve the medial compartment.    Assessment/Plan:  Left knee osteoarthritis    We discussed options.  I recommended continued conservative treatment.  Despite her " poor response to her previous injection, I recommend trying that again.  The risks, benefits and alternatives to a left knee injection were discussed.  She consented.  Going forward, she will follow up as needed.    Louis Prasad MD    10/24/2018    Large Joint Arthrocentesis  Date/Time: 10/24/2018 2:40 PM  Consent given by: patient  Site marked: site marked  Timeout: Immediately prior to procedure a time out was called to verify the correct patient, procedure, equipment, support staff and site/side marked as required   Supporting Documentation  Indications: pain   Procedure Details  Location: knee - L knee  Preparation: Patient was prepped and draped in the usual sterile fashion  Needle gauge: 25 G for Lidocaine injection-21 g for synvisc injection.  Approach: anterolateral  Medications administered: 4 mL lidocaine PF 2% 2 %; 48 mg hylan 48 MG/6ML  Patient tolerance: patient tolerated the procedure well with no immediate complications

## 2018-10-29 RX ORDER — LIDOCAINE HYDROCHLORIDE 20 MG/ML
4 INJECTION, SOLUTION EPIDURAL; INFILTRATION; INTRACAUDAL; PERINEURAL
Status: COMPLETED | OUTPATIENT
Start: 2018-10-24 | End: 2018-10-24

## 2018-10-30 ENCOUNTER — TREATMENT (OUTPATIENT)
Dept: PHYSICAL THERAPY | Facility: CLINIC | Age: 68
End: 2018-10-30

## 2018-10-30 DIAGNOSIS — M25.512 ACUTE POSTOPERATIVE PAIN OF LEFT SHOULDER: ICD-10-CM

## 2018-10-30 DIAGNOSIS — G89.18 ACUTE POSTOPERATIVE PAIN OF LEFT SHOULDER: ICD-10-CM

## 2018-10-30 DIAGNOSIS — Z47.1 AFTERCARE FOLLOWING LEFT SHOULDER JOINT REPLACEMENT SURGERY: Primary | ICD-10-CM

## 2018-10-30 DIAGNOSIS — Z96.612 AFTERCARE FOLLOWING LEFT SHOULDER JOINT REPLACEMENT SURGERY: Primary | ICD-10-CM

## 2018-10-30 DIAGNOSIS — M25.612 STIFFNESS OF LEFT SHOULDER JOINT: ICD-10-CM

## 2018-10-30 DIAGNOSIS — Z96.612 STATUS POST REPLACEMENT OF LEFT SHOULDER JOINT: ICD-10-CM

## 2018-10-30 PROCEDURE — 97140 MANUAL THERAPY 1/> REGIONS: CPT | Performed by: PHYSICAL THERAPIST

## 2018-10-30 PROCEDURE — 97110 THERAPEUTIC EXERCISES: CPT | Performed by: PHYSICAL THERAPIST

## 2018-10-30 NOTE — PROGRESS NOTES
Physical Therapy Daily Progress Note        Patient: Clarissa Matos   : 1950  Diagnosis/ICD-10 Code:  Aftercare following left shoulder joint replacement surgery [Z47.1, Z96.612]  Referring practitioner: Louis Prasad MD  Date of Initial Visit: Type: THERAPY  Noted: 2018  Today's Date: 10/30/2018  Patient seen for 12 sessions         Clarissa Matos reports:   Subjective   Pt states that she is feeling better in her shoulder but still has limited motion and strength.  Pt is also concerned about knee and wears knee brace daily.  Pt states that she may have to travel and take care of her mother for awhile but will try and maintain HEP.     Objective   See Exercise, Manual, and Modality Logs for complete treatment.       Assessment/Plan  Pt is benefiting from manual therapy to help increase ROM in all directions as she is lacking motion.  Exercises include mobility and strengthening activities. During strengthening patient expressed fatigue and future focus will be on endurance exercises. Pt educated about HEP and importance of performing them daily.  Progress per Plan of Care           Manual Therapy:    15     mins  41350;  Therapeutic Exercise:     15    mins  55067;     Neuromuscular Krysta:        mins  89915;    Therapeutic Activity:          mins  93676;     Gait Training:           mins  28479;     Ultrasound:          mins  56429;    Electrical Stimulation:         mins  25992 ( );  Dry Needling          mins self-pay    Timed Treatment:   30   mins   Total Treatment:     45   mins    Tere Mcqueen, PT  Physical Therapist

## 2018-12-07 ENCOUNTER — TELEPHONE (OUTPATIENT)
Dept: ORTHOPEDIC SURGERY | Facility: CLINIC | Age: 68
End: 2018-12-07

## 2018-12-07 NOTE — TELEPHONE ENCOUNTER
Has increased pain in left shoulder after exercises yesterday. Has been icing. She has an appt with [facility] on Monday, 12/10 at .  Patient asked to speak with [facility]. I informed her I have to send message to [facility] also.

## 2018-12-07 NOTE — TELEPHONE ENCOUNTER
Call return to the patient.  She was doing her exercises yesterday felt pain in the tricep area of her arm.  Pain radiates down her she back of her arm into the elbow.  Denies any swelling.  Neurovascularly is intact.  I have advised her to hold off on her exercises for now until she is seen in the office on Monday for follow-up.  Patient does have pain medicine at home that she can take which has been helping

## 2018-12-10 ENCOUNTER — OFFICE VISIT (OUTPATIENT)
Dept: ORTHOPEDIC SURGERY | Facility: CLINIC | Age: 68
End: 2018-12-10

## 2018-12-10 VITALS — TEMPERATURE: 97.6 F | WEIGHT: 185.6 LBS | BODY MASS INDEX: 32.89 KG/M2 | HEIGHT: 63 IN

## 2018-12-10 DIAGNOSIS — Z98.890 HISTORY OF SURGERY: Primary | ICD-10-CM

## 2018-12-10 PROCEDURE — 99212 OFFICE O/P EST SF 10 MIN: CPT | Performed by: ORTHOPAEDIC SURGERY

## 2018-12-10 PROCEDURE — 73030 X-RAY EXAM OF SHOULDER: CPT | Performed by: ORTHOPAEDIC SURGERY

## 2018-12-10 RX ORDER — HYDROCODONE BITARTRATE AND ACETAMINOPHEN 10; 325 MG/1; MG/1
1 TABLET ORAL EVERY 4 HOURS PRN
Qty: 50 TABLET | Refills: 0 | Status: SHIPPED | OUTPATIENT
Start: 2018-12-10 | End: 2019-06-25

## 2018-12-11 ENCOUNTER — TELEPHONE (OUTPATIENT)
Dept: ORTHOPEDIC SURGERY | Facility: CLINIC | Age: 68
End: 2018-12-11

## 2018-12-11 NOTE — TELEPHONE ENCOUNTER
Regarding: Non-Urgent Medical Question  Contact: [phone]  ----- Message from Mychart, Generic sent at 2018 12:37 PM EST -----    I do not understand how there is a fracture in a bone fragment left from my surgery? I understand that some fragments were left but after 5 months would n't  those bones be  with blood supply. I the fracture does not heal what is our next option? Would you explain the situation further?    Thank you Clarissa Matos

## 2018-12-11 NOTE — PROGRESS NOTES
Chief Complaint:  New left shoulder pain    HPI:  Ms. Matos is now roughly 5 months out from her left reverse total shoulder for a fracture.  She tells me that she had been doing great up until last week.  She reports that she was fine on Wednesday but woke up Thursday with increased pain and trouble moving her arm..  The pain came on rather suddenly but she does not recall any injury.  The pain is worse with reaching and lifting.  The pain is also worse with driving.  Hydrocodone and ice both helped.  The pain as moderate, constant and throbbing but sharp with movement.  When asked to localize the pain, she gestures towards the front of her shoulder and describes the pain as deep.    Exam:  Left shoulder is examined.  Skin is benign.  Incision is healed.  There is mild tenderness over the scapular spine at the junction of the acromion.  No step-offs or palpable defects.  Abduction and elevation are both uncomfortable for her in the front of her shoulder down into the armpit.  She does not seem to have any significant pain over the acromion with movement.  She continues to have good motion with the exception of internal rotation which is limited to roughly her back pocket.  Normal motor and sensory function in the lower arm and hand.  Palpable radial pulse.    Imaging:  AP and scapular Y views of the left shoulder are ordered, reviewed, and compared to previous x-rays.  She has some hypertrophic bone along the medial calcar.  It appears to me that there may be a nondisplaced fracture at the base of this hypertrophic bone at the junction to the humeral shaft.    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  I recommended relative rest and activity modifications.  I will see her back in 3 weeks to reevaluate.  I gave her a sling for comfort.  I recommended that she stop physical therapy for the time  being.    Louis Prasad MD  12/10/2018

## 2018-12-12 ENCOUNTER — TELEPHONE (OUTPATIENT)
Dept: ORTHOPEDIC SURGERY | Facility: CLINIC | Age: 68
End: 2018-12-12

## 2019-01-02 ENCOUNTER — OFFICE VISIT (OUTPATIENT)
Dept: ORTHOPEDIC SURGERY | Facility: CLINIC | Age: 69
End: 2019-01-02

## 2019-01-02 VITALS — WEIGHT: 185 LBS | HEIGHT: 63 IN | TEMPERATURE: 97.9 F | BODY MASS INDEX: 32.78 KG/M2

## 2019-01-02 DIAGNOSIS — Z09 FRACTURE FOLLOW-UP: Primary | ICD-10-CM

## 2019-01-02 DIAGNOSIS — M17.10 ARTHRITIS OF KNEE: ICD-10-CM

## 2019-01-02 PROCEDURE — 99212 OFFICE O/P EST SF 10 MIN: CPT | Performed by: ORTHOPAEDIC SURGERY

## 2019-01-02 PROCEDURE — 73030 X-RAY EXAM OF SHOULDER: CPT | Performed by: ORTHOPAEDIC SURGERY

## 2019-01-03 NOTE — PROGRESS NOTES
Chief Complaint:  Follow-up regarding left shoulder pain, left knee arthritis    HPI:  Ms. Matos comes in for follow-up of her left shoulder.  She admits that it is a little better.  She reports compliance with use of the sling.  She is having some discomfort along the medial elbow.  She also mentions some intermittent numbness and tingling in her left hand.  Not really having much shoulder pain today.  She says her motion seems to be improving.    Her biggest complaint today is the left knee.  It is bothering her more and she has expressed interest in pursuing an arthroplasty.  She had a right knee done a couple years ago.  She was not happy with her outcome on that side.    Exam:  Left shoulder is examined.  Skin is benign.  Her surgical wound is healed.  She has some tenderness along the medial elbow, 3 or 4 cm above the epicondyle.  No palpable swelling or masses.  No tenderness around the shoulder today.  Her passive shoulder motion is good.  Active motion is a little more limited but is well-tolerated.  She doesn't seem to be having any shoulder pain today.    Imaging:  AP and scapular Y views of the left shoulder are ordered and reviewed.  These are compared to previous x-rays.  The x-rays are off plane relative to her previous films.  I don't see any concerning findings.    Her previous left knee x-rays are again reviewed.  She has significant medial joint space narrowing and osteophyte formation consistent with moderate osteoarthritis.  Joint space still measures roughly 1-2 mm.    Assessment:  1.  Healing fractured heterotopic ossification status post left reverse total shoulder arthroplasty for fracture  2.  Left knee osteoarthritis    Plan:  Her shoulder seems to be doing better.  I recommended that she resume therapy and discontinue the sling.  I assured her that the elbow pain is not likely be coming from her shoulder implant.  She may have some irritation of the ulnar nerve from the sling.  I think  getting her out of the sling should help.  I will see her back in another 4 weeks to check her progress.    She expressed interest in pursuing a left total knee arthroplasty.  I told her that I wouldn't recommend that for her at this time.  The injection did not help her significantly and she is unhappy with her right total knee.  She still has some joint space left and I told her that my concern is she would end up unhappy with a left total knee as well.  I think she is better off waiting.  I would suggest some therapy.    She tells me that she would like to get another opinion from Dr. Bustamante.  I encouraged her to do that.    Louis Prasad MD  01/02/2019

## 2019-03-18 ENCOUNTER — OFFICE VISIT (OUTPATIENT)
Dept: ORTHOPEDIC SURGERY | Facility: CLINIC | Age: 69
End: 2019-03-18

## 2019-03-18 VITALS — WEIGHT: 188 LBS | TEMPERATURE: 97.8 F | BODY MASS INDEX: 33.31 KG/M2 | HEIGHT: 63 IN

## 2019-03-18 DIAGNOSIS — Z96.612 HISTORY OF ARTHROPLASTY OF LEFT SHOULDER: Primary | ICD-10-CM

## 2019-03-18 DIAGNOSIS — Z09 FRACTURE FOLLOW-UP: ICD-10-CM

## 2019-03-18 PROCEDURE — 73030 X-RAY EXAM OF SHOULDER: CPT | Performed by: ORTHOPAEDIC SURGERY

## 2019-03-18 PROCEDURE — 99212 OFFICE O/P EST SF 10 MIN: CPT | Performed by: ORTHOPAEDIC SURGERY

## 2019-03-19 NOTE — PROGRESS NOTES
Chief Complaint: Follow-up status post left reverse total shoulder arthroplasty for fracture    HPI:  Ms. Matos comes in today for follow-up of her left shoulder.  Things have gotten somewhat better.  She did stop therapy for a time and now wants to resume that.  She has requested a new referral.  She does continue to have some mild intermittent elbow pain but she says her symptoms are overall much better.    Exam: Left shoulder is examined.  No significant tenderness.  Active motion is to forward elevation of 155 degrees, external rotation of about 35-40 degrees and internal rotation to her back pocket.  She still has some mild tenderness over the medial epicondyle but no swellings or masses.  She has full elbow motion.  Negative cheek press test.    Imaging: AP and scapular Y views of the left shoulder are ordered and reviewed.  These are compared to previous x-rays.  No new findings.  The components appear well fixed and well-positioned.    Assessment: Follow-up status post left reverse total shoulder arthroplasty for fracture with extensive periarticular heterotopic ossification    Plan: The heterotopic ossification does not seem to be tremendously limiting her motion.  Although her motion is not great it is certainly functional.  I do think she could benefit from further physical therapy and I gave her a referral for that.  I will see her back annually.  We discussed current antibiotic prophylaxis recommendations.    Louis Prasad MD  03/18/2019

## 2019-03-26 ENCOUNTER — CONSULT (OUTPATIENT)
Dept: ORTHOPEDIC SURGERY | Facility: CLINIC | Age: 69
End: 2019-03-26

## 2019-03-26 VITALS — WEIGHT: 188.6 LBS | BODY MASS INDEX: 33.42 KG/M2 | HEIGHT: 63 IN | TEMPERATURE: 98.4 F

## 2019-03-26 DIAGNOSIS — M17.12 PRIMARY OSTEOARTHRITIS OF LEFT KNEE: Primary | ICD-10-CM

## 2019-03-26 PROCEDURE — 99214 OFFICE O/P EST MOD 30 MIN: CPT | Performed by: ORTHOPAEDIC SURGERY

## 2019-03-26 RX ORDER — CEFAZOLIN SODIUM 2 G/100ML
2 INJECTION, SOLUTION INTRAVENOUS ONCE
Status: CANCELLED | OUTPATIENT
Start: 2019-07-22 | End: 2019-03-26

## 2019-03-26 RX ORDER — CANDESARTAN 16 MG/1
TABLET ORAL DAILY
Refills: 0 | COMMUNITY
Start: 2019-03-22 | End: 2019-06-25

## 2019-03-26 RX ORDER — PREGABALIN 75 MG/1
150 CAPSULE ORAL ONCE
Status: CANCELLED | OUTPATIENT
Start: 2019-07-22 | End: 2019-03-26

## 2019-03-26 RX ORDER — MELOXICAM 15 MG/1
15 TABLET ORAL ONCE
Status: CANCELLED | OUTPATIENT
Start: 2019-07-22 | End: 2019-03-26

## 2019-03-26 NOTE — PROGRESS NOTES
Patient: Clarissa Matos  YOB: 1950 68 y.o. female  Medical Record Number: [id number]    Chief Complaints:   Chief Complaint   Patient presents with   • Left Knee - Establish Care, Pain       History of Present Illness:Clarissa Matos is a 68 y.o. female who presents with left medial knee pain - severe and constant.  Pain limits her basic activities of daily living.  She describes as a severe intermittent crushing grinding type pain.  She had a right total knee replacement by Dr. Garcia about 5 years ago.  She did have some issues with previous surgery.  She does have a history of fibromyalgia and takes Norco 10/325 roughly 4 a day.  The pain in the knee is limiting despite injections and other conservative measures.    Allergies:   Allergies   Allergen Reactions   • Nsaids GI Intolerance   • Percocet [Oxycodone-Acetaminophen] Itching   • Penicillins Rash       Medications:   Current Outpatient Medications   Medication Sig Dispense Refill   • acetaminophen (TYLENOL) 325 MG tablet Take 2 tablets by mouth Every 4 (Four) Hours As Needed for Fever (greater than 101 F).     • aspirin-acetaminophen-caffeine (EXCEDRIN MIGRAINE) 250-250-65 MG per tablet Take 1 tablet by mouth Every 6 (Six) Hours As Needed for Headache. May resume on 07/07/18.     • calcium carbonate (OS-CRESCENCIO) 600 MG tablet Take 600 mg by mouth Every Morning.     • candesartan (ATACAND) 16 MG tablet Take  by mouth Daily.  0   • Cholecalciferol (VITAMIN D3) 5000 units capsule capsule Take 5,000 Units by mouth Every Night.     • cyclobenzaprine (FLEXERIL) 10 MG tablet Take 1 tablet by mouth 3 (Three) Times a Day As Needed for Muscle Spasms. 10 tablet 0   • diclofenac (FLECTOR) 1.3 % patch patch Apply 1 patch topically 2 (Two) Times a Day As Needed (Pain). Hold until seen in office to discuss with Dr. Prasad when to resume. 60 patch    • docusate sodium 100 MG capsule Take 100 mg by mouth 2 (Two) Times a Day.     • DULoxetine (CYMBALTA) 20 MG  capsule Take 40 mg by mouth Every Morning. 2 tabs     • DULoxetine (CYMBALTA) 60 MG capsule TK 1 C PO QAM  5   • Echinacea 500 MG capsule Take 2 tablets by mouth As Needed.     • Fexofenadine HCl (MUCINEX ALLERGY PO) Take 1 tablet by mouth As Needed.     • HYDROcodone-acetaminophen (NORCO)  MG per tablet Take 1 tablet by mouth Every 4 (Four) Hours As Needed for Moderate Pain . 50 tablet 0   • HYDROcodone-acetaminophen (NORCO)  MG per tablet Take 1 tablet by mouth Every 4 (Four) Hours As Needed for Moderate Pain . 50 tablet 0   • HYDROcodone-acetaminophen (NORCO)  MG per tablet Take 1 tablet by mouth Every 4 (Four) Hours As Needed for Moderate Pain . 50 tablet 0   • HYDROcodone-acetaminophen (NORCO)  MG per tablet Take 1 tablet by mouth Every 4 (Four) Hours As Needed for Moderate Pain . 50 tablet 0   • loratadine (CLARITIN) 10 MG tablet Take 10 mg by mouth Every Morning.     • LORazepam (ATIVAN) 0.5 MG tablet Take 0.5 mg by mouth 2 (Two) Times a Day As Needed for Anxiety.     • losartan-hydrochlorothiazide (HYZAAR) 50-12.5 MG per tablet Take 1 tablet by mouth Every Morning.     • magnesium 30 MG tablet Take 30 mg by mouth Every Night.     • nystatin (MYCOSTATIN) 113002 UNIT/ML suspension Swish and swallow 5 mL 4 (Four) Times a Day. 140 mL 0   • omeprazole (priLOSEC) 20 MG capsule Take 20 mg by mouth Every Morning.     • zolpidem (AMBIEN) 10 MG tablet Take 10 mg by mouth At Night As Needed for Sleep.       No current facility-administered medications for this visit.          The following portions of the patient's history were reviewed and updated as appropriate: allergies, current medications, past family history, past medical history, past social history, past surgical history and problem list.    Review of Systems:   A 14 point review of systems was performed. All systems negative except pertinent positives/negative listed in HPI above    Physical Exam:   Vitals:    03/26/19 1404   Temp:  "98.4 °F (36.9 °C)   TempSrc: Temporal   Weight: 85.5 kg (188 lb 9.6 oz)   Height: 160 cm (63\")       General: A and O x 3, ASA, NAD    SCLERA:    Normal    DENTITION:   Normal  Knee:  left    ALIGNMENT:     Varus  ,   Patella  tracks  midline    GAIT:    Antalgic    SKIN:    No abnormality    RANGE OF MOTION:   5  -  110   DEG    STRENGTH:   4  / 5    LIGAMENTS:    No varus / valgus instability.   Negative  Lachman.    MENISCUS:     Negative   Raysa       DISTAL PULSES:    Paplable    DISTAL SENSATION :   Intact    LYMPHATICS:     No   lymphadenopathy    OTHER:          - Positive   effusion      - Crepitance with ROM      Radiology:  Xrays 3views LEFT  KNEE (ap,lateral, sunrise) taken previously demonstrating advanced varus osteoarthritis with bone on bone articulation, subchondral cysts, and periarticular osteophytes    Assessment/Plan: LEFT KNEE END STAGE OA. Failed conseravtive measures.  Continuation of conservative management vs. TKA discussed.  The patient wishes to proceed with total knee replacement.  At this point the patient has failed the full compliment of conservative treatment and stating complete understanding of the risks/benefits/ anternatives wishes to proceed with surgical treatment.    Risk and benefits of surgery were reviewed.  Including, but not limited to, blood clots or pulmonary embolism, anesthesia risk, infection, fracture, skin/leg numbness, persistent pain/crepitance/popping/catching, failure of the implant, need for future surgeries, hematoma, possible nerve or blood vessel injury, need for transfusion, and potential risk of stroke,heart attack or death, among others.  The patient understands and wishes to proceed.     It was explained that if tissue has been repaired or reconstructed, there is also an increased chance of failure which may require further management.  Following the completion of the discussion, the patient expressed understanding of this planned course of care, all " their questions were answered and consent will be obtained preoperatively.    Operative Plan: left [facility] Total Knee Replacement an overnight staywith home health rehab. H?O fibromyalgia and chronic narcotic will make pain control more difficult. Will use Norco 10/325 2 q 4 prn  / mobic with protonix        Sam Bustamante MD  3/26/2019

## 2019-04-02 ENCOUNTER — TREATMENT (OUTPATIENT)
Dept: PHYSICAL THERAPY | Facility: CLINIC | Age: 69
End: 2019-04-02

## 2019-04-02 DIAGNOSIS — Z96.612 STATUS POST REPLACEMENT OF LEFT SHOULDER JOINT: ICD-10-CM

## 2019-04-02 DIAGNOSIS — M25.619 LIMITED RANGE OF MOTION (ROM) OF SHOULDER: Primary | ICD-10-CM

## 2019-04-02 DIAGNOSIS — Z96.612 STATUS POST REPLACEMENT OF LEFT SHOULDER JOINT: Primary | ICD-10-CM

## 2019-04-02 DIAGNOSIS — M25.619 LIMITED RANGE OF MOTION (ROM) OF SHOULDER: ICD-10-CM

## 2019-04-02 PROCEDURE — 97110 THERAPEUTIC EXERCISES: CPT | Performed by: PHYSICAL THERAPIST

## 2019-04-02 PROCEDURE — 97161 PT EVAL LOW COMPLEX 20 MIN: CPT | Performed by: PHYSICAL THERAPIST

## 2019-04-02 PROCEDURE — PTNOCHG PR CUSTOM PT NO CHARGE VISIT: Performed by: PHYSICAL THERAPIST

## 2019-04-02 NOTE — PROGRESS NOTES
Physical Therapy Initial Evaluation and Plan of Care      Patient: Clarissa Matos   : 1950  Diagnosis/ICD-10 Code:  Limited range of motion (ROM) of shoulder [M25.619]  Referring practitioner: Louis Prasad MD  Date of Initial Visit: 2019  Today's Date: 4/3/2019  Patient seen for 1 sessions           Subjective Questionnaire: QuickDASH: 47.73%    Subjective Evaluation    History of Present Illness  Date of surgery: 2018  Mechanism of injury: Patient reports miss steped and fell into patio door resulting in left shoulder fracture-had RC reconstruction and shoulder replacement surgery.    PMH: Right RCR, Right TKA, Hypertension; Fibromyalgia; Irregular heart rate; Hiatal hernia, arthritis, depression, anxiety.     Subjective comment: Patient reports left shoulder weakness and stiffness.  Patient Occupation: Retired Quality of life: good    Pain  Current pain ratin  At worst pain ratin  Location: Top and front of left shoulder and armpit  Quality: dull ache  Relieving factors: change in position, ice and rest  Aggravating factors: repetitive movement, overhead activity, lifting and movement (Repetative movements with left arm: cooking, ADLs, IADLs)  Progression: improved    Social Support  Lives in: multiple-level home  Lives with: spouse     Hand dominance: right    Diagnostic Tests  X-ray-extensive periarticular heterotopic ossification         Patient Goals  Patient goals for therapy: decreased pain, increased strength, increased motion and independence with ADLs/IADLs      Objective       Active Range of Motion   Left Shoulder   Flexion: 108 degrees   Extension: 24 degrees   Abduction: 70 degrees   External rotation 45°: 5 degrees   Internal rotation 45°: 25 degrees     Strength/Myotome Testing     Left Shoulder     Planes of Motion   Flexion: 3-   Extension: 4   Abduction: 3-   Adduction: 4   External rotation at 0°: 3+   External rotation at 45°: 2+   Internal rotation at 0°: 3+    Internal rotation at 45°: 2+     Isolated Muscles   Biceps: 4+   Triceps: 4+      Assessment/Plan   Patient presents with functionally limited left shoulder ROM, strength, scapular stabilization.  She has had poor PT POC and HEP compliance.  Patient was encouraged to perform HEP daily and stressed importance of attending PT sessions to maximize positive rehab outcomes.      Prognosis: fair due to history of poor compliance.  Prognosis details: Pt is a good candidate for skilled PT intervention to restore functional AROM and strength to return to previous level of ADL's.    Goal  Short Term (4 wks):  1. Patient to have increased left shoulder AROM: flexion/abduction 120 deg, IR/ER 45 deg. to restore functional joint mobility.  2. Patient will have increased right shoulder strength to 4/5 to allow for increased joint stability and to decrease joint/soft tissue stresses.  3. Patient will have increased scapular stabilization to WFLs for improved functional UE use.    Long Term Goal (8 wks):  1.  Patient will have improved DASH score of 20% or less.  2.  Patient will reports decreased shoulder pain to 2/10 to allow for improved tolerance to  functional UE use.   3.  Patient will be independent in performance of HEP for carryover upon discharge from skilled PT services.           Plan  Therapy options: will be seen for skilled physical therapy services  Planned modality interventions: TENS, cryotherapy, thermotherapy (hydrocollator packs) and high voltage pulsed current (pain management)  Planned therapy interventions: manual therapy, soft tissue mobilization, strengthening, stretching, functional ROM exercises, joint mobilization and home exercise program  Frequency: 2x week  Duration in visits: 12  Treatment plan discussed with: patient  Plan details: Pt was educated on the importance of their HEP and their current need for continued skilled physical therapy. Patients goals and potential limitations were discussed  and pt is in agreement with current plan of care and treatment emphasis.             Manual Therapy:         mins  83448;  Therapeutic Exercise:    13     mins  15469;     Neuromuscular Krysta:        mins  21670;    Therapeutic Activity:          mins  73816;     Gait Training:           mins  95836;     Ultrasound:          mins  02623;    Electrical Stimulation:         mins  27673 ( );  Dry Needling          mins self-pay    Timed Treatment:   13   mins   Total Treatment:     60   mins    PT SIGNATURE: Tere cMqueen PT   DATE TREATMENT INITIATED: 4/3/2019    Initial Certification  Certification Period: 7/2/2019  I certify that the therapy services are furnished while this patient is under my care.  The services outlined above are required by this patient, and will be reviewed every 90 days.     PHYSICIAN: Louis Prasad MD      DATE:     Please sign and return via fax to [phone].. Thank you, [facility].

## 2019-04-02 NOTE — PROGRESS NOTES
Physical Therapy Evaluation and Plan of Care        Patient: Clarissa Matos   : 1950  Diagnosis/ICD-10 Code:  Status post replacement of left shoulder joint [Z96.612]  Referring practitioner: Louis Prasad MD  Date of Initial Visit: Type: THERAPY  Noted: 2018  Today's Date: 2019  Patient seen for 13 sessions      Subjective:   Clarissa Matos reports:   Subjective Questionnaire: QuickDASH: 47.73%  Clinical Progress: improved  Home Program Compliance: No      Subjective Evaluation    Pain  Current pain ratin  At worst pain ratin  Location: Top and front of left shoulder and armpit  Quality: dull ache  Relieving factors: change in position, ice and rest  Aggravating factors: repetitive movement, overhead activity, lifting and movement (Repetative movements with left arm: cooking, ADLs, IADLs)  Progression: improved         Objective       Active Range of Motion   Left Shoulder   Flexion: 108 degrees   Extension: 24 degrees   Abduction: 70 degrees   External rotation 45°: 5 degrees   Internal rotation 45°: 25 degrees     Strength/Myotome Testing     Left Shoulder     Planes of Motion   Flexion: 3-   Extension: 4   Abduction: 3-   Adduction: 4   External rotation at 0°: 3+   External rotation at 45°: 2+   Internal rotation at 0°: 3+   Internal rotation at 45°: 2+     Isolated Muscles   Biceps: 4+   Triceps: 4+      Assessment/Plan   Patient presents with functionally limited left shoulder ROM, strength, scapular stabilization.  She has had poor PT POC and HEP compliance.  Patient was encouraged to perform HEP daily and stressed importance of attending PT sessions to maximize positive rehab outcomes.      Goal  Short Term (4 wks):  1. Patient to have increased left shoulder AROM: flexion/abduction 120 deg, IR/ER 45 deg. to restore functional joint mobility.  2. Patient will have increased right shoulder strength to 4/5 to allow for increased joint stability and to decrease joint/soft tissue  stresses.  3. Patient will have increased scapular stabilization to WFLs for improved functional UE use.    Long Term Goal (8 wks):  1.  Patient will have improved DASH score of 20% or less.  2.  Patient will reports decreased shoulder pain to 2/10 to allow for improved tolerance to  functional UE use.   3.  Patient will be independent in performance of HEP for carryover upon discharge from skilled PT services.           Recommendations: Continue as planned  Timeframe: 1 month  Prognosis to achieve goals: good    PT Signature: Tere Mcqueen, PT      Based upon review of the patient's progress and continued therapy plan, it is my medical opinion that Clarissa Matos should continue physical therapy treatment at [facility]  [address] [place] [id number] [phone].    Signature: __________________________________  Louis Prasad MD    Manual Therapy:    ***     mins  09741;  Therapeutic Exercise:    ***     mins  09668;     Neuromuscular Krysta:    ***    mins  52165;    Therapeutic Activity:     ***     mins  13567;     Gait Training:      ***     mins  21591;     Ultrasound:     ***     mins  55555;    Electrical Stimulation:    ***     mins  99387 ( );  Dry Needling     ***     mins self-pay    Timed Treatment:   ***   mins   Total Treatment:     ***   mins

## 2019-04-02 NOTE — PATIENT INSTRUCTIONS
Access Code: [email or web address]   URL: [email or web address]   Date: 04/02/2019   Prepared by: Tere Weaver   Seated Shoulder Flexion AAROM with Pulley Behind - 10 reps - 3 sets - 10 sec. hold - 1x daily - 7x weekly   Seated Shoulder Abduction AAROM with Pulley Behind - 10 reps - 3 sets - 10 sec. hold - 1x daily - 7x weekly   Standing Shoulder Internal Rotation Stretch with Towel - 10 reps - 3 sets - 10 sec. hold - 1x daily - 7x weekly   Standing Shoulder External Rotation with Resistance - 10 reps - 3 sets - 1x daily - 7x weekly

## 2019-04-02 NOTE — PATIENT INSTRUCTIONS
Access Code: [email or web address]   URL: [email or web address]   Date: 04/02/2019   Prepared by: Tere Weaver   Seated Shoulder Flexion AAROM with Pulley Behind - 10 reps - 3 sets - 10 sec. hold - 1x daily - 7x weekly   Seated Shoulder Abduction AAROM with Pulley Behind - 10 reps - 3 sets - 10 sec. hold - 1x daily - 7x weekly   Standing Shoulder Internal Rotation Stretch with Towel - 10 reps - 3 sets - 10 sec. hold - 1x daily - 7x weekly   Standing Shoulder External Rotation with Resistance - 10 reps - 3 sets - 1x daily - 7x weekly

## 2019-04-04 PROBLEM — M17.12 PRIMARY OSTEOARTHRITIS OF LEFT KNEE: Status: ACTIVE | Noted: 2019-04-04

## 2019-04-10 ENCOUNTER — OFFICE VISIT (OUTPATIENT)
Dept: PHYSICAL THERAPY | Facility: CLINIC | Age: 69
End: 2019-04-10

## 2019-04-10 DIAGNOSIS — M25.619 LIMITED RANGE OF MOTION (ROM) OF SHOULDER: Primary | ICD-10-CM

## 2019-04-10 DIAGNOSIS — M25.612 STIFFNESS OF LEFT SHOULDER JOINT: ICD-10-CM

## 2019-04-10 DIAGNOSIS — Z96.612 STATUS POST REPLACEMENT OF LEFT SHOULDER JOINT: ICD-10-CM

## 2019-04-10 DIAGNOSIS — Z47.1 AFTERCARE FOLLOWING LEFT SHOULDER JOINT REPLACEMENT SURGERY: ICD-10-CM

## 2019-04-10 DIAGNOSIS — Z96.612 AFTERCARE FOLLOWING LEFT SHOULDER JOINT REPLACEMENT SURGERY: ICD-10-CM

## 2019-04-10 PROCEDURE — 97110 THERAPEUTIC EXERCISES: CPT | Performed by: PHYSICAL THERAPIST

## 2019-04-10 PROCEDURE — 97530 THERAPEUTIC ACTIVITIES: CPT | Performed by: PHYSICAL THERAPIST

## 2019-04-10 PROCEDURE — 97140 MANUAL THERAPY 1/> REGIONS: CPT | Performed by: PHYSICAL THERAPIST

## 2019-04-11 NOTE — PROGRESS NOTES
Physical Therapy Daily Progress Note        Clarissa Carlo reports: left shoulder still stiff, tight and uncomfortable but has been doing exercises as instructed.    Subjective     Objective   See Exercise, Manual, and Modality Logs for complete treatment.   -re instructed in home pulley use for left shoulder(flex, scap, abd and IR)  -posture/postural awareness of shoulders/shoulder blades    Assessment/Plan  Able to increase exercise/activity with clinic activities today without increased symptoms L shoulder, though some posterior/lateral shoulder discomfort with isometric ball squeezes.  Noted mm tightness and restrictions left shoulder with manual efforts all planes but flexion and abduction motions(observed) appear to be improved though still significantly limited.    Progress strengthening /stabilization /functional activity as symptoms allow.  Continue with manual efforts to improve left shoulder ROM.  Measure AROM to gauge improvement.           Manual Therapy:    15     mins  33011;  Therapeutic Exercise:   20      mins  28319;     Neuromuscular Krysta:        mins  33444;    Therapeutic Activity:   10       mins  21793;     Gait Training:           mins  62106;     Ultrasound:       mins  08748;    Electrical Stimulation:         mins  44707 ( );      Timed Treatment:  45    mins   Total Treatment:    55    mins    Yordan Ribeiro PTA    Physical Therapist Assistant [place] [address]

## 2019-04-16 ENCOUNTER — TREATMENT (OUTPATIENT)
Dept: PHYSICAL THERAPY | Facility: CLINIC | Age: 69
End: 2019-04-16

## 2019-04-16 DIAGNOSIS — Z96.612 STATUS POST REPLACEMENT OF LEFT SHOULDER JOINT: ICD-10-CM

## 2019-04-16 DIAGNOSIS — M25.619 LIMITED RANGE OF MOTION (ROM) OF SHOULDER: Primary | ICD-10-CM

## 2019-04-16 PROCEDURE — 97140 MANUAL THERAPY 1/> REGIONS: CPT | Performed by: PHYSICAL THERAPIST

## 2019-04-16 PROCEDURE — 97110 THERAPEUTIC EXERCISES: CPT | Performed by: PHYSICAL THERAPIST

## 2019-04-16 NOTE — PROGRESS NOTES
Physical Therapy Daily Progress Note        Patient: Clarissa Matos   : 1950  Diagnosis/ICD-10 Code:  Limited range of motion (ROM) of shoulder [M25.619]  Referring practitioner: Sam Bustamante MD  Date of Initial Visit: Type: THERAPY  Noted: 2019  Today's Date: 2019  Patient seen for 3 sessions         Clarissa Matos reports:      Subjective   Patient reports she feels she is making some progress with stretches at home.  States she missed last session due to her Aunt passing away.  Reports she had tried drinking hybiscus tea in an attempt to reduce her swelling and got blisters all over her mouth.     Objective   See Exercise, Manual, and Modality Logs for complete treatment.       Assessment/Plan  Patient tolerated treatment and exercise progression well with no adverse symptoms.  She had increased ROM post treatment.  Encouraged continued HEP performance.  Progress per Plan of Care           Manual Therapy:    10     mins  30157;  Therapeutic Exercise:    25     mins  02589;     Neuromuscular Krysta:        mins  96917;    Therapeutic Activity:          mins  49036;     Gait Training:           mins  99699;     Ultrasound:          mins  07594;    Electrical Stimulation:         mins  85514 ( );  Dry Needling          mins self-pay    Timed Treatment:   35   mins   Total Treatment:     45   mins    Tere Mcqueen PT  Physical Therapist

## 2019-04-18 ENCOUNTER — TREATMENT (OUTPATIENT)
Dept: PHYSICAL THERAPY | Facility: CLINIC | Age: 69
End: 2019-04-18

## 2019-04-18 DIAGNOSIS — Z96.612 STATUS POST REPLACEMENT OF LEFT SHOULDER JOINT: ICD-10-CM

## 2019-04-18 DIAGNOSIS — M25.619 LIMITED RANGE OF MOTION (ROM) OF SHOULDER: Primary | ICD-10-CM

## 2019-04-18 PROCEDURE — 97140 MANUAL THERAPY 1/> REGIONS: CPT | Performed by: PHYSICAL THERAPIST

## 2019-04-18 PROCEDURE — 97110 THERAPEUTIC EXERCISES: CPT | Performed by: PHYSICAL THERAPIST

## 2019-04-18 NOTE — PROGRESS NOTES
Physical Therapy Daily Progress Note        Patient: Clarissa Matos   : 1950  Diagnosis/ICD-10 Code:  Limited range of motion (ROM) of shoulder [M25.619]  Referring practitioner: Sam Bustamante MD  Date of Initial Visit: Type: THERAPY  Noted: 2019  Today's Date: 2019  Patient seen for 4 sessions         Clarissa Matos reports:       Subjective   Patient reports her shoulder has been feeling better overall but still having tightness.  States she injured her left knee-feels like something tore in her knee and she has had increased pain and swelling since.  States she has been having to wear the knee brace because her knee feels unsteady.      Objective   See Exercise, Manual, and Modality Logs for complete treatment.       Assessment/Plan  Patient had improved left shoulder ROM and scapular mobility post treatment.  Encouraged daily stretching and ice post exercise.  Progress per Plan of Care           Manual Therapy:    17     mins  37049;  Therapeutic Exercise:    15     mins  08211;     Neuromuscular Krysta:        mins  79621;    Therapeutic Activity:          mins  58241;     Gait Training:           mins  88773;     Ultrasound:          mins  50693;    Electrical Stimulation:         mins  50580 ( );  Dry Needling          mins self-pay    Timed Treatment:   32   mins   Total Treatment:     45   mins    Tere Mcqueen PT  Physical Therapist

## 2019-04-23 ENCOUNTER — TREATMENT (OUTPATIENT)
Dept: PHYSICAL THERAPY | Facility: CLINIC | Age: 69
End: 2019-04-23

## 2019-04-23 DIAGNOSIS — Z96.612 STATUS POST REPLACEMENT OF LEFT SHOULDER JOINT: ICD-10-CM

## 2019-04-23 DIAGNOSIS — M25.619 LIMITED RANGE OF MOTION (ROM) OF SHOULDER: Primary | ICD-10-CM

## 2019-04-23 PROCEDURE — 97140 MANUAL THERAPY 1/> REGIONS: CPT | Performed by: PHYSICAL THERAPIST

## 2019-04-23 PROCEDURE — 97110 THERAPEUTIC EXERCISES: CPT | Performed by: PHYSICAL THERAPIST

## 2019-04-24 NOTE — PROGRESS NOTES
Physical Therapy Daily Progress Note        Patient: Clarissa Matos   : 1950  Diagnosis/ICD-10 Code:  Limited range of motion (ROM) of shoulder [M25.619]  Referring practitioner: Sam Bustamante MD  Date of Initial Visit: Type: THERAPY  Noted: 2019  Today's Date: 2019  Patient seen for 5 sessions         Clarissa Matos reports:       Subjective   Patient reports her knee is still very painful but her shoulder is feeling less tight and less intense discomfort.    Objective   See Exercise, Manual, and Modality Logs for complete treatment.       Assessment/Plan  Patient demonstrates improved PROM and AROM in left shoulder.  She was able to perform advanced stretching for left shoulder without any adverse symptoms.  Progress per Plan of Care           Manual Therapy:    10     mins  60065;  Therapeutic Exercise:    20     mins  76600;     Neuromuscular Krysta:        mins  81668;    Therapeutic Activity:          mins  60612;     Gait Training:           mins  88013;     Ultrasound:          mins  97361;    Electrical Stimulation:         mins  01289 (MC );  Dry Needling          mins self-pay    Timed Treatment:   30   mins   Total Treatment:     45   mins    Tere Mcqueen PT  Physical Therapist

## 2019-04-25 ENCOUNTER — TREATMENT (OUTPATIENT)
Dept: PHYSICAL THERAPY | Facility: CLINIC | Age: 69
End: 2019-04-25

## 2019-04-25 DIAGNOSIS — Z96.612 STATUS POST REPLACEMENT OF LEFT SHOULDER JOINT: ICD-10-CM

## 2019-04-25 DIAGNOSIS — M25.619 LIMITED RANGE OF MOTION (ROM) OF SHOULDER: Primary | ICD-10-CM

## 2019-04-25 PROCEDURE — 97140 MANUAL THERAPY 1/> REGIONS: CPT | Performed by: PHYSICAL THERAPIST

## 2019-04-25 PROCEDURE — 97110 THERAPEUTIC EXERCISES: CPT | Performed by: PHYSICAL THERAPIST

## 2019-04-25 NOTE — PROGRESS NOTES
Physical Therapy Daily Progress Note        Patient: Clarissa Matos   : 1950  Diagnosis/ICD-10 Code:  Limited range of motion (ROM) of shoulder [M25.619]  Referring practitioner: Sam Bustamante MD  Date of Initial Visit: Type: THERAPY  Noted: 2019  Today's Date: 2019  Patient seen for 6 sessions         Clarissa Matos reports:      Subjective   Patient reports her knee and low back are bothering her more than her shoulder.  States she did a lot of work yesterday.    Objective   See Exercise, Manual, and Modality Logs for complete treatment.       Assessment/Plan  Patient tolerated treatment well with improved ROM and decreased muscle guarding at end range in left shoulder.  Progress per Plan of Care           Manual Therapy:    10     mins  02688;  Therapeutic Exercise:    25     mins  08931;     Neuromuscular Krysta:        mins  58827;    Therapeutic Activity:          mins  94663;     Gait Training:           mins  73707;     Ultrasound:          mins  68745;    Electrical Stimulation:         mins  56369 ( );  Dry Needling          mins self-pay    Timed Treatment:   35   mins   Total Treatment:     45   mins    Tere Mcqueen PT  Physical Therapist

## 2019-04-30 ENCOUNTER — TREATMENT (OUTPATIENT)
Dept: PHYSICAL THERAPY | Facility: CLINIC | Age: 69
End: 2019-04-30

## 2019-04-30 DIAGNOSIS — M25.619 LIMITED RANGE OF MOTION (ROM) OF SHOULDER: Primary | ICD-10-CM

## 2019-04-30 DIAGNOSIS — Z96.612 STATUS POST REPLACEMENT OF LEFT SHOULDER JOINT: ICD-10-CM

## 2019-04-30 PROCEDURE — 97140 MANUAL THERAPY 1/> REGIONS: CPT | Performed by: PHYSICAL THERAPIST

## 2019-04-30 PROCEDURE — 97110 THERAPEUTIC EXERCISES: CPT | Performed by: PHYSICAL THERAPIST

## 2019-04-30 NOTE — PROGRESS NOTES
Physical Therapy Daily Progress Note        Patient: Clarissa Matos   : 1950  Diagnosis/ICD-10 Code:  Limited range of motion (ROM) of shoulder [M25.619]  Referring practitioner: Sam Bustamante MD  Date of Initial Visit: Type: THERAPY  Noted: 2019  Today's Date: 2019  Patient seen for 7 sessions         Clarissa Matos reports:       Subjective   Patient reports her shoulder was very sore yesterday and feels it was in part due to the weather/barometric pressure.      Objective   See Exercise, Manual, and Modality Logs for complete treatment.       Assessment/Plan  Patient tolerated treatment well with no adverse symptoms.  Progressed exercise program and HEP.  Progress per Plan of Care           Manual Therapy:    10     mins  32761;  Therapeutic Exercise:    20     mins  75242;     Neuromuscular Krysta:        mins  29473;    Therapeutic Activity:          mins  19443;     Gait Training:           mins  29282;     Ultrasound:          mins  67835;    Electrical Stimulation:         mins  76239 (MC );  Dry Needling          mins self-pay    Timed Treatment:   30   mins   Total Treatment:     45   mins    Tere Mcqueen PT  Physical Therapist

## 2019-05-02 ENCOUNTER — TREATMENT (OUTPATIENT)
Dept: PHYSICAL THERAPY | Facility: CLINIC | Age: 69
End: 2019-05-02

## 2019-05-02 DIAGNOSIS — Z96.612 STATUS POST REPLACEMENT OF LEFT SHOULDER JOINT: ICD-10-CM

## 2019-05-02 DIAGNOSIS — M25.619 LIMITED RANGE OF MOTION (ROM) OF SHOULDER: Primary | ICD-10-CM

## 2019-05-02 PROCEDURE — 97140 MANUAL THERAPY 1/> REGIONS: CPT | Performed by: PHYSICAL THERAPIST

## 2019-05-02 PROCEDURE — 97110 THERAPEUTIC EXERCISES: CPT | Performed by: PHYSICAL THERAPIST

## 2019-05-02 NOTE — PROGRESS NOTES
Physical Therapy Re-Assessment / Re-Certification        Patient: Clarissa Matos   : 1950  Diagnosis/ICD-10 Code:  Limited range of motion (ROM) of shoulder [M25.619]  Referring practitioner: Louis Prasad MD  Date of Initial Visit: Type: THERAPY  Noted: 2019  Today's Date: 2019  Patient seen for 8 sessions      Subjective:   Clarissa Matos reports:   Clinical Progress: improved  Home Program Compliance: Yes  Treatment has included: therapeutic exercise, manual therapy and cryotherapy    Subjective   Patient reports her shoulder is extra sore and tight today.  States she feels it is in part due to the weather and stress as she received some upsetting news about her father today.  States she will be going this weekend to see him and will be out of town for a week.    Objective     Active Range of Motion   Left Shoulder   Flexion: 110 degrees   Extension: 24 degrees   Abduction: 90 degrees   External rotation 45°: 10 degrees   Internal rotation 45°: 25 degrees      Strength/Myotome Testing     Left Shoulder      Planes of Motion   Flexion: 3   Extension: 4   Abduction: 3   Adduction: 4   External rotation at 0°: 3+   External rotation at 45°: 2+   Internal rotation at 0°: 3+   Internal rotation at 45°: 2+      Isolated Muscles   Biceps: 4+   Triceps: 4+     Assessment/Plan   Patient demonstrates improved ROM and strength within available ROM.  Her ROM does fluctuate with her pain symptoms.  She had decreased exercise tolerance this session.  Progress toward previous goals: Partially Met    Goal Review  Short Term (4 wks):  1. Patient to have increased left shoulder AROM: flexion/abduction 120 deg, IR/ER 45 deg. to restore functional joint mobility.  2. Patient will have increased right shoulder strength to 4/5 to allow for increased joint stability and to decrease joint/soft tissue stresses.  3. Patient will have increased scapular stabilization to WFLs for improved functional UE use.    Long Term  Goal (8 wks):  1.  Patient will have improved DASH score of 20% or less.  2.  Patient will reports decreased shoulder pain to 2/10 to allow for improved tolerance to  functional UE use.   3.  Patient will be independent in performance of HEP for carryover upon discharge from skilled PT services.           Recommendations: Continue as planned  Timeframe: 1 month  Prognosis to achieve goals: good    PT Signature: Tere Mcqueen, PT      Based upon review of the patient's progress and continued therapy plan, it is my medical opinion that Clarissa Matos should continue physical therapy treatment at [facility]  [address] [place] [id number] [phone].    Signature: __________________________________ Louis Prasad MD    Manual Therapy:    15     mins  65176;  Therapeutic Exercise:    10     mins  09449;     Neuromuscular Krysta:        mins  15023;    Therapeutic Activity:          mins  33480;     Gait Training:           mins  62915;     Ultrasound:          mins  81819;    Electrical Stimulation:         mins  40785 ( );  Dry Needling          mins self-pay    Timed Treatment:   25   mins   Total Treatment:     40   mins

## 2019-05-14 ENCOUNTER — TREATMENT (OUTPATIENT)
Dept: PHYSICAL THERAPY | Facility: CLINIC | Age: 69
End: 2019-05-14

## 2019-05-14 DIAGNOSIS — M25.619 LIMITED RANGE OF MOTION (ROM) OF SHOULDER: Primary | ICD-10-CM

## 2019-05-14 DIAGNOSIS — Z96.612 STATUS POST REPLACEMENT OF LEFT SHOULDER JOINT: ICD-10-CM

## 2019-05-14 PROCEDURE — 97110 THERAPEUTIC EXERCISES: CPT | Performed by: PHYSICAL THERAPIST

## 2019-05-14 PROCEDURE — 97140 MANUAL THERAPY 1/> REGIONS: CPT | Performed by: PHYSICAL THERAPIST

## 2019-05-14 NOTE — PROGRESS NOTES
Physical Therapy Daily Progress Note        Patient: Clarissa Matos   : 1950  Diagnosis/ICD-10 Code:  Limited range of motion (ROM) of shoulder [M25.619]  Referring practitioner: Sam Bustamante MD  Date of Initial Visit: Type: THERAPY  Noted: 2019  Today's Date: 2019  Patient seen for 9 sessions         Clarissa Matos reports:       Subjective   Patient reports her shoulder has been holding up okay, still stiff and has sore spots but does not feel like she lost any progress while out of town last week.    Objective   See Exercise, Manual, and Modality Logs for complete treatment.       Assessment/Plan  Patient continues to have soft tissue tightness around the left shoulder although tenderness is reducing and she has improved ROM after stretching.    Progress per Plan of Care           Manual Therapy:    17     mins  36070;  Therapeutic Exercise:    15     mins  48430;     Neuromuscular Krysta:        mins  54890;    Therapeutic Activity:          mins  53925;     Gait Training:           mins  83052;     Ultrasound:          mins  82609;    Electrical Stimulation:         mins  20460 ( );  Dry Needling          mins self-pay    Timed Treatment:   30   mins   Total Treatment:     45   mins    Tere Mcqueen PT  Physical Therapist

## 2019-05-23 ENCOUNTER — TREATMENT (OUTPATIENT)
Dept: PHYSICAL THERAPY | Facility: CLINIC | Age: 69
End: 2019-05-23

## 2019-05-23 DIAGNOSIS — M25.619 LIMITED RANGE OF MOTION (ROM) OF SHOULDER: Primary | ICD-10-CM

## 2019-05-23 DIAGNOSIS — Z96.612 STATUS POST REPLACEMENT OF LEFT SHOULDER JOINT: ICD-10-CM

## 2019-05-23 PROCEDURE — 97110 THERAPEUTIC EXERCISES: CPT | Performed by: PHYSICAL THERAPIST

## 2019-05-23 PROCEDURE — 97140 MANUAL THERAPY 1/> REGIONS: CPT | Performed by: PHYSICAL THERAPIST

## 2019-05-23 NOTE — PROGRESS NOTES
Physical Therapy Daily Progress Note        Patient: Clarissa Matos   : 1950  Diagnosis/ICD-10 Code:  Limited range of motion (ROM) of shoulder [M25.619]  Referring practitioner: Sam Bustamante MD  Date of Initial Visit: Type: THERAPY  Noted: 2019  Today's Date: 2019  Patient seen for 10 sessions         Clarissa Matos reports:     Subjective   Patient reports her shoulder has been feeling better, still sore at times.  States she has been using her arm more and has been doing okay.    Objective   See Exercise, Manual, and Modality Logs for complete treatment.       Assessment/Plan  Patient had improved left shoulder ROM post treatment with less end range discomfort.  Encouraged daily stretching and ice.  Progress per Plan of Care           Manual Therapy:    15     mins  20292;  Therapeutic Exercise:    10     mins  90877;     Neuromuscular Krysta:        mins  26629;    Therapeutic Activity:          mins  05780;     Gait Training:           mins  60121;     Ultrasound:          mins  86900;    Electrical Stimulation:         mins  80580 ( );  Dry Needling          mins self-pay    Timed Treatment:   25   mins   Total Treatment:     35   mins    Tere Mcqueen PT  Physical Therapist

## 2019-05-24 ENCOUNTER — TELEPHONE (OUTPATIENT)
Dept: ORTHOPEDIC SURGERY | Facility: CLINIC | Age: 69
End: 2019-05-24

## 2019-05-24 DIAGNOSIS — M17.12 PRIMARY OSTEOARTHRITIS OF LEFT KNEE: Primary | ICD-10-CM

## 2019-05-30 ENCOUNTER — TREATMENT (OUTPATIENT)
Dept: PHYSICAL THERAPY | Facility: CLINIC | Age: 69
End: 2019-05-30

## 2019-05-30 DIAGNOSIS — Z96.612 STATUS POST REPLACEMENT OF LEFT SHOULDER JOINT: ICD-10-CM

## 2019-05-30 DIAGNOSIS — M17.12 PRIMARY OSTEOARTHRITIS OF LEFT KNEE: Primary | ICD-10-CM

## 2019-05-30 DIAGNOSIS — M25.619 LIMITED RANGE OF MOTION (ROM) OF SHOULDER: Primary | ICD-10-CM

## 2019-05-30 DIAGNOSIS — M25.562 CHRONIC PAIN OF LEFT KNEE: ICD-10-CM

## 2019-05-30 DIAGNOSIS — G89.29 CHRONIC PAIN OF LEFT KNEE: ICD-10-CM

## 2019-05-30 PROCEDURE — 97161 PT EVAL LOW COMPLEX 20 MIN: CPT | Performed by: PHYSICAL THERAPIST

## 2019-05-30 PROCEDURE — 97110 THERAPEUTIC EXERCISES: CPT | Performed by: PHYSICAL THERAPIST

## 2019-05-30 PROCEDURE — 97035 APP MDLTY 1+ULTRASOUND EA 15: CPT | Performed by: PHYSICAL THERAPIST

## 2019-05-30 PROCEDURE — 97140 MANUAL THERAPY 1/> REGIONS: CPT | Performed by: PHYSICAL THERAPIST

## 2019-06-13 ENCOUNTER — TREATMENT (OUTPATIENT)
Dept: PHYSICAL THERAPY | Facility: CLINIC | Age: 69
End: 2019-06-13

## 2019-06-13 DIAGNOSIS — M25.562 CHRONIC PAIN OF LEFT KNEE: ICD-10-CM

## 2019-06-13 DIAGNOSIS — M17.12 PRIMARY OSTEOARTHRITIS OF LEFT KNEE: Primary | ICD-10-CM

## 2019-06-13 DIAGNOSIS — G89.29 CHRONIC PAIN OF LEFT KNEE: ICD-10-CM

## 2019-06-13 PROCEDURE — 97035 APP MDLTY 1+ULTRASOUND EA 15: CPT | Performed by: PHYSICAL THERAPIST

## 2019-06-13 PROCEDURE — 97110 THERAPEUTIC EXERCISES: CPT | Performed by: PHYSICAL THERAPIST

## 2019-06-13 NOTE — PROGRESS NOTES
Physical Therapy Daily Progress Note        Patient: Clarissa Matos   : 1950  Diagnosis/ICD-10 Code:  Primary osteoarthritis of left knee [M17.12]  Referring practitioner: Sam Bustamante MD  Date of Initial Visit: Type: THERAPY  Noted: 2019  Today's Date: 2019  Patient seen for 2 sessions         Clarissa Matos reports:       Subjective   Patient reports she twisted her knee and has had increased pain and swelling in her right knee since.  States she has pain at rest and with WB.  States she does not feel stable putting weight on her knee without the knee brace.    Objective   See Exercise, Manual, and Modality Logs for complete treatment.       Assessment/Plan  Patient was able to perform exercises without increased symptoms.  Replace standing TKE exercise due to increased pain with WB activities.  She did have decreased TTP and pain intensity following US and ice.  Progress per Plan of Care           Manual Therapy:         mins  09087;  Therapeutic Exercise:    20     mins  69885 (10 minutes concurrently);     Neuromuscular Krysta:        mins  86125;    Therapeutic Activity:          mins  78959;     Gait Training:          mins  67476;     Ultrasound:     2/8     mins  78493;    Electrical Stimulation:         mins  92683 ( );  Dry Needling          mins self-pay    Timed Treatment:   30   mins   Total Treatment:     55   mins    Tere Mcqueen PT  Physical Therapist

## 2019-06-14 ENCOUNTER — TELEPHONE (OUTPATIENT)
Dept: ORTHOPEDIC SURGERY | Facility: CLINIC | Age: 69
End: 2019-06-14

## 2019-06-14 NOTE — TELEPHONE ENCOUNTER
Patient is scheduled for surgery, left TKA on 7/8/19. On 6/11 she twisted her left knee and it is swollen and painful to bear weight. She has a brace on it. Please advise.

## 2019-06-14 NOTE — TELEPHONE ENCOUNTER
"Call returned to the patient.  She twisted her knee as an immediate pain on the ileal aspect of the knee.  She does have some swelling on the medial aspect but has been able to go to physical therapy.  She does have pain with flexion and weightbearing.  Have discussed with RBB and he is recommending to continue with ice and elevation.  Patient is scheduled for total knee replacement on July 8 and because of her upcoming surgery would not be able to give her cortisone injection.  Patient's concern is that with her previous total knee done by another surgeon he told her that 1 of her \"ligaments tore\" while have left it open for her to call if her symptoms do not improve putting in the implants.  Does not recall which ligament was torn but had to wear a splint postoperatively.  "

## 2019-06-18 ENCOUNTER — TREATMENT (OUTPATIENT)
Dept: PHYSICAL THERAPY | Facility: CLINIC | Age: 69
End: 2019-06-18

## 2019-06-18 DIAGNOSIS — M17.12 PRIMARY OSTEOARTHRITIS OF LEFT KNEE: Primary | ICD-10-CM

## 2019-06-18 DIAGNOSIS — G89.29 CHRONIC PAIN OF LEFT KNEE: ICD-10-CM

## 2019-06-18 DIAGNOSIS — M25.562 CHRONIC PAIN OF LEFT KNEE: ICD-10-CM

## 2019-06-18 PROCEDURE — 97110 THERAPEUTIC EXERCISES: CPT | Performed by: PHYSICAL THERAPIST

## 2019-06-19 NOTE — PROGRESS NOTES
PHYSICAL THERAPY DISCHARGE SUMMARY      Patient: Clarissa Matos   : 1950  Diagnosis/ICD-10 Code:  Primary osteoarthritis of left knee [M17.12]  Referring practitioner: Sam Bustamante MD  Date of Initial Visit: Type: THERAPY  Noted: 2019  Today's Date: 2019  Patient seen for 3 sessions      Subjective:   Clarissa Matos reports:   Clinical Progress: unchanged  Home Program Compliance: Yes  Treatment has included: therapeutic exercise and cryotherapy    Subjective Evaluation    Pain  Current pain ratin  At worst pain ratin        Patient reports her pain has decreased a little in her left knee although the symptoms persist.  States she is still reliant on the knee brace.  Reports she did call the doctor to let him know she had re-injured her knee and reports the doctor wants her to continue use of ice and exercise as tolerated.    Objective     Tenderness   Left Knee   Tenderness in the lateral joint line, medial joint line, patellar tendon, pes anserinus and popliteal fossa.      Active Range of Motion   Left Knee   Flexion: 95 degrees   Extensor la degrees         Strength/Myotome Testing      Left Knee   Flexion: 4+  Extension: 4+       Assessment/Plan   Patient continues to have pain in left knee which limits functional mobility.  She is independent in HEP and will continue HEP leading up to surgery for TKA.  Patient is interested in trying Dry Needling to aid in pain relief.  Will proceed with DN.  Progress toward previous goals: Partially Met        Recommendations: Discharge to HEP    PT Signature: Tere Mcqueen, PT    Manual Therapy:                 mins  67549;  Therapeutic Exercise:    30     mins  21508;     Neuromuscular Krysta:        mins  98491;    Therapeutic Activity:           mins  81482;     Gait Training:                      mins  79856;     Ultrasound:                          mins  76414;    Electrical Stimulation:         mins  86221 ( );  Dry  Needling                       mins self-pay     Timed Treatment:   30   mins   Total Treatment:     40   mins

## 2019-06-20 ENCOUNTER — APPOINTMENT (OUTPATIENT)
Dept: PREADMISSION TESTING | Facility: HOSPITAL | Age: 69
End: 2019-06-20

## 2019-06-25 ENCOUNTER — APPOINTMENT (OUTPATIENT)
Dept: PREADMISSION TESTING | Facility: HOSPITAL | Age: 69
End: 2019-06-25

## 2019-06-25 ENCOUNTER — TREATMENT (OUTPATIENT)
Dept: PHYSICAL THERAPY | Facility: CLINIC | Age: 69
End: 2019-06-25

## 2019-06-25 VITALS
RESPIRATION RATE: 16 BRPM | HEART RATE: 71 BPM | BODY MASS INDEX: 32.34 KG/M2 | SYSTOLIC BLOOD PRESSURE: 143 MMHG | OXYGEN SATURATION: 98 % | DIASTOLIC BLOOD PRESSURE: 84 MMHG | HEIGHT: 63 IN | TEMPERATURE: 97.4 F | WEIGHT: 182.5 LBS

## 2019-06-25 DIAGNOSIS — M25.562 CHRONIC PAIN OF BOTH KNEES: Primary | ICD-10-CM

## 2019-06-25 DIAGNOSIS — G89.29 CHRONIC PAIN OF BOTH KNEES: Primary | ICD-10-CM

## 2019-06-25 DIAGNOSIS — G89.29 CHRONIC PAIN OF RIGHT KNEE: ICD-10-CM

## 2019-06-25 DIAGNOSIS — M25.561 CHRONIC PAIN OF BOTH KNEES: Primary | ICD-10-CM

## 2019-06-25 DIAGNOSIS — M25.561 CHRONIC PAIN OF RIGHT KNEE: ICD-10-CM

## 2019-06-25 DIAGNOSIS — M17.12 PRIMARY OSTEOARTHRITIS OF LEFT KNEE: ICD-10-CM

## 2019-06-25 LAB
ANION GAP SERPL CALCULATED.3IONS-SCNC: 10.8 MMOL/L
BILIRUB UR QL STRIP: NEGATIVE
BUN BLD-MCNC: 12 MG/DL (ref 8–23)
BUN/CREAT SERPL: 15.6 (ref 7–25)
CALCIUM SPEC-SCNC: 9.9 MG/DL (ref 8.6–10.5)
CHLORIDE SERPL-SCNC: 102 MMOL/L (ref 98–107)
CLARITY UR: CLEAR
CO2 SERPL-SCNC: 28.2 MMOL/L (ref 22–29)
COLOR UR: YELLOW
CREAT BLD-MCNC: 0.77 MG/DL (ref 0.57–1)
DEPRECATED RDW RBC AUTO: 43.6 FL (ref 37–54)
ERYTHROCYTE [DISTWIDTH] IN BLOOD BY AUTOMATED COUNT: 12.7 % (ref 12.3–15.4)
GFR SERPL CREATININE-BSD FRML MDRD: 75 ML/MIN/1.73
GLUCOSE BLD-MCNC: 94 MG/DL (ref 65–99)
GLUCOSE UR STRIP-MCNC: NEGATIVE MG/DL
HCT VFR BLD AUTO: 48.1 % (ref 34–46.6)
HGB BLD-MCNC: 16 G/DL (ref 12–15.9)
HGB UR QL STRIP.AUTO: NEGATIVE
KETONES UR QL STRIP: NEGATIVE
LEUKOCYTE ESTERASE UR QL STRIP.AUTO: NEGATIVE
MCH RBC QN AUTO: 31.6 PG (ref 26.6–33)
MCHC RBC AUTO-ENTMCNC: 33.3 G/DL (ref 31.5–35.7)
MCV RBC AUTO: 95.1 FL (ref 79–97)
NITRITE UR QL STRIP: NEGATIVE
PH UR STRIP.AUTO: 6 [PH] (ref 5–8)
PLATELET # BLD AUTO: 258 10*3/MM3 (ref 140–450)
PMV BLD AUTO: 10.4 FL (ref 6–12)
POTASSIUM BLD-SCNC: 4.2 MMOL/L (ref 3.5–5.2)
PROT UR QL STRIP: NEGATIVE
RBC # BLD AUTO: 5.06 10*6/MM3 (ref 3.77–5.28)
SODIUM BLD-SCNC: 141 MMOL/L (ref 136–145)
SP GR UR STRIP: 1.01 (ref 1–1.03)
UROBILINOGEN UR QL STRIP: NORMAL
WBC NRBC COR # BLD: 6.87 10*3/MM3 (ref 3.4–10.8)

## 2019-06-25 PROCEDURE — 93005 ELECTROCARDIOGRAM TRACING: CPT

## 2019-06-25 PROCEDURE — 80048 BASIC METABOLIC PNL TOTAL CA: CPT | Performed by: ORTHOPAEDIC SURGERY

## 2019-06-25 PROCEDURE — 93010 ELECTROCARDIOGRAM REPORT: CPT | Performed by: INTERNAL MEDICINE

## 2019-06-25 PROCEDURE — 36415 COLL VENOUS BLD VENIPUNCTURE: CPT

## 2019-06-25 PROCEDURE — 81003 URINALYSIS AUTO W/O SCOPE: CPT | Performed by: ORTHOPAEDIC SURGERY

## 2019-06-25 PROCEDURE — 85027 COMPLETE CBC AUTOMATED: CPT | Performed by: ORTHOPAEDIC SURGERY

## 2019-06-25 PROCEDURE — DRYNDL PR CUSTOM DRY NEEDLING SELF PAY: Performed by: PHYSICAL THERAPIST

## 2019-06-25 RX ORDER — DULOXETIN HYDROCHLORIDE 60 MG/1
60 CAPSULE, DELAYED RELEASE ORAL EVERY MORNING
COMMUNITY
End: 2022-04-21 | Stop reason: ALTCHOICE

## 2019-06-25 RX ORDER — CHLORHEXIDINE GLUCONATE 500 MG/1
1 CLOTH TOPICAL TAKE AS DIRECTED
COMMUNITY
End: 2019-07-23 | Stop reason: HOSPADM

## 2019-06-25 ASSESSMENT — KOOS JR
KOOS JR SCORE: 17
KOOS JR SCORE: 44.905

## 2019-06-25 NOTE — PROGRESS NOTES
Physical Therapy Daily Progress Note-Dry Needling        Patient: Clarissa Matos   : 1950  Diagnosis/ICD-10 Code:  Chronic pain of both knees [M25.561, M25.562, G89.29]  Referring practitioner: No ref. provider found  Date of Initial Visit: Type: THERAPY  Noted: 2019  Today's Date: 2019  Patient seen for 1 sessions         Clarissa Matos reports:       Subjective Evaluation    Pain  Current pain ratin         Patient reports chronic right knee pain since TKA in Dec. 2014.  States she wants to try dry needling to reduce pain.    Objective       Palpation     Right Tenderness of the distal biceps femoris, distal semimembranosus, distal semitendinosus, vastus lateralis and vastus medialis.   Trigger point to vastus medialis.     Tenderness     Right Knee   Tenderness in the inferior patella, patellar tendon, pes anserinus, quadriceps tendon, superior patella and tibial tubercle.     Additional Tenderness Details  Right ITB     See Exercise, Manual, and Modality Logs for complete treatment.       Assessment/Plan  Soft tissue was assessed at right knee. PT noted point tenderness as well as palpable trigger points within the muslce tissue. On this date patient stated that they would like to undergo a dry needling procedure for the soft tissue dysfunction. Patient was educated on the procedure for dry needling and consent waver was signed. Patient was informed of the risks, possible adverse effects, along with the benefits of DN.              Manual Therapy:         mins  08998;  Therapeutic Exercise:         mins  02047;     Neuromuscular Krysta:        mins  25793;    Therapeutic Activity:          mins  39821;     Gait Training:           mins  28996;     Ultrasound:          mins  95854;    Electrical Stimulation:         mins  92786 ( );  Dry Needling     15     mins self-pay    Timed Treatment:      mins   Total Treatment:     30   mins    Tere Mcqueen, PT  Physical  Therapist

## 2019-07-02 ENCOUNTER — TREATMENT (OUTPATIENT)
Dept: PHYSICAL THERAPY | Facility: CLINIC | Age: 69
End: 2019-07-02

## 2019-07-02 ENCOUNTER — OFFICE VISIT (OUTPATIENT)
Dept: ORTHOPEDIC SURGERY | Facility: CLINIC | Age: 69
End: 2019-07-02

## 2019-07-02 VITALS
SYSTOLIC BLOOD PRESSURE: 150 MMHG | HEIGHT: 63 IN | DIASTOLIC BLOOD PRESSURE: 82 MMHG | WEIGHT: 182 LBS | BODY MASS INDEX: 32.25 KG/M2

## 2019-07-02 DIAGNOSIS — M25.561 CHRONIC PAIN OF RIGHT KNEE: ICD-10-CM

## 2019-07-02 DIAGNOSIS — M25.562 CHRONIC PAIN OF BOTH KNEES: Primary | ICD-10-CM

## 2019-07-02 DIAGNOSIS — M25.561 CHRONIC PAIN OF BOTH KNEES: Primary | ICD-10-CM

## 2019-07-02 DIAGNOSIS — G89.29 CHRONIC PAIN OF RIGHT KNEE: ICD-10-CM

## 2019-07-02 DIAGNOSIS — M17.12 PRIMARY OSTEOARTHRITIS OF LEFT KNEE: ICD-10-CM

## 2019-07-02 DIAGNOSIS — M17.12 PRIMARY OSTEOARTHRITIS OF LEFT KNEE: Primary | ICD-10-CM

## 2019-07-02 DIAGNOSIS — G89.29 CHRONIC PAIN OF BOTH KNEES: Primary | ICD-10-CM

## 2019-07-02 PROCEDURE — S0260 H&P FOR SURGERY: HCPCS | Performed by: NURSE PRACTITIONER

## 2019-07-02 PROCEDURE — 73562 X-RAY EXAM OF KNEE 3: CPT | Performed by: NURSE PRACTITIONER

## 2019-07-02 PROCEDURE — DRYNDL PR CUSTOM DRY NEEDLING SELF PAY: Performed by: PHYSICAL THERAPIST

## 2019-07-02 NOTE — H&P (VIEW-ONLY)
Patient: Clarissa Matos    Date of Admission: 7/22/19    YOB: 1950    Medical Record Number: [id number]    Admitting Physician: Dr. Sam Bustamante    Reason for Admission: End Stage Left Knee OA    History of Present Illness: 68 y.o. female presents with severe end stage knee osteoarthritis which has not been responsive to the full compliment of conservative measures. Despite conservative attempts, there is still severe, constant activity limiting pain. Given the severity of the pain, the patient has elected to proceed with knee replacement.    Allergies:   Allergies   Allergen Reactions   • Percocet [Oxycodone-Acetaminophen] Itching   • Nsaids GI Intolerance   • Penicillins Rash         Current Medications:  Home Medications:    Current Outpatient Medications on File Prior to Visit   Medication Sig   • acetaminophen (TYLENOL) 325 MG tablet Take 2 tablets by mouth Every 4 (Four) Hours As Needed for Fever (greater than 101 F).   • aspirin-acetaminophen-caffeine (EXCEDRIN MIGRAINE) 250-250-65 MG per tablet Take 1 tablet by mouth Every 6 (Six) Hours As Needed for Headache. May resume on 07/07/18.   • calcium carbonate (OS-CRESCENCIO) 600 MG tablet Take 600 mg by mouth Every Morning.   • Chlorhexidine Gluconate Cloth 2 % pads Apply  topically. Use as directed preop   • Cholecalciferol (VITAMIN D3) 5000 units capsule capsule Take 5,000 Units by mouth Every Night.   • diclofenac (FLECTOR) 1.3 % patch patch Apply 1 patch topically 2 (Two) Times a Day As Needed (Pain). Hold until seen in office to discuss with Dr. Prasad when to resume.   • docusate sodium 100 MG capsule Take 100 mg by mouth 2 (Two) Times a Day. (Patient taking differently: Take 100 mg by mouth As Needed.)   • DULoxetine (CYMBALTA) 60 MG capsule Take 60 mg by mouth Every Morning.   • Echinacea 500 MG capsule Take 2 tablets by mouth As Needed.   • Fexofenadine HCl (MUCINEX ALLERGY PO) Take 1 tablet by mouth As Needed.   • HYDROcodone-acetaminophen  (NORCO)  MG per tablet Take 1 tablet by mouth Every 4 (Four) Hours As Needed for Moderate Pain . (Patient taking differently: Take 1 tablet by mouth Every 6 (Six) Hours.)   • loratadine (CLARITIN) 10 MG tablet Take 10 mg by mouth Every Morning.   • LORazepam (ATIVAN) 0.5 MG tablet Take 0.5 mg by mouth 2 (Two) Times a Day As Needed for Anxiety.   • losartan-hydrochlorothiazide (HYZAAR) 50-12.5 MG per tablet Take 1 tablet by mouth Every Morning.   • magnesium 30 MG tablet Take 30 mg by mouth Every Night.   • mupirocin (BACTROBAN) 2 % nasal ointment by Each Nare route. Use as directed preop   • omeprazole (priLOSEC) 20 MG capsule Take 20 mg by mouth Every Morning.   • zolpidem (AMBIEN) 10 MG tablet Take 10 mg by mouth At Night As Needed for Sleep.     Current Facility-Administered Medications on File Prior to Visit   Medication   • mupirocin (BACTROBAN) 2 % nasal ointment     PRN Meds:.    PMH:     Past Medical History:   Diagnosis Date   • Anxiety    • Arthritis    • Depression    • Fibromyalgia     DX    • GERD (gastroesophageal reflux disease)    • Hard to intubate     STATES TOOK 30 MINUTES TO BE INTUBATED   • Headache    • Hiatal hernia    • Hypertension    • Irregular heart rate     PVC'S   • Limited joint range of motion     LT KNEE       PF/Surg/Soc Hx:     Past Surgical History:   Procedure Laterality Date   • BREAST BIOPSY Right    • CATARACT EXTRACTION W/ INTRAOCULAR LENS IMPLANT Bilateral    •  SECTION      x 2   • CHOLECYSTECTOMY WITH INTRAOPERATIVE CHOLANGIOGRAM N/A 2017    Procedure: CHOLECYSTECTOMY LAPAROSCOPIC INTRAOPERATIVE CHOLANGIOGRAM;  Surgeon: Demi Madden MD;  Location: [facility];  Service:    • HYSTERECTOMY     • REPLACEMENT TOTAL KNEE Right    • ROTATOR CUFF REPAIR Right    • TOTAL SHOULDER ARTHROPLASTY W/ DISTAL CLAVICLE EXCISION Left 2018    Procedure: Reverse Total Shoulder Arthroplsty for fracture;  Surgeon: Louis Prasad MD;  Location:  "[facility] OR;  Service: Orthopedics   • TUBAL ABDOMINAL LIGATION  1986        Social History     Occupational History   • Not on file   Tobacco Use   • Smoking status: Never Smoker   • Smokeless tobacco: Never Used   Substance and Sexual Activity   • Alcohol use: No   • Drug use: No   • Sexual activity: Defer      Social History     Social History Narrative   • Not on file        Family History   Problem Relation Age of Onset   • Heart disease Mother    • Hypertension Mother    • Heart disease Father    • Hypertension Father    • No Known Problems Sister    • No Known Problems Brother    • No Known Problems Daughter    • No Known Problems Son    • No Known Problems Maternal Grandmother    • No Known Problems Paternal Grandmother    • No Known Problems Maternal Aunt    • No Known Problems Paternal Aunt    • BRCA 1/2 Neg Hx    • Breast cancer Neg Hx    • Colon cancer Neg Hx    • Endometrial cancer Neg Hx    • Ovarian cancer Neg Hx    • Malig Hyperthermia Neg Hx          Review of Systems:   A 14 point review of systems was performed, pertinent positives discussed above, all other systems are negative    Physical Exam: 68 y.o. female  Vital Signs :   Vitals:    07/02/19 0916   BP: 150/82   Weight: 82.6 kg (182 lb)   Height: 160 cm (63\")     General: Alert and Oriented x 3, No acute distress.  Psych: mood and affect appropriate; recent and remote memory intact  Eyes: conjunctiva clear; pupils equally round and reactive, sclera nonicteric  CV: no peripheral edema  Resp: normal respiratory effort  Skin: no rashes or wounds; normal turgor  Musculosketetal; pain and crepitance with knee range of motion  Vascular: palpable distal pulses    Xrays:  -3 views (AP, lateral, and sunrise) were ordered and reviewed demonstrating end-stage OA with bone on bone articulation.    Assessment:  End-stage Left knee osteoarthritis. Conservative measures have failed.      Plan:  The plan is to proceed with Left Total Knee Replacement. The " patient voiced understanding of the risks, benefits, and alternative forms of treatment that were discussed with Dr Bustamante at the time of scheduling.  Patient is planning on going home with home health next day.  She also would like Dilaudid postoperatively since she already takes hydrocodone preop and Percocet makes her itch.    Dania Kevin, APRN  7/2/2019

## 2019-07-02 NOTE — H&P
Patient: Clarissa Matos    Date of Admission: 7/22/19    YOB: 1950    Medical Record Number: [id number]    Admitting Physician: Dr. Sam Bustamante    Reason for Admission: End Stage Left Knee OA    History of Present Illness: 68 y.o. female presents with severe end stage knee osteoarthritis which has not been responsive to the full compliment of conservative measures. Despite conservative attempts, there is still severe, constant activity limiting pain. Given the severity of the pain, the patient has elected to proceed with knee replacement.    Allergies:   Allergies   Allergen Reactions   • Percocet [Oxycodone-Acetaminophen] Itching   • Nsaids GI Intolerance   • Penicillins Rash         Current Medications:  Home Medications:    Current Outpatient Medications on File Prior to Visit   Medication Sig   • acetaminophen (TYLENOL) 325 MG tablet Take 2 tablets by mouth Every 4 (Four) Hours As Needed for Fever (greater than 101 F).   • aspirin-acetaminophen-caffeine (EXCEDRIN MIGRAINE) 250-250-65 MG per tablet Take 1 tablet by mouth Every 6 (Six) Hours As Needed for Headache. May resume on 07/07/18.   • calcium carbonate (OS-CRESCENCIO) 600 MG tablet Take 600 mg by mouth Every Morning.   • Chlorhexidine Gluconate Cloth 2 % pads Apply  topically. Use as directed preop   • Cholecalciferol (VITAMIN D3) 5000 units capsule capsule Take 5,000 Units by mouth Every Night.   • diclofenac (FLECTOR) 1.3 % patch patch Apply 1 patch topically 2 (Two) Times a Day As Needed (Pain). Hold until seen in office to discuss with Dr. Prasad when to resume.   • docusate sodium 100 MG capsule Take 100 mg by mouth 2 (Two) Times a Day. (Patient taking differently: Take 100 mg by mouth As Needed.)   • DULoxetine (CYMBALTA) 60 MG capsule Take 60 mg by mouth Every Morning.   • Echinacea 500 MG capsule Take 2 tablets by mouth As Needed.   • Fexofenadine HCl (MUCINEX ALLERGY PO) Take 1 tablet by mouth As Needed.   • HYDROcodone-acetaminophen  (NORCO)  MG per tablet Take 1 tablet by mouth Every 4 (Four) Hours As Needed for Moderate Pain . (Patient taking differently: Take 1 tablet by mouth Every 6 (Six) Hours.)   • loratadine (CLARITIN) 10 MG tablet Take 10 mg by mouth Every Morning.   • LORazepam (ATIVAN) 0.5 MG tablet Take 0.5 mg by mouth 2 (Two) Times a Day As Needed for Anxiety.   • losartan-hydrochlorothiazide (HYZAAR) 50-12.5 MG per tablet Take 1 tablet by mouth Every Morning.   • magnesium 30 MG tablet Take 30 mg by mouth Every Night.   • mupirocin (BACTROBAN) 2 % nasal ointment by Each Nare route. Use as directed preop   • omeprazole (priLOSEC) 20 MG capsule Take 20 mg by mouth Every Morning.   • zolpidem (AMBIEN) 10 MG tablet Take 10 mg by mouth At Night As Needed for Sleep.     Current Facility-Administered Medications on File Prior to Visit   Medication   • mupirocin (BACTROBAN) 2 % nasal ointment     PRN Meds:.    PMH:     Past Medical History:   Diagnosis Date   • Anxiety    • Arthritis    • Depression    • Fibromyalgia     DX    • GERD (gastroesophageal reflux disease)    • Hard to intubate     STATES TOOK 30 MINUTES TO BE INTUBATED   • Headache    • Hiatal hernia    • Hypertension    • Irregular heart rate     PVC'S   • Limited joint range of motion     LT KNEE       PF/Surg/Soc Hx:     Past Surgical History:   Procedure Laterality Date   • BREAST BIOPSY Right    • CATARACT EXTRACTION W/ INTRAOCULAR LENS IMPLANT Bilateral    •  SECTION      x 2   • CHOLECYSTECTOMY WITH INTRAOPERATIVE CHOLANGIOGRAM N/A 2017    Procedure: CHOLECYSTECTOMY LAPAROSCOPIC INTRAOPERATIVE CHOLANGIOGRAM;  Surgeon: Demi Madden MD;  Location: [facility];  Service:    • HYSTERECTOMY     • REPLACEMENT TOTAL KNEE Right    • ROTATOR CUFF REPAIR Right    • TOTAL SHOULDER ARTHROPLASTY W/ DISTAL CLAVICLE EXCISION Left 2018    Procedure: Reverse Total Shoulder Arthroplsty for fracture;  Surgeon: Louis Prasad MD;  Location:  "[facility] OR;  Service: Orthopedics   • TUBAL ABDOMINAL LIGATION  1986        Social History     Occupational History   • Not on file   Tobacco Use   • Smoking status: Never Smoker   • Smokeless tobacco: Never Used   Substance and Sexual Activity   • Alcohol use: No   • Drug use: No   • Sexual activity: Defer      Social History     Social History Narrative   • Not on file        Family History   Problem Relation Age of Onset   • Heart disease Mother    • Hypertension Mother    • Heart disease Father    • Hypertension Father    • No Known Problems Sister    • No Known Problems Brother    • No Known Problems Daughter    • No Known Problems Son    • No Known Problems Maternal Grandmother    • No Known Problems Paternal Grandmother    • No Known Problems Maternal Aunt    • No Known Problems Paternal Aunt    • BRCA 1/2 Neg Hx    • Breast cancer Neg Hx    • Colon cancer Neg Hx    • Endometrial cancer Neg Hx    • Ovarian cancer Neg Hx    • Malig Hyperthermia Neg Hx          Review of Systems:   A 14 point review of systems was performed, pertinent positives discussed above, all other systems are negative    Physical Exam: 68 y.o. female  Vital Signs :   Vitals:    07/02/19 0916   BP: 150/82   Weight: 82.6 kg (182 lb)   Height: 160 cm (63\")     General: Alert and Oriented x 3, No acute distress.  Psych: mood and affect appropriate; recent and remote memory intact  Eyes: conjunctiva clear; pupils equally round and reactive, sclera nonicteric  CV: no peripheral edema  Resp: normal respiratory effort  Skin: no rashes or wounds; normal turgor  Musculosketetal; pain and crepitance with knee range of motion  Vascular: palpable distal pulses    Xrays:  -3 views (AP, lateral, and sunrise) were ordered and reviewed demonstrating end-stage OA with bone on bone articulation.    Assessment:  End-stage Left knee osteoarthritis. Conservative measures have failed.      Plan:  The plan is to proceed with Left Total Knee Replacement. The " patient voiced understanding of the risks, benefits, and alternative forms of treatment that were discussed with Dr Bustamante at the time of scheduling.  Patient is planning on going home with home health next day.  She also would like Dilaudid postoperatively since she already takes hydrocodone preop and Percocet makes her itch.    Dania Kevin, APRN  7/2/2019

## 2019-07-02 NOTE — PROGRESS NOTES
Physical Therapy Daily Progress Note-Dry Needling        Patient: Clarissa Matos   : 1950  Diagnosis/ICD-10 Code:  Chronic pain of both knees [M25.561, M25.562, G89.29]  Referring practitioner: No ref. provider found  Date of Initial Visit: Type: THERAPY  Noted: 2019  Today's Date: 2019  Patient seen for 2 sessions         Clarissa Matos reports:       Subjective   Patient reports her knee felt better after initial DN session and has noted decreased swelling in her knee since the session.    Objective   See Exercise, Manual, and Modality Logs for complete treatment.       Assessment/Plan  Soft tissue was assessed at right knee/thigh. PT noted point tenderness as well as palpable trigger points within the muslce tissue. On this date patient stated that they would like to undergo a dry needling procedure for the soft tissue dysfunction. Patient was educated on the procedure for dry needling and consent waver was signed. Patient was informed of the risks, possible adverse effects, along with the benefits of DN.   Progress per Plan of Care           Manual Therapy:         mins  09848;  Therapeutic Exercise:         mins  53747;     Neuromuscular Krysta:        mins  37324;    Therapeutic Activity:          mins  50711;     Gait Training:           mins  13160;     Ultrasound:          mins  89478;    Electrical Stimulation:         mins  07647 ( );  Dry Needling     15     mins self-pay    Timed Treatment:      mins   Total Treatment:     30   mins    Tere Mcqueen, PT  Physical Therapist

## 2019-07-16 ENCOUNTER — TREATMENT (OUTPATIENT)
Dept: PHYSICAL THERAPY | Facility: CLINIC | Age: 69
End: 2019-07-16

## 2019-07-16 DIAGNOSIS — M25.561 CHRONIC PAIN OF BOTH KNEES: Primary | ICD-10-CM

## 2019-07-16 DIAGNOSIS — M25.562 CHRONIC PAIN OF BOTH KNEES: Primary | ICD-10-CM

## 2019-07-16 DIAGNOSIS — G89.29 CHRONIC PAIN OF BOTH KNEES: Primary | ICD-10-CM

## 2019-07-16 PROCEDURE — DRYNDL PR CUSTOM DRY NEEDLING SELF PAY: Performed by: PHYSICAL THERAPIST

## 2019-07-16 NOTE — PROGRESS NOTES
Physical Therapy Daily Progress Note        Patient: Clarissa Matos   : 1950  Diagnosis/ICD-10 Code:  Chronic pain of both knees [M25.561, M25.562, G89.29]  Referring practitioner: No ref. provider found  Date of Initial Visit: Type: THERAPY  Noted: 2019  Today's Date: 2019  Patient seen for 3 sessions         Clarissa Matos reports:       Subjective   Patient reports she has been having less pain in her knee, still having some increased swelling with rainy weather and after prolonged walking.    Objective   See Exercise, Manual, and Modality Logs for complete treatment.       Assessment/Plan  Soft tissue was assessed at right knee. PT noted point tenderness as well as palpable trigger points within the muslce tissue. On this date patient stated that they would like to undergo a dry needling procedure for the soft tissue dysfunction. Patient was educated on the procedure for dry needling and consent waver was signed. Patient was informed of the risks, possible adverse effects, along with the benefits of TDN.   Progress per Plan of Care           Manual Therapy:         mins  29032;  Therapeutic Exercise:         mins  32426;     Neuromuscular Krysta:        mins  87580;    Therapeutic Activity:          mins  47216;     Gait Training:           mins  96767;     Ultrasound:          mins  98744;    Electrical Stimulation:         mins  78974 ( );  Dry Needling     15     mins self-pay    Timed Treatment:      mins   Total Treatment:     30   mins    Tere Mcqueen, PT  Physical Therapist

## 2019-07-22 ENCOUNTER — APPOINTMENT (OUTPATIENT)
Dept: GENERAL RADIOLOGY | Facility: HOSPITAL | Age: 69
End: 2019-07-22

## 2019-07-22 ENCOUNTER — ANESTHESIA (OUTPATIENT)
Dept: PERIOP | Facility: HOSPITAL | Age: 69
End: 2019-07-22

## 2019-07-22 ENCOUNTER — ANESTHESIA EVENT (OUTPATIENT)
Dept: PERIOP | Facility: HOSPITAL | Age: 69
End: 2019-07-22

## 2019-07-22 ENCOUNTER — HOSPITAL ENCOUNTER (OUTPATIENT)
Facility: HOSPITAL | Age: 69
Discharge: HOME HEALTH | End: 2019-07-23
Attending: ORTHOPAEDIC SURGERY | Admitting: ORTHOPAEDIC SURGERY

## 2019-07-22 DIAGNOSIS — R26.2 DIFFICULTY WALKING: Primary | ICD-10-CM

## 2019-07-22 DIAGNOSIS — M17.12 PRIMARY OSTEOARTHRITIS OF LEFT KNEE: ICD-10-CM

## 2019-07-22 PROCEDURE — 25010000002 HYDROMORPHONE PER 4 MG: Performed by: NURSE ANESTHETIST, CERTIFIED REGISTERED

## 2019-07-22 PROCEDURE — 27447 TOTAL KNEE ARTHROPLASTY: CPT | Performed by: ORTHOPAEDIC SURGERY

## 2019-07-22 PROCEDURE — 25010000002 VANCOMYCIN 10 G RECONSTITUTED SOLUTION: Performed by: ORTHOPAEDIC SURGERY

## 2019-07-22 PROCEDURE — 25010000002 HYDRALAZINE PER 20 MG: Performed by: NURSE ANESTHETIST, CERTIFIED REGISTERED

## 2019-07-22 PROCEDURE — 25010000003 CEFAZOLIN IN DEXTROSE 2-4 GM/100ML-% SOLUTION: Performed by: ORTHOPAEDIC SURGERY

## 2019-07-22 PROCEDURE — 25010000002 DEXAMETHASONE PER 1 MG: Performed by: NURSE ANESTHETIST, CERTIFIED REGISTERED

## 2019-07-22 PROCEDURE — 25010000002 MIDAZOLAM PER 1 MG: Performed by: ANESTHESIOLOGY

## 2019-07-22 PROCEDURE — 25010000002 NEOSTIGMINE 5 MG/10ML SOLUTION: Performed by: NURSE ANESTHETIST, CERTIFIED REGISTERED

## 2019-07-22 PROCEDURE — 27447 TOTAL KNEE ARTHROPLASTY: CPT | Performed by: NURSE PRACTITIONER

## 2019-07-22 PROCEDURE — C1713 ANCHOR/SCREW BN/BN,TIS/BN: HCPCS | Performed by: ORTHOPAEDIC SURGERY

## 2019-07-22 PROCEDURE — C1776 JOINT DEVICE (IMPLANTABLE): HCPCS | Performed by: ORTHOPAEDIC SURGERY

## 2019-07-22 PROCEDURE — 25010000003 BUPIVACAINE LIPOSOME 1.3 % SUSPENSION 20 ML VIAL: Performed by: ORTHOPAEDIC SURGERY

## 2019-07-22 PROCEDURE — 73560 X-RAY EXAM OF KNEE 1 OR 2: CPT

## 2019-07-22 PROCEDURE — 25010000002 ONDANSETRON PER 1 MG: Performed by: NURSE ANESTHETIST, CERTIFIED REGISTERED

## 2019-07-22 PROCEDURE — 25010000002 FENTANYL CITRATE (PF) 100 MCG/2ML SOLUTION: Performed by: NURSE ANESTHETIST, CERTIFIED REGISTERED

## 2019-07-22 PROCEDURE — 25010000002 VANCOMYCIN 10 G RECONSTITUTED SOLUTION: Performed by: NURSE PRACTITIONER

## 2019-07-22 PROCEDURE — C9290 INJ, BUPIVACAINE LIPOSOME: HCPCS | Performed by: ORTHOPAEDIC SURGERY

## 2019-07-22 PROCEDURE — 25010000002 PROPOFOL 10 MG/ML EMULSION: Performed by: NURSE ANESTHETIST, CERTIFIED REGISTERED

## 2019-07-22 DEVICE — GENESIS II NON-POROUS TIBIAL                                    BASEPLATE SIZE 3 LEFT
Type: IMPLANTABLE DEVICE | Site: KNEE | Status: FUNCTIONAL
Brand: GENESIS II

## 2019-07-22 DEVICE — CMT BONE PALACOS R HI/VISC 1X40: Type: IMPLANTABLE DEVICE | Site: KNEE | Status: FUNCTIONAL

## 2019-07-22 DEVICE — GENESIS II BICONVEX PATELLA 26MM
Type: IMPLANTABLE DEVICE | Site: KNEE | Status: FUNCTIONAL
Brand: GENESIS II

## 2019-07-22 DEVICE — GENESIS II CRUCIATE RETAINING DEEP                                    FLEXION INSERT SIZE 3-4 9MM
Type: IMPLANTABLE DEVICE | Site: KNEE | Status: FUNCTIONAL
Brand: GENESIS II

## 2019-07-22 DEVICE — IMPLANTABLE DEVICE: Type: IMPLANTABLE DEVICE | Status: FUNCTIONAL

## 2019-07-22 DEVICE — LEGION CRUCIATE RETAINING OXINIUM                                    FEMORAL SIZE 3 LEFT
Type: IMPLANTABLE DEVICE | Site: KNEE | Status: FUNCTIONAL
Brand: LEGION

## 2019-07-22 RX ORDER — PANTOPRAZOLE SODIUM 40 MG/1
40 TABLET, DELAYED RELEASE ORAL EVERY MORNING
Status: DISCONTINUED | OUTPATIENT
Start: 2019-07-23 | End: 2019-07-23 | Stop reason: HOSPADM

## 2019-07-22 RX ORDER — DOXAZOSIN 2 MG/1
2 TABLET ORAL NIGHTLY
Refills: 0 | COMMUNITY
Start: 2019-07-10 | End: 2020-08-10

## 2019-07-22 RX ORDER — DOCUSATE SODIUM 100 MG/1
100 CAPSULE, LIQUID FILLED ORAL 2 TIMES DAILY
Status: DISCONTINUED | OUTPATIENT
Start: 2019-07-22 | End: 2019-07-23 | Stop reason: HOSPADM

## 2019-07-22 RX ORDER — LABETALOL HYDROCHLORIDE 5 MG/ML
5 INJECTION, SOLUTION INTRAVENOUS
Status: DISCONTINUED | OUTPATIENT
Start: 2019-07-22 | End: 2019-07-23 | Stop reason: HOSPADM

## 2019-07-22 RX ORDER — MELOXICAM 15 MG/1
15 TABLET ORAL DAILY
Status: DISCONTINUED | OUTPATIENT
Start: 2019-07-23 | End: 2019-07-22

## 2019-07-22 RX ORDER — HYDROMORPHONE HYDROCHLORIDE 2 MG/1
2 TABLET ORAL
Status: DISCONTINUED | OUTPATIENT
Start: 2019-07-22 | End: 2019-07-23 | Stop reason: HOSPADM

## 2019-07-22 RX ORDER — PROMETHAZINE HYDROCHLORIDE 25 MG/ML
12.5 INJECTION, SOLUTION INTRAMUSCULAR; INTRAVENOUS ONCE AS NEEDED
Status: DISCONTINUED | OUTPATIENT
Start: 2019-07-22 | End: 2019-07-22 | Stop reason: HOSPADM

## 2019-07-22 RX ORDER — ASPIRIN 325 MG
325 TABLET, DELAYED RELEASE (ENTERIC COATED) ORAL EVERY 12 HOURS SCHEDULED
Status: DISCONTINUED | OUTPATIENT
Start: 2019-07-23 | End: 2019-07-23 | Stop reason: HOSPADM

## 2019-07-22 RX ORDER — HYDROCODONE BITARTRATE AND ACETAMINOPHEN 7.5; 325 MG/1; MG/1
1 TABLET ORAL ONCE AS NEEDED
Status: COMPLETED | OUTPATIENT
Start: 2019-07-22 | End: 2019-07-22

## 2019-07-22 RX ORDER — SODIUM CHLORIDE 0.9 % (FLUSH) 0.9 %
3 SYRINGE (ML) INJECTION EVERY 12 HOURS SCHEDULED
Status: DISCONTINUED | OUTPATIENT
Start: 2019-07-22 | End: 2019-07-22 | Stop reason: HOSPADM

## 2019-07-22 RX ORDER — EPHEDRINE SULFATE 50 MG/ML
5 INJECTION, SOLUTION INTRAVENOUS ONCE AS NEEDED
Status: DISCONTINUED | OUTPATIENT
Start: 2019-07-22 | End: 2019-07-22 | Stop reason: HOSPADM

## 2019-07-22 RX ORDER — TRANEXAMIC ACID 100 MG/ML
INJECTION, SOLUTION INTRAVENOUS AS NEEDED
Status: DISCONTINUED | OUTPATIENT
Start: 2019-07-22 | End: 2019-07-22 | Stop reason: SURG

## 2019-07-22 RX ORDER — PREGABALIN 75 MG/1
150 CAPSULE ORAL ONCE
Status: COMPLETED | OUTPATIENT
Start: 2019-07-22 | End: 2019-07-22

## 2019-07-22 RX ORDER — DULOXETIN HYDROCHLORIDE 60 MG/1
60 CAPSULE, DELAYED RELEASE ORAL EVERY MORNING
Status: DISCONTINUED | OUTPATIENT
Start: 2019-07-23 | End: 2019-07-23 | Stop reason: HOSPADM

## 2019-07-22 RX ORDER — MAGNESIUM HYDROXIDE 1200 MG/15ML
LIQUID ORAL AS NEEDED
Status: DISCONTINUED | OUTPATIENT
Start: 2019-07-22 | End: 2019-07-22 | Stop reason: HOSPADM

## 2019-07-22 RX ORDER — DIPHENHYDRAMINE HYDROCHLORIDE 50 MG/ML
12.5 INJECTION INTRAMUSCULAR; INTRAVENOUS
Status: DISCONTINUED | OUTPATIENT
Start: 2019-07-22 | End: 2019-07-22 | Stop reason: HOSPADM

## 2019-07-22 RX ORDER — ACETAMINOPHEN 10 MG/ML
1000 INJECTION, SOLUTION INTRAVENOUS ONCE
Status: COMPLETED | OUTPATIENT
Start: 2019-07-22 | End: 2019-07-22

## 2019-07-22 RX ORDER — LIDOCAINE HYDROCHLORIDE 20 MG/ML
INJECTION, SOLUTION INFILTRATION; PERINEURAL AS NEEDED
Status: DISCONTINUED | OUTPATIENT
Start: 2019-07-22 | End: 2019-07-22 | Stop reason: SURG

## 2019-07-22 RX ORDER — FAMOTIDINE 10 MG/ML
20 INJECTION, SOLUTION INTRAVENOUS
Status: COMPLETED | OUTPATIENT
Start: 2019-07-22 | End: 2019-07-22

## 2019-07-22 RX ORDER — WOUND DRESSING ADHESIVE - LIQUID
LIQUID MISCELLANEOUS AS NEEDED
Status: DISCONTINUED | OUTPATIENT
Start: 2019-07-22 | End: 2019-07-22 | Stop reason: HOSPADM

## 2019-07-22 RX ORDER — UREA 10 %
3 LOTION (ML) TOPICAL NIGHTLY PRN
Status: DISCONTINUED | OUTPATIENT
Start: 2019-07-22 | End: 2019-07-23 | Stop reason: HOSPADM

## 2019-07-22 RX ORDER — FLUMAZENIL 0.1 MG/ML
0.2 INJECTION INTRAVENOUS AS NEEDED
Status: DISCONTINUED | OUTPATIENT
Start: 2019-07-22 | End: 2019-07-22 | Stop reason: HOSPADM

## 2019-07-22 RX ORDER — PROMETHAZINE HYDROCHLORIDE 25 MG/1
25 SUPPOSITORY RECTAL ONCE AS NEEDED
Status: DISCONTINUED | OUTPATIENT
Start: 2019-07-22 | End: 2019-07-22 | Stop reason: HOSPADM

## 2019-07-22 RX ORDER — SODIUM CHLORIDE, SODIUM LACTATE, POTASSIUM CHLORIDE, CALCIUM CHLORIDE 600; 310; 30; 20 MG/100ML; MG/100ML; MG/100ML; MG/100ML
9 INJECTION, SOLUTION INTRAVENOUS CONTINUOUS PRN
Status: DISCONTINUED | OUTPATIENT
Start: 2019-07-22 | End: 2019-07-22 | Stop reason: HOSPADM

## 2019-07-22 RX ORDER — PROMETHAZINE HYDROCHLORIDE 25 MG/1
25 TABLET ORAL ONCE AS NEEDED
Status: DISCONTINUED | OUTPATIENT
Start: 2019-07-22 | End: 2019-07-22 | Stop reason: HOSPADM

## 2019-07-22 RX ORDER — PROMETHAZINE HYDROCHLORIDE 25 MG/ML
6.25 INJECTION, SOLUTION INTRAMUSCULAR; INTRAVENOUS
Status: DISCONTINUED | OUTPATIENT
Start: 2019-07-22 | End: 2019-07-22 | Stop reason: HOSPADM

## 2019-07-22 RX ORDER — ONDANSETRON 2 MG/ML
4 INJECTION INTRAMUSCULAR; INTRAVENOUS ONCE AS NEEDED
Status: DISCONTINUED | OUTPATIENT
Start: 2019-07-22 | End: 2019-07-22 | Stop reason: HOSPADM

## 2019-07-22 RX ORDER — ONDANSETRON 2 MG/ML
4 INJECTION INTRAMUSCULAR; INTRAVENOUS EVERY 6 HOURS PRN
Status: DISCONTINUED | OUTPATIENT
Start: 2019-07-22 | End: 2019-07-23 | Stop reason: HOSPADM

## 2019-07-22 RX ORDER — HYDROMORPHONE HYDROCHLORIDE 1 MG/ML
0.5 INJECTION, SOLUTION INTRAMUSCULAR; INTRAVENOUS; SUBCUTANEOUS
Status: DISCONTINUED | OUTPATIENT
Start: 2019-07-22 | End: 2019-07-22 | Stop reason: HOSPADM

## 2019-07-22 RX ORDER — KETOROLAC TROMETHAMINE 30 MG/ML
15 INJECTION, SOLUTION INTRAMUSCULAR; INTRAVENOUS EVERY 8 HOURS
Status: DISCONTINUED | OUTPATIENT
Start: 2019-07-22 | End: 2019-07-22

## 2019-07-22 RX ORDER — HYDROMORPHONE HCL 110MG/55ML
PATIENT CONTROLLED ANALGESIA SYRINGE INTRAVENOUS AS NEEDED
Status: DISCONTINUED | OUTPATIENT
Start: 2019-07-22 | End: 2019-07-22 | Stop reason: SURG

## 2019-07-22 RX ORDER — LABETALOL HYDROCHLORIDE 5 MG/ML
5 INJECTION, SOLUTION INTRAVENOUS
Status: DISCONTINUED | OUTPATIENT
Start: 2019-07-22 | End: 2019-07-22 | Stop reason: HOSPADM

## 2019-07-22 RX ORDER — MIDAZOLAM HYDROCHLORIDE 1 MG/ML
1 INJECTION INTRAMUSCULAR; INTRAVENOUS
Status: DISCONTINUED | OUTPATIENT
Start: 2019-07-22 | End: 2019-07-22 | Stop reason: HOSPADM

## 2019-07-22 RX ORDER — ACETAMINOPHEN 325 MG/1
325 TABLET ORAL EVERY 4 HOURS PRN
Status: DISCONTINUED | OUTPATIENT
Start: 2019-07-22 | End: 2019-07-23 | Stop reason: HOSPADM

## 2019-07-22 RX ORDER — FENTANYL CITRATE 50 UG/ML
INJECTION, SOLUTION INTRAMUSCULAR; INTRAVENOUS AS NEEDED
Status: DISCONTINUED | OUTPATIENT
Start: 2019-07-22 | End: 2019-07-22 | Stop reason: SURG

## 2019-07-22 RX ORDER — DIPHENHYDRAMINE HCL 25 MG
25 CAPSULE ORAL
Status: DISCONTINUED | OUTPATIENT
Start: 2019-07-22 | End: 2019-07-22 | Stop reason: HOSPADM

## 2019-07-22 RX ORDER — MELOXICAM 15 MG/1
15 TABLET ORAL ONCE
Status: COMPLETED | OUTPATIENT
Start: 2019-07-22 | End: 2019-07-22

## 2019-07-22 RX ORDER — ACETAMINOPHEN 325 MG/1
650 TABLET ORAL ONCE AS NEEDED
Status: DISCONTINUED | OUTPATIENT
Start: 2019-07-22 | End: 2019-07-22 | Stop reason: HOSPADM

## 2019-07-22 RX ORDER — HYDRALAZINE HYDROCHLORIDE 20 MG/ML
5 INJECTION INTRAMUSCULAR; INTRAVENOUS
Status: DISCONTINUED | OUTPATIENT
Start: 2019-07-22 | End: 2019-07-22 | Stop reason: HOSPADM

## 2019-07-22 RX ORDER — CEFAZOLIN SODIUM 2 G/100ML
2 INJECTION, SOLUTION INTRAVENOUS ONCE
Status: COMPLETED | OUTPATIENT
Start: 2019-07-22 | End: 2019-07-22

## 2019-07-22 RX ORDER — ONDANSETRON 4 MG/1
4 TABLET, FILM COATED ORAL EVERY 6 HOURS PRN
Status: DISCONTINUED | OUTPATIENT
Start: 2019-07-22 | End: 2019-07-23 | Stop reason: HOSPADM

## 2019-07-22 RX ORDER — ONDANSETRON 2 MG/ML
INJECTION INTRAMUSCULAR; INTRAVENOUS AS NEEDED
Status: DISCONTINUED | OUTPATIENT
Start: 2019-07-22 | End: 2019-07-22 | Stop reason: SURG

## 2019-07-22 RX ORDER — NALOXONE HCL 0.4 MG/ML
0.2 VIAL (ML) INJECTION AS NEEDED
Status: DISCONTINUED | OUTPATIENT
Start: 2019-07-22 | End: 2019-07-22 | Stop reason: HOSPADM

## 2019-07-22 RX ORDER — LABETALOL HYDROCHLORIDE 5 MG/ML
INJECTION, SOLUTION INTRAVENOUS
Status: COMPLETED
Start: 2019-07-22 | End: 2019-07-22

## 2019-07-22 RX ORDER — SODIUM CHLORIDE 0.9 % (FLUSH) 0.9 %
3-10 SYRINGE (ML) INJECTION AS NEEDED
Status: DISCONTINUED | OUTPATIENT
Start: 2019-07-22 | End: 2019-07-22 | Stop reason: HOSPADM

## 2019-07-22 RX ORDER — DEXAMETHASONE SODIUM PHOSPHATE 4 MG/ML
INJECTION, SOLUTION INTRA-ARTICULAR; INTRALESIONAL; INTRAMUSCULAR; INTRAVENOUS; SOFT TISSUE AS NEEDED
Status: DISCONTINUED | OUTPATIENT
Start: 2019-07-22 | End: 2019-07-22 | Stop reason: SURG

## 2019-07-22 RX ORDER — FENTANYL CITRATE 50 UG/ML
50 INJECTION, SOLUTION INTRAMUSCULAR; INTRAVENOUS
Status: DISCONTINUED | OUTPATIENT
Start: 2019-07-22 | End: 2019-07-22 | Stop reason: HOSPADM

## 2019-07-22 RX ORDER — TERAZOSIN 2 MG/1
2 CAPSULE ORAL NIGHTLY
Status: DISCONTINUED | OUTPATIENT
Start: 2019-07-22 | End: 2019-07-23 | Stop reason: HOSPADM

## 2019-07-22 RX ORDER — NEOSTIGMINE METHYLSULFATE 0.5 MG/ML
INJECTION, SOLUTION INTRAVENOUS AS NEEDED
Status: DISCONTINUED | OUTPATIENT
Start: 2019-07-22 | End: 2019-07-22 | Stop reason: SURG

## 2019-07-22 RX ORDER — MIDAZOLAM HYDROCHLORIDE 1 MG/ML
2 INJECTION INTRAMUSCULAR; INTRAVENOUS
Status: DISCONTINUED | OUTPATIENT
Start: 2019-07-22 | End: 2019-07-22 | Stop reason: HOSPADM

## 2019-07-22 RX ORDER — PROPOFOL 10 MG/ML
VIAL (ML) INTRAVENOUS CONTINUOUS PRN
Status: DISCONTINUED | OUTPATIENT
Start: 2019-07-22 | End: 2019-07-22 | Stop reason: SURG

## 2019-07-22 RX ORDER — ROCURONIUM BROMIDE 10 MG/ML
INJECTION, SOLUTION INTRAVENOUS AS NEEDED
Status: DISCONTINUED | OUTPATIENT
Start: 2019-07-22 | End: 2019-07-22 | Stop reason: SURG

## 2019-07-22 RX ORDER — LORAZEPAM 0.5 MG/1
0.5 TABLET ORAL 2 TIMES DAILY PRN
Status: DISCONTINUED | OUTPATIENT
Start: 2019-07-22 | End: 2019-07-23 | Stop reason: HOSPADM

## 2019-07-22 RX ORDER — BUPIVACAINE HYDROCHLORIDE 7.5 MG/ML
INJECTION, SOLUTION EPIDURAL; RETROBULBAR AS NEEDED
Status: DISCONTINUED | OUTPATIENT
Start: 2019-07-22 | End: 2019-07-22 | Stop reason: SURG

## 2019-07-22 RX ORDER — GLYCOPYRROLATE 0.2 MG/ML
INJECTION INTRAMUSCULAR; INTRAVENOUS AS NEEDED
Status: DISCONTINUED | OUTPATIENT
Start: 2019-07-22 | End: 2019-07-22 | Stop reason: SURG

## 2019-07-22 RX ADMIN — PROPOFOL 200 MCG/KG/MIN: 10 INJECTION, EMULSION INTRAVENOUS at 15:03

## 2019-07-22 RX ADMIN — HYDROMORPHONE HYDROCHLORIDE 2 MG: 2 TABLET ORAL at 22:04

## 2019-07-22 RX ADMIN — BUPIVACAINE HYDROCHLORIDE 12 MG: 7.5 INJECTION, SOLUTION EPIDURAL; RETROBULBAR at 14:55

## 2019-07-22 RX ADMIN — HYDROCODONE BITARTRATE AND ACETAMINOPHEN 1 TABLET: 7.5; 325 TABLET ORAL at 18:11

## 2019-07-22 RX ADMIN — HYDROMORPHONE HYDROCHLORIDE 0.5 MG: 2 INJECTION INTRAMUSCULAR; INTRAVENOUS; SUBCUTANEOUS at 17:05

## 2019-07-22 RX ADMIN — FENTANYL CITRATE 50 MCG: 50 INJECTION INTRAMUSCULAR; INTRAVENOUS at 17:16

## 2019-07-22 RX ADMIN — ACETAMINOPHEN 1000 MG: 10 INJECTION, SOLUTION INTRAVENOUS at 15:20

## 2019-07-22 RX ADMIN — SODIUM CHLORIDE, POTASSIUM CHLORIDE, SODIUM LACTATE AND CALCIUM CHLORIDE 500 ML: 600; 310; 30; 20 INJECTION, SOLUTION INTRAVENOUS at 11:27

## 2019-07-22 RX ADMIN — HYDROMORPHONE HYDROCHLORIDE 0.5 MG: 2 INJECTION INTRAMUSCULAR; INTRAVENOUS; SUBCUTANEOUS at 15:57

## 2019-07-22 RX ADMIN — FENTANYL CITRATE 50 MCG: 50 INJECTION INTRAMUSCULAR; INTRAVENOUS at 15:36

## 2019-07-22 RX ADMIN — LABETALOL 20 MG/4 ML (5 MG/ML) INTRAVENOUS SYRINGE 5 MG: at 17:10

## 2019-07-22 RX ADMIN — LABETALOL 20 MG/4 ML (5 MG/ML) INTRAVENOUS SYRINGE 5 MG: at 17:16

## 2019-07-22 RX ADMIN — FENTANYL CITRATE 50 MCG: 50 INJECTION INTRAMUSCULAR; INTRAVENOUS at 15:48

## 2019-07-22 RX ADMIN — ACETAMINOPHEN 325 MG: 325 TABLET, FILM COATED ORAL at 23:31

## 2019-07-22 RX ADMIN — ONDANSETRON 4 MG: 2 INJECTION INTRAMUSCULAR; INTRAVENOUS at 15:53

## 2019-07-22 RX ADMIN — DEXAMETHASONE SODIUM PHOSPHATE 8 MG: 4 INJECTION INTRA-ARTICULAR; INTRALESIONAL; INTRAMUSCULAR; INTRAVENOUS; SOFT TISSUE at 15:14

## 2019-07-22 RX ADMIN — HYDROMORPHONE HYDROCHLORIDE 0.5 MG: 2 INJECTION INTRAMUSCULAR; INTRAVENOUS; SUBCUTANEOUS at 16:56

## 2019-07-22 RX ADMIN — ROCURONIUM BROMIDE 10 MG: 10 INJECTION INTRAVENOUS at 15:53

## 2019-07-22 RX ADMIN — LORAZEPAM 0.5 MG: 0.5 TABLET ORAL at 23:31

## 2019-07-22 RX ADMIN — GLYCOPYRROLATE 0.4 MG: 0.2 INJECTION INTRAMUSCULAR; INTRAVENOUS at 16:36

## 2019-07-22 RX ADMIN — HYDROMORPHONE HYDROCHLORIDE 0.5 MG: 1 INJECTION, SOLUTION INTRAMUSCULAR; INTRAVENOUS; SUBCUTANEOUS at 18:20

## 2019-07-22 RX ADMIN — MIDAZOLAM 2 MG: 1 INJECTION INTRAMUSCULAR; INTRAVENOUS at 13:43

## 2019-07-22 RX ADMIN — VANCOMYCIN HYDROCHLORIDE 1250 MG: 10 INJECTION, POWDER, LYOPHILIZED, FOR SOLUTION INTRAVENOUS at 23:32

## 2019-07-22 RX ADMIN — PREGABALIN 150 MG: 75 CAPSULE ORAL at 11:37

## 2019-07-22 RX ADMIN — NEOSTIGMINE METHYLSULFATE 3 MG: 0.5 INJECTION, SOLUTION INTRAVENOUS at 16:36

## 2019-07-22 RX ADMIN — FAMOTIDINE 20 MG: 10 INJECTION INTRAVENOUS at 12:27

## 2019-07-22 RX ADMIN — LIDOCAINE HYDROCHLORIDE 60 MG: 20 INJECTION, SOLUTION INFILTRATION; PERINEURAL at 15:03

## 2019-07-22 RX ADMIN — DOCUSATE SODIUM 100 MG: 100 CAPSULE, LIQUID FILLED ORAL at 22:05

## 2019-07-22 RX ADMIN — FENTANYL CITRATE 50 MCG: 50 INJECTION INTRAMUSCULAR; INTRAVENOUS at 18:16

## 2019-07-22 RX ADMIN — POLYETHYLENE GLYCOL 3350 17 G: 17 POWDER, FOR SOLUTION ORAL at 22:05

## 2019-07-22 RX ADMIN — MIDAZOLAM 2 MG: 1 INJECTION INTRAMUSCULAR; INTRAVENOUS at 12:27

## 2019-07-22 RX ADMIN — TERAZOSIN HYDROCHLORIDE 2 MG: 2 CAPSULE ORAL at 22:05

## 2019-07-22 RX ADMIN — ROCURONIUM BROMIDE 10 MG: 10 INJECTION INTRAVENOUS at 15:42

## 2019-07-22 RX ADMIN — MELOXICAM 15 MG: 15 TABLET ORAL at 11:37

## 2019-07-22 RX ADMIN — CEFAZOLIN SODIUM 2 G: 2 INJECTION, SOLUTION INTRAVENOUS at 15:08

## 2019-07-22 RX ADMIN — TRANEXAMIC ACID 1000 MG: 100 INJECTION, SOLUTION INTRAVENOUS at 16:18

## 2019-07-22 RX ADMIN — SODIUM CHLORIDE, POTASSIUM CHLORIDE, SODIUM LACTATE AND CALCIUM CHLORIDE: 600; 310; 30; 20 INJECTION, SOLUTION INTRAVENOUS at 15:24

## 2019-07-22 RX ADMIN — FENTANYL CITRATE 50 MCG: 50 INJECTION INTRAMUSCULAR; INTRAVENOUS at 15:25

## 2019-07-22 RX ADMIN — SODIUM CHLORIDE, POTASSIUM CHLORIDE, SODIUM LACTATE AND CALCIUM CHLORIDE 9 ML/HR: 600; 310; 30; 20 INJECTION, SOLUTION INTRAVENOUS at 12:28

## 2019-07-22 RX ADMIN — FENTANYL CITRATE 50 MCG: 50 INJECTION INTRAMUSCULAR; INTRAVENOUS at 15:39

## 2019-07-22 RX ADMIN — HYDRALAZINE HYDROCHLORIDE 5 MG: 20 INJECTION INTRAMUSCULAR; INTRAVENOUS at 17:19

## 2019-07-22 RX ADMIN — HYDROMORPHONE HYDROCHLORIDE 0.5 MG: 2 INJECTION INTRAMUSCULAR; INTRAVENOUS; SUBCUTANEOUS at 16:50

## 2019-07-22 RX ADMIN — SODIUM CHLORIDE 1250 MG: 9 INJECTION, SOLUTION INTRAVENOUS at 11:27

## 2019-07-22 NOTE — ANESTHESIA POSTPROCEDURE EVALUATION
"Patient: Clarissa Matos    Procedure Summary     Date:  07/22/19 Room / Location:  [facility] OR [address] [place] [address] MAIN OR    Anesthesia Start:  1446 Anesthesia Stop:  1700    Procedure:  LEFT TOTAL KNEE ARTHROPLASTY (Left Knee) Diagnosis:       Primary osteoarthritis of left knee      (Primary osteoarthritis of left knee [M17.12])    Surgeon:  Sam Bustamante MD Provider:  Tanner Dumas MD    Anesthesia Type:  general ASA Status:  3          Anesthesia Type: general  Last vitals  BP   135/80 (07/22/19 1918)   Temp   36.7 °C (98.1 °F) (07/22/19 1916)   Pulse   74 (07/22/19 1918)   Resp   16 (07/22/19 1918)     SpO2   97 % (07/22/19 1918)     Post Anesthesia Care and Evaluation    Patient location during evaluation: bedside  Patient participation: complete - patient participated  Level of consciousness: awake  Pain management: adequate  Airway patency: patent  Anesthetic complications: No anesthetic complications    Cardiovascular status: acceptable  Respiratory status: acceptable  Hydration status: acceptable    Comments: */80 (BP Location: Left arm, Patient Position: Lying)   Pulse 74   Temp 36.7 °C (98.1 °F) (Oral)   Resp 16   Ht 160 cm (63\")   Wt 83.3 kg (183 lb 11.2 oz)   LMP  (LMP Unknown)   SpO2 97%   BMI 32.54 kg/m²         "

## 2019-07-22 NOTE — OP NOTE
Name: Clarissa Matos  YOB: 1950    DATE OF SURGERY: 7/22/2019    PREOPERATIVE DIAGNOSIS: Left knee end-stage osteoarthritis    POSTOPERATIVE DIAGNOSIS: Left knee end-stage osteoarthritis    PROCEDURE PERFORMED: Left total knee replacement    SURGEON: Sam Bustamante M.D.    ASSISTANT: SIDDHARTH HOFFMAN    IMPLANTS: [employer]:     Implant Name Type Inv. Item Serial No.  Lot No. LRB No. Used   CMT BONE PALACOS R HI/VISC 1X40 - [id number] Implant CMT BONE PALACOS R HI/VISC 1X40  [facility] [id number] Left 1   PAT GEN2 BICONVEX [id number] - [id number] Implant PAT GEN2 BICONVEX [id number]  [employer] [id number] Left 1   COMP FEM LEGION OXINIUM CR SZ3 LT - [id number] Implant COMP FEM LEGION OXINIUM CR SZ3 LT  [employer] [id number] Left 1   BASE TIB/KN GEN2 NONPOR TI SZ3 LT - [id number] Implant BASE TIB/KN GEN2 NONPOR TI SZ3 LT  [employer] [id number] Left 1   INSRT KN CR FLX DEEP GEN2 SZ3 4 9MM - [id number] Implant INSRT KN CR FLX DEEP GEN2 SZ3 4 9MM  [employer] [id number] Left 1       Estimated Blood Loss: 200cc  Specimens : none  Complications: none    DESCRIPTION OF PROCEDURE: The patient was taken to the operating room and placed in the supine position. A sequential compression device was carefully placed on the non-operative leg. Preoperative antibiotics were administered. Surgical time out was performed. After adequate induction of anesthesia, the leg was prepped and draped in the usual sterile fashion, exsanguinated with an Esmarch bandage and the tourniquet inflated to 250 mmHg. A midline incision was performed followed by a medial parapatellar arthrotomy. The patella was subluxed laterally.  A portion of the fat pad, ACL, and anterior horns of the meniscus were excised. The drill hole was placed in the distal femur and the canal was the irrigated and suctioned. The IM monique was placed and a 5 degree distal valgus cut was performed on the femur. The femur was  then sized with a sizing guide. The femoral cutting block was placed and all femoral cuts were performed. The proximal tibia was exposed. We used the extramedullary tibial cutting guide set for removal of 9mm of bone off the high side. The tibial cut was performed. The posterior horns of the menisci were excised. The posterior osteophytes were removed. Flexion extension blocks were then used to balance the knee. The tibial cut surface was then sized with the sizing templates and the tibial and femoral trial were then placed. The knee was placed in full extension and then the tibial tray rotation was then matched to the femoral rotation and marked.    Attention was then placed to the patella. The patella was noted to track centrally through range of motion. The patella was then sized with the trials. The thickness of the patella was then measured. The patella was resurfaced and the surrounding osteophytes were removed. The preoperative thickness was reproduced. The patella tracked centrally through range of motion.   At this point all trial components were removed, the knee was copiously irrigated with pulsed lavage, and the knee was injected with anesthetic cocktail solution. The cut surfaces were then dried with clean lap sponges, and the components were cemented tibia, followed by femur, then patella. The knee was held in full extension and all excess cement was removed. The knee was held still until the cement had completely hardened. We then placed the trial polyethylene spacer which resulted in full extension and excellent flexion-extension balance. We placed the final polyethylene spacer.   The knee was then copiously irrigated. The tourniquet was then released. There was excellent hemostasis. We placed a one-eighth inch Hemovac drain. We closed the knee in multiple layers in standard fashion. Sterile dressing were applied. At the end of the case, the sponge and needle counts were reported as being correct.  There were no known complications. The patient was then transported to the recovery room.      Sam Bustamante M.D.

## 2019-07-22 NOTE — ANESTHESIA PREPROCEDURE EVALUATION
Anesthesia Evaluation     Patient summary reviewed   history of anesthetic complications (previously x 1):               Airway   Mallampati: II  Possible difficult intubation  Dental      Pulmonary    Cardiovascular     Rhythm: regular    (+) hypertension,       Neuro/Psych  GI/Hepatic/Renal/Endo      Musculoskeletal     Abdominal    Substance History      OB/GYN          Other                        Anesthesia Plan    ASA 3     spinal     Anesthetic plan, all risks, benefits, and alternatives have been provided, discussed and informed consent has been obtained with: patient.  Use of blood products discussed with patient .

## 2019-07-22 NOTE — ANESTHESIA PROCEDURE NOTES
Intrathecal Block      Performed By  Anesthesiologist: Tanner Dumas MD  CRNA: Nya Heredia CRNA  Preanesthetic Checklist  Completed: patient identified, site marked, surgical consent, pre-op evaluation, timeout performed, IV checked, risks and benefits discussed and monitors and equipment checked  Intrathecal Block Prep:  Pt Position:sitting  Sterile Tech:sterile barrier, gloves and cap  Prep:chloraprep  Monitoring:blood pressure monitoring, continuous pulse oximetry and EKG  Intrathecal Block Procedure:  Approach:midline  Guidance:landmark technique  Location:L3-L4  Needle Type:Quincke  Needle Gauge:25 G  Placement of Needle Event: cerebrospinal fluid and failed spinal  Fluid Appearance:clear  Post Assessment:  Patient Tolerance:patient tolerated the procedure well with no apparent complications  Complications:no

## 2019-07-23 VITALS
RESPIRATION RATE: 16 BRPM | SYSTOLIC BLOOD PRESSURE: 124 MMHG | WEIGHT: 183.7 LBS | DIASTOLIC BLOOD PRESSURE: 67 MMHG | OXYGEN SATURATION: 96 % | HEIGHT: 63 IN | BODY MASS INDEX: 32.55 KG/M2 | TEMPERATURE: 97 F | HEART RATE: 78 BPM

## 2019-07-23 LAB
HCT VFR BLD AUTO: 39.6 % (ref 34–46.6)
HGB BLD-MCNC: 13.2 G/DL (ref 12–15.9)

## 2019-07-23 PROCEDURE — 85018 HEMOGLOBIN: CPT | Performed by: NURSE PRACTITIONER

## 2019-07-23 PROCEDURE — 85014 HEMATOCRIT: CPT | Performed by: NURSE PRACTITIONER

## 2019-07-23 PROCEDURE — 97162 PT EVAL MOD COMPLEX 30 MIN: CPT

## 2019-07-23 PROCEDURE — 97110 THERAPEUTIC EXERCISES: CPT

## 2019-07-23 RX ORDER — ONDANSETRON 4 MG/1
4 TABLET, FILM COATED ORAL EVERY 6 HOURS PRN
Qty: 10 TABLET | Refills: 0 | Status: SHIPPED | OUTPATIENT
Start: 2019-07-23 | End: 2021-07-07

## 2019-07-23 RX ORDER — ACETAMINOPHEN 325 MG/1
325 TABLET ORAL EVERY 4 HOURS
Qty: 75 TABLET | Refills: 0 | Status: SHIPPED | OUTPATIENT
Start: 2019-07-23 | End: 2019-08-05

## 2019-07-23 RX ORDER — HYDROMORPHONE HYDROCHLORIDE 2 MG/1
2 TABLET ORAL EVERY 4 HOURS PRN
Qty: 50 TABLET | Refills: 0 | Status: SHIPPED | OUTPATIENT
Start: 2019-07-23 | End: 2019-07-31 | Stop reason: SDUPTHER

## 2019-07-23 RX ADMIN — MUPIROCIN 1 APPLICATION: 20 OINTMENT TOPICAL at 08:08

## 2019-07-23 RX ADMIN — DULOXETINE HYDROCHLORIDE 60 MG: 60 CAPSULE, DELAYED RELEASE ORAL at 06:03

## 2019-07-23 RX ADMIN — POLYETHYLENE GLYCOL 3350 17 G: 17 POWDER, FOR SOLUTION ORAL at 08:09

## 2019-07-23 RX ADMIN — Medication 3 MG: at 01:47

## 2019-07-23 RX ADMIN — ASPIRIN 325 MG: 325 TABLET, COATED ORAL at 08:08

## 2019-07-23 RX ADMIN — ACETAMINOPHEN 325 MG: 325 TABLET, FILM COATED ORAL at 08:17

## 2019-07-23 RX ADMIN — HYDROMORPHONE HYDROCHLORIDE 2 MG: 2 TABLET ORAL at 05:00

## 2019-07-23 RX ADMIN — HYDROMORPHONE HYDROCHLORIDE 2 MG: 2 TABLET ORAL at 11:27

## 2019-07-23 RX ADMIN — HYDROMORPHONE HYDROCHLORIDE 2 MG: 2 TABLET ORAL at 01:47

## 2019-07-23 RX ADMIN — DOCUSATE SODIUM 100 MG: 100 CAPSULE, LIQUID FILLED ORAL at 08:08

## 2019-07-23 RX ADMIN — HYDROMORPHONE HYDROCHLORIDE 2 MG: 2 TABLET ORAL at 08:17

## 2019-07-23 RX ADMIN — PANTOPRAZOLE SODIUM 40 MG: 40 TABLET, DELAYED RELEASE ORAL at 06:03

## 2019-07-23 NOTE — THERAPY DISCHARGE NOTE
Acute Care - Physical Therapy Initial Eval/Discharge  [facility]     Patient Name: Clarissa Matos  : 1950  MRN: [id number]  Today's Date: 2019   Onset of Illness/Injury or Date of Surgery: (P) 19     Referring Physician: enoch      Admit Date: 2019    Visit Dx:    ICD-10-CM ICD-9-CM   1. Difficulty walking R26.2 719.7   2. Primary osteoarthritis of left knee M17.12 715.16     Patient Active Problem List   Diagnosis   • Cholecystitis   • Fibromyalgia   • Essential hypertension   • Closed fracture of upper end of humerus   • Rotator cuff arthropathy   • Primary osteoarthritis of left knee     Past Medical History:   Diagnosis Date   • Anxiety    • Arthritis    • Depression    • Fibromyalgia     DX    • GERD (gastroesophageal reflux disease)    • Hard to intubate     STATES TOOK 30 MINUTES TO BE INTUBATED   • Headache    • Hiatal hernia    • Hypertension    • Irregular heart rate     PVC'S   • Limited joint range of motion     LT KNEE     Past Surgical History:   Procedure Laterality Date   • BREAST BIOPSY Right    • CATARACT EXTRACTION W/ INTRAOCULAR LENS IMPLANT Bilateral    •  SECTION      x 2   • CHOLECYSTECTOMY WITH INTRAOPERATIVE CHOLANGIOGRAM N/A 2017    Procedure: CHOLECYSTECTOMY LAPAROSCOPIC INTRAOPERATIVE CHOLANGIOGRAM;  Surgeon: Demi Madden MD;  Location: [facility];  Service:    • HYSTERECTOMY     • REPLACEMENT TOTAL KNEE Right    • ROTATOR CUFF REPAIR Right    • TOTAL KNEE ARTHROPLASTY Left 2019    Procedure: LEFT TOTAL KNEE ARTHROPLASTY;  Surgeon: Sam Bustamante MD;  Location: [facility];  Service: Orthopedics   • TOTAL SHOULDER ARTHROPLASTY W/ DISTAL CLAVICLE EXCISION Left 2018    Procedure: Reverse Total Shoulder Arthroplsty for fracture;  Surgeon: Louis Prasad MD;  Location: [facility];  Service: Orthopedics   • TUBAL ABDOMINAL LIGATION            PT ASSESSMENT (last 12 hours)      Physical Therapy Evaluation      Row Name 07/23/19 1023          PT Evaluation Time/Intention    Subjective Information  no complaints  (Pended)   -BK     Document Type  discharge evaluation/summary  (Pended)   -BK     Mode of Treatment  physical therapy  (Pended)   -BK     Patient Effort  good  (Pended)   -BK     Symptoms Noted During/After Treatment  none  (Pended)   -BK     Row Name 07/23/19 1023          General Information    Onset of Illness/Injury or Date of Surgery  07/22/19  (Pended)   -BK     Referring Physician  enoch  -AR     Patient Observations  alert;cooperative;agree to therapy  (Pended)   -BK     Patient/Family Observations  pt sitting in chair, no acute distress noted at rest  (Pended)   -BK     Prior Level of Function  independent:  (Pended)   -BK     Equipment Currently Used at Home  none  (Pended)   -BK     Existing Precautions/Restrictions  fall  (Pended)   -BK     Barriers to Rehab  none identified  (Pended)   -BK     Row Name 07/23/19 1023          Relationship/Environment    Lives With  spouse  (Pended)   -BK     Row Name 07/23/19 1023          Resource/Environmental Concerns    Current Living Arrangements  home/apartment/condo  (Pended)   -BK     Row Name 07/23/19 1023          Living Environment    Living Environment Comment  2 steps to enter w/ HR  -AR     Row Name 07/23/19 1023          Cognitive Assessment/Interventions    Additional Documentation  Cognitive Assessment/Intervention (Group)  (Pended)   -BK     Row Name 07/23/19 1023          Cognitive Assessment/Intervention- PT/OT    Orientation Status (Cognition)  oriented x 4  (Pended)   -BK     Follows Commands (Cognition)  WFL  (Pended)   -BK     Personal Safety Interventions  fall prevention program maintained;gait belt;nonskid shoes/slippers when out of bed  (Pended)   -BK     Row Name 07/23/19 1023          Bed Mobility Assessment/Treatment    Bed Mobility Assessment/Treatment  supine-sit;sit-supine  (Pended)   -BK     Supine-Sit [place] (Bed Mobility)   not tested  (Pended)  sitting in chair  -BK     Sit-Supine [place] (Bed Mobility)  verbal cues;supervision  (Pended)   -BK     Row Name 07/23/19 1023          Transfer Assessment/Treatment    Transfer Assessment/Treatment  sit-stand transfer;stand-sit transfer  (Pended)   -BK     Sit-Stand [place] (Transfers)  contact guard;verbal cues  (Pended)   -BK     Stand-Sit [place] (Transfers)  verbal cues;contact guard  (Pended)   -BK     Row Name 07/23/19 1023          Sit-Stand Transfer    Assistive Device (Sit-Stand Transfers)  walker, front-wheeled  (Pended)   -BK     Row Name 07/23/19 1023          Stand-Sit Transfer    Assistive Device (Stand-Sit Transfers)  walker, front-wheeled  (Pended)   -BK     Row Name 07/23/19 1023          Gait/Stairs Assessment/Training    [place] Level (Gait)  supervision  (Pended)   -BK     Assistive Device (Gait)  walker, front-wheeled  (Pended)   -BK     Distance in Feet (Gait)  80  (Pended)   -BK     Pattern (Gait)  step-through;step-to  (Pended)  progressed to step through after sequencing comprehension  -BK     Deviations/Abnormal Patterns (Gait)  antalgic;andrea decreased;gait speed decreased  (Pended)   -BK     Handrail Location (Stairs)  left side (ascending)  (Pended)   -BK     Number of Steps (Stairs)  4  (Pended)   -BK     Ascending Technique (Stairs)  step-to-step  (Pended)   -BK     Descending Technique (Stairs)  step-to-step  (Pended)   -BK     Row Name 07/23/19 1023          General ROM    GENERAL ROM COMMENTS  L knee ROM limited post op  (Pended)   -BK     Row Name 07/23/19 1023          MMT (Manual Muscle Testing)    General MMT Comments  L knee strength decreased post op  (Pended)   -BK     Row Name 07/23/19 1023          Motor Assessment/Intervention    Additional Documentation  Balance (Group);Therapeutic Exercise (Group)  (Pended)   -BK     Row Name 07/23/19 1023          Therapeutic Exercise    Therapeutic Exercise  --  (Pended)  10 reps L TKA  protocol  -BK     Additional Documentation  Therapeutic Exercise (Row)  (Pended)   -BK     Row Name 07/23/19 1023          Balance    Balance  static standing balance;dynamic standing balance  (Pended)   -BK     Row Name 07/23/19 1023          Static Standing Balance    Level of [place] (Supported Standing, Static Balance)  supervision  (Pended)   -BK     Assistive Device Utilized (Supported Standing, Static Balance)  walker, rolling  (Pended)   -BK     Row Name 07/23/19 1023          Dynamic Standing Balance    Level of [place], Reaches Outside Midline (Standing, Dynamic Balance)  contact guard assist  (Pended)   -BK     Assistive Device Utilized (Supported Standing, Dynamic Balance)  walker, rolling  (Pended)   -BK     Row Name 07/23/19 1023          Pain Assessment    Additional Documentation  Pain Scale: Numbers Pre/Post-Treatment (Group)  (Pended)   -BK     Row Name 07/23/19 1023          Pain Scale: Numbers Pre/Post-Treatment    Pain Scale: Numbers, Post-Treatment  8/10  (Pended)   -BK     Pain Location - Side  Left  (Pended)   -BK     Pain Location - Orientation  proximal  (Pended)   -BK     Pain Location  knee  (Pended)   -BK     Pain Intervention(s)  Cold applied;Repositioned  (Pended)   -BK     Row Name             Wound 07/22/19 1550 Left knee incision    Wound - Properties Group Date first assessed: 07/22/19  -RM Time first assessed: 1550  -RM Side: Left  -RM Location: knee  -RM Type: incision  -RM    Row Name 07/23/19 1023          Plan of Care Review    Plan of Care Reviewed With  patient  (Pended)   -BK     Row Name 07/23/19 1023          Physical Therapy Clinical Impression    Patient/Family Goals Statement (PT Clinical Impression)  to DC home  (Pended)   -BK     Criteria for Skilled Interventions Met (PT Clinical Impression)  treatment indicated  (Pended)   -BK     Impairments Found (describe specific impairments)  gait, locomotion, and balance;ROM;muscle performance  (Pended)   -BK      Row Name 07/23/19 1023          Positioning and Restraints    Pre-Treatment Position  sitting in chair/recliner  (Pended)   -     Post Treatment Position  bed  (Pended)   -BK     In Bed  supine;call light within reach;encouraged to call for assist  (Pended)   -       User Key  (r) = Recorded By, (t) = Taken By, (c) = Cosigned By    Initials Name Provider Type     Madhavi Bullock, RN Registered Nurse    Nia Byers, PT Physical Therapist    Doug Castillo, PT Student PT Student          Physical Therapy Education     Title: PT OT SLP Therapies (Done)     Topic: Physical Therapy (Done)     Point: Mobility training (Done)     Learning Progress Summary           Patient Acceptance, E, VU by  at 7/23/2019 10:29 AM                   Point: Home exercise program (Done)     Learning Progress Summary           Patient Acceptance, E, VU by  at 7/23/2019 10:29 AM                   Point: Body mechanics (Done)     Learning Progress Summary           Patient Acceptance, E, VU by  at 7/23/2019 10:29 AM                   Point: Precautions (Done)     Learning Progress Summary           Patient Acceptance, E, VU by  at 7/23/2019 10:29 AM                               User Key     Initials Effective Dates Name Provider Type Discipline     07/05/19 -  Doug Mcclure, PT Student PT Student PT                PT Recommendation and Plan  Therapy Frequency (PT Clinical Impression): (P) evaluation only  Plan of Care Reviewed With: (P) patient  Outcome Summary: (P) Pt is s/p L TKA on 7/21. Pt presents with decreased L knee ROM and strength post op. Pt able to ambulate using step to then progressing to step through gait mechanics after sequencing comprehension. Pt progressed from contact guard to standby assist during gait. Pt educated on proper stair navigation and verbalized understanding. Pt to DC with home health and OP services. Pt no longer needs inpatient PT.    Outcome Measures     Row Name  07/23/19 1000             How much help from another person do you currently need...    Turning from your back to your side while in flat bed without using bedrails?  4  (Pended)   -BK      Moving from lying on back to sitting on the side of a flat bed without bedrails?  4  (Pended)   -BK      Moving to and from a bed to a chair (including a wheelchair)?  3  (Pended)   -BK      Standing up from a chair using your arms (e.g., wheelchair, bedside chair)?  4  (Pended)   -BK      Climbing 3-5 steps with a railing?  3  (Pended)   -BK      To walk in hospital room?  4  (Pended)   -BK      AM-PAC 6 Clicks Score (PT)  22  (Pended)   -AR (r) BK (t)         Functional Assessment    Outcome Measure Options  AM-PAC 6 Clicks Basic Mobility (PT)  (Pended)   -BK        User Key  (r) = Recorded By, (t) = Taken By, (c) = Cosigned By    Initials Name Provider Type    Nia Byers, PT Physical Therapist    Doug Castillo, PT Student PT Student           Time Calculation:   PT Charges     Row Name 07/23/19 1035             Time Calculation    Start Time  1000  (Pended)   -BK      Stop Time  1023  (Pended)   -BK      Time Calculation (min)  23 min  (Pended)   -BK      PT Received On  07/23/19  (Pended)   -BK         Time Calculation- PT    Total Timed Code Minutes- PT  20 minute(s)  (Pended)   -BK        User Key  (r) = Recorded By, (t) = Taken By, (c) = Cosigned By    Initials Name Provider Type    Doug Castillo, PT Student PT Student        Therapy Charges for Today     Code Description Service Date Service Provider Modifiers Qty    48327026495 HC PT EVAL MOD COMPLEXITY 2 7/23/2019 Majo Mcclure., PT Student GP 1    17037425984 HC PT THER PROC EA 15 MIN 7/23/2019 Doug Mcclure, PT Student GP 1          PT G-Codes  Outcome Measure Options: (P) AM-PAC 6 Clicks Basic Mobility (PT)  AM-PAC 6 Clicks Score (PT): (P) 22         Kami Kasper, PT Student  7/23/2019

## 2019-07-23 NOTE — PLAN OF CARE
Problem: Patient Care Overview  Goal: Plan of Care Review  Outcome: Outcome(s) achieved Date Met: 07/23/19 07/23/19 0551 07/23/19 1031 07/23/19 1407   Coping/Psychosocial   Plan of Care Reviewed With --  patient --    Plan of Care Review   Progress no change --  --    OTHER   Outcome Summary --  --  POD# 1 of L TKA. A&Ox4. C/o pain and tolerating oral pain medication well. Up with assistance. Voiding on her own. Good sensation and motion to ble. Dressing to left knee c/d/i. EJFFY dressing in place with green blinking light.Hemovac discontinued with tip intact. Tolerated it well. Patient being discharged to home today with C and family care. Discharge instructions given and verbalized understanding. VSS. Belongings with patient.      Goal: Individualization and Mutuality  Outcome: Outcome(s) achieved Date Met: 07/23/19    Goal: Discharge Needs Assessment  Outcome: Outcome(s) achieved Date Met: 07/23/19    Goal: Interprofessional Rounds/Family Conf  Outcome: Outcome(s) achieved Date Met: 07/23/19      Problem: Pain, Chronic (Adult)  Goal: Identify Related Risk Factors and Signs and Symptoms  Outcome: Outcome(s) achieved Date Met: 07/23/19    Goal: Acceptable Pain/Comfort Level and Functional Ability  Outcome: Outcome(s) achieved Date Met: 07/23/19      Problem: Fall Risk (Adult)  Goal: Identify Related Risk Factors and Signs and Symptoms  Outcome: Outcome(s) achieved Date Met: 07/23/19    Goal: Absence of Fall  Outcome: Outcome(s) achieved Date Met: 07/23/19      Problem: Knee Arthroplasty (Total, Partial) (Adult)  Goal: Signs and Symptoms of Listed Potential Problems Will be Absent, Minimized or Managed (Knee Arthroplasty)  Outcome: Outcome(s) achieved Date Met: 07/23/19    Goal: Anesthesia/Sedation Recovery  Outcome: Outcome(s) achieved Date Met: 07/23/19

## 2019-07-23 NOTE — PROGRESS NOTES
Continued Stay Note  [facility]     Patient Name: Clarissa Matos  MRN: [id number]  Today's Date: 7/23/2019    Admit Date: 7/22/2019    Discharge Plan     Row Name 07/23/19 1522       Plan    Plan  [facility]    Patient/Family in Agreement with Plan  yes    Plan Comments  Spoke with pt, verified correct information on facesheet and explained the role of CCP. Pt would like to d/c home with [facility], referral given to Carri with [facility] who states they are able to accept. Plan will be to d/c home with [facility] and family support. No other needs identified.     Final Discharge Disposition Code  06 - home with home health care    Final Note  Pt d/c'ed home with [facility].        Discharge Codes    No documentation.       Expected Discharge Date and Time     Expected Discharge Date Expected Discharge Time    Jul 23, 2019             Martha Morales RN

## 2019-07-23 NOTE — DISCHARGE SUMMARY
Patient Name: Clarissa Matos  Patient YOB: 1950    Date of Admission:  7/22/2019  Date of Discharge:  7/23/2019  Discharge Diagnosis: LEFT TOTAL KNEE ARTHROPLASTY  Presenting Problem/History of Present Illness: Primary osteoarthritis of left knee [M17.12]  Primary osteoarthritis of left knee [M17.12]  Admitting Physician: Dr Sam Bustamante  Consults:   Consults     No orders found for last 30 day(s).          DETAILS OF HOSPITAL STAY:  Patient is a 68 y.o. female was admitted to the floor following the above procedure and underwent an uncomplicated hospital stay.  Patient did well with physical therapy and was ambulating well without problems.  On the day of discharge the wound was clean, dry and intact and calf was soft and non tender and Homans sign was negative.  Patient was tolerating  without problems.  Patient will be discharged home.    Condition on Discharge:  Stable    Vital Signs  Temp:  [97 °F (36.1 °C)-98.4 °F (36.9 °C)] 97 °F (36.1 °C)  Heart Rate:  [60-88] 78  Resp:  [14-16] 16  BP: (115-193)/() 124/67    LABS:      Admission on 07/22/2019   Component Date Value Ref Range Status   • Hemoglobin 07/23/2019 13.2  12.0 - 15.9 g/dL Final   • Hematocrit 07/23/2019 39.6  34.0 - 46.6 % Final       No results found.    Discharge Medications     Discharge Medications      New Medications      Instructions Start Date   aspirin 325 MG EC tablet   325 mg, Oral, Every 12 Hours Scheduled      HYDROmorphone 2 MG tablet  Commonly known as:  DILAUDID   1 tab po q 4 hr prn      ondansetron 4 MG tablet  Commonly known as:  ZOFRAN   4 mg, Oral, Every 6 Hours PRN      polyethylene glycol pack packet  Commonly known as:  MIRALAX   17 g, Oral, 2 Times Daily         Changes to Medications      Instructions Start Date   acetaminophen 325 MG tablet  Commonly known as:  TYLENOL  What changed:  Another medication with the same name was added. Make sure you understand how and when to take each.   650 mg, Oral,  Every 4 Hours PRN      acetaminophen 325 MG tablet  Commonly known as:  TYLENOL  What changed:  You were already taking a medication with the same name, and this prescription was added. Make sure you understand how and when to take each.   325 mg, Oral, Every 4 Hours      docusate sodium 100 MG capsule  What changed:    · when to take this  · reasons to take this   100 mg, Oral, 2 Times Daily         Continue These Medications      Instructions Start Date   aspirin-acetaminophen-caffeine 250-250-65 MG per tablet  Commonly known as:  EXCEDRIN MIGRAINE   1 tablet, Oral, Every 6 Hours PRN, May resume on 07/07/18.      CLARITIN 10 MG tablet  Generic drug:  loratadine   10 mg, Oral, Every Morning      doxazosin 2 MG tablet  Commonly known as:  CARDURA   2 mg, Oral, Nightly      DULoxetine 60 MG capsule  Commonly known as:  CYMBALTA   60 mg, Oral, Every Morning      LORazepam 0.5 MG tablet  Commonly known as:  ATIVAN   0.5 mg, Oral, 2 Times Daily PRN      losartan-hydrochlorothiazide 50-12.5 MG per tablet  Commonly known as:  HYZAAR   1 tablet, Oral, Every Morning      MUCINEX ALLERGY PO   1 tablet, Oral, As Needed      omeprazole 20 MG capsule  Commonly known as:  priLOSEC   20 mg, Oral, Every Morning      vitamin D3 5000 units capsule capsule   5,000 Units, Oral, Nightly      zolpidem 10 MG tablet  Commonly known as:  AMBIEN   10 mg, Oral, Nightly PRN         Stop These Medications    calcium carbonate 600 MG tablet  Commonly known as:  OS-CRESCENCIO     Chlorhexidine Gluconate Cloth 2 % pads     diclofenac 1.3 % patch patch  Commonly known as:  FLECTOR     Echinacea 500 MG capsule     HYDROcodone-acetaminophen  MG per tablet  Commonly known as:  NORCO     magnesium 30 MG tablet     mupirocin 2 % nasal ointment  Commonly known as:  BACTROBAN            Activity at Discharge:     Discharge Instructions: Patient is to continue with physical therapy exercises daily and continue working with the physical therapist as  ordered. Patient may weight bear as tolerated. Apply ice regularly. Patient may ice for long periods of time as long as ice is not directly on the skin. Patient instructed on frequent calf pumping exercises.  Patient also instructed on incentive spirometer during hospitalization and encouraged to continue to use at home regularly.    Dressing: The dressing is designed to be left in place until you return to the office in 2 weeks.  The suction unit should stop functioning at 7 days and the green light will switch to yellow.  At that point the suction unit and tubing can be disconnected at the port closest to the dressing.  The suction unit and tubing may be discarded.  You may shower immediately upon return home, you will need to turn the pump off by depressing the orange button once and then you may disconnect the pump and tubing at the connection port.  After showering, shake off the excess water and reattach the tubing.  Restart the pump by depressing the orange button one time and you will notice the green light flashing again.  If the dressing becomes disloged or saturated it should be changed. Please refer to the JEFFY information sheet if you have any questions about the dressing.  If you have a home health nurse or therapist they can be contacted to assist with dressing change or repair. You may also call the JEFFY dressing hotline for questions related to the dressing (1-527.419.3453). If there still other problems or questions related to the dressing despite these measures then you can contact Ludmila at our office [phone].  If for some reason the JEFFY dressing is removed, after 7 days the wound can be gently cleaned with antibacterial soap then allowed to dry and covered with a dry sterile dressing. The wound should be covered at all times except while showering.  Patient may change dressings daily and prn using sterile 4x4 and paper tape, and should call if any unusual drainage, redness or  swelling.*  Follow up appointment in 2 weeks - patient to call the office at [phone] to schedule.  Patient will be discharged on aspirin 325mg BID x 2weeks, then daily x 4weeks    Discharge Diagnosis:    Patient Active Problem List   Diagnosis   • Cholecystitis   • Fibromyalgia   • Essential hypertension   • Closed fracture of upper end of humerus   • Rotator cuff arthropathy   • Primary osteoarthritis of left knee       Follow-up Appointments  Future Appointments   Date Time Provider Department Center   8/9/2019  1:00 PM Tere Mcqueen, PT [facility] None     Additional Instructions for the Follow-ups that You Need to Schedule     Referral to home health   As directed      PT to see for Home PT protocol. Daily for first week then 3x/ wk for second week. Staples to be removed at f/u appointment in 2 weeks.    Order Comments:  PT to see for Home PT protocol. Daily for first week then 3x/ wk for second week. Staples to be removed at f/u appointment in 2 weeks.     Face to Face Visit Date:  7/23/2019    Follow-up Provider for Plan of Care?:  I will be treating the patient on an ongoing basis.  Please send me the Plan of Care for signature.    Follow-up Provider:  ALEKSANDER BUSTAMANTE [[id number]]    Reason/Clinical Findings:  post op knee replacement    Describe mobility limitations that make leaving home difficult:  pain, swelling, weakness, limited mobility, difficulty ambulating without assistive device    Nursing/Therapeutic Services Requested:  Physicial Therapy                  Aleksander Bustamante MD  07/23/19  11:58 AM

## 2019-07-23 NOTE — PLAN OF CARE
Problem: Patient Care Overview  Goal: Plan of Care Review   07/23/19 1031   Coping/Psychosocial   Plan of Care Reviewed With patient   OTHER   Outcome Summary Pt is s/p L TKA on 7/22. Pt presents with decreased L knee ROM and strength post op. Pt able to ambulate using step to then progressing to step through gait mechanics after sequencing comprehension. Pt progressed from contact guard to standby assist during gait. Pt educated on proper stair navigation and verbalized understanding. Pt to DC with home health and OP services. Pt no longer needs inpatient PT.     Direct supervision provided throughout PT eval

## 2019-07-23 NOTE — PLAN OF CARE
Problem: Patient Care Overview  Goal: Plan of Care Review  Outcome: Ongoing (interventions implemented as appropriate)   07/23/19 0551   Coping/Psychosocial   Plan of Care Reviewed With patient   Plan of Care Review   Progress no change   OTHER   Outcome Summary vitals stable. pt expressed pain. meds given per orders. hemovac and ernesto dressing in place. will continue to monitor.        Problem: Pain, Chronic (Adult)  Goal: Identify Related Risk Factors and Signs and Symptoms  Outcome: Ongoing (interventions implemented as appropriate)    Goal: Acceptable Pain/Comfort Level and Functional Ability  Outcome: Ongoing (interventions implemented as appropriate)      Problem: Fall Risk (Adult)  Goal: Identify Related Risk Factors and Signs and Symptoms  Outcome: Ongoing (interventions implemented as appropriate)    Goal: Absence of Fall  Outcome: Ongoing (interventions implemented as appropriate)      Problem: Knee Arthroplasty (Total, Partial) (Adult)  Goal: Signs and Symptoms of Listed Potential Problems Will be Absent, Minimized or Managed (Knee Arthroplasty)  Outcome: Ongoing (interventions implemented as appropriate)

## 2019-07-23 NOTE — DISCHARGE PLACEMENT REQUEST
"Clarissa Matos (68 y.o. Female)     Date of Birth Social Security Number Address Home Phone MRN    1950  [id number] [phone] [id number]    [employer]vent Marital Status          M[employer]ism        Admission Date Admission Type Admitting Provider Attending Provider Department, Room/Bed    7/22/19 Elective Sam Bustamante MD Brown, Reid B, MD [address], P794/1    Discharge Date Discharge Disposition Discharge Destination                       Attending Provider:  Sam Bustamante MD    Allergies:  Percocet [Oxycodone-acetaminophen], Nsaids, Penicillins    Isolation:  None   Infection:  None   Code Status:  CPR    Ht:  160 cm (63\")   Wt:  83.3 kg (183 lb 11.2 oz)    Admission Cmt:  None   Principal Problem:  Primary osteoarthritis of left knee [M17.12] More...                 Active Insurance as of 7/22/2019     Primary Coverage     Payor Plan Insurance Group Employer/Plan Group    MEDICARE MEDICARE A & B      Payor Plan Address Payor Plan Phone Number Payor Plan Fax Number Effective Dates    [address] [phone]  11/1/2017 - None Entered    [facility] [id number]       Subscriber Name Subscriber Birth Date Member ID       CLARISSA MATOS 1950 [id number]           Secondary Coverage     Payor Plan Insurance Group Employer/Plan Group    [facility] SUPP [id number]     Payor Plan Address Payor Plan Phone Number Payor Plan Fax Number Effective Dates    [address]   1/1/2018 - None Entered    [facility] [id number]       Subscriber Name Subscriber Birth Date Member ID       CLARISSA MATOS 1950 [id number]                 Emergency Contacts      (Rel.) Home Phone Work Phone Mobile Phone    CarloJaime (Spouse) [phone] -- [phone]    Apryl Joseph (Friend) -- -- [phone]          "

## 2019-07-26 ENCOUNTER — TELEPHONE (OUTPATIENT)
Dept: ORTHOPEDIC SURGERY | Facility: CLINIC | Age: 69
End: 2019-07-26

## 2019-07-26 NOTE — TELEPHONE ENCOUNTER
S/P LEFT TKA BY RBB 7/22/19  Patient was given antibiotics in hospital and has a history of gettingThrush. Says her tongue is solid white and is asking to have Nystatin Rx called in.

## 2019-07-26 NOTE — TELEPHONE ENCOUNTER
Please call in----Nystatin swish and swallow 5 mL's 4 times daily x1 week quantity number 1 week supply 0 refills

## 2019-07-31 RX ORDER — HYDROMORPHONE HYDROCHLORIDE 2 MG/1
2 TABLET ORAL EVERY 4 HOURS PRN
Qty: 50 TABLET | Refills: 0 | Status: SHIPPED | OUTPATIENT
Start: 2019-07-31 | End: 2019-08-08 | Stop reason: SDUPTHER

## 2019-08-02 ENCOUNTER — TELEPHONE (OUTPATIENT)
Dept: ORTHOPEDIC SURGERY | Facility: CLINIC | Age: 69
End: 2019-08-02

## 2019-08-06 ENCOUNTER — OFFICE VISIT (OUTPATIENT)
Dept: ORTHOPEDIC SURGERY | Facility: CLINIC | Age: 69
End: 2019-08-06

## 2019-08-06 VITALS — BODY MASS INDEX: 32.43 KG/M2 | TEMPERATURE: 98.2 F | WEIGHT: 183 LBS | HEIGHT: 63 IN

## 2019-08-06 DIAGNOSIS — Z96.652 STATUS POST LEFT KNEE REPLACEMENT: Primary | ICD-10-CM

## 2019-08-06 PROCEDURE — 99024 POSTOP FOLLOW-UP VISIT: CPT | Performed by: ORTHOPAEDIC SURGERY

## 2019-08-06 NOTE — PROGRESS NOTES
Clarissa Matos : 1950 MRN: [id number] DATE: 2019    DIAGNOSIS: 2 week follow up left total knee      SUBJECTIVE:Patient returns today for 2 week follow up of left total knee replacement. Patient reports doing well with no unusual complaints. Appears to be progressing appropriately.    OBJECTIVE:   Exam:. The incision is healing appropriately. No sign of infection. Range of motion is progressing as expected. The calf is soft and nontender with a negative Homans sign.    ASSESSMENT: 2 week status post left knee replacement.    PLAN: 1) Staples removed and steri strips applied   2) Order given for PT   3) Discontinue MARYURI hose   4) Continue ice PRN   5) aspirin 325 mg orally every day for 1 month   6) Follow up in 6 weeks with repeat Xrays of left knee (3views)    Sam Bustamante MD  2019

## 2019-08-07 ENCOUNTER — TELEPHONE (OUTPATIENT)
Dept: ORTHOPEDIC SURGERY | Facility: CLINIC | Age: 69
End: 2019-08-07

## 2019-08-08 DIAGNOSIS — Z96.652 STATUS POST LEFT KNEE REPLACEMENT: Primary | ICD-10-CM

## 2019-08-08 NOTE — TELEPHONE ENCOUNTER
Patient states B/p went down last night, informed patient if the b/p becomes high she needs to contact her PCP asap   Patient voiced understanding wkt [phone]

## 2019-08-09 ENCOUNTER — TREATMENT (OUTPATIENT)
Dept: PHYSICAL THERAPY | Facility: CLINIC | Age: 69
End: 2019-08-09

## 2019-08-09 ENCOUNTER — TELEPHONE (OUTPATIENT)
Dept: ORTHOPEDIC SURGERY | Facility: CLINIC | Age: 69
End: 2019-08-09

## 2019-08-09 DIAGNOSIS — R26.89 IMPAIRED GAIT AND MOBILITY: ICD-10-CM

## 2019-08-09 DIAGNOSIS — Z96.652 STATUS POST LEFT KNEE REPLACEMENT: Primary | ICD-10-CM

## 2019-08-09 DIAGNOSIS — M25.60 LIMITED JOINT RANGE OF MOTION (ROM): ICD-10-CM

## 2019-08-09 PROCEDURE — 97110 THERAPEUTIC EXERCISES: CPT | Performed by: PHYSICAL THERAPIST

## 2019-08-09 PROCEDURE — 97161 PT EVAL LOW COMPLEX 20 MIN: CPT | Performed by: PHYSICAL THERAPIST

## 2019-08-09 PROCEDURE — G0283 ELEC STIM OTHER THAN WOUND: HCPCS | Performed by: PHYSICAL THERAPIST

## 2019-08-09 NOTE — PROGRESS NOTES
Physical Therapy Initial Evaluation and Plan of Care        Patient: Clarissa Matos   : 1950  Diagnosis/ICD-10 Code:  Status post left knee replacement [Z96.652]  Referring practitioner: Dr. Sam Bustamante MD  Date of Initial Visit: 2019  Today's Date: 2019  Patient seen for 1 sessions           Subjective Questionnaire: LEFS:       Subjective Evaluation    History of Present Illness  Date of surgery: 2019  Mechanism of injury: Patient reports had recent left TKA without complications.  Had 6  therapy visits.  Has been walking with cane for 3-4 days, still avoiding stairs at home.      PMH: Right RCR, Right TKA, Hypertension; Fibromyalgia; Irregular heart rate; Hiatal hernia, arthritis, depression, anxiety.    Subjective comment: Patient reports left knee pain.  Patient Occupation: Retired Quality of life: good    Pain  Current pain ratin  At best pain ratin  At worst pain ratin  Location: Left knee  Quality: throbbing and dull ache  Relieving factors: ice and medications  Aggravating factors: movement, ambulation, squatting, stairs, standing and sleeping  Progression: improved    Social Support  Lives in: multiple-level home  Lives with: spouse    Hand dominance: right    Treatments  Previous treatment: physical therapy  Patient Goals  Patient goals for therapy: decreased pain, increased strength and increased motion  Patient goal: Return to PLOF           Objective       Active Range of Motion   Left Knee   Flexion: 83 degrees   Extensor lag: 3 degrees     Strength/Myotome Testing     Left Knee   Flexion: 3+  Extension: 4-  Quadriceps contraction: poor    Tests     Additional Tests Details  Deferred due to post op status.    Swelling     Left Knee Girth Measurement (cm)   Joint line: 46 cm  10 cm above joint line: 50 cm  10 cm below joint line: 44 cm         Assessment & Plan     Assessment  Impairments: abnormal gait, abnormal or restricted ROM, activity intolerance,  impaired physical strength, pain with function and weight-bearing intolerance  Assessment details: Clarissa Matos is a pleasant 68 y.o. female that presents with signs and symptoms consistent with the above diagnosis. Pt would benefit from skilled PT services in order to address listed impairments and increase tolerance to normal daily activities including ADLs, IADLs and leisurel activities.  Prognosis: good  Prognosis details: Patient demonstrates good rehab potential as evidenced by high motivation to participate with PT POC and to return to PLOF/ADLs/IADLs/leisure activities.  Functional Limitations: sleeping, walking, uncomfortable because of pain, moving in bed and standing  Goals  Plan Goals: Short Term Goals (2-3 wks):  1.  Patient will have increased left knee ROM to: Flexion 130, Extension 0 to allow for increased functional joint mobility.  2.  Patient will have increased left knee strength to: Flexion 4+/5, Extension 4+/5 to allow for increased joint stabilization and support.  3.  Patient will have decreased pain to 4/10 at worst to allow for increased comfort with functional activities and allow for improved restorative sleep.  4.  Patient will have LE muscle flexibility WNLs.  5.  Patient will demonstrate normalized gait pattern.      Long Term Goals (4-6 wks):  1.  Patient will be independent in performance of HEP for carryover upon discharge from skilled PT services.  2.  Patient will have left knee strength of 5/5.  3.  Patient will have improved LEFS score of 50/80 or better.  4.  Patient will report return to performance of ADLs/IADLs/Leisure activities with minimal to no symptom reproduction/pain limitations.      Plan  Therapy options: will be seen for skilled physical therapy services  Planned modality interventions: high voltage pulsed current (pain management), thermotherapy (hydrocollator packs) and cryotherapy  Planned therapy interventions: manual therapy, soft tissue mobilization,  strengthening, stretching, balance/weight-bearing training, flexibility, functional ROM exercises, gait training, joint mobilization and home exercise program  Frequency: 2x week  Duration in visits: 20  Treatment plan discussed with: patient  Plan details: Pt was educated on the importance of their HEP and their current need for continued skilled physical therapy. Patients goals and potential limitations were discussed and pt is in agreement with current plan of care and treatment emphasis.            Manual Therapy:    8     mins  02092;  Therapeutic Exercise:    25     mins  57792;     Neuromuscular Krysta:        mins  27535;    Therapeutic Activity:          mins  89467;     Gait Training:           mins  00980;     Ultrasound:          mins  36011;    Electrical Stimulation:    15     mins  67125 ( );  Dry Needling          mins self-pay    Timed Treatment:   33   mins   Total Treatment:     70   mins    PT SIGNATURE: Tere Mcqueen, PT   DATE TREATMENT INITIATED: 8/9/2019    Initial Certification  Certification Period: 11/7/2019  I certify that the therapy services are furnished while this patient is under my care.  The services outlined above are required by this patient, and will be reviewed every 90 days.     PHYSICIAN:       DATE:     Please sign and return via fax to [phone].. Thank you, [facility].

## 2019-08-12 RX ORDER — HYDROMORPHONE HYDROCHLORIDE 2 MG/1
2 TABLET ORAL EVERY 4 HOURS PRN
Qty: 50 TABLET | Refills: 0 | Status: SHIPPED | OUTPATIENT
Start: 2019-08-12 | End: 2019-08-26 | Stop reason: SDUPTHER

## 2019-08-26 ENCOUNTER — OFFICE VISIT (OUTPATIENT)
Dept: PHYSICAL THERAPY | Facility: CLINIC | Age: 69
End: 2019-08-26

## 2019-08-26 DIAGNOSIS — M25.60 LIMITED JOINT RANGE OF MOTION (ROM): ICD-10-CM

## 2019-08-26 DIAGNOSIS — Z96.652 STATUS POST LEFT KNEE REPLACEMENT: Primary | ICD-10-CM

## 2019-08-26 DIAGNOSIS — R26.89 IMPAIRED GAIT AND MOBILITY: ICD-10-CM

## 2019-08-26 DIAGNOSIS — Z96.652 STATUS POST LEFT KNEE REPLACEMENT: ICD-10-CM

## 2019-08-26 PROCEDURE — 97140 MANUAL THERAPY 1/> REGIONS: CPT | Performed by: PHYSICAL THERAPIST

## 2019-08-26 PROCEDURE — G0283 ELEC STIM OTHER THAN WOUND: HCPCS | Performed by: PHYSICAL THERAPIST

## 2019-08-26 PROCEDURE — 97110 THERAPEUTIC EXERCISES: CPT | Performed by: PHYSICAL THERAPIST

## 2019-08-26 NOTE — PROGRESS NOTES
Physical Therapy Daily Progress Note        Clarissa Matos reports: stiff and sore today throughout body due to arthritis acting up(rainy weather).      Subjective     Objective   -presents to clinic using straight cane.  Ambulates with decreased left LE step/stride length and mild antalgia.  -AROM left knee flexion 91 deg; ext -2 deg  -AAROM left knee flexion 97 deg(foot in pillow on indo board pulling with strap)    See Exercise, Manual, and Modality Logs for complete treatment.   Exercise rationale/ pain free exercise performance  Anatomy and structure of affected musculature  Ice application  Cross friction scar massage technique  Sleeping positions with pillows  Alternate exercise positions  Verbal/Tactile cues to ensure correct exercise performance/technique    Added:Nu Step x 5 min  Increased reps of HEP    Assessment/Plan  Subjectively, reports complains of stiffness throughout body due to arthritis, though does not report any increased left knee discomfort.  Objectively, she presents with noted left LE antalgia and dependence upon assistive device for safe ambulation.  A/AAROM left knee is improved vs. Initial measurement, though flexion deficits present. Positive response to manual techniques/intervention in regards to decreased edema as well as improved joint mobility. Able to progress exercise reps without increased symptoms/discomfort.  Good recall and performance noted.    Other  Continue manual intervention to improve left knee A/PROM.  Progress exercises/activity as appropriated           Manual Therapy:    25     mins  48466;  Therapeutic Exercise:    18     mins  48514;     Neuromuscular Krysta:        mins  11569;    Therapeutic Activity:     5     mins  54616;     Gait Training:           mins  05945;     Ultrasound:          mins  90933;    Electrical Stimulation:    15     mins  40375 ( );      Timed Treatment:   48   mins   Total Treatment:     63   mins    Yordan Ribeiro,  PTA    Physical Therapist Assistant [phone]

## 2019-08-27 RX ORDER — HYDROMORPHONE HYDROCHLORIDE 2 MG/1
2 TABLET ORAL EVERY 8 HOURS PRN
Qty: 20 TABLET | Refills: 0 | Status: SHIPPED | OUTPATIENT
Start: 2019-08-27 | End: 2020-06-10

## 2019-08-30 ENCOUNTER — TREATMENT (OUTPATIENT)
Dept: PHYSICAL THERAPY | Facility: CLINIC | Age: 69
End: 2019-08-30

## 2019-08-30 DIAGNOSIS — Z96.652 STATUS POST LEFT KNEE REPLACEMENT: Primary | ICD-10-CM

## 2019-08-30 DIAGNOSIS — R26.89 IMPAIRED GAIT AND MOBILITY: ICD-10-CM

## 2019-08-30 DIAGNOSIS — M25.60 LIMITED JOINT RANGE OF MOTION (ROM): ICD-10-CM

## 2019-08-30 PROCEDURE — G0283 ELEC STIM OTHER THAN WOUND: HCPCS | Performed by: PHYSICAL THERAPIST

## 2019-08-30 PROCEDURE — 97110 THERAPEUTIC EXERCISES: CPT | Performed by: PHYSICAL THERAPIST

## 2019-08-30 PROCEDURE — 97140 MANUAL THERAPY 1/> REGIONS: CPT | Performed by: PHYSICAL THERAPIST

## 2019-08-30 NOTE — PROGRESS NOTES
Physical Therapy Daily Progress Note        Patient: Clarissa Matos   : 1950  Diagnosis/ICD-10 Code:  Status post left knee replacement [Z96.652]  Referring practitioner: Sam Bustamante MD  Date of Initial Visit: Type: THERAPY  Noted: 2019  Today's Date: 2019  Patient seen for 3 sessions         Clarissa Matos reports:       Subjective   Patient reports her knee is feeling okay but still sore and tight to bend.    Objective       Active Range of Motion     Right Knee   Flexion: 90 degrees   Extension: 0 degrees     Passive Range of Motion     Right Knee   Flexion: 100 degrees   Extension: 0 degrees      See Exercise, Manual, and Modality Logs for complete treatment.       Assessment/Plan  Patient was able to perform additional exercises with increased stretching intensity.  She is progressing, encouraged daily HEP and walking.  Progress per Plan of Care           Timed:  Manual Therapy:    8     mins  01975;  Therapeutic Exercise:    20     mins  22510;     Neuromuscular Krysta:        mins  60517;    Therapeutic Activity:          mins  66378;     Gait Training:           mins  28683;     Ultrasound:          mins  95024;    Electrical Stimulation:    15     mins  08659 ( );  Mechanical Traction:         mins  57825;     Timed Treatment:  28    mins   Total Treatment:     45   mins  Tere Mcqueen PT  Physical Therapist

## 2019-09-06 ENCOUNTER — TREATMENT (OUTPATIENT)
Dept: PHYSICAL THERAPY | Facility: CLINIC | Age: 69
End: 2019-09-06

## 2019-09-06 DIAGNOSIS — M25.60 LIMITED JOINT RANGE OF MOTION (ROM): ICD-10-CM

## 2019-09-06 DIAGNOSIS — R26.89 IMPAIRED GAIT AND MOBILITY: ICD-10-CM

## 2019-09-06 DIAGNOSIS — Z96.652 STATUS POST LEFT KNEE REPLACEMENT: Primary | ICD-10-CM

## 2019-09-06 PROCEDURE — G0283 ELEC STIM OTHER THAN WOUND: HCPCS | Performed by: PHYSICAL THERAPIST

## 2019-09-06 PROCEDURE — 97110 THERAPEUTIC EXERCISES: CPT | Performed by: PHYSICAL THERAPIST

## 2019-09-06 NOTE — PROGRESS NOTES
Physical Therapy Re-Assessment / Re-Certification        Patient: Clarissa Matos   : 1950  Diagnosis/ICD-10 Code:  Status post left knee replacement [Z96.652]  Referring practitioner: Sam Bustamante MD  Date of Initial Visit: Type: THERAPY  Noted: 2019  Today's Date: 2019  Patient seen for 4 sessions      Subjective:   Clarissa Matos reports:   Subjective Questionnaire: LEFS:   Clinical Progress: improved  Home Program Compliance: Yes  Treatment has included: therapeutic exercise, manual therapy, electrical stimulation and cryotherapy    Subjective   Patient reports her knee is feeling better, still having soreness and stiffness.  States she has been having a migraine this week-went to the doctor and her medications have been adjusted.    Objective     Active Range of Motion   Left Knee   Flexion: 95 degrees   Extensor: 0 degrees      Strength/Myotome Testing      Left Knee   Flexion: 3+  Extension: 4-  Quadriceps contraction: poor     Tests     Additional Tests Details  Deferred due to post op status.     Swelling      Left Knee Girth Measurement (cm)   Joint line: 45 cm  10 cm above joint line: 49 cm  10 cm below joint line: 41 cm    Assessment/Plan  Patient is progressing with ROM in her left knee.  Swelling has also improved significantly.  Exercise progression has limited due to low PT attendance as she has been having unrelated medical issues.  Progress toward previous goals: Partially Met    Goal Review  Short Term Goals (2-3 wks):  1.  Patient will have increased left knee ROM to: Flexion 130 to allow for increased functional joint mobility.  2.  Patient will have increased left knee strength to: Flexion 4+/5, Extension 4+/5 to allow for increased joint stabilization and support.  3.  Patient will have decreased pain to 4/10 at worst to allow for increased comfort with functional activities and allow for improved restorative sleep.  4.  Patient will have LE muscle flexibility WNLs.  5.   Patient will demonstrate normalized gait pattern.      Long Term Goals (4-6 wks):  1.  Patient will be independent in performance of HEP for carryover upon discharge from skilled PT services.  2.  Patient will have left knee strength of 5/5.  3.  Patient will have improved LEFS score of 50/80 or better.  4.  Patient will report return to performance of ADLs/IADLs/Leisure activities with minimal to no symptom reproduction/pain limitations.        Recommendations: Continue as planned  Timeframe: 1 month  Prognosis to achieve goals: good    PT Signature: Tere Mcqueen, PT      Based upon review of the patient's progress and continued therapy plan, it is my medical opinion that Clarissa Matos should continue physical therapy treatment at [facility]  [address] [place] [id number] [phone].    Signature: __________________________________  Sam Bustamante MD    Timed:  Manual Therapy:         mins  70009;  Therapeutic Exercise:    30     mins  88733;     Neuromuscular Krysta:        mins  11450;    Therapeutic Activity:          mins  78764;     Gait Training:           mins  61945;     Ultrasound:          mins  57141;    Electrical Stimulation:    15     mins  64911 ( );  Mechanical Traction:         mins  68159;     Timed Treatment:   30   mins   Total Treatment:     55   mins

## 2019-09-09 ENCOUNTER — TREATMENT (OUTPATIENT)
Dept: PHYSICAL THERAPY | Facility: CLINIC | Age: 69
End: 2019-09-09

## 2019-09-09 DIAGNOSIS — R26.89 IMPAIRED GAIT AND MOBILITY: ICD-10-CM

## 2019-09-09 DIAGNOSIS — M25.60 LIMITED JOINT RANGE OF MOTION (ROM): ICD-10-CM

## 2019-09-09 DIAGNOSIS — Z96.652 STATUS POST LEFT KNEE REPLACEMENT: Primary | ICD-10-CM

## 2019-09-09 PROCEDURE — 97110 THERAPEUTIC EXERCISES: CPT | Performed by: PHYSICAL THERAPIST

## 2019-09-09 PROCEDURE — 97140 MANUAL THERAPY 1/> REGIONS: CPT | Performed by: PHYSICAL THERAPIST

## 2019-09-09 NOTE — PROGRESS NOTES
Physical Therapy Daily Progress Note      Patient: Clarissa Matos   : 1950  Referring practitioner: Sam Bustamante MD  Treatment Diagnosis:     ICD-10-CM ICD-9-CM   1. Status post left knee replacement Z96.652 V43.65   2. Limited joint range of motion (ROM) M25.60 719.50   3. Impaired gait and mobility R26.89 781.2     Date of Initial Visit: Type: THERAPY  Noted: 2019  Today's Date: 2019  Patient seen for 5 sessions         Clarissa Matos reports:       Subjective   Patient reports her knee has been sore and she has been having increased swelling in general today.  States she is having swelling in both legs.  Reports her HA is less intense and her blood pressure is still elevated.    Objective   See Exercise, Manual, and Modality Logs for complete treatment.       Assessment/Plan  Patient performed modified program due to continued HA/elevated blood pressure. Will progress as she tolerates.  Progress per Plan of Care           Timed:  Manual Therapy:    13     mins  56860;  Therapeutic Exercise:    15     mins  66975;     Neuromuscular Krysta:        mins  10793;    Therapeutic Activity:          mins  94940;     Gait Training:           mins  16306;     Ultrasound:          mins  44404;    Electrical Stimulation:         mins  27695 ( );  Mechanical Traction:         mins  01110;     Timed Treatment:   28   mins   Total Treatment:     40   mins  Tere Mcqueen PT  Physical Therapist

## 2019-09-17 ENCOUNTER — OFFICE VISIT (OUTPATIENT)
Dept: ORTHOPEDIC SURGERY | Facility: CLINIC | Age: 69
End: 2019-09-17

## 2019-09-17 ENCOUNTER — TREATMENT (OUTPATIENT)
Dept: PHYSICAL THERAPY | Facility: CLINIC | Age: 69
End: 2019-09-17

## 2019-09-17 DIAGNOSIS — M25.60 LIMITED JOINT RANGE OF MOTION (ROM): ICD-10-CM

## 2019-09-17 DIAGNOSIS — Z96.652 STATUS POST LEFT KNEE REPLACEMENT: Primary | ICD-10-CM

## 2019-09-17 PROCEDURE — 97140 MANUAL THERAPY 1/> REGIONS: CPT | Performed by: PHYSICAL THERAPIST

## 2019-09-17 PROCEDURE — 73562 X-RAY EXAM OF KNEE 3: CPT | Performed by: NURSE PRACTITIONER

## 2019-09-17 PROCEDURE — 99024 POSTOP FOLLOW-UP VISIT: CPT | Performed by: NURSE PRACTITIONER

## 2019-09-17 PROCEDURE — 97110 THERAPEUTIC EXERCISES: CPT | Performed by: PHYSICAL THERAPIST

## 2019-09-17 PROCEDURE — 97033 APP MDLTY 1+IONTPHRSIS EA 15: CPT | Performed by: PHYSICAL THERAPIST

## 2019-09-17 RX ORDER — HYDROCODONE BITARTRATE AND ACETAMINOPHEN 10; 325 MG/1; MG/1
TABLET ORAL
Refills: 0 | COMMUNITY
Start: 2019-09-14 | End: 2020-06-10

## 2019-09-17 NOTE — PROGRESS NOTES
Clarissa Matos : 1950 MRN: [id number] DATE: 2019    DIAGNOSIS: 8 week follow up left total knee      SUBJECTIVE:Patient returns today for 8 week follow up of left total knee replacement. Patient reports doing well with no unusual complaints. Appears to be progressing appropriately.    OBJECTIVE:   Exam:. The incision is well healed. No sign of infection. Range of motion is measured at 0 to 120. The calf is soft and nontender with a negative Homans sign. Strength is progressing and the patient is ambulating appropriately.    DIAGNOSTIC STUDIES  Xrays: 3 views of the left knee (AP, lateral, and sunrise) were ordered and reviewed for evaluation of recent knee replacement. They demonstrate a well positioned, well aligned knee replacement without complicating factors noted. In comparison with previous films there has been no change.    ASSESSMENT: 8 week status post left knee replacement.    PLAN: 1) Continue with PT exercises as prescribed   2) Follow up in 10 months    Dania Kevin, APRN  2019

## 2019-09-17 NOTE — PROGRESS NOTES
Physical Therapy Daily Progress Note      Patient: Clarissa Matos   : 1950  Treatment Diagnosis:     ICD-10-CM ICD-9-CM   1. Status post left knee replacement Z96.652 V43.65   2. Limited joint range of motion (ROM) M25.60 719.50     Referring practitioner: Sam Bustamante MD  Date of Initial Visit: Type: THERAPY  Noted: 2019  Today's Date: 2019  Patient seen for 6 sessions         Clarissa Matos reports:         Subjective   Patient reports to therapy indicating that doctor has changed her BP medication and now she is not feeling quite as dizzy. Patient notes that pain in knee has gone down, but she reports increased swelling in LLE.    Objective   See Exercise, Manual, and Modality Logs for complete treatment.   /132    Assessment/Plan  Patient responded positively to treatment interventions, with report of mild pain with patellar/scar mobilizations.  Patient tolerated addition of bilateral and unilateral leg press in order to increase strength in LLE and improve joint stability.  Plan to continue with plan of care in order to improve joint ROM, strength, stability, and increase overall performance with ADL and recreational activity completion.  Progress per Plan of Care           Timed:  Manual Therapy:    12     mins  60864;  Therapeutic Exercise:    32     mins  59189;     Neuromuscular Krysta:        mins  32616;    Therapeutic Activity:          mins  02101;     Gait Training:           mins  49452;     Ultrasound:          mins  46469;    Iontophoresis:          mins  66339;  Dry Needling:          mins ;    Untimed:  Electrical Stimulation:    15     mins  01851 ( );  Mechanical Traction:         mins  94249;     Timed Treatment:   44   mins   Total Treatment:     59   mins    Tere Mcqueen PT  Physical Therapist

## 2019-09-24 ENCOUNTER — TREATMENT (OUTPATIENT)
Dept: PHYSICAL THERAPY | Facility: CLINIC | Age: 69
End: 2019-09-24

## 2019-09-24 DIAGNOSIS — M25.60 LIMITED JOINT RANGE OF MOTION (ROM): ICD-10-CM

## 2019-09-24 DIAGNOSIS — Z96.652 STATUS POST LEFT KNEE REPLACEMENT: Primary | ICD-10-CM

## 2019-09-24 DIAGNOSIS — R26.89 IMPAIRED GAIT AND MOBILITY: ICD-10-CM

## 2019-09-24 PROCEDURE — 97140 MANUAL THERAPY 1/> REGIONS: CPT | Performed by: PHYSICAL THERAPIST

## 2019-09-24 PROCEDURE — 97110 THERAPEUTIC EXERCISES: CPT | Performed by: PHYSICAL THERAPIST

## 2019-09-24 NOTE — PROGRESS NOTES
I have reviewed the notes, assessments, and/or procedures performed by Enrique Villagomez, PT Student, I concur with her/his documentation of Clarissa Matos.

## 2019-09-24 NOTE — PROGRESS NOTES
Physical Therapy Daily Progress Note      Patient: Clarissa Matos   : 1950  Treatment Diagnosis:     ICD-10-CM ICD-9-CM   1. Status post left knee replacement Z96.652 V43.65   2. Limited joint range of motion (ROM) M25.60 719.50   3. Impaired gait and mobility R26.89 781.2     Referring practitioner: Sam Bustamante MD  Date of Initial Visit: Type: THERAPY  Noted: 2019  Today's Date: 2019  Patient seen for 7 sessions         Clarissa Matos reports:       Subjective   Patient reports to therapy today with c/o soreness and swelling in L knee.  Patient notes that she believes it was caused by addition of leg press into exercise regiment the previous session.    Objective   See Exercise, Manual, and Modality Logs for complete treatment.       Assessment/Plan   Patient responded positively to treatment interventions aimed at increasing ROM and strength in left knee.  Addition of corrective sequencing and STM in order to correct pain in IT band proved to be beneficial.  Patient also responded well to addition of Game Ready in order to add compression and ice for reduction in inflammation. Patient educated on the risks and benefits of skilled PT, and the physiology behind addition of new exercises.  Plan to continue direction of treatment in order to continue to increase stability and mobility of left knee, and progress exercise intervention as tolerated.    Progress per Plan of Care           Timed:  Manual Therapy:    12     mins  93187;  Therapeutic Exercise:    32     mins  78778;     Neuromuscular Krysta:        mins  59634;    Therapeutic Activity:          mins  53057;     Gait Training:           mins  77694;     Ultrasound:          mins  26375;    Iontophoresis:          mins  70056;  Dry Needling:          mins ;    Untimed:  Electrical Stimulation:         mins  09103 ( );  Mechanical Traction:         mins  63409;     Timed Treatment:   44   mins   Total Treatment:     59    Patient Name:  Christelle London  MRN:  [id number]    Assessment     1  Closed fracture of right tibial plateau with routine healing  XR knee 1 or 2 vw right       Plan     1  I would recommend follow-up in 5 weeks  X-rays will be obtained at the time of his return  His brace may be removed for cleansing with precautions as reviewed  He may remove the brace for sleeping at night if he chooses as well as for periods of inactivity  However, the brace must be on any time he is up and ambulating  He must remain nonweightbearing  Physical therapy was ordered for range of motion, strengthening and ambulation training  I encouraged him to contact me if any questions or concerns were to arise prior to follow-up  Subjective   Christelle London returns for follow-up of his right tibial plateau fracture  The patient is 1 week(s) post ORIF and returns for routine follow-up  Patient Denies any significant pain  He has primary concerns regarding itching  Objective     /86 (BP Location: Left arm, Patient Position: Sitting, Cuff Size: Large)   Pulse 96   Temp 98 6 °F (37 °C) (Temporal)     Examination demonstrated the dressings to be in place from his surgical procedure  The brace was in place  The brace was loosened and the dressings removed  The incision is benign  Calf compartments are soft and nontender  Distal motor and sensory exams are intact  Gentle range of motion of the knee elicited no complaints and I was able to obtain range of motion from 10° to 70°  Data Review     I have personally reviewed pertinent films in PACS, and my interpretation follows  X-rays demonstrate the fracture to be acceptably aligned  There is a slight degree of depression of the articular surface  Hardware is in place and stable  mins    Jonatan Villagomez, PT Student

## 2019-10-08 ENCOUNTER — TREATMENT (OUTPATIENT)
Dept: PHYSICAL THERAPY | Facility: CLINIC | Age: 69
End: 2019-10-08

## 2019-10-08 DIAGNOSIS — Z96.652 STATUS POST LEFT KNEE REPLACEMENT: Primary | ICD-10-CM

## 2019-10-08 DIAGNOSIS — R26.89 IMPAIRED GAIT AND MOBILITY: ICD-10-CM

## 2019-10-08 DIAGNOSIS — M25.60 LIMITED JOINT RANGE OF MOTION (ROM): ICD-10-CM

## 2019-10-08 PROCEDURE — 97140 MANUAL THERAPY 1/> REGIONS: CPT | Performed by: PHYSICAL THERAPIST

## 2019-10-08 PROCEDURE — 97110 THERAPEUTIC EXERCISES: CPT | Performed by: PHYSICAL THERAPIST

## 2019-10-08 NOTE — PROGRESS NOTES
I have reviewed the notes, assessments, and/or procedures performed by Enrique Villagomez PT Student, I concur with her/his documentation of Clarissa Matos.

## 2019-10-08 NOTE — PROGRESS NOTES
Physical Therapy Re-Assessment / Re-Certification        Patient: Clarissa Matos   : 1950  Visit Diagnoses:     ICD-10-CM ICD-9-CM   1. Status post left knee replacement Z96.652 V43.65   2. Limited joint range of motion (ROM) M25.60 719.50   3. Impaired gait and mobility R26.89 781.2     Referring practitioner: Sam Bustamante MD  Date of Initial Visit: Type: THERAPY  Noted: 2019  Today's Date: 10/8/2019  Patient seen for 8 sessions      Subjective:   Clarissa Matos reports:   Subjective Questionnaire: LEFS:   Clinical Progress: unchanged, but progressing  Home Program Compliance: Inconsistent  Treatment has included: therapeutic exercise, manual therapy, electrical stimulation and cryotherapy    Subjective   Patient reports to therapy noting about a 5/10 pain level at worst, with exacerbations coming with walking, stairs, and after having to get up following prolonged sitting.  Patient notes that she has been relatively sedentary as of lately and has been inconsistent with stretching at home.  Patient has missed last several therapy sessions due to HBP, and her medication was doubled last week.    Objective     BP LUE: 174/80  BP RUE: 176/86    Active Range of Motion   Left Knee   Flexion: 94 degrees   Extension: 0 degrees     Strength/Myotome Testing      Left Knee   Flexion: 4 with pain  Extension: 4+ with pain  Quadriceps contraction: fair/poor     Swelling      Left Knee Girth Measurement (cm)   Joint line: 44.5 cm  10 cm above joint line: 48 cm  10 cm below joint line: 41 cm    Assessment/Plan  Patient responded positively to treatment interventions aimed at improving overall knee mobility.  STM to IT band and scar mobilizations provoked mild but tolerable pain.  Held off on more intense therapeutic exercise due to high blood pressure in BUE.  Plan to continue with direction of treatment in order to improve overall ROM and muscle flexibility in knee, and improve overall hip and knee  stabilization for improved performance in functional mobility.    Progress toward previous goals: have made progress towards goals    Goal Review  Plan Goals: Short Term Goals (2-3 wks):  1.  Patient will have increased left knee ROM to: Flexion 130, Extension 0 to allow for increased functional joint mobility.  2.  Patient will have increased left knee strength to: Flexion 4+/5, Extension 4+/5 to allow for increased joint stabilization and support.  3.  Patient will have decreased pain to 4/10 at worst to allow for increased comfort with functional activities and allow for improved restorative sleep.  4.  Patient will have LE muscle flexibility WNLs.  5.  Patient will demonstrate normalized gait pattern.      Long Term Goals (4 wks):  1.  Patient will be independent in performance of HEP for carryover upon discharge from skilled PT services.  2.  Patient will have left knee strength of 5/5.  3.  Patient will have improved LEFS score of 50/80 or better.  4.  Patient will report return to performance of ADLs/IADLs/Leisure activities with minimal to no symptom reproduction/pain limitations.      Recommendations: Continue as planned  Timeframe: 1 month  Prognosis to achieve goals: gael Villagomez, PT Student    Based upon review of the patient's progress and continued therapy plan, it is my medical opinion that Clarissa Matos should continue physical therapy treatment at [facility] PH[facility]  [address] [place] [id number] [phone].    Signature: __________________________________  Sam Bustamante MD    Timed:  Manual Therapy:    22     mins  82661;  Therapeutic Exercise:    24     mins  14383;     Neuromuscular Krysta:        mins  09364;    Therapeutic Activity:          mins  69175;     Gait Training:           mins  14186;     Ultrasound:          mins  87623;  Iontophoresis:          mins  96545;    Dry Needling:          mins ;    Untimed:  Electrical  Stimulation:         mins  96028 ( );  Mechanical Traction:         mins  45375;     Timed Treatment:   46   mins   Total Treatment:     56   mins

## 2019-10-11 ENCOUNTER — TREATMENT (OUTPATIENT)
Dept: PHYSICAL THERAPY | Facility: CLINIC | Age: 69
End: 2019-10-11

## 2019-10-11 DIAGNOSIS — M25.60 LIMITED JOINT RANGE OF MOTION (ROM): ICD-10-CM

## 2019-10-11 DIAGNOSIS — R26.89 IMPAIRED GAIT AND MOBILITY: ICD-10-CM

## 2019-10-11 DIAGNOSIS — Z96.652 STATUS POST LEFT KNEE REPLACEMENT: Primary | ICD-10-CM

## 2019-10-11 PROCEDURE — 97110 THERAPEUTIC EXERCISES: CPT | Performed by: PHYSICAL THERAPIST

## 2019-10-11 PROCEDURE — 97140 MANUAL THERAPY 1/> REGIONS: CPT | Performed by: PHYSICAL THERAPIST

## 2019-10-11 NOTE — PROGRESS NOTES
Physical Therapy Daily Progress Note      Patient: Clarissa Matos   : 1950  Treatment Diagnosis:     ICD-10-CM ICD-9-CM   1. Status post left knee replacement Z96.652 V43.65   2. Limited joint range of motion (ROM) M25.60 719.50   3. Impaired gait and mobility R26.89 781.2     Referring practitioner: Sam Bustamante MD  Date of Initial Visit: Type: THERAPY  Noted: 2019  Today's Date: 10/11/2019  Patient seen for 9 sessions         Clarissa Matos reports:       Subjective   Patient reports to therapy noting increased stiffness in left knee.  Noted intense pain from previous day and did not believe she was going to be able to walk that day, but then added a pain patch to medial knee which relieved symptoms.    Objective   See Exercise, Manual, and Modality Logs for complete treatment.       Assessment/Plan  Patient noted increased tenderness with STM of IT band and tibialis anterior.  Noted that knee joint felt a little more mobile after posterior fibular head glides.  Plan to continue direction of treatment in order to restore normal muscle length, proper joint mobility, knee ROM, and knee strength in order to improve functional joint mobility during ADL completion.    Progress per Plan of Care           Timed:  Manual Therapy:   36      mins  26711;  Therapeutic Exercise:    10     mins  60086;     Neuromuscular Krysta:        mins  10996;    Therapeutic Activity:          mins  71687;     Gait Training:           mins  41455;     Ultrasound:          mins  34736;    Iontophoresis:          mins  38298;  Dry Needling:          mins ;    Untimed:  Electrical Stimulation:         mins  93849 ( );  Mechanical Traction:         mins  39455;     Timed Treatment:   46   mins   Total Treatment:     56   mins    Jonatan Villagomez PT Student

## 2019-10-14 ENCOUNTER — TREATMENT (OUTPATIENT)
Dept: PHYSICAL THERAPY | Facility: CLINIC | Age: 69
End: 2019-10-14

## 2019-10-14 DIAGNOSIS — R26.89 IMPAIRED GAIT AND MOBILITY: ICD-10-CM

## 2019-10-14 DIAGNOSIS — M25.60 LIMITED JOINT RANGE OF MOTION (ROM): ICD-10-CM

## 2019-10-14 DIAGNOSIS — Z96.652 STATUS POST LEFT KNEE REPLACEMENT: Primary | ICD-10-CM

## 2019-10-14 PROCEDURE — 97110 THERAPEUTIC EXERCISES: CPT | Performed by: PHYSICAL THERAPIST

## 2019-10-14 PROCEDURE — 97140 MANUAL THERAPY 1/> REGIONS: CPT | Performed by: PHYSICAL THERAPIST

## 2019-10-14 NOTE — PROGRESS NOTES
Physical Therapy Daily Progress Note      Patient: Clarissa Matos   : 1950  Treatment Diagnosis:     ICD-10-CM ICD-9-CM   1. Status post left knee replacement Z96.652 V43.65   2. Limited joint range of motion (ROM) M25.60 719.50   3. Impaired gait and mobility R26.89 781.2     Referring practitioner: Sam Bustamante MD  Date of Initial Visit: Type: THERAPY  Noted: 2019  Today's Date: 10/14/2019  Patient seen for 10 sessions         Clarissa Matos reports:       Subjective   Patient reports to therapy today with c/o sharp pain in incision line that is provoked through touch.  States that it does not bother her with movement, but she is always consciously aware that it is present.  Also notes that she had improved performance with descending stairs this weekend which she believes was attributed to manual therapy from previous session.  However, she occasionally feels unsteady in static sustained positions, stating that she feels as if she will fall backwards.    Objective   See Exercise, Manual, and Modality Logs for complete treatment.       Assessment/Plan  Patient responded positively to treatment interventions aimed at improving overall knee ROM, muscle flexibility, and increasing hip and knee stability.  Unable to perform incision inspection due to long tight pants that were unable to roll up.  Contract relax stretching proved to be beneficial in improving hamstring length in LLE.  Addition of rocker board exercises were also added in order to address patient complaints of balance problems with prolonged stationary standing at home.  Educated patient on weight shifting exercises she can work on at home in order to improve balance.  Plan to continue with direction of treatment aimed at improving overall strength and stability of LLE, restoring normal muscle length and ROM, and improving performance with functional mobility.  Progress per Plan of Care           Timed:  Manual Therapy:    20     mins   19625;  Therapeutic Exercise:    30     mins  61390;     Neuromuscular Krysta:        mins  33294;    Therapeutic Activity:          mins  56301;     Gait Training:           mins  52114;     Ultrasound:          mins  61576;    Iontophoresis:          mins  46813;  Dry Needling:          mins ;    Untimed:  Electrical Stimulation:         mins  82422 ( );  Mechanical Traction:         mins  84332;     Timed Treatment:  50    mins   Total Treatment:     60   mins    Jonatan Villagomez, PT Student

## 2019-10-16 ENCOUNTER — TREATMENT (OUTPATIENT)
Dept: PHYSICAL THERAPY | Facility: CLINIC | Age: 69
End: 2019-10-16

## 2019-10-16 DIAGNOSIS — Z96.652 STATUS POST LEFT KNEE REPLACEMENT: Primary | ICD-10-CM

## 2019-10-16 DIAGNOSIS — R26.89 IMPAIRED GAIT AND MOBILITY: ICD-10-CM

## 2019-10-16 DIAGNOSIS — M25.60 LIMITED JOINT RANGE OF MOTION (ROM): ICD-10-CM

## 2019-10-16 PROCEDURE — G0283 ELEC STIM OTHER THAN WOUND: HCPCS | Performed by: PHYSICAL THERAPIST

## 2019-10-16 PROCEDURE — 97110 THERAPEUTIC EXERCISES: CPT | Performed by: PHYSICAL THERAPIST

## 2019-10-16 PROCEDURE — 97140 MANUAL THERAPY 1/> REGIONS: CPT | Performed by: PHYSICAL THERAPIST
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
## 2019-10-23 ENCOUNTER — TREATMENT (OUTPATIENT)
Dept: PHYSICAL THERAPY | Facility: CLINIC | Age: 69
End: 2019-10-23

## 2019-10-23 DIAGNOSIS — Z96.652 STATUS POST LEFT KNEE REPLACEMENT: Primary | ICD-10-CM

## 2019-10-23 DIAGNOSIS — M25.60 LIMITED JOINT RANGE OF MOTION (ROM): ICD-10-CM

## 2019-10-23 DIAGNOSIS — R26.89 IMPAIRED GAIT AND MOBILITY: ICD-10-CM

## 2019-10-23 PROCEDURE — 97110 THERAPEUTIC EXERCISES: CPT | Performed by: PHYSICAL THERAPIST

## 2019-10-23 PROCEDURE — G0283 ELEC STIM OTHER THAN WOUND: HCPCS | Performed by: PHYSICAL THERAPIST

## 2019-10-23 PROCEDURE — 97140 MANUAL THERAPY 1/> REGIONS: CPT | Performed by: PHYSICAL THERAPIST

## 2019-10-23 NOTE — PROGRESS NOTES
Physical Therapy Daily Progress Note      Patient: Clarissa Matos   : 1950  Treatment Diagnosis:     ICD-10-CM ICD-9-CM   1. Status post left knee replacement Z96.652 V43.65   2. Limited joint range of motion (ROM) M25.60 719.50   3. Impaired gait and mobility R26.89 781.2     Referring practitioner: Sam Bustamante MD  Date of Initial Visit: Type: THERAPY  Noted: 2019  Today's Date: 10/23/2019  Patient seen for 12 sessions         Clarissa Matos reports:       Subjective   Patient reports to therapy noting increased knee stiffness from over the weekend, but overall feels better.  States that she believes her stiffness is due to new diuretic she has been taking which has made it harder to retain fluids.    Objective   See Exercise, Manual, and Modality Logs for complete treatment.       Assessment/Plan  Patient responded positively to treatment interventions aimed at improving knee ROM, increasing LLE strength, and decreasing c/o pain.  Patient was given water several times throughout treatment in order to ensure proper hydration due to diuretic.  Manual therapy of LLE in fibular head posterior glides, scar mobilizations, and STM of ITB and lateral hamstrings proved to be beneficial in improving overall knee mobility and ROM.  Continue to use a closer seat on NuStep and leg press in order to promote deeper knee flexion with bending.  Plan to continue with direction of treatment aimed at improving overall ROM, muscle flexibility, strength, and decrease pain for overall improved functional joint stability and improved performance during ADL and recreational activity.  Progress per Plan of Care           Timed:  Manual Therapy:    16     mins  46028; (12 minutes concurrent)  Therapeutic Exercise:    14     mins  83293;     Neuromuscular Krysta:        mins  41058;    Therapeutic Activity:          mins  87717;     Gait Training:           mins  49085;     Ultrasound:          mins  74604;     Iontophoresis:          mins  27993;  Dry Needling:          mins ;    Untimed:  Electrical Stimulation:    15     mins  94811 ( );  Mechanical Traction:         mins  90906;     Timed Treatment:   30   mins   Total Treatment:     57   mins    Jonatan Villagomez, PT Student

## 2019-10-28 ENCOUNTER — TREATMENT (OUTPATIENT)
Dept: PHYSICAL THERAPY | Facility: CLINIC | Age: 69
End: 2019-10-28

## 2019-10-28 DIAGNOSIS — M25.60 LIMITED JOINT RANGE OF MOTION (ROM): ICD-10-CM

## 2019-10-28 DIAGNOSIS — R26.89 IMPAIRED GAIT AND MOBILITY: ICD-10-CM

## 2019-10-28 DIAGNOSIS — Z96.652 STATUS POST LEFT KNEE REPLACEMENT: Primary | ICD-10-CM

## 2019-10-28 PROCEDURE — 97110 THERAPEUTIC EXERCISES: CPT | Performed by: PHYSICAL THERAPIST

## 2019-10-28 PROCEDURE — 97140 MANUAL THERAPY 1/> REGIONS: CPT | Performed by: PHYSICAL THERAPIST

## 2019-10-28 PROCEDURE — G0283 ELEC STIM OTHER THAN WOUND: HCPCS | Performed by: PHYSICAL THERAPIST

## 2019-10-28 NOTE — PROGRESS NOTES
Physical Therapy Daily Progress Note      Patient: Clarissa Matos   : 1950  Treatment Diagnosis:     ICD-10-CM ICD-9-CM   1. Status post left knee replacement Z96.652 V43.65   2. Limited joint range of motion (ROM) M25.60 719.50   3. Impaired gait and mobility R26.89 781.2     Referring practitioner: Sam Bustamante MD  Date of Initial Visit: Type: THERAPY  Noted: 2019  Today's Date: 10/28/2019  Patient seen for 13 sessions         Clarissa Matos reports:      Subjective   Patient reports to therapy noting that she continues to have increased pain along lateral lower leg, especially with stair climbing.  States that she still worries about climbing stairs and would eventually like to work on stair negotiation during treatment.    Objective   See Exercise, Manual, and Modality Logs for complete treatment.       Assessment/Plan  Patient responded positively to treatment interventions aimed at improving overall left knee ROM, strength, and stability. Scar mobilizations, contract relax stretching of hamstrings, and heel slides proved to be beneficial in improving overall knee ROM.  Continue to utilize closer seat with less weight for BLE and LLE leg press and NuStep in order to improve knee ROM.  Plan to continue with direction of treatment aimed at improving overall BLE strength, muscle flexibility, and knee ROM for overall improved functional joint mobility during ADL and recreational activity completion.  Progress per Plan of Care           Timed:  Manual Therapy:   12      mins  72805;  Therapeutic Exercise:   18      mins  11174; (14 minutes concurrent)    Neuromuscular Krysta:        mins  39795;    Therapeutic Activity:          mins  12570;     Gait Training:           mins  06598;     Ultrasound:          mins  12149;    Iontophoresis:          mins  15323;  Dry Needling:          mins ;    Untimed:  Electrical Stimulation:    15     mins  66858 ( );  Mechanical Traction:         mins  27650;      Timed Treatment:   30   mins   Total Treatment:     59   mins    Jonatan Villagomez, PT Student

## 2019-11-01 ENCOUNTER — TREATMENT (OUTPATIENT)
Dept: PHYSICAL THERAPY | Facility: CLINIC | Age: 69
End: 2019-11-01

## 2019-11-01 DIAGNOSIS — Z96.652 STATUS POST LEFT KNEE REPLACEMENT: Primary | ICD-10-CM

## 2019-11-01 DIAGNOSIS — R26.89 IMPAIRED GAIT AND MOBILITY: ICD-10-CM

## 2019-11-01 DIAGNOSIS — M25.60 LIMITED JOINT RANGE OF MOTION (ROM): ICD-10-CM

## 2019-11-01 PROCEDURE — 97110 THERAPEUTIC EXERCISES: CPT | Performed by: PHYSICAL THERAPIST

## 2019-11-01 PROCEDURE — G0283 ELEC STIM OTHER THAN WOUND: HCPCS | Performed by: PHYSICAL THERAPIST

## 2019-11-01 PROCEDURE — 97140 MANUAL THERAPY 1/> REGIONS: CPT | Performed by: PHYSICAL THERAPIST

## 2019-11-01 NOTE — PROGRESS NOTES
Physical Therapy Daily Progress Note      Patient: Clarissa Matos   : 1950  Treatment Diagnosis:     ICD-10-CM ICD-9-CM   1. Status post left knee replacement Z96.652 V43.65   2. Limited joint range of motion (ROM) M25.60 719.50   3. Impaired gait and mobility R26.89 781.2     Referring practitioner: Sma Bustaamnte MD  Date of Initial Visit: Type: THERAPY  Noted: 2019  Today's Date: 2019  Patient seen for 14 sessions         Clarissa Matos reports:       Subjective   Patient reports to therapy noting that she was able to navigate stairs reciprocally today 3 times without exacerbation of pain, although does note that her knee does feel numb.  States that she is feeling stiff in low back and pelvis.    Objective   See Exercise, Manual, and Modality Logs for complete treatment.       Assessment/Plan  Patient responded positively to treatment interventions aimed at improving SIJ alignment, restoring normal muscle length, improving ROM, and increasing LLE muscle strength.  Corrective sequence and lumbar rotation facet gapping were initiated due to abnormal alignment, which can contribute to abnormal stresses at knee joint down the kinematic chain.  Contract relax stretching of hamstring musculature proved to be beneficial in improving hamstring flexibility and reducing pain.  Seat was scooted closer for both NuStep and leg press in order to promote increased knee flexion, and also to promote overall LLE strength/stability.  Plan to continue with direction of treatment aimed at restoring normal hip and lower spine alignment, improving muscle flexibility, improving knee and hip ROM, improving knee and hip strength/stability, and increasing overall functional joint stability for increased performance with ADL and recreational activity completion.  Progress per Plan of Care           Timed:  Manual Therapy:  14       mins  27412;  Therapeutic Exercise:   16     mins  58932;   (14 minutes  concurrent)  Neuromuscular Krysta:        mins  39622;    Therapeutic Activity:          mins  43391;     Gait Training:           mins  03068;     Ultrasound:          mins  79856;    Iontophoresis:          mins  95415;  Dry Needling:          mins ;    Untimed:  Electrical Stimulation:    15     mins  85219 ( );  Mechanical Traction:         mins  80800;     Timed Treatment:  30    mins   Total Treatment:     59   mins    Jonatan Villagomez, PT Student

## 2019-11-13 ENCOUNTER — TREATMENT (OUTPATIENT)
Dept: PHYSICAL THERAPY | Facility: CLINIC | Age: 69
End: 2019-11-13

## 2019-11-13 DIAGNOSIS — M25.60 LIMITED JOINT RANGE OF MOTION (ROM): ICD-10-CM

## 2019-11-13 DIAGNOSIS — Z96.652 STATUS POST LEFT KNEE REPLACEMENT: Primary | ICD-10-CM

## 2019-11-13 DIAGNOSIS — R26.89 IMPAIRED GAIT AND MOBILITY: ICD-10-CM

## 2019-11-13 PROCEDURE — 97140 MANUAL THERAPY 1/> REGIONS: CPT | Performed by: PHYSICAL THERAPIST

## 2019-11-13 PROCEDURE — G0283 ELEC STIM OTHER THAN WOUND: HCPCS | Performed by: PHYSICAL THERAPIST

## 2019-11-13 PROCEDURE — 97110 THERAPEUTIC EXERCISES: CPT | Performed by: PHYSICAL THERAPIST

## 2019-11-19 ENCOUNTER — TREATMENT (OUTPATIENT)
Dept: PHYSICAL THERAPY | Facility: CLINIC | Age: 69
End: 2019-11-19

## 2019-11-19 DIAGNOSIS — Z96.652 STATUS POST LEFT KNEE REPLACEMENT: Primary | ICD-10-CM

## 2019-11-19 DIAGNOSIS — R26.89 IMPAIRED GAIT AND MOBILITY: ICD-10-CM

## 2019-11-19 DIAGNOSIS — M25.60 LIMITED JOINT RANGE OF MOTION (ROM): ICD-10-CM

## 2019-11-19 PROCEDURE — 97110 THERAPEUTIC EXERCISES: CPT | Performed by: PHYSICAL THERAPIST

## 2019-11-19 PROCEDURE — G0283 ELEC STIM OTHER THAN WOUND: HCPCS | Performed by: PHYSICAL THERAPIST

## 2019-11-19 PROCEDURE — 97140 MANUAL THERAPY 1/> REGIONS: CPT | Performed by: PHYSICAL THERAPIST

## 2019-11-19 NOTE — PROGRESS NOTES
Physical Therapy Re-Assessment / Re-Certification        Patient: Clarissa Matos   : 1950  Visit Diagnoses:     ICD-10-CM ICD-9-CM   1. Status post left knee replacement Z96.652 V43.65   2. Limited joint range of motion (ROM) M25.60 719.50   3. Impaired gait and mobility R26.89 781.2     Referring practitioner: Sam Bustamante MD  Date of Initial Visit: Type: THERAPY  Noted: 2019  Today's Date: 2019  Patient seen for 16 sessions      Subjective:   Clarissa Matos reports:   Subjective Questionnaire: LEFS:   Clinical Progress: inconsistent  Home Program Compliance: inconsistent  Treatment has included: therapeutic exercise, manual therapy, electrical stimulation and cryotherapy    Subjective   Patient reports to therapy noting that she had a near fall about a week and a half ago due to her sandal getting caught on a stair with ascending stairs.  States that she still has her normal pain and stiffness, but no new acute symptoms to report.    Pain at best: 4/10- ice, medication  Pain at worst: 7/10  Pain currently: 5/10    Objective   Active Range of Motion   Left Knee   Flexion: 103 degrees   Extension: 3 degree lag    Passive Range of Motion  Left Knee  Flexion: 112 degrees  Extension: 0 degrees     Strength/Myotome Testing      Left Knee   Flexion: 4 with pain  Extension: 4+  Quadriceps contraction: fair/poor     Swelling      Left Knee Girth Measurement (cm)   Joint line: 45 cm  10 cm above joint line: 50 cm  10 cm below joint line: 42 cm     Assessment/Plan  Patient presented with improvement with AROM left knee flexion, but had decreased ROM with extension.  Anterior/posterior tibial glides, soft tissue mobilization of knee musculature, and scar mobilizations proved to be beneficial in improving knee PROM.  Patient recorded decreased score with LEFS, decreased pain scores, and continues to present with decreased quadriceps contraction.  Girth measurements also increased since previous  session.  Contract relax stretching was attempted but then deferred due to pain limiting factors.  Discussed potential benefits of dry needling and patient is interested in pursuing next treatment session.  Regression with recert measurements likely due to inconsistent participation in therapy sessions and HEP completion.  Plan to continue with direction of treatment aimed at improving knee ROM, knee strength, girth measurements, pain score, and overall performance with functional mobility during ADL and recreational activity completion.  Progress toward previous goals: Partially Met: making progress towards goals    Goal Review  Short Term Goals (2-3 wks):  1.  Patient will have increased left knee ROM to: Flexion 130, Extension 0 to allow for increased functional joint mobility.  2.  Patient will have increased left knee strength to: Flexion 4+/5, Extension 4+/5 to allow for increased joint stabilization and support.  3.  Patient will have decreased pain to 4/10 at worst to allow for increased comfort with functional activities and allow for improved restorative sleep.  4.  Patient will have LE muscle flexibility WNLs.  5.  Patient will demonstrate normalized gait pattern.      Long Term Goals (4 wks):  1.  Patient will be independent in performance of HEP for carryover upon discharge from skilled PT services.  2.  Patient will have left knee strength of 5/5.  3.  Patient will have improved LEFS score of 50/80 or better.  4.  Patient will report return to performance of ADLs/IADLs/Leisure activities with minimal to no symptom reproduction/pain limitations.      Recommendations: Continue as planned  Timeframe: 1 month  Prognosis to achieve goals: good    PT Signature: Jonatan Villagomez, PT Student    Based upon review of the patient's progress and continued therapy plan, it is my medical opinion that Clarissa Matos should continue physical therapy treatment at [facility]  [address]  [place] [address]  [phone].    Signature: __________________________________  Sam Bustamante MD    Timed:  Manual Therapy:    16     mins  12129; (12 minutes concurrent)  Therapeutic Exercise:    14     mins  22597;     Neuromuscular Krysta:        mins  93790;    Therapeutic Activity:          mins  74678;     Gait Training:           mins  06689;     Ultrasound:          mins  13361;  Iontophoresis:          mins  79728;    Dry Needling:          mins ;    Untimed:  Electrical Stimulation:    15     mins  99545 ( );  Mechanical Traction:         mins  68195;     Timed Treatment:   30   mins   Total Treatment:     57   mins

## 2019-11-21 ENCOUNTER — TREATMENT (OUTPATIENT)
Dept: PHYSICAL THERAPY | Facility: CLINIC | Age: 69
End: 2019-11-21

## 2019-11-21 DIAGNOSIS — Z96.652 STATUS POST LEFT KNEE REPLACEMENT: Primary | ICD-10-CM

## 2019-11-21 DIAGNOSIS — R26.89 IMPAIRED GAIT AND MOBILITY: ICD-10-CM

## 2019-11-21 DIAGNOSIS — M25.60 LIMITED JOINT RANGE OF MOTION (ROM): ICD-10-CM

## 2019-11-21 PROCEDURE — G0283 ELEC STIM OTHER THAN WOUND: HCPCS | Performed by: PHYSICAL THERAPIST

## 2019-11-21 PROCEDURE — 97110 THERAPEUTIC EXERCISES: CPT | Performed by: PHYSICAL THERAPIST

## 2019-11-21 PROCEDURE — DRYNDL PR CUSTOM DRY NEEDLING SELF PAY: Performed by: PHYSICAL THERAPIST

## 2019-11-21 NOTE — PROGRESS NOTES
Physical Therapy Daily Progress Note      Patient: Clarissa Matos   : 1950  Treatment Diagnosis: No diagnosis found.  Referring practitioner: Sam Bustamante MD  Date of Initial Visit: No linked episodes  Today's Date: 2019  Patient seen for Visit count could not be calculated. Make sure you are using a visit which is associated with an episode. sessions         Clarissa Matos reports:       Subjective   Patient reports to therapy noting that she feels better than previous session but still has same pain in knee.  States that her pain intensity is a 4/10 and lies mostly in the anterior/lateral knee.    Objective   See Exercise, Manual, and Modality Logs for complete treatment.       Assessment/Plan  Patient responded positively to treatment interventions aimed at improving overall ROM, increasing functional joint stability, and promoting normal muscle length.  Patient performed NuStep for 10 minutes level 6 in order to promote endurance, and seat was moved from position 6 to 5 in order to promote knee flexion.  Leg press BLE at 80 lbs. And unilateral LE at 40 lbs performed in order to increase overall BLE strength.  Initiation of dry needling to left knee structures performed by supervising therapist in order to reduce adhesive soft tissue and treat trigger points for reduced pain.  E Stim and heat added post treatment in order to promote increased muscle flexibility and in order to reduce pain.  Plan to continue with direction of treatment aimed at increasing overall ROM, improving functional joint stability, and promoting normal muscle length for improved performance with functional mobility during ADL and recreational activity completion.    Soft tissue was assessed at left knee. PT noted point tenderness as well as palpable trigger points within the muslce tissue. On this date patient stated that they would like to undergo a dry needling procedure for the soft tissue dysfunction. Patient was  educated on the procedure for dry needling and consent waver on file. Patient was informed of the risks, possible adverse effects, along with the benefits of DN.  DN treatment performed by supervising therapist.  Progress per Plan of Care           Timed:  Manual Therapy:         mins  74062;  Therapeutic Exercise:    20     mins  63601;     Neuromuscular Krysta:        mins  20399;    Therapeutic Activity:          mins  80090;     Gait Training:           mins  46929;     Ultrasound:          mins  99449;    Iontophoresis:          mins  13067;  Dry Needling:    15      mins ;    Untimed:  Electrical Stimulation:  15       mins  60860 ( );  Mechanical Traction:         mins  89282;     Timed Treatment:   35   mins   Total Treatment:     50   mins    Jonatan Villagomez, PT Student    Tere Mcqueen PT

## 2019-11-26 ENCOUNTER — TREATMENT (OUTPATIENT)
Dept: PHYSICAL THERAPY | Facility: CLINIC | Age: 69
End: 2019-11-26

## 2019-11-26 DIAGNOSIS — R26.89 IMPAIRED GAIT AND MOBILITY: ICD-10-CM

## 2019-11-26 DIAGNOSIS — M25.60 LIMITED JOINT RANGE OF MOTION (ROM): ICD-10-CM

## 2019-11-26 DIAGNOSIS — Z96.652 STATUS POST LEFT KNEE REPLACEMENT: Primary | ICD-10-CM

## 2019-11-26 PROCEDURE — 97140 MANUAL THERAPY 1/> REGIONS: CPT | Performed by: PHYSICAL THERAPIST

## 2019-11-26 PROCEDURE — 97110 THERAPEUTIC EXERCISES: CPT | Performed by: PHYSICAL THERAPIST

## 2019-11-26 NOTE — PROGRESS NOTES
Physical Therapy Daily Progress Note      Patient: Clarissa Matos   : 1950  Treatment Diagnosis:     ICD-10-CM ICD-9-CM   1. Status post left knee replacement Z96.652 V43.65   2. Limited joint range of motion (ROM) M25.60 719.50   3. Impaired gait and mobility R26.89 781.2     Referring practitioner: Sam Bustamante MD  Date of Initial Visit: Type: THERAPY  Noted: 2019  Today's Date: 2019  Patient seen for 18 sessions         Clarissa Matos reports:     Subjective   Patient reports she has been having good days and bad days and overall the weather has been influencing her fibromyalgia.    Objective   See Exercise, Manual, and Modality Logs for complete treatment.       Assessment/Plan  Patient performed exercises for LE strengthening and flexibility with improved ROM and decreased HS muscle tightness post treatment.  ROM improved to 110 deg. Active flexion and was 115 deg. Passive flexion and 0 deg. Extension.  Progress per Plan of Care           Timed:  Manual Therapy:    13     mins  18751;  Therapeutic Exercise:    20     mins  93680;     Neuromuscular Krysta:        mins  49078;    Therapeutic Activity:          mins  93239;     Gait Training:           mins  29702;     Ultrasound:          mins  31835;    Iontophoresis:          mins  06761;  Dry Needling:          mins ;    Untimed:  Electrical Stimulation:         mins  45943 ( );  Mechanical Traction:         mins  02034;     Timed Treatment:   33   mins   Total Treatment:     43   mins    Tere Mcqueen PT  Physical Therapist

## 2019-12-12 ENCOUNTER — TREATMENT (OUTPATIENT)
Dept: PHYSICAL THERAPY | Facility: CLINIC | Age: 69
End: 2019-12-12

## 2019-12-12 DIAGNOSIS — Z96.652 STATUS POST LEFT KNEE REPLACEMENT: Primary | ICD-10-CM

## 2019-12-12 DIAGNOSIS — M25.60 LIMITED JOINT RANGE OF MOTION (ROM): ICD-10-CM

## 2019-12-12 DIAGNOSIS — R26.89 IMPAIRED GAIT AND MOBILITY: ICD-10-CM

## 2019-12-12 PROCEDURE — 97110 THERAPEUTIC EXERCISES: CPT | Performed by: PHYSICAL THERAPIST

## 2019-12-12 NOTE — PROGRESS NOTES
Physical Therapy Daily Progress Note      Patient: Clarissa Matos   : 1950  Treatment Diagnosis:     ICD-10-CM ICD-9-CM   1. Status post left knee replacement Z96.652 V43.65   2. Limited joint range of motion (ROM) M25.60 719.50   3. Impaired gait and mobility R26.89 781.2     Referring practitioner: Sam Bustamante MD  Date of Initial Visit: Type: THERAPY  Noted: 2019  Today's Date: 2019  Patient seen for 19 sessions         Clarissa Matos reports:     Subjective   Patient reports her knee has not been painful in general.  States yesterday she had pain all over-with no good explanation.  States her knee is still stiff.  Reports she has not been using ice at home.    Objective   See Exercise, Manual, and Modality Logs for complete treatment.       Assessment/Plan  Patient demonstrates neutral knee extension however her knee flexion is still limited.  After extensive stretching she was able to achieve 117 degrees flexion.  Encouraged her to stretch regularly at home and use ice to keep swelling reduced as she did have soft tissue approximation limitations due to swelling in her LE.    Progress per Plan of Care           Timed:  Manual Therapy:         mins  10799;  Therapeutic Exercise:    30     mins  85937 (10 minutes concurrently);     Neuromuscular Krysta:        mins  96925;    Therapeutic Activity:          mins  80729;     Gait Training:           mins  02818;     Ultrasound:          mins  62241;    Iontophoresis:          mins  92113;  Dry Needling:          mins ;    Untimed:  Electrical Stimulation:         mins  42920 ( );  Mechanical Traction:         mins  75975;     Timed Treatment:   30   mins   Total Treatment:     50   mins    Tere Mcqueen PT  Physical Therapist

## 2019-12-17 ENCOUNTER — TREATMENT (OUTPATIENT)
Dept: PHYSICAL THERAPY | Facility: CLINIC | Age: 69
End: 2019-12-17

## 2019-12-17 DIAGNOSIS — R26.89 IMPAIRED GAIT AND MOBILITY: ICD-10-CM

## 2019-12-17 DIAGNOSIS — Z96.652 STATUS POST LEFT KNEE REPLACEMENT: Primary | ICD-10-CM

## 2019-12-17 DIAGNOSIS — M25.60 LIMITED JOINT RANGE OF MOTION (ROM): ICD-10-CM

## 2019-12-17 PROCEDURE — G0283 ELEC STIM OTHER THAN WOUND: HCPCS | Performed by: PHYSICAL THERAPIST

## 2019-12-17 PROCEDURE — 97140 MANUAL THERAPY 1/> REGIONS: CPT | Performed by: PHYSICAL THERAPIST

## 2019-12-17 PROCEDURE — 97110 THERAPEUTIC EXERCISES: CPT | Performed by: PHYSICAL THERAPIST

## 2019-12-17 NOTE — PROGRESS NOTES
Physical Therapy Re-Assessment / Re-Certification        Patient: Clarissa Matos   : 1950  Visit Diagnoses:     ICD-10-CM ICD-9-CM   1. Status post left knee replacement Z96.652 V43.65   2. Limited joint range of motion (ROM) M25.60 719.50   3. Impaired gait and mobility R26.89 781.2     Referring practitioner: Sam Bustamante MD  Date of Initial Visit: Type: THERAPY  Noted: 2019  Today's Date: 2019  Patient seen for 20 sessions      Subjective:   Clarissa Matos reports:   Subjective Questionnaire: LEFS:   Clinical Progress: improved  Home Program Compliance: Yes  Treatment has included: therapeutic exercise, manual therapy, electrical stimulation and cryotherapy    Subjective   Patient reports she is stiff and sore in all her joints today due to the cold weather.      Objective     Active Range of Motion   Left Knee   Flexion: 110 degrees   Extension: 0 degree      Passive Range of Motion  Left Knee  Flexion: 120 degrees  Extension: 0 degrees     Strength/Myotome Testing      Left Knee   Flexion: 4+ with pain  Extension: 4+  Quadriceps contraction: fair     Swelling      Left Knee Girth Measurement (cm)   Joint line: 43.5 cm  10 cm above joint line: 47 cm  10 cm below joint line: 40 cm    Assessment/Plan  Patient presents with improved LE swelling and increased knee ROM as well as increased scar and patellar mobility.  She continues to have swelling resulting in soft tissue approximation barrier to knee flexion.  She has had inconsistent PT attendance although she reports HEP compliance as well as compliance with use of ice and elevation at home to reduce swelling.  She tolerated program this session with focus on flexion ROM, flexibility and reducing swelling in LE.  She did have improved soft tissue tension post treatment.  Encouraged frequent stretching and swelling management at home.  She will benefit from continued PT interventions.  Progress toward previous goals: Partially Met    Goal  Review  Short Term Goals (2-3 wks):  1.  Patient will have increased left knee ROM to: Flexion 130, Extension 0 to allow for increased functional joint mobility.  2.  Patient will have decreased pain to 4/10 at worst to allow for increased comfort with functional activities and allow for improved restorative sleep.  3.  Patient will have LE muscle flexibility WNLs.  4.  Patient will demonstrate normalized gait pattern.      Long Term Goals (4 wks):  1.  Patient will be independent in performance of HEP for carryover upon discharge from skilled PT services.  2.  Patient will have left knee strength of 5/5.  3.  Patient will have improved LEFS score of 50/80 or better.  4.  Patient will report return to performance of ADLs/IADLs/Leisure activities with minimal to no symptom reproduction/pain limitations.      Recommendations: Continue as planned  Timeframe: 1 month  Prognosis to achieve goals: good    PT Signature: Tere Mcqueen, PT      Based upon review of the patient's progress and continued therapy plan, it is my medical opinion that Clarissa Matos should continue physical therapy treatment at [facility] PHYS[facility] TH[facility]AP[facility]  [address] [place] [id number] [phone].    Signature: __________________________________  Sam Bustamante MD    Timed:  Manual Therapy:    13     mins  87764;  Therapeutic Exercise:    15     mins  35928;     Neuromuscular Krysta:        mins  69061;    Therapeutic Activity:          mins  60318;     Gait Training:           mins  95345;     Ultrasound:          mins  29268;  Iontophoresis:          mins  61900;    Dry Needling:          mins ;    Untimed:  Electrical Stimulation:    15     mins  47722 ( );  Mechanical Traction:         mins  10128;     Timed Treatment:   28   mins   Total Treatment:     45   mins

## 2019-12-23 ENCOUNTER — TREATMENT (OUTPATIENT)
Dept: PHYSICAL THERAPY | Facility: CLINIC | Age: 69
End: 2019-12-23

## 2019-12-23 DIAGNOSIS — Z96.652 STATUS POST LEFT KNEE REPLACEMENT: Primary | ICD-10-CM

## 2019-12-23 DIAGNOSIS — M25.60 LIMITED JOINT RANGE OF MOTION (ROM): ICD-10-CM

## 2019-12-23 DIAGNOSIS — R26.89 IMPAIRED GAIT AND MOBILITY: ICD-10-CM

## 2019-12-23 PROCEDURE — 97110 THERAPEUTIC EXERCISES: CPT | Performed by: PHYSICAL THERAPIST

## 2019-12-23 PROCEDURE — DRYNDL PR CUSTOM DRY NEEDLING SELF PAY: Performed by: PHYSICAL THERAPIST

## 2019-12-23 NOTE — PROGRESS NOTES
Physical Therapy Daily Progress Note      Patient: Clarissa Matos   : 1950  Treatment Diagnosis:     ICD-10-CM ICD-9-CM   1. Status post left knee replacement Z96.652 V43.65   2. Limited joint range of motion (ROM) M25.60 719.50   3. Impaired gait and mobility R26.89 781.2     Referring practitioner: Sam Bustamante MD  Date of Initial Visit: Type: THERAPY  Noted: 2019  Today's Date: 2019  Patient seen for 21 sessions         Clarissa Matos reports:     Subjective   Patient reports her knee is still stiff and tight across the top of her knee.  States she wants to have a DN treatment.    Objective   See Exercise, Manual, and Modality Logs for complete treatment.       Assessment/Plan  Soft tissue was assessed at left knee. PT noted point tenderness as well as palpable trigger points within the muscle tissue. On this date patient stated that they would like to undergo a dry needling procedure for the soft tissue dysfunction. Patient was educated on the procedure for dry needling and consent waver on file. Patient was informed of the risks, possible adverse effects, along with the benefits of DN.  Patient responded positively to DN treatment with improved knee ROM and decreased soft tissue tightness/pressure.    Progress per Plan of Care           Timed:  Manual Therapy:         mins  45297;  Therapeutic Exercise:    15     mins  05524;     Neuromuscular Krysta:        mins  81016;    Therapeutic Activity:          mins  19312;     Gait Training:           mins  42194;     Ultrasound:          mins  03830;    Iontophoresis:          mins  76936;  Dry Needling:     15     mins ;    Untimed:  Electrical Stimulation:         mins  06334 ( );  Mechanical Traction:         mins  00501;     Timed Treatment:   30   mins   Total Treatment:     35   mins    Tere Mcqueen PT  Physical Therapist

## 2019-12-27 ENCOUNTER — TREATMENT (OUTPATIENT)
Dept: PHYSICAL THERAPY | Facility: CLINIC | Age: 69
End: 2019-12-27

## 2019-12-27 DIAGNOSIS — R26.89 IMPAIRED GAIT AND MOBILITY: ICD-10-CM

## 2019-12-27 DIAGNOSIS — M25.60 LIMITED JOINT RANGE OF MOTION (ROM): ICD-10-CM

## 2019-12-27 DIAGNOSIS — Z96.652 STATUS POST LEFT KNEE REPLACEMENT: Primary | ICD-10-CM

## 2019-12-27 PROCEDURE — 97140 MANUAL THERAPY 1/> REGIONS: CPT | Performed by: PHYSICAL THERAPIST

## 2019-12-27 PROCEDURE — 97110 THERAPEUTIC EXERCISES: CPT | Performed by: PHYSICAL THERAPIST

## 2019-12-27 NOTE — PROGRESS NOTES
Physical Therapy Daily Progress Note      Patient: Clarissa Matos   : 1950  Treatment Diagnosis:     ICD-10-CM ICD-9-CM   1. Status post left knee replacement Z96.652 V43.65   2. Limited joint range of motion (ROM) M25.60 719.50   3. Impaired gait and mobility R26.89 781.2     Referring practitioner: Sam Bustamante MD  Date of Initial Visit: Type: THERAPY  Noted: 2019  Today's Date: 2019  Patient seen for 22 sessions         Clarissa Matos reports:       Subjective   Patient reports her knee feels better since the DN treatment.  States the ice felt weird when she tried it so she went back to heat.    Objective   See Exercise, Manual, and Modality Logs for complete treatment.       Assessment/Plan  Patient presents with improved left knee flexion ROM although limitations persist.  She responded positively to manual therapy with improved ROM and decreased soft tissue tension and tenderness.  Used ice post treatment with no adverse symptoms.  Encouraged continued knee flexion stretching at home.  Progress per Plan of Care           Timed:  Manual Therapy:    17     mins  15547;  Therapeutic Exercise:    13     mins  84808 (15 minutes concurrently);     Neuromuscular Krysta:        mins  95042;    Therapeutic Activity:          mins  78812;     Gait Training:           mins  32841;     Ultrasound:          mins  76789;    Iontophoresis:          mins  78331;  Dry Needling:          mins ;    Untimed:  Electrical Stimulation:         mins  73460 ( );  Mechanical Traction:         mins  57916;     Timed Treatment:   30   mins   Total Treatment:     60   mins    Tere Mcqueen PT  Physical Therapist

## 2020-01-15 ENCOUNTER — TREATMENT (OUTPATIENT)
Dept: PHYSICAL THERAPY | Facility: CLINIC | Age: 70
End: 2020-01-15

## 2020-01-15 DIAGNOSIS — R26.89 IMPAIRED GAIT AND MOBILITY: ICD-10-CM

## 2020-01-15 DIAGNOSIS — Z96.652 STATUS POST LEFT KNEE REPLACEMENT: Primary | ICD-10-CM

## 2020-01-15 DIAGNOSIS — M25.60 LIMITED JOINT RANGE OF MOTION (ROM): ICD-10-CM

## 2020-01-15 PROCEDURE — 97110 THERAPEUTIC EXERCISES: CPT | Performed by: PHYSICAL THERAPIST

## 2020-01-15 PROCEDURE — G0283 ELEC STIM OTHER THAN WOUND: HCPCS | Performed by: PHYSICAL THERAPIST

## 2020-01-15 NOTE — PROGRESS NOTES
Physical Therapy Re-Assessment / Re-Certification        Patient: Clarissa Matos   : 1950  Visit Diagnoses:     ICD-10-CM ICD-9-CM   1. Status post left knee replacement Z96.652 V43.65   2. Limited joint range of motion (ROM) M25.60 719.50   3. Impaired gait and mobility R26.89 781.2     Referring practitioner: Sam Bustamante MD  Date of Initial Visit: Type: THERAPY  Noted: 2019  Today's Date: 1/15/2020  Patient seen for 23 sessions      Subjective:   Clarissa Matos reports:   Subjective Questionnaire: LEFS:   Clinical Progress: improved  Home Program Compliance: Yes-inconsistent  Treatment has included: therapeutic exercise, therapeutic activity, gait training, electrical stimulation and cryotherapy    Subjective Evaluation    Pain  Current pain ratin  At best pain ratin  At worst pain ratin      Patient reports she is hurting all over today and has been really stressed about her dog being sick and was sick herself after [place] so she has not been doing exercises at home regularly.  States her knee pain fluctuates and at times she has to resort to going up/down steps one at a time.       Objective     Active Range of Motion   Left Knee   Flexion: 106 degrees   Extension: 0 degree      Passive Range of Motion  Left Knee  Flexion: 113 degrees  Extension: 0 degrees     Strength/Myotome Testing      Left Knee   Flexion: 4+ with pain  Extension: 4+  Quadriceps contraction: fair     Swelling      Left Knee Girth Measurement (cm)   Joint line: 45 cm  10 cm above joint line: 50 cm  10 cm below joint line: 42 cm       Assessment/Plan  Patient presents with increased LE edema which is limiting her knee flexion ROM.  Her knee flexion is less than last assessment which is influenced by increased edema as well as frequent gaps in PT attendance.  Her LEFS score has improved from  to 32.  She would benefit from continued PT to progress strength, ROM and maximize functional capacity.   Encouraged increased PT attendance, use of ice at home and regular HEP performance.   Progress toward previous goals: Partially Met    Goal Review  Short Term Goals (2-3 wks):  1.  Patient will have increased left knee ROM to: Flexion 120, Extension 0 to allow for increased functional joint mobility.  2.  Patient will have decreased pain to 4/10 at worst to allow for increased comfort with functional activities and allow for improved restorative sleep.  3.  Patient will have LE muscle flexibility WNLs.  4.  Patient will demonstrate normalized gait pattern.      Long Term Goals (4 wks):  1.  Patient will be independent in performance of HEP for carryover upon discharge from skilled PT services.  2.  Patient will have left knee strength of 5/5.  3.  Patient will have improved LEFS score of 50/80 or better.  4.  Patient will report return to performance of ADLs/IADLs/Leisure activities with minimal to no symptom reproduction/pain limitations.      Recommendations: Continue as planned  Timeframe: 1 month  Prognosis to achieve goals: good with compliance    PT Signature: Tere Mcqueen, PT      Based upon review of the patient's progress and continued therapy plan, it is my medical opinion that Clarissa Matos should continue physical therapy treatment at [facility] PHYS[facility] THERAP[facility]  [address] [place] [id number] [phone].    Signature: __________________________________  Sam Bustamante MD    Timed:  Manual Therapy:         mins  53169;  Therapeutic Exercise:    30     mins  06681 (10 minutes concurrently);     Neuromuscular Krysta:        mins  97393;    Therapeutic Activity:          mins  54899;     Gait Training:           mins  47151;     Ultrasound:          mins  73899;  Iontophoresis:          mins  11635;    Dry Needling:          mins ;    Untimed:  Electrical Stimulation:    15     mins  33711 ( );  Mechanical Traction:         mins  73824;     Timed  Treatment:   30   mins   Total Treatment:     60   mins

## 2020-01-23 ENCOUNTER — TREATMENT (OUTPATIENT)
Dept: PHYSICAL THERAPY | Facility: CLINIC | Age: 70
End: 2020-01-23

## 2020-01-23 DIAGNOSIS — Z96.652 STATUS POST LEFT KNEE REPLACEMENT: Primary | ICD-10-CM

## 2020-01-23 DIAGNOSIS — R26.89 IMPAIRED GAIT AND MOBILITY: ICD-10-CM

## 2020-01-23 DIAGNOSIS — M25.60 LIMITED JOINT RANGE OF MOTION (ROM): ICD-10-CM

## 2020-01-23 PROCEDURE — 97110 THERAPEUTIC EXERCISES: CPT | Performed by: PHYSICAL THERAPIST

## 2020-01-23 PROCEDURE — 97140 MANUAL THERAPY 1/> REGIONS: CPT | Performed by: PHYSICAL THERAPIST

## 2020-01-23 NOTE — PROGRESS NOTES
Physical Therapy Daily Progress Note      Patient: Clarissa Matos   : 1950  Treatment Diagnosis:     ICD-10-CM ICD-9-CM   1. Status post left knee replacement Z96.652 V43.65   2. Limited joint range of motion (ROM) M25.60 719.50   3. Impaired gait and mobility R26.89 781.2     Referring practitioner: Sam Bustamante MD  Date of Initial Visit: Type: THERAPY  Noted: 2019  Today's Date: 2020  Patient seen for 24 sessions         Clarissa Matos reports:     Subjective   Patient reports she has been working on her home exercises and feels her knee is less swollen today.     Objective     Active Range of Motion   Left Knee   Flexion: 110 degrees   Extension: 0 degrees     Passive Range of Motion  Left Knee  Flexion: 115 degrees  Extension: 0 degrees    See Exercise, Manual, and Modality Logs for complete treatment.       Assessment/Plan  Patient presents with improved knee ROM and decreased swelling in her left knee.  Encouraged continued HEP performance and use of ice to minimize pain and swelling.  She is progressing and will benefit from continued PT.  Progress per Plan of Care and Progress strengthening /stabilization /functional activity           Timed:  Manual Therapy:    12     mins  12502;  Therapeutic Exercise:    18     mins  30777 (10 min concurrently);     Neuromuscular Krysta:        mins  69862;    Therapeutic Activity:          mins  33185;     Gait Training:           mins  58241;     Ultrasound:          mins  99992;    Iontophoresis:          mins  90577;  Dry Needling:          mins ;    Untimed:  Electrical Stimulation:         mins  74493 ( );  Mechanical Traction:         mins  44126;     Timed Treatment:   30   mins   Total Treatment:     50   mins    Tere Mcqueen PT  Physical Therapist

## 2020-03-19 ENCOUNTER — DOCUMENTATION (OUTPATIENT)
Dept: PHYSICAL THERAPY | Facility: CLINIC | Age: 70
End: 2020-03-19

## 2020-03-19 DIAGNOSIS — R26.89 IMPAIRED GAIT AND MOBILITY: ICD-10-CM

## 2020-03-19 DIAGNOSIS — M25.60 LIMITED JOINT RANGE OF MOTION (ROM): ICD-10-CM

## 2020-03-19 DIAGNOSIS — Z96.652 STATUS POST LEFT KNEE REPLACEMENT: Primary | ICD-10-CM

## 2020-03-19 NOTE — PROGRESS NOTES
PHYSICAL THERAPY DISCHARGE SUMMARY      Patient: Clarissa Matos   : 1950  Diagnosis/ICD-10 Code:  Status post left knee replacement [Z96.652]  Referring practitioner: No ref. provider found  Date of Initial Visit: Type: THERAPY  Noted: 2019  Today's Date: 3/19/2020  Patient seen for 24 sessions      Subjective:   Clarissa Matos reports:   Subjective Questionnaire: NA  Clinical Progress: Unknown  Home Program Compliance: NA  Treatment has included: therapeutic exercise, manual therapy, electrical stimulation and cryotherapy    Subjective  NA    Objective   Unable to assess at discharge.    Assessment/Plan  Unable to assess as patient did not complete episode of PT.      Recommendations: Discharge    PT Signature: Tere Mcqueen PT

## 2020-05-07 ENCOUNTER — TREATMENT (OUTPATIENT)
Dept: PHYSICAL THERAPY | Facility: CLINIC | Age: 70
End: 2020-05-07

## 2020-05-07 PROCEDURE — DRYNDL PR CUSTOM DRY NEEDLING SELF PAY: Performed by: PHYSICAL THERAPIST

## 2020-05-07 NOTE — PROGRESS NOTES
Physical Therapy Daily Progress Note-Dry Needling      Patient: Clarissa Matos   : 1950  Treatment Diagnosis: No diagnosis found.  Referring practitioner: No ref. provider found  Date of Initial Visit: Type: THERAPY  Noted: 2020  Today's Date: 2020  Patient seen for 1 sessions         Clarissa Matos reports:     Subjective   Patient reports she has been having knee pain bilaterally.  Primary pain is in the lateral (distal ITB) region and medial knee (VMO).    Objective          Palpation   Left   Tenderness of the vastus medialis.   Trigger point to vastus medialis.     Right Tenderness of the vastus medialis.   Trigger point to vastus medialis.     Tenderness   Left Knee   Tenderness in the ITB, pes anserinus and quadriceps tendon.     Right Knee   Tenderness in the ITB, pes anserinus and quadriceps tendon.       See Exercise, Manual, and Modality Logs for complete treatment.       Assessment/Plan  Soft tissue was assessed at bilateral knees. PT noted point tenderness as well as palpable trigger points within the muslce tissue. On this date patient stated that they would like to undergo a dry needling procedure for the soft tissue dysfunction. Patient was educated on the procedure for dry needling and consent waver on file. Patient was informed of the risks, possible adverse effects, along with the benefits of DN.  Patient had positive response to DN treatment with decreased TTP and decreased soft tissue tension.  Will continue prn.               Timed:  Manual Therapy:         mins  46910;  Therapeutic Exercise:         mins  36753;     Neuromuscular Krysta:        mins  27096;    Therapeutic Activity:          mins  50030;     Gait Training:           mins  64440;     Ultrasound:          mins  79656;    Iontophoresis:          mins  76989;  Dry Needlin     mins ;    Untimed:  Electrical Stimulation:         mins  56154 ( );  Mechanical Traction:         mins  83482;     Timed  Treatment:   20   mins   Total Treatment:     30   mins    Tere Mcqueen, PT  Physical Therapist

## 2020-05-29 ENCOUNTER — TELEPHONE (OUTPATIENT)
Dept: ORTHOPEDIC SURGERY | Facility: CLINIC | Age: 70
End: 2020-05-29

## 2020-05-29 NOTE — TELEPHONE ENCOUNTER
Patient called checking on the status of previous message left. Informed patient MLL can see patient Mon 6/01 at [facility] & scheduled patient for 09:40 OPNC / LEFT Knee / DOI 5/21 / LEFT TKA 7/22/19 / ok per MLL

## 2020-06-01 ENCOUNTER — OFFICE VISIT (OUTPATIENT)
Dept: ORTHOPEDIC SURGERY | Facility: CLINIC | Age: 70
End: 2020-06-01

## 2020-06-01 VITALS — HEIGHT: 63 IN | WEIGHT: 176 LBS | TEMPERATURE: 98 F | BODY MASS INDEX: 31.18 KG/M2

## 2020-06-01 DIAGNOSIS — M25.562 PAIN IN BOTH KNEES, UNSPECIFIED CHRONICITY: Primary | ICD-10-CM

## 2020-06-01 DIAGNOSIS — M25.561 PAIN IN BOTH KNEES, UNSPECIFIED CHRONICITY: Primary | ICD-10-CM

## 2020-06-01 DIAGNOSIS — M54.41 CHRONIC BILATERAL LOW BACK PAIN WITH BILATERAL SCIATICA: ICD-10-CM

## 2020-06-01 DIAGNOSIS — M54.42 CHRONIC BILATERAL LOW BACK PAIN WITH BILATERAL SCIATICA: ICD-10-CM

## 2020-06-01 DIAGNOSIS — G89.29 CHRONIC BILATERAL LOW BACK PAIN WITH BILATERAL SCIATICA: ICD-10-CM

## 2020-06-01 PROCEDURE — 99214 OFFICE O/P EST MOD 30 MIN: CPT | Performed by: NURSE PRACTITIONER

## 2020-06-01 PROCEDURE — 73562 X-RAY EXAM OF KNEE 3: CPT | Performed by: NURSE PRACTITIONER

## 2020-06-01 RX ORDER — IRBESARTAN 300 MG/1
300 TABLET ORAL DAILY
COMMUNITY
Start: 2020-03-17 | End: 2020-08-10

## 2020-06-01 RX ORDER — MELOXICAM 15 MG/1
15 TABLET ORAL DAILY
Qty: 30 TABLET | Refills: 3 | Status: SHIPPED | OUTPATIENT
Start: 2020-06-01 | End: 2020-08-10

## 2020-06-01 RX ORDER — HYDROCHLOROTHIAZIDE 25 MG/1
25 TABLET ORAL DAILY
COMMUNITY
End: 2020-08-25 | Stop reason: SDUPTHER

## 2020-06-01 NOTE — PROGRESS NOTES
Patient: Clarissa Matos  YOB: 1950 69 y.o. female  Medical Record Number: [id number]    Chief Complaints:   Chief Complaint   Patient presents with   • Left Knee - Establish Care, Pain   • Right Knee - Establish Care, Pain       History of Present Illness:Clarissa Matos is a 69 y.o. female who presents with complaints of bilateral knee soreness and low back pain.  Apparently the patient fell backward a couple weeks ago after losing her balance twisted her body but did not have direct fall onto either knee.  She has had some pain in her lower back and both knees since.  She does have pain going down her legs at times.  Down to her foot.  Denies any numbness tingling change in bowel or bladder habits.  Describes the knee and back pain as a moderate constant stabbing type pain with occasional swelling, worse with standing walking, better with ice and rest.    Allergies:   Allergies   Allergen Reactions   • Percocet [Oxycodone-Acetaminophen] Itching   • Nsaids GI Intolerance   • Penicillins Rash       Medications:   Current Outpatient Medications   Medication Sig Dispense Refill   • DULoxetine (CYMBALTA) 60 MG capsule Take 60 mg by mouth Every Morning.     • hydroCHLOROthiazide (HYDRODIURIL) 12.5 MG tablet Take 12.5 mg by mouth Daily.     • HYDROcodone-acetaminophen (NORCO)  MG per tablet TK 1 T PO  QID PRN P  0   • irbesartan (AVAPRO) 300 MG tablet Take 300 mg by mouth Daily.     • LORazepam (ATIVAN) 0.5 MG tablet Take 0.5 mg by mouth 2 (Two) Times a Day As Needed for Anxiety.     • zolpidem (AMBIEN) 10 MG tablet Take 10 mg by mouth At Night As Needed for Sleep.     • acetaminophen (TYLENOL) 325 MG tablet Take 2 tablets by mouth Every 4 (Four) Hours As Needed for Fever (greater than 101 F).     • aspirin-acetaminophen-caffeine (EXCEDRIN MIGRAINE) 250-250-65 MG per tablet Take 1 tablet by mouth Every 6 (Six) Hours As Needed for Headache. May resume on 07/07/18.     • Cholecalciferol (VITAMIN D3)  "5000 units capsule capsule Take 5,000 Units by mouth Every Night.     • docusate sodium 100 MG capsule Take 100 mg by mouth 2 (Two) Times a Day. (Patient taking differently: Take 100 mg by mouth As Needed.)     • doxazosin (CARDURA) 2 MG tablet Take 2 mg by mouth Every Night.  0   • Fexofenadine HCl (MUCINEX ALLERGY PO) Take 1 tablet by mouth As Needed.     • HYDROmorphone (DILAUDID) 2 MG tablet Take 1 tablet by mouth Every 8 (Eight) Hours As Needed for Moderate Pain  for up to 20 doses. 20 tablet 0   • loratadine (CLARITIN) 10 MG tablet Take 10 mg by mouth Every Morning.     • losartan-hydrochlorothiazide (HYZAAR) 50-12.5 MG per tablet Take 1 tablet by mouth Every Morning.     • omeprazole (priLOSEC) 20 MG capsule Take 20 mg by mouth Every Morning.     • ondansetron (ZOFRAN) 4 MG tablet Take 1 tablet by mouth Every 6 (Six) Hours As Needed for Nausea or Vomiting for up to 10 doses. 10 tablet 0     No current facility-administered medications for this visit.      Facility-Administered Medications Ordered in Other Visits   Medication Dose Route Frequency Provider Last Rate Last Dose   • mupirocin (BACTROBAN) 2 % nasal ointment   Nasal BID Sam Bustamante MD             The following portions of the patient's history were reviewed and updated as appropriate: allergies, current medications, past family history, past medical history, past social history, past surgical history and problem list.    Review of Systems:   A 14 point review of systems was performed. All systems negative except pertinent positives/negative listed in HPI above    Physical Exam:   Vitals:    06/01/20 1006   Temp: 98 °F (36.7 °C)   Weight: 79.8 kg (176 lb)   Height: 160 cm (63\")   PainSc:   6       General: A and O x 3, ASA, NAD    SCLERA:    Normal    DENTITION:   Normal  Skin clear no unusual lesions noted  Bilateral knees patient has well-healed surgical incisions noted ex range of motion with no instability calf is soft and nontender  Low back " patient has decreased range of motion secondary to pain with positive straight leg bilaterally neurologic intact    Radiology:  Xrays 3views (ap,lateral, sunrise) bilateral knees were ordered and reviewed today secondary to pain and show well-placed well-positioned bilateral total knee replacements.  Compared to views are unchanged    Assessment/Plan:  Bilateral knee pain  Low back pain with radiculopathy    Patient discussed treatment options.  She would like to proceed with MRI of the lumbar spine.  In addition prescription sent to her pharmacy for meloxicam to take as needed and she will also start physical therapy.  She will let me know if her symptoms do not improve

## 2020-06-04 ENCOUNTER — TREATMENT (OUTPATIENT)
Dept: PHYSICAL THERAPY | Facility: CLINIC | Age: 70
End: 2020-06-04

## 2020-06-04 DIAGNOSIS — M54.6 ACUTE BILATERAL THORACIC BACK PAIN: ICD-10-CM

## 2020-06-04 DIAGNOSIS — M25.562 ACUTE PAIN OF BOTH KNEES: ICD-10-CM

## 2020-06-04 DIAGNOSIS — M54.41 ACUTE RIGHT-SIDED LOW BACK PAIN WITH RIGHT-SIDED SCIATICA: Primary | ICD-10-CM

## 2020-06-04 DIAGNOSIS — M25.561 ACUTE PAIN OF BOTH KNEES: ICD-10-CM

## 2020-06-04 PROCEDURE — 97162 PT EVAL MOD COMPLEX 30 MIN: CPT | Performed by: PHYSICAL THERAPIST

## 2020-06-04 PROCEDURE — 97110 THERAPEUTIC EXERCISES: CPT | Performed by: PHYSICAL THERAPIST

## 2020-06-04 NOTE — PROGRESS NOTES
Physical Therapy Initial Evaluation and Plan of Care      Patient: Clarissa Matos   : 1950  Diagnosis/ICD-10 Code:  Acute right-sided low back pain with right-sided sciatica [M54.41]  Referring practitioner: RUBY Mckenna  Date of Initial Visit: 2020  Today's Date: 2020  Patient seen for 1 sessions           Subjective Evaluation    History of Present Illness    Subjective comment: [place] car ride to [place] along with recent fall resulting in acute (R) LBP with 3-5 radicular symtpoms/ thoracic pain/ (B) knee pain (L) > (R).  currently on oral dose pack day 2 Pain  Current pain ratin  At best pain ratin  At worst pain ratin  Quality: throbbing, sharp and radiating  Aggravating factors: ambulation, squatting, outstretched reach, repetitive movement, stairs, overhead activity, standing, movement and lifting  Progression: worsening    Patient Goals  Patient goals for therapy: improved balance, increased motion, return to sport/leisure activities, independence with ADLs/IADLs, increased strength, decreased pain and decreased edema             Objective           General Comments     Lumbar Comments  Decreased lumbar AROM 50%; pain with all transfers. 78% oswestry score; decreased (R) L3-5 dermatomes/ decreased (B) hip flex/ knee extension/ core trunk lumbar stabilization 3+/5 secondary to pain; sitting tolerance 5 mins; severe ttp t8-10 paraspinals (B) QL; (-) slump/ (-) ASRL (B) ; timed sit-stand x 5 23 s. Ambulation tolerance 10 mins.  (+) pain with cough/ sneeze; MRI lumbar spine scheduled for 2020;  Hx of fibromyalgia. (L) TKA ; (R) TKA . Increased effusion is noted (L) LE;(-) homans/ no pitting/  (-) xray (L) LE. Pt has hx of fall.        See Exercise, Manual, and Modality Logs for complete treatment.   Functional outcome score: 78% oswetry            Assessment & Plan     Assessment  Impairments: abnormal gait, abnormal muscle tone, abnormal or restricted ROM,  activity intolerance, impaired balance, impaired physical strength, lacks appropriate home exercise program, pain with function, safety issue and weight-bearing intolerance  Assessment details: Clarissa Matos is a 69 y.o. year-old female referred to physical therapy for LBP/ (B) knee pain/ thoracic pain/ unsteady gait. She presents with a evolving clinical presentation.  She has comorbidities of fibromyalgia and no personal factors  that may affect her progress in the plan of care.  Signs and symptoms are consistent with physical therapy diagnosis of LBP (R) L3-5 radicular symptoms/ (B) knee pain/ thoracic pain/ unsteady gait. Pt demonstrates decreased lumbar/ thoracic  AROM, severe ttp thoracic / lumbar paraspinals;  Decreased core trunk lumbar stabilization is noted 3+/5;  (+) antalgia with gait is observed.  5 min sitting/ 10 min walking tolerance is noted. Pt reports 6/ 10 pain at rest. Patient is appropriate for skilled physical therapy in order to reduce pain and increase ease with daily mobility.   Prognosis: good  Functional Limitations: carrying objects, lifting, sleeping, walking, pulling, pushing, uncomfortable because of pain, sitting, standing, stooping and reaching behind back  Goals  Plan Goals: Short Term Goals for completion in 30 days:   -Patient will report a reduction in pain for 12-24 hours or greater following aquatic therapy session 6 wks  -Patient will demonstrate good core stabilization strength with advanced  aquatic exercises such as bicycle kicks and tuck ups to help improve postural stability 6 wks  -Patient will report increased standing tolerance from 10 min to 20 min or greater to allow patient to complete ADLs such as cooking without pain/discomfort 6 wks  - increased lumbar AROM to WFL to allow for increased ease with dressing/grooming bathing activities.     LTGs for completion within 90 days:  -Patient will demonstrate sit to stand without use of hands in order to increase  functional strength 12 wks.  -Patient will increase walking tolerance from 10 min to 20 min or greater to allow for patient to walk for leisure/[id number] wks.  -Patient will demonstrate independence with water walks and stretches to promote independent managment of [id number] wks.  -Patient will improve 5xSTS from 24 seconds to </= 19 econds in order to decrease risk of falls and increase functional [id number] wks.  -Patient will increase LE strength to 4-/5 or greater to increase LE stability during gait12 wks.    Plan  Therapy options: will be seen for skilled physical therapy services  Planned modality interventions: ultrasound and TENS  Other planned modality interventions: aquatic PT  Planned therapy interventions: postural training, stretching, therapeutic activities, IADL retraining, home exercise program, gait training, functional ROM exercises, flexibility, abdominal trunk stabilization, ADL retraining and transfer training  Other planned therapy interventions: Aquatic therapy  Frequency: 2x week (36 visits)  Duration in weeks: 12  Treatment plan discussed with: patient  Plan details: Aquatic therapy for core stabilization, LE strength/stability, gait training, balance, and posture        Timed:  Manual Therapy:        mins  63642;  Therapeutic Exercise:      15   mins  93219;     Neuromuscular Krysta:        mins  03971;    Therapeutic Activity:          mins  07565;     Gait Training:           mins  92976;     Ultrasound:          mins  00115;    Electrical Stimulation:         mins  85011 ( );  Iontophoresis         mins 65200  Dry Needling        mins      Untimed:  Electrical Stimulation:         mins  82174 ( );  Mechanical Traction:         mins  41509;     Timed Treatment:  15    mins   Total Treatment:     39   mins    PT SIGNATURE: Markel Ralph, PT   DATE TREATMENT INITIATED: 6/4/2020    Initial Certification  Certification Period: 9/2/2020  I certify that the therapy services  are furnished while this patient is under my care.  The services outlined above are required by this patient, and will be reviewed every 90 days.     PHYSICIAN: Dania Kevin, APRN      DATE:     Please sign and return via fax to [phone].. Thank you, [facility].

## 2020-06-07 ENCOUNTER — HOSPITAL ENCOUNTER (OUTPATIENT)
Dept: MRI IMAGING | Facility: HOSPITAL | Age: 70
Discharge: HOME | End: 2020-06-07
Admitting: NURSE PRACTITIONER

## 2020-06-07 DIAGNOSIS — M54.42 CHRONIC BILATERAL LOW BACK PAIN WITH BILATERAL SCIATICA: ICD-10-CM

## 2020-06-07 DIAGNOSIS — M54.41 CHRONIC BILATERAL LOW BACK PAIN WITH BILATERAL SCIATICA: ICD-10-CM

## 2020-06-07 DIAGNOSIS — G89.29 CHRONIC BILATERAL LOW BACK PAIN WITH BILATERAL SCIATICA: ICD-10-CM

## 2020-06-07 PROCEDURE — 72148 MRI LUMBAR SPINE W/O DYE: CPT

## 2020-06-09 ENCOUNTER — TELEPHONE (OUTPATIENT)
Dept: ORTHOPEDIC SURGERY | Facility: CLINIC | Age: 70
End: 2020-06-09

## 2020-06-09 DIAGNOSIS — G89.29 CHRONIC BILATERAL LOW BACK PAIN WITH BILATERAL SCIATICA: Primary | ICD-10-CM

## 2020-06-09 DIAGNOSIS — M54.42 CHRONIC BILATERAL LOW BACK PAIN WITH BILATERAL SCIATICA: Primary | ICD-10-CM

## 2020-06-09 DIAGNOSIS — M54.41 CHRONIC BILATERAL LOW BACK PAIN WITH BILATERAL SCIATICA: Primary | ICD-10-CM

## 2020-06-09 NOTE — TELEPHONE ENCOUNTER
----- Message from RUBY Mckenna sent at 6/9/2020  8:34 AM EDT -----  Please let the patient know that the MRI of her lumbar spine shows significant arthritic changes.  No fracture seen.  Please let her know that if she still having some low back pain we can try epidural injections in addition to the physical therapy that we discussed

## 2020-06-09 NOTE — TELEPHONE ENCOUNTER
Patient wanted to move forward with physical therapy but wants to hold off on the epidurals for now and see if PT will help.

## 2020-06-10 ENCOUNTER — CONSULT (OUTPATIENT)
Dept: ORTHOPEDIC SURGERY | Facility: CLINIC | Age: 70
End: 2020-06-10

## 2020-06-10 VITALS — TEMPERATURE: 98.2 F | BODY MASS INDEX: 31.18 KG/M2 | HEIGHT: 63 IN | WEIGHT: 176 LBS

## 2020-06-10 DIAGNOSIS — Z96.652 STATUS POST LEFT KNEE REPLACEMENT: Primary | ICD-10-CM

## 2020-06-10 DIAGNOSIS — S49.92XA INJURY OF LEFT SHOULDER, INITIAL ENCOUNTER: ICD-10-CM

## 2020-06-10 PROCEDURE — 99213 OFFICE O/P EST LOW 20 MIN: CPT | Performed by: ORTHOPAEDIC SURGERY

## 2020-06-10 PROCEDURE — 73030 X-RAY EXAM OF SHOULDER: CPT | Performed by: ORTHOPAEDIC SURGERY

## 2020-06-10 RX ORDER — HYDROCODONE BITARTRATE AND ACETAMINOPHEN 7.5; 325 MG/1; MG/1
TABLET ORAL
COMMUNITY
Start: 2020-06-02 | End: 2021-07-07

## 2020-06-10 NOTE — PROGRESS NOTES
Patient:Clarissa Matos    YOB: 1950    Medical Record Number:[id number]    Chief Complaints:  Left shoulder injury    History of Present Illness:     69 y.o. female patient who presents for her left shoulder.  She is about 2 years out from her reverse for fracture.  She tells me she was doing well up until May 31 when she fell and torqued her shoulder.  She has been having pain in the shoulder since the fall.  It is slowly getting better.  Most of her pain is posterior in the musculature over the back of the shoulder.  She reports that she is still able to move it fairly well.  Reaching across her body and back behind her body are both mildly uncomfortable.  Current pain is moderate and aching.    Allergies:  Allergies   Allergen Reactions   • Percocet [Oxycodone-Acetaminophen] Itching   • Nsaids GI Intolerance   • Penicillins Rash   • Methylprednisolone Anxiety, Arrhythmia, Confusion, Dizziness, GI Intolerance, Headache, Irritability, Nausea Only, Palpitations and Tinnitus       Home Medications:    Current Outpatient Medications:   •  acetaminophen (TYLENOL) 325 MG tablet, Take 2 tablets by mouth Every 4 (Four) Hours As Needed for Fever (greater than 101 F)., Disp: , Rfl:   •  aspirin-acetaminophen-caffeine (EXCEDRIN MIGRAINE) 250-250-65 MG per tablet, Take 1 tablet by mouth Every 6 (Six) Hours As Needed for Headache. May resume on 07/07/18., Disp: , Rfl:   •  Cholecalciferol (VITAMIN D3) 5000 units capsule capsule, Take 5,000 Units by mouth Every Night., Disp: , Rfl:   •  docusate sodium 100 MG capsule, Take 100 mg by mouth 2 (Two) Times a Day. (Patient taking differently: Take 100 mg by mouth As Needed.), Disp: , Rfl:   •  doxazosin (CARDURA) 2 MG tablet, Take 2 mg by mouth Every Night., Disp: , Rfl: 0  •  DULoxetine (CYMBALTA) 60 MG capsule, Take 60 mg by mouth Every Morning., Disp: , Rfl:   •  Fexofenadine HCl (MUCINEX ALLERGY PO), Take 1 tablet by mouth As Needed., Disp: , Rfl:   •   hydroCHLOROthiazide (HYDRODIURIL) 12.5 MG tablet, Take 12.5 mg by mouth Daily., Disp: , Rfl:   •  HYDROcodone-acetaminophen (NORCO) 7.5-325 MG per tablet, TK 1 TO 3 TS PO D AS NEEDED FOR  INCREASED PAIN DUE TO RECENT TRIP, Disp: , Rfl:   •  irbesartan (AVAPRO) 300 MG tablet, Take 300 mg by mouth Daily., Disp: , Rfl:   •  loratadine (CLARITIN) 10 MG tablet, Take 10 mg by mouth Every Morning., Disp: , Rfl:   •  LORazepam (ATIVAN) 0.5 MG tablet, Take 0.5 mg by mouth 2 (Two) Times a Day As Needed for Anxiety., Disp: , Rfl:   •  losartan-hydrochlorothiazide (HYZAAR) 50-12.5 MG per tablet, Take 1 tablet by mouth Every Morning., Disp: , Rfl:   •  meloxicam (Mobic) 15 MG tablet, Take 1 tablet by mouth Daily., Disp: 30 tablet, Rfl: 3  •  omeprazole (priLOSEC) 20 MG capsule, Take 20 mg by mouth Every Morning., Disp: , Rfl:   •  zolpidem (AMBIEN) 10 MG tablet, Take 10 mg by mouth At Night As Needed for Sleep., Disp: , Rfl:   •  ondansetron (ZOFRAN) 4 MG tablet, Take 1 tablet by mouth Every 6 (Six) Hours As Needed for Nausea or Vomiting for up to 10 doses., Disp: 10 tablet, Rfl: 0  No current facility-administered medications for this visit.     Facility-Administered Medications Ordered in Other Visits:   •  mupirocin (BACTROBAN) 2 % nasal ointment, , Nasal, BID, Sam Bustamante MD    Past Medical History:   Diagnosis Date   • Anxiety    • Arthritis    • Depression    • Fibromyalgia     DX    • GERD (gastroesophageal reflux disease)    • Hard to intubate     STATES TOOK 30 MINUTES TO BE INTUBATED   • Headache    • Hiatal hernia    • Hypertension    • Irregular heart rate     PVC'S   • Limited joint range of motion     LT KNEE       Past Surgical History:   Procedure Laterality Date   • BREAST BIOPSY Right    • CATARACT EXTRACTION W/ INTRAOCULAR LENS IMPLANT Bilateral    •  SECTION      x 2   • CHOLECYSTECTOMY WITH INTRAOPERATIVE CHOLANGIOGRAM N/A 2017    Procedure: CHOLECYSTECTOMY LAPAROSCOPIC  INTRAOPERATIVE CHOLANGIOGRAM;  Surgeon: Demi Madden MD;  Location: [facility] OR;  Service:    • HYSTERECTOMY  2010   • REPLACEMENT TOTAL KNEE Right    • ROTATOR CUFF REPAIR Right 2001   • TOTAL KNEE ARTHROPLASTY Left 7/22/2019    Procedure: LEFT TOTAL KNEE ARTHROPLASTY;  Surgeon: Sam Bustamante MD;  Location: [facility] OR;  Service: Orthopedics   • TOTAL SHOULDER ARTHROPLASTY W/ DISTAL CLAVICLE EXCISION Left 7/5/2018    Procedure: Reverse Total Shoulder Arthroplsty for fracture;  Surgeon: Louis Prasad MD;  Location: [facility] OR;  Service: Orthopedics   • TUBAL ABDOMINAL LIGATION  1986       Social History     Occupational History   • Not on file   Tobacco Use   • Smoking status: Never Smoker   • Smokeless tobacco: Never Used   Substance and Sexual Activity   • Alcohol use: No   • Drug use: No   • Sexual activity: Defer      Social History     Social History Narrative   • Not on file       Family History   Problem Relation Age of Onset   • Heart disease Mother    • Hypertension Mother    • Heart disease Father    • Hypertension Father    • No Known Problems Sister    • No Known Problems Brother    • No Known Problems Daughter    • No Known Problems Son    • No Known Problems Maternal Grandmother    • No Known Problems Paternal Grandmother    • No Known Problems Maternal Aunt    • No Known Problems Paternal Aunt    • BRCA 1/2 Neg Hx    • Breast cancer Neg Hx    • Colon cancer Neg Hx    • Endometrial cancer Neg Hx    • Ovarian cancer Neg Hx    • Malig Hyperthermia Neg Hx        Review of Systems:      Constitutional: Denies fever, shaking or chills   Eyes: Denies change in visual acuity   HEENT: Denies nasal congestion or sore throat   Respiratory: Denies cough or shortness of breath   Cardiovascular: Denies chest pain or edema  Endocrine: Denies tremors, palpitations, intolerance of heat or cold, polyuria, polydipsia.  GI: Denies abdominal pain, nausea, vomiting, bloody stools or diarrhea  : Denies  "frequency, urgency, incontinence, retention, or nocturia.  Musculoskeletal: Denies numbness, tingling or loss of motor function except as above  Integument: Denies rash, lesion or ulceration   Neurologic: Denies headache or focal weakness, deficits  Heme: Denies spontaneous or excessive bleeding, epistaxis, hematuria, melena, fatigue, enlarged or tender lymph nodes.      All other pertinent positives and negatives as noted above in HPI.    Physical Exam:69 y.o. female  Vitals:    06/10/20 1526   Temp: 98.2 °F (36.8 °C)   Weight: 79.8 kg (176 lb)   Height: 160 cm (63\")     General:  Patient is awake and alert.  Appears in no acute distress or discomfort.    Psych:  Affect and demeanor are appropriate.    Extremities: Left shoulder is examined.  Surgical incision is healed.  There is no palpable effusion.  No discrete bony tenderness.  She has some mild tenderness over the infraspinatus fossa as well as some mild tenderness over the posterior head of the deltoid.  Her motion is good.  She can still elevate to about 160 degrees, abduct about 80 degrees and externally rotate about 45 degrees.  Abduction against resistance is uncomfortable.  Horizontal abduction is also uncomfortable.  There is no appreciable apprehension or instability.  She has intact motor and sensory function in her hand.  Brisk capillary refill.    Imaging: AP, scapular Y and axillary views of the left shoulder are ordered, reviewed, and compared to previous x-rays.  I do not see any new findings relative to previous x-rays.  No acute abnormalities.    Assessment/Plan:  Left shoulder muscle strain    I suspect that this is a soft tissue injury.  It is only been about 10 days now and I think we need to give it some time.  I recommended getting her into physical therapy.  She would like to try aqua therapy.  She was given the appropriate referral.  I told her that I want to see her back in about a month if she is not significantly improved.    Louis " SANJAY Prasad MD    06/10/2020

## 2020-06-16 ENCOUNTER — TREATMENT (OUTPATIENT)
Dept: PHYSICAL THERAPY | Facility: CLINIC | Age: 70
End: 2020-06-16

## 2020-06-16 DIAGNOSIS — M25.561 ACUTE PAIN OF BOTH KNEES: ICD-10-CM

## 2020-06-16 DIAGNOSIS — M25.562 ACUTE PAIN OF BOTH KNEES: ICD-10-CM

## 2020-06-16 DIAGNOSIS — M54.6 ACUTE BILATERAL THORACIC BACK PAIN: ICD-10-CM

## 2020-06-16 DIAGNOSIS — M54.41 ACUTE RIGHT-SIDED LOW BACK PAIN WITH RIGHT-SIDED SCIATICA: Primary | ICD-10-CM

## 2020-06-16 PROCEDURE — 97113 AQUATIC THERAPY/EXERCISES: CPT | Performed by: PHYSICAL THERAPIST

## 2020-06-16 NOTE — PROGRESS NOTES
Physical Therapy Daily Progress Note    Patient: Clarissa Matos   : 1950  Diagnosis/ICD-10 Code:  Acute right-sided low back pain with right-sided sciatica [M54.41]  Referring practitioner: RUBY Mckenna  Date of Initial Visit: Type: THERAPY  Noted: 2020  Today's Date: 2020  Patient seen for 2 sessions             Subjective   Stairs still cause me a lot of pain I try to limit how often I use them at home. The home exercises do seem to help.     Objective     Water Walk             forward/backward/side step- 5 min warm up  Stretch 1                calf stretch 20 sec x2  Stretch 2                  hamstring hip sweeps with small noodle x20  Stretch 3                    wall walk stretch 30 sec x2  Stretch Other 1        seated piriformis stretch 30 sec x2  Stretch Other 2          Vertical Traction       5 min at end with large noodle   Abdominals             small dumbbell with both hands x15  Clams                         Hip Abd/Add             x10 ( pain increased, stopped exercise)  Hip Flex/Ext                March in Place         walking, 2 laps  Mini Squat              x15  Toe/Heel Raises        Uni-Squat                   Uni-Clock                   Step Ups                    Bicycle                                 seated-2 min  Flutter/Scissor                    / seated x20  Exercise 1                  Exercise 2                  Exercise 3       Assessment/Plan  First aquatic therapy session. Provided vc’s and demonstration for all exercises performed. Focused on upright posture during standing exercises and performed movements slowly to increase muscle control. Patient complained of pain after performing squats (and possibly from standing hip abduction) therefore had patient perform remaining exercises while seated at bench and ended with traction/floating in deep end.     Plan: add small step ups.       Timed:  Aquatic Therapy    45    mins [id number];    Tere Zamora  PT  Physical Therapist

## 2020-06-18 ENCOUNTER — TREATMENT (OUTPATIENT)
Dept: PHYSICAL THERAPY | Facility: CLINIC | Age: 70
End: 2020-06-18

## 2020-06-18 DIAGNOSIS — M25.562 ACUTE PAIN OF BOTH KNEES: ICD-10-CM

## 2020-06-18 DIAGNOSIS — M25.561 ACUTE PAIN OF BOTH KNEES: ICD-10-CM

## 2020-06-18 DIAGNOSIS — M54.41 ACUTE RIGHT-SIDED LOW BACK PAIN WITH RIGHT-SIDED SCIATICA: Primary | ICD-10-CM

## 2020-06-18 DIAGNOSIS — M54.6 ACUTE BILATERAL THORACIC BACK PAIN: ICD-10-CM

## 2020-06-18 PROCEDURE — 97113 AQUATIC THERAPY/EXERCISES: CPT | Performed by: PHYSICAL THERAPIST

## 2020-06-23 ENCOUNTER — OFFICE VISIT (OUTPATIENT)
Dept: ORTHOPEDIC SURGERY | Facility: CLINIC | Age: 70
End: 2020-06-23

## 2020-06-23 ENCOUNTER — TREATMENT (OUTPATIENT)
Dept: PHYSICAL THERAPY | Facility: CLINIC | Age: 70
End: 2020-06-23

## 2020-06-23 VITALS — WEIGHT: 176 LBS | BODY MASS INDEX: 31.18 KG/M2 | HEIGHT: 63 IN | TEMPERATURE: 97.7 F

## 2020-06-23 DIAGNOSIS — M54.6 ACUTE BILATERAL THORACIC BACK PAIN: ICD-10-CM

## 2020-06-23 DIAGNOSIS — M54.41 ACUTE RIGHT-SIDED LOW BACK PAIN WITH RIGHT-SIDED SCIATICA: Primary | ICD-10-CM

## 2020-06-23 DIAGNOSIS — M25.562 ACUTE PAIN OF BOTH KNEES: ICD-10-CM

## 2020-06-23 DIAGNOSIS — Z96.653 STATUS POST BILATERAL KNEE REPLACEMENTS: Primary | ICD-10-CM

## 2020-06-23 DIAGNOSIS — M25.561 ACUTE PAIN OF BOTH KNEES: ICD-10-CM

## 2020-06-23 PROCEDURE — 97113 AQUATIC THERAPY/EXERCISES: CPT | Performed by: PHYSICAL THERAPIST

## 2020-06-23 PROCEDURE — 99213 OFFICE O/P EST LOW 20 MIN: CPT | Performed by: ORTHOPAEDIC SURGERY

## 2020-06-23 RX ORDER — MORPHINE SULFATE 15 MG/1
15 TABLET, FILM COATED, EXTENDED RELEASE ORAL EVERY 12 HOURS
COMMUNITY
Start: 2020-06-19

## 2020-06-23 NOTE — PROGRESS NOTES
Patient: Clarissa Matos  YOB: 1950 69 y.o. female  Medical Record Number: [id number]    Chief Complaints:   Chief Complaint   Patient presents with   • Lumbar Spine - Follow-up   • Right Leg - Follow-up       History of Present Illness:Clarissa Matos is a 69 y.o. female who presents for follow-up of  hai lateral knee / leg pain-  Burning aching with activity.    Allergies:   Allergies   Allergen Reactions   • Percocet [Oxycodone-Acetaminophen] Itching   • Nsaids GI Intolerance   • Penicillins Rash   • Methylprednisolone Anxiety, Arrhythmia, Confusion, Dizziness, GI Intolerance, Headache, Irritability, Nausea Only, Palpitations and Tinnitus       Medications:   Current Outpatient Medications   Medication Sig Dispense Refill   • acetaminophen (TYLENOL) 325 MG tablet Take 2 tablets by mouth Every 4 (Four) Hours As Needed for Fever (greater than 101 F).     • aspirin-acetaminophen-caffeine (EXCEDRIN MIGRAINE) 250-250-65 MG per tablet Take 1 tablet by mouth Every 6 (Six) Hours As Needed for Headache. May resume on 07/07/18.     • Cholecalciferol (VITAMIN D3) 5000 units capsule capsule Take 5,000 Units by mouth Every Night.     • diclofenac (VOLTAREN) 1 % gel gel Apply 4 g topically to the appropriate area as directed 4 (Four) Times a Day As Needed.     • docusate sodium 100 MG capsule Take 100 mg by mouth 2 (Two) Times a Day. (Patient taking differently: Take 100 mg by mouth As Needed.)     • DULoxetine (CYMBALTA) 60 MG capsule Take 60 mg by mouth Every Morning.     • hydroCHLOROthiazide (HYDRODIURIL) 12.5 MG tablet Take 12.5 mg by mouth Daily.     • HYDROcodone-acetaminophen (NORCO) 7.5-325 MG per tablet TK 1 TO 3 TS PO D AS NEEDED FOR  INCREASED PAIN DUE TO RECENT TRIP     • LORazepam (ATIVAN) 0.5 MG tablet Take 0.5 mg by mouth 2 (Two) Times a Day As Needed for Anxiety.     • Morphine (MS CONTIN) 15 MG 12 hr tablet Take 15 mg by mouth Every 12 (Twelve) Hours.     • ondansetron (ZOFRAN) 4 MG tablet  "Take 1 tablet by mouth Every 6 (Six) Hours As Needed for Nausea or Vomiting for up to 10 doses. 10 tablet 0   • zolpidem (AMBIEN) 10 MG tablet Take 10 mg by mouth At Night As Needed for Sleep.     • doxazosin (CARDURA) 2 MG tablet Take 2 mg by mouth Every Night.  0   • Fexofenadine HCl (MUCINEX ALLERGY PO) Take 1 tablet by mouth As Needed.     • irbesartan (AVAPRO) 300 MG tablet Take 300 mg by mouth Daily.     • loratadine (CLARITIN) 10 MG tablet Take 10 mg by mouth Every Morning.     • losartan-hydrochlorothiazide (HYZAAR) 50-12.5 MG per tablet Take 1 tablet by mouth Every Morning.     • meloxicam (Mobic) 15 MG tablet Take 1 tablet by mouth Daily. 30 tablet 3   • omeprazole (priLOSEC) 20 MG capsule Take 20 mg by mouth Every Morning.       No current facility-administered medications for this visit.      Facility-Administered Medications Ordered in Other Visits   Medication Dose Route Frequency Provider Last Rate Last Dose   • mupirocin (BACTROBAN) 2 % nasal ointment   Nasal BID Sam Bustamante MD             The following portions of the patient's history were reviewed and updated as appropriate: allergies, current medications, past family history, past medical history, past social history, past surgical history and problem list.    Review of Systems:   A 14 point review of systems was performed. All systems negative except pertinent positives/negative listed in HPI above    Physical Exam:   Vitals:    06/23/20 1626   Temp: 97.7 °F (36.5 °C)   TempSrc: Temporal   Weight: 79.8 kg (176 lb)   Height: 160 cm (63\")       General: A and O x 3, ASA, NAD    SCLERA:    Normal    DENTITION:   Normal  Knee:  bilateral    ALIGNMENT:     Neutral  ,   Patella  tracks  Midline without crepitance    GAIT:    Nonantalgic    SKIN:    Well healed midline incision, no erythema or fluctuance    RANGE OF MOTION:   0  -  120   DEG    STRENGTH:   5  / 5    LIGAMENTS:    No varus / valgus instability.   No  Flexion   instability.      "  DISTAL PULSES:    Paplable    DISTAL SENSATION :   Intact    LYMPHATICS:     No   lymphadenopathy    OTHER:     No   Effusion      Calf soft / nontender ,   Negative Artemio's sign            Radiology:  Xrays 3views both knees (ap,lateral, sunrise) taken previously demonstrating well positioned knee replacements without evidence of wear, loosening or osteolysis      Assessment/Plan:  Pradip TKA- no mevchanical problems - recommended PT, may consider BASILIA for lumbar DDD. If not better will call back to set up BASILIA lumbar.

## 2020-06-23 NOTE — PROGRESS NOTES
Physical Therapy Daily Progress Note    Patient: Clarissa Matos   : 1950  Diagnosis/ICD-10 Code:  Acute right-sided low back pain with right-sided sciatica [M54.41]  Referring practitioner: RUBY Mckenna  Date of Initial Visit: Type: THERAPY  Noted: 2020  Today's Date: 2020  Patient seen for 4 sessions           Subjective   I was still pretty sore after our last session. I do feel like it is getting a little easier though.     Objective     Water Walk             forward/side step- 5 min warm up  Stretch 1                calf stretch 20 sec x2  Stretch 2                  hamstring hip sweeps with small noodle x20  Stretch 3                    wall walk stretch 30 sec x2  Stretch Other 1        seated piriformis stretch 30 sec x2  Stretch Other 2        posterior shoulder, levator scap, and upper trap. 20 sec x 2 of each.   Vertical Traction       5 min with large noodle   Abdominals             small dumbbell with both hands x15  Clams                         Hip Abd/Add             SKIPPED  Hip Flex/Ext                March in Place         walking, 2 laps  Mini Squat              SKIPPED  Toe/Heel Raises        SKTC Stretch At bench, 5x10 sec                   Tuck ups  Seated at bench 15x               Step Ups                    Bicycle                                 seated-2 min  Flutter/Scissor                    / seated x20  Exercise 1                trunk rotation with LN x20  Exercise 2                seated trunk flexion with LN and mild side bends 2 min  Exercise 3               Horizontal abduction just hands x15    Assessment/Plan  Patient able to progress with addition of SKTC and tuck ups at bench. She continues to complain of soreness after PT but it appears to be less severe. Provided cueing to keep toes facing up during hip sweeps. Also sat with back to jets while performing seated exercises to help massage low back. May attempt progressing to suspended bicycle next  session.         Timed:    Aquatic Therapy:    55     mins  [id number];     Tere Zamora, PT  Physical Therapist

## 2020-06-25 ENCOUNTER — TREATMENT (OUTPATIENT)
Dept: PHYSICAL THERAPY | Facility: CLINIC | Age: 70
End: 2020-06-25

## 2020-06-25 DIAGNOSIS — Z96.652 STATUS POST LEFT KNEE REPLACEMENT: ICD-10-CM

## 2020-06-25 DIAGNOSIS — R26.89 IMPAIRED GAIT AND MOBILITY: ICD-10-CM

## 2020-06-25 DIAGNOSIS — M54.6 ACUTE BILATERAL THORACIC BACK PAIN: ICD-10-CM

## 2020-06-25 DIAGNOSIS — M25.562 ACUTE PAIN OF BOTH KNEES: ICD-10-CM

## 2020-06-25 DIAGNOSIS — M25.561 ACUTE PAIN OF BOTH KNEES: ICD-10-CM

## 2020-06-25 DIAGNOSIS — M25.60 LIMITED JOINT RANGE OF MOTION (ROM): Primary | ICD-10-CM

## 2020-06-25 DIAGNOSIS — M54.41 ACUTE RIGHT-SIDED LOW BACK PAIN WITH RIGHT-SIDED SCIATICA: ICD-10-CM

## 2020-06-25 PROCEDURE — 97113 AQUATIC THERAPY/EXERCISES: CPT | Performed by: PHYSICAL THERAPIST

## 2020-06-25 NOTE — PROGRESS NOTES
Physical Therapy Daily Progress Note    Patient: Clarissa Matos   : 1950  Diagnosis/ICD-10 Code:  Limited joint range of motion (ROM) [M25.60]  Referring practitioner: RUBY Mckenna  Date of Initial Visit: Type: THERAPY  Noted: 2020  Today's Date: 2020  Patient seen for 5 sessions             Subjective   I could barely walk after last session. My groin and hips were so sore.     Objective     Water Walk             forward/side step- 5 min warm up  Stretch 1                calf stretch 20 sec x2  Stretch 2                  hamstring hip sweeps with small noodle x20  Stretch 3                    wall walk stretch 30 sec x2  Stretch Other 1        seated piriformis stretch 30 sec x2  Stretch Other 2        posterior shoulder, levator scap, and upper trap. 20 sec x 2 of each.   Vertical Traction       5 min with large noodle   Abdominals             small dumbbell with both hands x15  Clams                         Hip Abd/Add             SKIPPED  Hip Flex/Ext                March in Place         walking, 2 laps  Mini Squat              SKIPPED  Toe/Heel Raises        SKTC Stretch    Seated 5x10 sec hold                Tuck ups                    Seated 10x            Step Ups                    Bicycle                                 seated-2 min  Flutter/Scissor                    / seated x20  Exercise 1                trunk rotation with LN x20  Exercise 2                seated trunk flexion with LN and mild side bends 2 min  Exercise 3               Horizontal abduction just hands x15    Assessment/Plan  Patient believes her flare up was due to the number of reps she did with bicycle/flutter kicks. Made sure to give more rest breaks with seated exercises and performed less flutter kicks. Added more time with decompression/traction in deep end.         Timed:  Aquatic Therapy    58     mins [id number];    Tere Zamora, PT  Physical Therapist

## 2020-06-30 ENCOUNTER — TREATMENT (OUTPATIENT)
Dept: PHYSICAL THERAPY | Facility: CLINIC | Age: 70
End: 2020-06-30

## 2020-06-30 DIAGNOSIS — M25.60 LIMITED JOINT RANGE OF MOTION (ROM): ICD-10-CM

## 2020-06-30 DIAGNOSIS — M25.562 ACUTE PAIN OF BOTH KNEES: ICD-10-CM

## 2020-06-30 DIAGNOSIS — M25.561 ACUTE PAIN OF BOTH KNEES: ICD-10-CM

## 2020-06-30 DIAGNOSIS — M54.41 ACUTE RIGHT-SIDED LOW BACK PAIN WITH RIGHT-SIDED SCIATICA: Primary | ICD-10-CM

## 2020-06-30 DIAGNOSIS — M54.6 ACUTE BILATERAL THORACIC BACK PAIN: ICD-10-CM

## 2020-06-30 PROCEDURE — 97113 AQUATIC THERAPY/EXERCISES: CPT | Performed by: PHYSICAL THERAPIST

## 2020-06-30 NOTE — PROGRESS NOTES
Re-Assessment / Re-Certification        Patient: Clarissa Matos   : 1950  Diagnosis/ICD-10 Code:  Acute right-sided low back pain with right-sided sciatica [M54.41]  Referring practitioner: RUBY Mckenna  Date of Initial Visit: Type: THERAPY  Noted: 2020  Today's Date: 2020  Patient seen for 6 sessions      Subjective:   Clarissa Matos reports: 4/10 pain  Functional Outcome Score: Modified Oswestry  Clinical Progress: improved  Home Program Compliance: Yes  Treatment has included:  aquatic therapy    Subjective I feel pretty good today but my pain is still bad, especially in my low back/hips.     Objective     Decreased lumbar AROM 50% with flex/side bend. Extension to neutral ;   pain with all transfers. 42% oswestry score;   (B) hip flex/ knee extension/ hamstring flex = 4/5  core trunk lumbar stabilization 3+/5 secondary to pain;   sitting tolerance 5 mins;   severe ttp T8-10 paraspinals (B) QL;    timed sit-stand x 5 = 15.8 s.   Ambulation tolerance 10 mins.      See Exercise, Manual, and Modality Logs for complete treatment.     Water Walk             forward/side step- 5 min warm up  Stretch 1                calf stretch 20 sec x2  Stretch 2                  hamstring hip sweeps with small noodle x20  Stretch 3                    wall walk stretch 30 sec x2  Stretch Other 1        seated piriformis stretch 30 sec x2  Stretch Other 2        posterior shoulder, levator scap, and upper trap. 20 sec x 2 of each.   Vertical Traction       5 min with large noodle   Abdominals             small dumbbell with both hands x15  Clams                         Hip Abd/Add             SKIPPED  Hip Flex/Ext                March in Place         walking, 2 laps  Mini Squat              SKIPPED  Toe/Heel Raises        SKTC Stretch    Seated 5x10 sec hold                Tuck ups                    Seated 10x            Step Ups                    Bicycle                                 seated-2  min  Flutter/Scissor                    / seated x20  Exercise 1                trunk rotation with LN x20  Exercise 2                seated trunk flexion with LN and mild side bends 2 min  Exercise 3               Horizontal abduction just hands x15    Functional outcome score: Oswestry 42%      Assessment/Plan  Progress toward previous goals: Partially Met     Clarissa Matos has been seen for 6 physical therapy sessions for LBP/ (B) knee pain/ thoracic pain/ unsteady gait that was exacerbated by a car ride to [place] along with recent fall. Treatment has included aquatic therapy. Patient able to improve her 5xSTS from 23 sec to 15.8 sec and improved her Oswestry score from 78% disability to 42%. She reports no change in her overall pain level or standing/sitting tolerance, however LE strength has improved with MMT from 3+/5 to 4/5. Progress to physical therapy goals is fair.  She will benefit from continued skilled physical therapy to address remaining impairments and functional limitations.       Short Term Goals for completion in 30 days:   -Patient will report a reduction in pain for 12-24 hours or greater following aquatic therapy session 6 wks- ONGOING, no change and sore after therapy  -Patient will demonstrate good core stabilization strength with advanced  aquatic exercises such as bicycle kicks and tuck ups to help improve postural stability 6 wks  ONGOING-unable to tolerate suspended exercises and limited tolerance to performing them seated at bench  -Patient will report increased standing tolerance from 10 min to 20 min or greater to allow patient to complete ADLs such as cooking without pain/discomfort 6 wks -ONGOING, no change  - increased lumbar AROM to WFL to allow for increased ease with dressing/grooming bathing activities. -ONGOING, see objective    LTGs for completion within 90 days:  -Patient will demonstrate sit to stand without use of hands in order to increase functional strength 12 wks.  -MET  -Patient will increase walking tolerance from 10 min to 20 min or greater to allow for patient to walk for leisure/[id number] wks. ONGOING, no change  -Patient will demonstrate independence with water walks and stretches to promote independent managment of [id number] wks. PARTIALLY MET- independent with walks but not stretches  -Patient will improve 5xSTS from 24 seconds to </= 19 seconds in order to decrease risk of falls and increase functional [id number] wks. -MET see objective  -Patient will increase LE strength to 4-/5 or greater to increase LE stability during gait 12 wks -MET  .   Recommendations: Continue as planned  Timeframe: 1 month  Prognosis to achieve goals: fair    PT Signature: Tere Zamora PT      Based upon review of the patient's progress and continued therapy plan, it is my medical opinion that Clarissa Matos should continue physical therapy treatment at [facility]  [address] [place] [address]  [phone].    Signature: __________________________________  Dania Kevin, RUBY    Timed:  Aquatic Therapy    40     mins [id number]

## 2020-07-02 ENCOUNTER — TREATMENT (OUTPATIENT)
Dept: PHYSICAL THERAPY | Facility: CLINIC | Age: 70
End: 2020-07-02

## 2020-07-02 DIAGNOSIS — M54.41 ACUTE RIGHT-SIDED LOW BACK PAIN WITH RIGHT-SIDED SCIATICA: ICD-10-CM

## 2020-07-02 DIAGNOSIS — M25.562 ACUTE PAIN OF BOTH KNEES: ICD-10-CM

## 2020-07-02 DIAGNOSIS — M25.561 ACUTE PAIN OF BOTH KNEES: ICD-10-CM

## 2020-07-02 DIAGNOSIS — M25.60 LIMITED JOINT RANGE OF MOTION (ROM): Primary | ICD-10-CM

## 2020-07-02 DIAGNOSIS — M54.6 ACUTE BILATERAL THORACIC BACK PAIN: ICD-10-CM

## 2020-07-02 PROCEDURE — 97113 AQUATIC THERAPY/EXERCISES: CPT | Performed by: PHYSICAL THERAPIST

## 2020-07-02 NOTE — PROGRESS NOTES
Physical Therapy Daily Progress Note    Patient: Clarissa Matos   : 1950  Diagnosis/ICD-10 Code:  Limited joint range of motion (ROM) [M25.60]  Referring practitioner: RUBY Mckenna  Date of Initial Visit: Type: THERAPY  Noted: 2020  Today's Date: 2020  Patient seen for 7 sessions             Subjective Overall I’m feeling better but my back/hips still ache.     Objective     Water Walk             forward/backward/side step- 5 min warm up  Stretch 1                calf stretch 20 sec x2  Stretch 2                  hamstring hip sweeps with small noodle x20  Stretch 3                    wall walk stretch 30 sec x2  Stretch Other 1        seated piriformis stretch 30 sec x2  Stretch Other 2        posterior shoulder, levator scap, and upper trap. 20 sec x 2 of each.   Vertical Traction       5 min with large noodle   Abdominals             green aqualogix x15  Clams                         Hip Abd/Add             SKIPPED  Hip Flex/Ext                March in Place         walking, 2 laps  Mini Squat              SKIPPED  Toe/Heel Raises        SKTC Stretch    Seated 5x10 sec hold                Tuck ups                    Seated 10x            Step Ups                    Bicycle                                 seated-2 min  Flutter/Scissor                    / seated x20  Exercise 1                trunk rotation with LN x20  Exercise 2                seated trunk flexion with LN and mild side bends 2 min  Exercise 3               Shoulder horizontal abduction, green aqualogix x15    Assessment/Plan  Patient able to reincorporate backward walking as long as she takes small steps. Her single leg balance has improved with walking marches however she still complains of mild numbness in B feet with hamstring stretch. Progressed shoulder strengthening by using aqualogix today.         Timed:  Aquatic Therapy    54     mins [id number];    Tere Zamora, PT  Physical Therapist

## 2020-07-07 ENCOUNTER — TREATMENT (OUTPATIENT)
Dept: PHYSICAL THERAPY | Facility: CLINIC | Age: 70
End: 2020-07-07

## 2020-07-07 DIAGNOSIS — R26.89 IMPAIRED GAIT AND MOBILITY: ICD-10-CM

## 2020-07-07 DIAGNOSIS — M54.41 ACUTE RIGHT-SIDED LOW BACK PAIN WITH RIGHT-SIDED SCIATICA: Primary | ICD-10-CM

## 2020-07-07 DIAGNOSIS — M25.60 LIMITED JOINT RANGE OF MOTION (ROM): ICD-10-CM

## 2020-07-07 DIAGNOSIS — M25.562 ACUTE PAIN OF BOTH KNEES: ICD-10-CM

## 2020-07-07 DIAGNOSIS — M54.6 ACUTE BILATERAL THORACIC BACK PAIN: ICD-10-CM

## 2020-07-07 DIAGNOSIS — M25.561 ACUTE PAIN OF BOTH KNEES: ICD-10-CM

## 2020-07-07 PROCEDURE — 97113 AQUATIC THERAPY/EXERCISES: CPT | Performed by: PHYSICAL THERAPIST

## 2020-07-07 NOTE — PROGRESS NOTES
Physical Therapy Daily Progress Note    Patient: Clarissa Matos   : 1950  Diagnosis/ICD-10 Code:  Acute right-sided low back pain with right-sided sciatica [M54.41]  Referring practitioner: RUBY Mckenna  Date of Initial Visit: Type: THERAPY  Noted: 2020  Today's Date: 2020  Patient seen for 8 sessions           Subjective   I felt sore after the last session but I want to push myself to get better. We needed to take more rest breaks. I did some yardwork/vacuuming this weekend which made me sore.     Objective     Water Walk             forward/backward/side step- 5 min warm up  Stretch 1                calf stretch 20 sec x2  Stretch 2                  hamstring hip sweeps with small noodle x20  Stretch 3                    wall walk stretch 30 sec x2  Stretch Other 1        seated piriformis stretch 30 sec x2  Stretch Other 2        posterior shoulder, levator scap, and upper trap. 20 sec x 2 of each.   Vertical Traction       5 min with large noodle   Abdominals             green aqualogix x15  Clams                         Hip Abd/Add             SKIPPED  Hip Flex/Ext                March in Place         walking, 2 laps  Mini Squat              SKIPPED  Toe/Heel Raises        SKTC Stretch    Seated 5x10 sec hold                Tuck ups                    Seated 10x            Seated clamshells  10x                  Bicycle                                 seated-2 min  Flutter/Scissor                    / seated x20  Exercise 1                trunk rotation with LN x20  Exercise 2                seated trunk flexion with LN and mild side bends 2 min  Exercise 3               Shoulder horizontal abduction, green aqualogix x15    Assessment/Plan   Patient becoming more independent with water walks but still feels unsteady therefore utilized UE support with a large noodle. She continues to have numbness in B feet during her hamstring stretch but reports it improves at home if she wears  her knee braces. Added seated clamshells to strengthen hip abductors.            Timed:    Aquatic Therapy:    53     mins  [id number];      Tere Zamora, PT  Physical Therapist

## 2020-07-09 ENCOUNTER — TREATMENT (OUTPATIENT)
Dept: PHYSICAL THERAPY | Facility: CLINIC | Age: 70
End: 2020-07-09

## 2020-07-09 DIAGNOSIS — M54.6 ACUTE BILATERAL THORACIC BACK PAIN: ICD-10-CM

## 2020-07-09 DIAGNOSIS — M25.562 ACUTE PAIN OF BOTH KNEES: ICD-10-CM

## 2020-07-09 DIAGNOSIS — M54.41 ACUTE RIGHT-SIDED LOW BACK PAIN WITH RIGHT-SIDED SCIATICA: Primary | ICD-10-CM

## 2020-07-09 DIAGNOSIS — M25.60 LIMITED JOINT RANGE OF MOTION (ROM): ICD-10-CM

## 2020-07-09 DIAGNOSIS — M25.561 ACUTE PAIN OF BOTH KNEES: ICD-10-CM

## 2020-07-09 PROCEDURE — 97113 AQUATIC THERAPY/EXERCISES: CPT | Performed by: PHYSICAL THERAPIST

## 2020-07-09 NOTE — PROGRESS NOTES
Physical Therapy Daily Progress Note    Patient: Clarissa Matos   : 1950  Diagnosis/ICD-10 Code:  Acute right-sided low back pain with right-sided sciatica [M54.41]  Referring practitioner: RUBY Mckenna  Date of Initial Visit: Type: THERAPY  Noted: 2020  Today's Date: 2020  Patient seen for 9 sessions             Subjective   Patient reports feeling sore upon arrival to therapy today. She states the soreness is from getting ready for her trip as well as the last treatment session.     Objective     AQUATIC EXERCISES:    Water Walk             forward/backward/side step- 5 min warm up  Stretch 1                calf stretch 20 sec x2  Stretch 2                  hamstring hip sweeps with small noodle x20  Stretch 3                    wall walk stretch 30 sec x2  Stretch Other 1        seated piriformis stretch 30 sec x2  Stretch Other 2        posterior shoulder, levator scap, and upper trap. 20 sec x 2 of each.   Vertical Traction       5 min with large noodle   Abdominals             green aqualogix x15  Clams                         Hip Abd/Add             SKIPPED  Hip Flex/Ext                Marching        walking, 2 laps LN  Mini Squat              SKIPPED  Toe/Heel Raises        SKTC Stretch    Seated 5x10 sec hold                Tuck ups                    Seated 10x            Seated clamshells     10x                  Bicycle                                 seated-2 min  Flutter/Scissor                    / seated x20  Exercise 1                trunk rotation with LN x20  Exercise 2                seated trunk flexion with LN and mild side bends 2 min  Exercise 3               Shoulder horizontal abduction, green aqualogix x15    Assessment/Plan    Patient reports feeling worried and stressed due to family issues which could correlate with her reports of soreness. She was able to maintain upright position during hip sweeps showing greater hamstring flexibility. Patient showed  improved balance with SLS during marches across pool. She tolerated the session well, gave good effort, and will benefit from further skilled therapy sessions to decrease back pain with ADLs.        Timed:  Aquatic Therapy    45     mins [id number];    Tere Zamora, PT  Physical Therapist

## 2020-07-23 ENCOUNTER — TREATMENT (OUTPATIENT)
Dept: PHYSICAL THERAPY | Facility: CLINIC | Age: 70
End: 2020-07-23

## 2020-07-23 DIAGNOSIS — M25.561 ACUTE PAIN OF BOTH KNEES: ICD-10-CM

## 2020-07-23 DIAGNOSIS — M54.6 ACUTE BILATERAL THORACIC BACK PAIN: ICD-10-CM

## 2020-07-23 DIAGNOSIS — M54.41 ACUTE RIGHT-SIDED LOW BACK PAIN WITH RIGHT-SIDED SCIATICA: ICD-10-CM

## 2020-07-23 DIAGNOSIS — R26.89 IMPAIRED GAIT AND MOBILITY: ICD-10-CM

## 2020-07-23 DIAGNOSIS — M25.562 ACUTE PAIN OF BOTH KNEES: ICD-10-CM

## 2020-07-23 DIAGNOSIS — M25.60 LIMITED JOINT RANGE OF MOTION (ROM): Primary | ICD-10-CM

## 2020-07-23 PROCEDURE — 97113 AQUATIC THERAPY/EXERCISES: CPT | Performed by: PHYSICAL THERAPIST

## 2020-07-23 NOTE — PROGRESS NOTES
Physical Therapy Daily Progress Note    Patient: Clarissa Matos   : 1950  Diagnosis/ICD-10 Code:  Limited joint range of motion (ROM) [M25.60]  Referring practitioner: RUBY Mckenna  Date of Initial Visit: Type: THERAPY  Noted: 2020  Today's Date: 2020  Patient seen for 10 sessions             Subjective   I fell last Friday (tripped while staying at an unfamiliar house and slippery floors). I am doing okay, luckily nothing was broken, I'm just bruised and sore.    Objective     AQUATIC EXERCISES:     Water Walk             forward- 2 min warm up  Stretch 1                calf stretch 20 sec x2  Stretch 2                  hamstring hip sweeps with small noodle x20  Stretch 3                    wall walk stretch 30 sec x2  Stretch Other 1        seated piriformis stretch 30 sec x2  Stretch Other 2        posterior shoulder 20 sec x 2 of each.   Vertical Traction       5 min with large noodle   Abdominals             green aqualogix x15  *Skipped  Clams                         Hip Flex/Ext                Marching        walking, 2 laps LN  Toe/Heel Raises        SKTC Stretch    Seated 5x10 sec hold                Tuck ups                    Seated 10x            Seated clamshells     10x                  Bicycle                                 seated-2 min  Scissor kicks                    / seated x20  Exercise 1                trunk rotation with LN x20  Exercise 2                seated trunk flexion with LN and mild side bends 2 min  Exercise 3               Shoulder horizontal abduction, green aqualogix x15 *Skipped    Assessment/Plan  Patient arrived late due to traffic, therefore our session was shortened. She reports good relief from floating/traction in the pool however she is still unable to tolerate suspended core strengthening. Better balance noted with water walks, especially while marching.          Timed:  Aquatic Therapy    30     mins [id number];    Tere Zamora, PT  Physical  Therapist

## 2020-07-28 ENCOUNTER — TREATMENT (OUTPATIENT)
Dept: PHYSICAL THERAPY | Facility: CLINIC | Age: 70
End: 2020-07-28

## 2020-07-28 ENCOUNTER — OFFICE VISIT (OUTPATIENT)
Dept: ORTHOPEDIC SURGERY | Facility: CLINIC | Age: 70
End: 2020-07-28

## 2020-07-28 VITALS — WEIGHT: 169 LBS | BODY MASS INDEX: 29.95 KG/M2 | TEMPERATURE: 97.4 F | HEIGHT: 63 IN

## 2020-07-28 DIAGNOSIS — Z96.652 STATUS POST LEFT KNEE REPLACEMENT: ICD-10-CM

## 2020-07-28 DIAGNOSIS — M54.41 ACUTE RIGHT-SIDED LOW BACK PAIN WITH RIGHT-SIDED SCIATICA: Primary | ICD-10-CM

## 2020-07-28 DIAGNOSIS — M54.6 ACUTE BILATERAL THORACIC BACK PAIN: ICD-10-CM

## 2020-07-28 DIAGNOSIS — M25.562 ACUTE PAIN OF BOTH KNEES: ICD-10-CM

## 2020-07-28 DIAGNOSIS — M79.604 PAIN IN BOTH LOWER EXTREMITIES: ICD-10-CM

## 2020-07-28 DIAGNOSIS — M79.605 PAIN IN BOTH LOWER EXTREMITIES: ICD-10-CM

## 2020-07-28 DIAGNOSIS — R26.89 IMPAIRED GAIT AND MOBILITY: ICD-10-CM

## 2020-07-28 DIAGNOSIS — Z96.652 STATUS POST LEFT KNEE REPLACEMENT: Primary | ICD-10-CM

## 2020-07-28 DIAGNOSIS — M25.561 ACUTE PAIN OF BOTH KNEES: ICD-10-CM

## 2020-07-28 DIAGNOSIS — M25.60 LIMITED JOINT RANGE OF MOTION (ROM): ICD-10-CM

## 2020-07-28 PROCEDURE — 99213 OFFICE O/P EST LOW 20 MIN: CPT | Performed by: ORTHOPAEDIC SURGERY

## 2020-07-28 PROCEDURE — 97113 AQUATIC THERAPY/EXERCISES: CPT | Performed by: PHYSICAL THERAPIST

## 2020-07-28 PROCEDURE — 73562 X-RAY EXAM OF KNEE 3: CPT | Performed by: ORTHOPAEDIC SURGERY

## 2020-07-28 NOTE — PROGRESS NOTES
"Clarissa Matos : 1950 MRN: [id number] DATE: 2020    DIAGNOSIS: Annual follow up left total knee      SUBJECTIVE:Patient returns today for  1 year follow up of left total knee replacement. Patient reports doing well with no unusual complaints. Denies any limitations due to the knee. Has burning in both feet. Had recent lumbar MRI here for discussion    OBJECTIVE:    Temp 97.4 °F (36.3 °C) (Temporal)   Ht 160 cm (63\")   Wt 76.7 kg (169 lb)   LMP  (LMP Unknown)   BMI 29.94 kg/m²   Family History   Problem Relation Age of Onset   • Heart disease Mother    • Hypertension Mother    • Heart disease Father    • Hypertension Father    • No Known Problems Sister    • No Known Problems Brother    • No Known Problems Daughter    • No Known Problems Son    • No Known Problems Maternal Grandmother    • No Known Problems Paternal Grandmother    • No Known Problems Maternal Aunt    • No Known Problems Paternal Aunt    • BRCA 1/2 Neg Hx    • Breast cancer Neg Hx    • Colon cancer Neg Hx    • Endometrial cancer Neg Hx    • Ovarian cancer Neg Hx    • Malig Hyperthermia Neg Hx      Past Medical History:   Diagnosis Date   • Anxiety    • Arthritis    • Depression    • Fibromyalgia     DX    • GERD (gastroesophageal reflux disease)    • Hard to intubate     STATES TOOK 30 MINUTES TO BE INTUBATED   • Headache    • Hiatal hernia    • Hypertension    • Irregular heart rate     PVC'S   • Limited joint range of motion     LT KNEE     Past Surgical History:   Procedure Laterality Date   • BREAST BIOPSY Right    • CATARACT EXTRACTION W/ INTRAOCULAR LENS IMPLANT Bilateral    •  SECTION      x 2   • CHOLECYSTECTOMY WITH INTRAOPERATIVE CHOLANGIOGRAM N/A 2017    Procedure: CHOLECYSTECTOMY LAPAROSCOPIC INTRAOPERATIVE CHOLANGIOGRAM;  Surgeon: Demi Madden MD;  Location: [facility] OR;  Service:    • HYSTERECTOMY  2010   • REPLACEMENT TOTAL KNEE Right    • ROTATOR CUFF REPAIR Right    • TOTAL KNEE " ARTHROPLASTY Left 7/22/2019    Procedure: LEFT TOTAL KNEE ARTHROPLASTY;  Surgeon: Sam Bustamante MD;  Location: [facility] OR;  Service: Orthopedics   • TOTAL SHOULDER ARTHROPLASTY W/ DISTAL CLAVICLE EXCISION Left 7/5/2018    Procedure: Reverse Total Shoulder Arthroplsty for fracture;  Surgeon: Louis Prasad MD;  Location: [facility] OR;  Service: Orthopedics   • TUBAL ABDOMINAL LIGATION  1986     Social History     Socioeconomic History   • Marital status:      Spouse name: Not on file   • Number of children: Not on file   • Years of education: Not on file   • Highest education level: Not on file   Tobacco Use   • Smoking status: Never Smoker   • Smokeless tobacco: Never Used   Substance and Sexual Activity   • Alcohol use: No   • Drug use: No   • Sexual activity: Defer     Review of Systems - a 14 point review of systems was performed. All systems were negative.    Exam:. The incision is well healed. Range of motion is measured at 0 to 120. The calf is soft and nontender with a negative Homans sign. Alignment is neutral. Good quad strength. There is no evidence of varus/valgus or flexion instability. No effusion. Intact to light touch with palpable distal pulses.     DIAGNOSTIC STUDIES  Xrays: 3 views of the left knee (AP, lateral, and sunrise) were ordered and reviewed for evaluation of recent knee replacement. They demonstrate a well positioned, well aligned knee replacement without complicating factors noted. In comparison with previous films there has been no change.    ASSESSMENT: Annual follow up left knee replacement. Pradip foot pain-  Will try BASILIA -  If not better will need to see PCP     PLAN:  Continue activities as tolerated    Follow up PRN      Sam Bustamante MD  7/28/2020

## 2020-07-28 NOTE — PROGRESS NOTES
Re-Assessment / Re-Certification        Patient: Clarissa Matos   : 1950  Diagnosis/ICD-10 Code:  Acute right-sided low back pain with right-sided sciatica [M54.41]  Referring practitioner: RUBY Mckenna  Date of Initial Visit: Type: THERAPY  Noted: 2020  Today's Date: 2020  Patient seen for 11 sessions      Subjective:   Clarissa Matos reports: 2/10 pain  Functional Outcome Score: Modified Oswestry  Clinical Progress: improved  Home Program Compliance: Yes  Treatment has included:  aquatic therapy     Subjective      I feel ok today with mild pain in my right wrist and both shoulders. I was hurting bad yesterday so I went to the Chiropractor.     Objective      Decreased lumbar AROM 50% with flex/side bend. Extension to neutral   sitting tolerance: 1-2 hours      timed sit-stand x 5 = 11.3 sec  Ambulation tolerance 20-30 min       LE strength:  hip flex= 4/5 (B)  knee extension= 4+/5 (B)  hamstring flex = 4/5 (B)     AQUATIC EXERCISES:     Water Walk             forward- 2 min warm up  Stretch 1                calf stretch 20 sec x2  Stretch 2                  hamstring hip sweeps with small noodle x20  Stretch 3                    wall walk stretch 30 sec x2  Stretch Other 1        seated piriformis stretch 30 sec x2  Stretch Other 2        posterior shoulder 20 sec x 2 of each.   Vertical Traction       5 min with large noodle   Abdominals             small noodle x15    Clams                         Hip Flex/Ext                Marching        walking, 2 laps LN  Toe/Heel Raises        SKTC Stretch    Seated 5x10 sec hold                Tuck ups                    Seated 10x            Seated clamshells     10x                  Bicycle                                 seated-2 min  Scissor kicks                    / seated x20  Exercise 1                trunk rotation with LN x20  Exercise 2                seated trunk flexion with LN and mild side bends 2 min  Exercise 3                Shoulder horizontal abduction, green aqualogix x15 *Skipped     Functional outcome score: Oswestry 42%        Assessment/Plan  Progress toward previous goals: Partially Met     Clarissa Matos has been seen for 11 physical therapy sessions for LBP/ (B) knee pain/ thoracic pain/ unsteady gait that was exacerbated by a car ride to [place] and a fall.Treatment has included aquatic therapy. Clarissa demonstrates a significant improvement in her functional strength and she has increased her B knee extension strength with MMT. She reports a 20 min improvement in her walking tolerance and she was able to tolerate sitting on a plane ride for 1-2 hours this past month. She continues to have days where her pain is exacerbated, and she did suffer a fall when staying at an unfamiliar place two weeks ago. Progress to physical therapy goals is good.  She will benefit from continued skilled physical therapy to address remaining impairments and functional limitations.         Short Term Goals for completion in 30 days:   -Patient will report a reduction in pain for 12-24 hours or greater following aquatic therapy session 6 wks- MET  -Patient will demonstrate good core stabilization strength with advanced  aquatic exercises such as bicycle kicks and tuck ups to help improve postural stability 6 wks  -PROGRESSING-unable to tolerate suspended exercises but better tolerance to seated core work  -Patient will report increased standing tolerance from 10 min to 20 min or greater to allow patient to complete ADLs such as cooking without pain/discomfort 6 wks -ONGOING, still 10 min  - increased lumbar AROM to WFL to allow for increased ease with dressing/grooming bathing activities. -ONGOING    LTGs for completion within 90 days:  -Patient will demonstrate sit to stand without use of hands in order to increase functional strength 12 wks. -MET  -Patient will increase walking tolerance from 10 min to 20 min or greater to allow for  patient to walk for leisure/[id number] wks. MET  -Patient will demonstrate independence with water walks and stretches to promote independent managment of [id number] wks. MET  -Patient will improve 5xSTS from 24 seconds to </= 19 seconds in order to decrease risk of falls and increase functional [id number] wks. -MET   -Patient will increase LE strength to 4-/5 or greater to increase LE stability during gait 12 wks -MET  .             Recommendations: Continue as planned  Timeframe: 1 month  Prognosis to achieve goals: fair       PT Signature: Tere Zamora PT      Based upon review of the patient's progress and continued therapy plan, it is my medical opinion that Clarissa Matos should continue physical therapy treatment at [facility]  [address] [place] [address] [phone].    Signature: __________________________________  Dania Kevin APRN    Timed:  Aquatic Therapy    43     mins [id number]

## 2020-07-30 ENCOUNTER — TREATMENT (OUTPATIENT)
Dept: PHYSICAL THERAPY | Facility: CLINIC | Age: 70
End: 2020-07-30

## 2020-07-30 DIAGNOSIS — M54.6 ACUTE BILATERAL THORACIC BACK PAIN: ICD-10-CM

## 2020-07-30 DIAGNOSIS — M54.41 ACUTE RIGHT-SIDED LOW BACK PAIN WITH RIGHT-SIDED SCIATICA: ICD-10-CM

## 2020-07-30 DIAGNOSIS — M25.60 LIMITED JOINT RANGE OF MOTION (ROM): Primary | ICD-10-CM

## 2020-07-30 DIAGNOSIS — M25.562 ACUTE PAIN OF BOTH KNEES: ICD-10-CM

## 2020-07-30 DIAGNOSIS — R26.89 IMPAIRED GAIT AND MOBILITY: ICD-10-CM

## 2020-07-30 DIAGNOSIS — M25.561 ACUTE PAIN OF BOTH KNEES: ICD-10-CM

## 2020-07-30 PROCEDURE — 97113 AQUATIC THERAPY/EXERCISES: CPT | Performed by: PHYSICAL THERAPIST

## 2020-08-04 ENCOUNTER — TREATMENT (OUTPATIENT)
Dept: PHYSICAL THERAPY | Facility: CLINIC | Age: 70
End: 2020-08-04

## 2020-08-04 DIAGNOSIS — M25.561 ACUTE PAIN OF BOTH KNEES: ICD-10-CM

## 2020-08-04 DIAGNOSIS — Z96.612 STATUS POST REPLACEMENT OF LEFT SHOULDER JOINT: ICD-10-CM

## 2020-08-04 DIAGNOSIS — M25.562 ACUTE PAIN OF BOTH KNEES: ICD-10-CM

## 2020-08-04 DIAGNOSIS — G89.29 CHRONIC PAIN OF RIGHT KNEE: ICD-10-CM

## 2020-08-04 DIAGNOSIS — Z47.1 AFTERCARE FOLLOWING LEFT SHOULDER JOINT REPLACEMENT SURGERY: ICD-10-CM

## 2020-08-04 DIAGNOSIS — M25.561 CHRONIC PAIN OF BOTH KNEES: ICD-10-CM

## 2020-08-04 DIAGNOSIS — M25.60 LIMITED JOINT RANGE OF MOTION (ROM): ICD-10-CM

## 2020-08-04 DIAGNOSIS — R26.89 IMPAIRED GAIT AND MOBILITY: Primary | ICD-10-CM

## 2020-08-04 DIAGNOSIS — Z96.652 STATUS POST LEFT KNEE REPLACEMENT: ICD-10-CM

## 2020-08-04 DIAGNOSIS — M25.512 ACUTE POSTOPERATIVE PAIN OF LEFT SHOULDER: ICD-10-CM

## 2020-08-04 DIAGNOSIS — G89.29 CHRONIC PAIN OF BOTH KNEES: ICD-10-CM

## 2020-08-04 DIAGNOSIS — M54.6 ACUTE BILATERAL THORACIC BACK PAIN: ICD-10-CM

## 2020-08-04 DIAGNOSIS — Z96.612 AFTERCARE FOLLOWING LEFT SHOULDER JOINT REPLACEMENT SURGERY: ICD-10-CM

## 2020-08-04 DIAGNOSIS — M25.562 CHRONIC PAIN OF LEFT KNEE: ICD-10-CM

## 2020-08-04 DIAGNOSIS — M17.12 PRIMARY OSTEOARTHRITIS OF LEFT KNEE: ICD-10-CM

## 2020-08-04 DIAGNOSIS — G89.29 CHRONIC PAIN OF LEFT KNEE: ICD-10-CM

## 2020-08-04 DIAGNOSIS — M54.41 ACUTE RIGHT-SIDED LOW BACK PAIN WITH RIGHT-SIDED SCIATICA: ICD-10-CM

## 2020-08-04 DIAGNOSIS — M25.612 STIFFNESS OF LEFT SHOULDER JOINT: ICD-10-CM

## 2020-08-04 DIAGNOSIS — M25.562 CHRONIC PAIN OF BOTH KNEES: ICD-10-CM

## 2020-08-04 DIAGNOSIS — M25.619 LIMITED RANGE OF MOTION (ROM) OF SHOULDER: ICD-10-CM

## 2020-08-04 DIAGNOSIS — M25.561 CHRONIC PAIN OF RIGHT KNEE: ICD-10-CM

## 2020-08-04 DIAGNOSIS — G89.18 ACUTE POSTOPERATIVE PAIN OF LEFT SHOULDER: ICD-10-CM

## 2020-08-04 PROCEDURE — 97113 AQUATIC THERAPY/EXERCISES: CPT | Performed by: PHYSICAL THERAPIST

## 2020-08-04 NOTE — PROGRESS NOTES
Physical Therapy Daily Progress Note    Patient: Clarissa Matos   : 1950  Diagnosis/ICD-10 Code:  Impaired gait and mobility [R26.89]  Referring practitioner: RUBY Mckenna  Date of Initial Visit: Type: THERAPY  Noted: 2020  Today's Date: 2020  Patient seen for 13 sessions             Subjective     I pulled weeks in my yard yesterday so I am a little stiff. My hands are really sore but other than that I have no pain.     Objective     Water Walk                    forward/backward/side step- 2 min warm up  Stretch 1                       calf stretch 20 sec x2  Stretch 2                       hamstring hip sweeps with small noodle x20   Stretch 3                       wall walk stretch 30 sec x2  Stretch Other 1           seated piriformis stretch 30 sec x2  Stretch Other 2           posterior shoulder 20 sec x 2 of each.   Vertical Traction           5 min with large noodle   Abdominals                   large noodle x15    Marching             walking, 2 laps LN  Toe/Heel Raises         20x B  SKTC Stretch         Seated 5x10 sec hold                Tuck ups                      Seated 10x            Seated clamshells       10x                  Bicycle                           seated-2 min  Scissor kicks                seated x20  Exercise 1                     trunk rotation with LN x20  Exercise 2                     seated trunk flexion with LN and mild side bends 2 min  Exercise 3                     Shoulder horizontal abduction, L5 paddles x15   Alternating Rows          L5 Paddles x15    Assessment/Plan  Patient once again able to tolerate a larger noodle for Abdominals however she required cueing to bring her arms all the way down to her thighs in order to compete the exercise through full ROM. Clarissa is now able to tolerate bicycle kicks for a full 2 minutes (instead of breaking it up into 1 min x 2) and she is performing her scissor kicks through a larger ROM. She continues  to report good relief from suspended traction at end of session.         Timed:  Aquatic Therapy    25     mins [id number];    Tere Zamora, PT  Physical Therapist

## 2020-08-06 ENCOUNTER — TREATMENT (OUTPATIENT)
Dept: PHYSICAL THERAPY | Facility: CLINIC | Age: 70
End: 2020-08-06

## 2020-08-06 DIAGNOSIS — M25.612 STIFFNESS OF LEFT SHOULDER JOINT: ICD-10-CM

## 2020-08-06 DIAGNOSIS — Z96.652 STATUS POST LEFT KNEE REPLACEMENT: ICD-10-CM

## 2020-08-06 DIAGNOSIS — G89.29 CHRONIC PAIN OF BOTH KNEES: ICD-10-CM

## 2020-08-06 DIAGNOSIS — M17.12 PRIMARY OSTEOARTHRITIS OF LEFT KNEE: ICD-10-CM

## 2020-08-06 DIAGNOSIS — R26.89 IMPAIRED GAIT AND MOBILITY: Primary | ICD-10-CM

## 2020-08-06 DIAGNOSIS — Z96.612 AFTERCARE FOLLOWING LEFT SHOULDER JOINT REPLACEMENT SURGERY: ICD-10-CM

## 2020-08-06 DIAGNOSIS — M25.619 LIMITED RANGE OF MOTION (ROM) OF SHOULDER: ICD-10-CM

## 2020-08-06 DIAGNOSIS — M54.6 ACUTE BILATERAL THORACIC BACK PAIN: ICD-10-CM

## 2020-08-06 DIAGNOSIS — Z96.612 STATUS POST REPLACEMENT OF LEFT SHOULDER JOINT: ICD-10-CM

## 2020-08-06 DIAGNOSIS — M25.60 LIMITED JOINT RANGE OF MOTION (ROM): ICD-10-CM

## 2020-08-06 DIAGNOSIS — M25.562 ACUTE PAIN OF BOTH KNEES: ICD-10-CM

## 2020-08-06 DIAGNOSIS — M54.41 ACUTE RIGHT-SIDED LOW BACK PAIN WITH RIGHT-SIDED SCIATICA: ICD-10-CM

## 2020-08-06 DIAGNOSIS — M25.561 ACUTE PAIN OF BOTH KNEES: ICD-10-CM

## 2020-08-06 DIAGNOSIS — G89.18 ACUTE POSTOPERATIVE PAIN OF LEFT SHOULDER: ICD-10-CM

## 2020-08-06 DIAGNOSIS — G89.29 CHRONIC PAIN OF RIGHT KNEE: ICD-10-CM

## 2020-08-06 DIAGNOSIS — M25.561 CHRONIC PAIN OF RIGHT KNEE: ICD-10-CM

## 2020-08-06 DIAGNOSIS — Z47.1 AFTERCARE FOLLOWING LEFT SHOULDER JOINT REPLACEMENT SURGERY: ICD-10-CM

## 2020-08-06 DIAGNOSIS — G89.29 CHRONIC PAIN OF LEFT KNEE: ICD-10-CM

## 2020-08-06 DIAGNOSIS — M25.562 CHRONIC PAIN OF LEFT KNEE: ICD-10-CM

## 2020-08-06 DIAGNOSIS — M25.561 CHRONIC PAIN OF BOTH KNEES: ICD-10-CM

## 2020-08-06 DIAGNOSIS — M25.562 CHRONIC PAIN OF BOTH KNEES: ICD-10-CM

## 2020-08-06 DIAGNOSIS — M25.512 ACUTE POSTOPERATIVE PAIN OF LEFT SHOULDER: ICD-10-CM

## 2020-08-06 PROCEDURE — 97113 AQUATIC THERAPY/EXERCISES: CPT | Performed by: PHYSICAL THERAPIST

## 2020-08-06 NOTE — PROGRESS NOTES
Physical Therapy Daily Progress Note    Patient: Clarissa Matos   : 1950  Diagnosis/ICD-10 Code:  Impaired gait and mobility [R26.89]  Referring practitioner: RUBY Mckenna  Date of Initial Visit: Type: THERAPY  Noted: 2020  Today's Date: 2020  Patient seen for 14 sessions           Subjective     I don't feel very good today, my neck and upper back are hurting but it is not from therapy. My overall pain is a 4/10 and I have a bad headache. When I do laundry at home I have to reach over my head a lot so that could be causing my pain.    Objective     Water Walk                               forward/backward/side step- 2 min warm up  Stretch 1                                  calf stretch 20 sec x2  Stretch 2                                  hamstring stretch with small noodle x20 (discontinued due to pain)  Stretch 3                                  wall walk stretch 30 sec x2  Stretch Other 1                      seated piriformis stretch 30 sec x2  Stretch Other 2                      posterior shoulder 20 sec x 2 of each.   Short lever hip sweeps   SN, x15  Vertical Traction                     5 min with large noodle   Abdominals                             large noodle x15  (small noodle today)  Marching                                 walking, 2 laps LN  Toe/Heel Raises                    20x B  SKTC Stretch                         Seated 5x10 sec hold                Tuck ups                                 Seated 10x  -SKIPPED       Seated clamshells                 10x -SKIPPED                 Bicycle                                    seated-2 min  Scissor kicks                         seated x20  Exercise 1                              trunk rotation with LN x20 -SKIPPED  Exercise 2                              seated trunk flexion with LN and mild side bends 2 min -SKIPPED  Exercise 3                              shoulder horizontal abduction, L5  paddles x15 -SKIPPED  Alternating Rows                   L5 Paddles x15 -SKIPPED  Upper trap stretch   1 min, B  Levator stretch   1 min, B    Assessment/Plan    Ms. Matos experienced numbness and tingling in her right ankle/foot during the hamstring stretch, so she was instructed to perform short lever hip sweeps which decreased her symptoms. Performed bilateral upper trap and levator stretches today in attempt to alleviate pain. Unable to perform a majority of the strengthening exercises today (focused on stretching) due to patient arriving late with reports of not feeling well. Utilized small noodle (instead of large noodle) during abdominals due to soreness in upper back.        Timed:  Aquatic Therapy    26     mins [id number];    Tere Zamora, PT  Physical Therapist

## 2020-08-10 ENCOUNTER — OFFICE VISIT (OUTPATIENT)
Dept: FAMILY MEDICINE CLINIC | Facility: CLINIC | Age: 70
End: 2020-08-10

## 2020-08-10 VITALS
BODY MASS INDEX: 30.81 KG/M2 | SYSTOLIC BLOOD PRESSURE: 138 MMHG | DIASTOLIC BLOOD PRESSURE: 80 MMHG | TEMPERATURE: 98.1 F | OXYGEN SATURATION: 98 % | HEIGHT: 63 IN | HEART RATE: 76 BPM | WEIGHT: 173.9 LBS

## 2020-08-10 DIAGNOSIS — Z12.39 SCREENING FOR BREAST CANCER: ICD-10-CM

## 2020-08-10 DIAGNOSIS — G57.93 NEUROPATHY OF BOTH FEET: ICD-10-CM

## 2020-08-10 DIAGNOSIS — M54.9 CHRONIC BACK PAIN GREATER THAN 3 MONTHS DURATION: ICD-10-CM

## 2020-08-10 DIAGNOSIS — Z12.31 ENCOUNTER FOR SCREENING MAMMOGRAM FOR MALIGNANT NEOPLASM OF BREAST: ICD-10-CM

## 2020-08-10 DIAGNOSIS — G89.29 CHRONIC BACK PAIN GREATER THAN 3 MONTHS DURATION: ICD-10-CM

## 2020-08-10 DIAGNOSIS — Z00.00 MEDICARE ANNUAL WELLNESS VISIT, SUBSEQUENT: Primary | ICD-10-CM

## 2020-08-10 DIAGNOSIS — I10 ESSENTIAL HYPERTENSION: ICD-10-CM

## 2020-08-10 DIAGNOSIS — M79.7 FIBROMYALGIA: ICD-10-CM

## 2020-08-10 DIAGNOSIS — H61.21 IMPACTED CERUMEN OF RIGHT EAR: ICD-10-CM

## 2020-08-10 DIAGNOSIS — R73.09 ELEVATED GLUCOSE: ICD-10-CM

## 2020-08-10 PROCEDURE — 99214 OFFICE O/P EST MOD 30 MIN: CPT | Performed by: NURSE PRACTITIONER

## 2020-08-10 PROCEDURE — 69209 REMOVE IMPACTED EAR WAX UNI: CPT | Performed by: NURSE PRACTITIONER

## 2020-08-10 PROCEDURE — G0439 PPPS, SUBSEQ VISIT: HCPCS | Performed by: NURSE PRACTITIONER

## 2020-08-10 NOTE — PATIENT INSTRUCTIONS
Preventing Unhealthy Weight Gain, Adult  Staying at a healthy weight is important to your overall health. When fat builds up in your body, you may become overweight or obese. Being overweight or obese increases your risk of developing certain health problems, such as heart disease, diabetes, sleeping problems, joint problems, and some types of cancer.  Unhealthy weight gain is often the result of making unhealthy food choices or not getting enough exercise. You can make changes to your lifestyle to prevent obesity and stay as healthy as possible.  What nutrition changes can be made?    · Eat only as much as your body needs. To do this:  ? Pay attention to signs that you are hungry or full. Stop eating as soon as you feel full.  ? If you feel hungry, try drinking water first before eating. Drink enough water so your urine is clear or pale yellow.  ? Eat smaller portions. Pay attention to portion sizes when eating out.  ? Look at serving sizes on food labels. Most foods contain more than one serving per container.  ? Eat the recommended number of calories for your gender and activity level. For most active people, a daily total of 2,000 calories is appropriate. If you are trying to lose weight or are not very active, you may need to eat fewer calories. Talk with your health care provider or a diet and nutrition specialist (dietitian) about how many calories you need each day.  · Choose healthy foods, such as:  ? Fruits and vegetables. At each meal, try to fill at least half of your plate with fruits and vegetables.  ? Whole grains, such as whole-wheat bread, brown rice, and quinoa.  ? Lean meats, such as chicken or fish.  ? Other healthy proteins, such as beans, eggs, or tofu.  ? Healthy fats, such as nuts, seeds, fatty fish, and olive oil.  ? Low-fat or fat-free dairy products.  · Check food labels, and avoid food and drinks that:  ? Are high in calories.  ? Have added sugar.  ? Are high in sodium.  ? Have saturated  fats or trans fats.  · Cook foods in healthier ways, such as by baking, broiling, or grilling.  · Make a meal plan for the week, and shop with a grocery list to help you stay on track with your purchases. Try to avoid going to the grocery store when you are hungry.  · When grocery shopping, try to shop around the outside of the store first, where the fresh foods are. Doing this helps you to avoid prepackaged foods, which can be high in sugar, salt (sodium), and fat.  What lifestyle changes can be made?    · Exercise for 30 or more minutes on 5 or more days each week. Exercising may include brisk walking, yard work, biking, running, swimming, and team sports like basketball and soccer. Ask your health care provider which exercises are safe for you.  · Do muscle-strengthening activities, such as lifting weights or using resistance bands, on 2 or more days a week.  · Do not use any products that contain nicotine or tobacco, such as cigarettes and e-cigarettes. If you need help quitting, ask your health care provider.  · Limit alcohol intake to no more than 1 drink a day for nonpregnant women and 2 drinks a day for men. One drink equals 12 oz of beer, 5 oz of wine, or 1½ oz of hard liquor.  · Try to get 7-9 hours of sleep each night.  What other changes can be made?  · Keep a food and activity journal to keep track of:  ? What you ate and how many calories you had. Remember to count the calories in sauces, dressings, and side dishes.  ? Whether you were active, and what exercises you did.  ? Your calorie, weight, and activity goals.  · Check your weight regularly. Track any changes. If you notice you have gained weight, make changes to your diet or activity routine.  · Avoid taking weight-loss medicines or supplements. Talk to your health care provider before starting any new medicine or supplement.  · Talk to your health care provider before trying any new diet or exercise plan.  Why are these changes  important?  Eating healthy, staying active, and having healthy habits can help you to prevent obesity. Those changes also:  · Help you manage stress and emotions.  · Help you connect with friends and family.  · Improve your self-esteem.  · Improve your sleep.  · Prevent long-term health problems.  What can happen if changes are not made?  Being obese or overweight can cause you to develop joint or bone problems, which can make it hard for you to stay active or do activities you enjoy. Being obese or overweight also puts stress on your heart and lungs and can lead to health problems like diabetes, heart disease, and some cancers.  Where to find more information  Talk with your health care provider or a dietitian about healthy eating and healthy lifestyle choices. You may also find information from:  · U.[place]. Department of Agriculture, MyPlate: www.SyndicateRoommyplate.gov  · American Heart Association: www.heart.org  · Centers for Disease Control and Prevention: www.cdc.gov  Summary  · Staying at a healthy weight is important to your overall health. It helps you to prevent certain diseases and health problems, such as heart disease, diabetes, joint problems, sleep disorders, and some types of cancer.  · Being obese or overweight can cause you to develop joint or bone problems, which can make it hard for you to stay active or do activities you enjoy.  · You can prevent unhealthy weight gain by eating a healthy diet, exercising regularly, not smoking, limiting alcohol, and getting enough sleep.  · Talk with your health care provider or a dietitian for guidance about healthy eating and healthy lifestyle choices.  This information is not intended to replace advice given to you by your health care provider. Make sure you discuss any questions you have with your health care provider.  Document Released: 12/19/2017 Document Revised: 12/21/2018 Document Reviewed: 01/24/2018  Elsevier Patient Education © 2020 [employer].  Zoster  Vaccine, Recombinant injection  What is this medicine?  ZOSTER VACCINE (ZOS ter vak SEEN) is used to prevent shingles in adults 50 years old and over. This vaccine is not used to treat shingles or nerve pain from shingles.  This medicine may be used for other purposes; ask your health care provider or pharmacist if you have questions.  COMMON BRAND NAME(S): SHINGRIX  What should I tell my health care provider before I take this medicine?  They need to know if you have any of these conditions:  · blood disorders or disease  · cancer like leukemia or lymphoma  · immune system problems or therapy  · an unusual or allergic reaction to vaccines, other medications, foods, dyes, or preservatives  · pregnant or trying to get pregnant  · breast-feeding  How should I use this medicine?  This vaccine is for injection in a muscle. It is given by a health care professional.  Talk to your pediatrician regarding the use of this medicine in children. This medicine is not approved for use in children.  Overdosage: If you think you have taken too much of this medicine contact a poison control center or emergency room at once.  NOTE: This medicine is only for you. Do not share this medicine with others.  What if I miss a dose?  Keep appointments for follow-up (booster) doses as directed. It is important not to miss your dose. Call your doctor or health care professional if you are unable to keep an appointment.  What may interact with this medicine?  · medicines that suppress your immune system  · medicines to treat cancer  · steroid medicines like prednisone or cortisone  This list may not describe all possible interactions. Give your health care provider a list of all the medicines, herbs, non-prescription drugs, or dietary supplements you use. Also tell them if you smoke, drink alcohol, or use illegal drugs. Some items may interact with your medicine.  What should I watch for while using this medicine?  Visit your doctor for  regular check ups.  This vaccine, like all vaccines, may not fully protect everyone.  What side effects may I notice from receiving this medicine?  Side effects that you should report to your doctor or health care professional as soon as possible:  · allergic reactions like skin rash, itching or hives, swelling of the face, lips, or tongue  · breathing problems  Side effects that usually do not require medical attention (report these to your doctor or health care professional if they continue or are bothersome):  · chills  · headache  · fever  · nausea, vomiting  · redness, warmth, pain, swelling or itching at site where injected  · tiredness  This list may not describe all possible side effects. Call your doctor for medical advice about side effects. You may report side effects to FDA at 1-113-PIL-0792.  Where should I keep my medicine?  This vaccine is only given in a clinic, pharmacy, doctor's office, or other health care setting and will not be stored at home.  NOTE: This sheet is a summary. It may not cover all possible information. If you have questions about this medicine, talk to your doctor, pharmacist, or health care provider.  © 2020 Elsevier/Gold Standard (2018-07-30 13:20:30)  Pneumococcal Vaccine, Polyvalent solution for injection  What is this medicine?  PNEUMOCOCCAL VACCINE, POLYVALENT (DONATO mo DANIELLE al christian Marie) is a vaccine to prevent pneumococcus bacteria infection. These bacteria are a major cause of ear infections, Strep throat infections, and serious pneumonia, meningitis, or blood infections worldwide. These vaccines help the body to produce antibodies (protective substances) that help your body defend against these bacteria. This vaccine is recommended for people 2 years of age and older with health problems. It is also recommended for all adults over 50 years old. This vaccine will not treat an infection.  This medicine may be used for other purposes; ask your health care  provider or pharmacist if you have questions.  COMMON BRAND NAME(S): Pneumovax 23  What should I tell my health care provider before I take this medicine?  They need to know if you have any of these conditions:  · bleeding problems  · bone marrow or organ transplant  · cancer, Hodgkin's disease  · fever  · infection  · immune system problems  · low platelet count in the blood  · seizures  · an unusual or allergic reaction to pneumococcal vaccine, diphtheria toxoid, other vaccines, latex, other medicines, foods, dyes, or preservatives  · pregnant or trying to get pregnant  · breast-feeding  How should I use this medicine?  This vaccine is for injection into a muscle or under the skin. It is given by a health care professional.  A copy of Vaccine Information Statements will be given before each vaccination. Read this sheet carefully each time. The sheet may change frequently.  Talk to your pediatrician regarding the use of this medicine in children. While this drug may be prescribed for children as young as 2 years of age for selected conditions, precautions do apply.  Overdosage: If you think you have taken too much of this medicine contact a poison control center or emergency room at once.  NOTE: This medicine is only for you. Do not share this medicine with others.  What if I miss a dose?  It is important not to miss your dose. Call your doctor or health care professional if you are unable to keep an appointment.  What may interact with this medicine?  · medicines for cancer chemotherapy  · medicines that suppress your immune function  · medicines that treat or prevent blood clots like warfarin, enoxaparin, and dalteparin  · steroid medicines like prednisone or cortisone  This list may not describe all possible interactions. Give your health care provider a list of all the medicines, herbs, non-prescription drugs, or dietary supplements you use. Also tell them if you smoke, drink alcohol, or use illegal drugs. Some  items may interact with your medicine.  What should I watch for while using this medicine?  Mild fever and pain should go away in 3 days or less. Report any unusual symptoms to your doctor or health care professional.  What side effects may I notice from receiving this medicine?  Side effects that you should report to your doctor or health care professional as soon as possible:  · allergic reactions like skin rash, itching or hives, swelling of the face, lips, or tongue  · breathing problems  · confused  · fever over 102 degrees F  · pain, tingling, numbness in the hands or feet  · seizures  · unusual bleeding or bruising  · unusual muscle weakness  Side effects that usually do not require medical attention (report to your doctor or health care professional if they continue or are bothersome):  · aches and pains  · diarrhea  · fever of 102 degrees F or less  · headache  · irritable  · loss of appetite  · pain, tender at site where injected  · trouble sleeping  This list may not describe all possible side effects. Call your doctor for medical advice about side effects. You may report side effects to FDA at 3-720-KGO-4716.  Where should I keep my medicine?  This does not apply. This vaccine is given in a clinic, pharmacy, doctor's office, or other health care setting and will not be stored at home.  NOTE: This sheet is a summary. It may not cover all possible information. If you have questions about this medicine, talk to your doctor, pharmacist, or health care provider.  © 2020 Elsevier/Gold Standard (2009-07-24 14:32:37)

## 2020-08-10 NOTE — PROGRESS NOTES
The ABCs of the Annual Wellness Visit  Subsequent Medicare Wellness Visit    Chief Complaint   Patient presents with   • Back Pain     new pt. est. care, pt also states pain in the back of legs    • tingling in feet       Subjective   History of Present Illness:  Clarissa Matos is a 69 y.o. female who presents for a Subsequent Medicare Wellness Visit.    HEALTH RISK ASSESSMENT    Recent Hospitalizations:  Recently treated at the following:  [facility]    Current Medical Providers:  Patient Care Team:  Judy Kaur APRN as PCP - General (Nurse Practitioner)    Smoking Status:  Social History     Tobacco Use   Smoking Status Never Smoker   Smokeless Tobacco Never Used       Alcohol Consumption:  Social History     Substance and Sexual Activity   Alcohol Use No       Depression Screen:   PHQ-2/PHQ-9 Depression Screening 8/10/2020   Little interest or pleasure in doing things 0   Feeling down, depressed, or hopeless 0   Total Score 0       Fall Risk Screen:  MORA Fall Risk Assessment was completed, and patient is at MODERATE risk for falls. Assessment completed on:8/10/2020    Health Habits and Functional and Cognitive Screening:  No flowsheet data found.      Does the patient have evidence of cognitive impairment? No    Asprin use counseling:Does not need ASA (and currently is not on it)    Age-appropriate Screening Schedule:  Refer to the list below for future screening recommendations based on patient's age, sex and/or medical conditions. Orders for these recommended tests are listed in the plan section. The patient has been provided with a written plan.    Health Maintenance   Topic Date Due   • TDAP/TD VACCINES (1 - Tdap) 11/10/1961   • ZOSTER VACCINE (1 of 2) 11/10/2002   • COLONOSCOPY  07/06/2017   • MAMMOGRAM  12/20/2019   • INFLUENZA VACCINE  08/01/2020          The following portions of the patient's history were reviewed and updated as appropriate: allergies, current medications, past  family history, past medical history, past social history, past surgical history and problem list.    Outpatient Medications Prior to Visit   Medication Sig Dispense Refill   • acetaminophen (TYLENOL) 325 MG tablet Take 2 tablets by mouth Every 4 (Four) Hours As Needed for Fever (greater than 101 F).     • aspirin-acetaminophen-caffeine (EXCEDRIN MIGRAINE) 250-250-65 MG per tablet Take 1 tablet by mouth Every 6 (Six) Hours As Needed for Headache. May resume on 07/07/18.     • CALCIUM PO Take  by mouth.     • Cholecalciferol (VITAMIN D3) 5000 units capsule capsule Take 5,000 Units by mouth Every Night.     • diclofenac (VOLTAREN) 1 % gel gel Apply 4 g topically to the appropriate area as directed 4 (Four) Times a Day As Needed.     • docusate sodium 100 MG capsule Take 100 mg by mouth 2 (Two) Times a Day. (Patient taking differently: Take 100 mg by mouth As Needed.)     • DULoxetine (CYMBALTA) 60 MG capsule Take 60 mg by mouth Every Morning.     • Fexofenadine HCl (MUCINEX ALLERGY PO) Take 1 tablet by mouth As Needed.     • hydroCHLOROthiazide (HYDRODIURIL) 25 MG tablet Take 25 mg by mouth Daily.     • HYDROcodone-acetaminophen (NORCO) 7.5-325 MG per tablet TK 1 TO 3 TS PO D AS NEEDED FOR  INCREASED PAIN DUE TO RECENT TRIP     • LORazepam (ATIVAN) 0.5 MG tablet Take 0.5 mg by mouth 2 (Two) Times a Day As Needed for Anxiety.     • MAGNESIUM PO Take  by mouth.     • Morphine (MS CONTIN) 15 MG 12 hr tablet Take 15 mg by mouth Every 12 (Twelve) Hours.     • ondansetron (ZOFRAN) 4 MG tablet Take 1 tablet by mouth Every 6 (Six) Hours As Needed for Nausea or Vomiting for up to 10 doses. 10 tablet 0   • VITAMIN D PO Take  by mouth.     • zolpidem (AMBIEN) 10 MG tablet Take 10 mg by mouth At Night As Needed for Sleep.     • doxazosin (CARDURA) 2 MG tablet Take 2 mg by mouth Every Night.  0   • irbesartan (AVAPRO) 300 MG tablet Take 300 mg by mouth Daily.     • loratadine (CLARITIN) 10 MG tablet Take 10 mg by mouth Every  "Morning.     • losartan-hydrochlorothiazide (HYZAAR) 50-12.5 MG per tablet Take 1 tablet by mouth Every Morning.     • meloxicam (Mobic) 15 MG tablet Take 1 tablet by mouth Daily. (Patient taking differently: Take 15 mg by mouth Every 12 (Twelve) Hours.) 30 tablet 3   • omeprazole (priLOSEC) 20 MG capsule Take 20 mg by mouth Every Morning.       Facility-Administered Medications Prior to Visit   Medication Dose Route Frequency Provider Last Rate Last Dose   • mupirocin (BACTROBAN) 2 % nasal ointment   Nasal BID Sam Bustamante MD           Patient Active Problem List   Diagnosis   • Cholecystitis   • Fibromyalgia   • Essential hypertension   • Closed fracture of upper end of humerus   • Rotator cuff arthropathy   • Primary osteoarthritis of left knee       Advanced Care Planning:  ACP discussion was held with the patient during this visit. Patient has an advance directive in EMR which is still valid.     Review of Systems    Compared to one year ago, the patient feels her physical health is better.  Compared to one year ago, the patient feels her mental health is better.    Reviewed chart for potential of high risk medication in the elderly: yes  Reviewed chart for potential of harmful drug interactions in the elderly:yes    Objective         Vitals:    08/10/20 1537   BP: 138/80   Pulse: 76   Temp: 98.1 °F (36.7 °C)   TempSrc: Temporal   SpO2: 98%   Weight: 78.9 kg (173 lb 14.4 oz)   Height: 160 cm (62.99\")       Body mass index is 30.81 kg/m².  Discussed the patient's BMI with her. The BMI is above average; BMI management plan is completed.    Physical Exam          Assessment/Plan   Medicare Risks and Personalized Health Plan  CMS Preventative Services Quick Reference  Breast Cancer/Mammogram Screening  Colon Cancer Screening  Fall Risk  Immunizations Discussed/Encouraged (specific immunizations; adacel Tdap and Pneumococcal 23 )    The above risks/problems have been discussed with the patient.  Pertinent " information has been shared with the patient in the After Visit Summary.  Follow up plans and orders are seen below in the Assessment/Plan Section.    Diagnoses and all orders for this visit:    1. Essential hypertension (Primary)    2. Impacted cerumen of right ear      Follow Up:  No follow-ups on file.     An After Visit Summary and PPPS were given to the patient.

## 2020-08-10 NOTE — PROGRESS NOTES
Subjective   Clarissa Matos is a 69 y.o. female.     Chief Complaint   Patient presents with   • Back Pain     new pt. est. care, pt also states pain in the back of legs    • tingling in feet      HPI new patient to me.  She is here to establish care for annual wellness visit and chronic disease management.  She has chronic hypertension which seems recently to be pretty well controlled on her HCTZ which she takes and tolerates daily.  She has been on multiple other blood pressure medications in the past and none of them seem to work very well.  She does have a cuff at home to check her blood pressure but not checked it recently but is willing to do that if needed.    She has a history of chronic pain and is followed by pain management.  The pain is in her neck and upper back.  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  She is on hydrocodone and MS Contin for that.    She is followed by psychiatry for history of fibromyalgia and anxiety, depression, insomnia.  These are controlled with her current medications of Cymbalta, Ativan, Ambien.  She denies any side effects from these medications.    She recently had an oral lesion that was mildly painful.  She had gone to her PCP for that and she was not able to figure out what it was thought it was a geographic tongue.  She was given Fallon's Magic mouthwash by her dentist eventually and now that is resolved.  She also has a history of gritting her teeth and is now using both her mom mouth guards and this is helped her symptoms as well.    She was previously followed by GYN but he has since retired.  She has had a history of hysterectomy, approximately 10 years ago, she thinks she no longer has a uterus or ovaries but she is not sure.  Her last Pap was approximately 3 years ago she believes.  She denies any abnormal Paps recently and does not think she has HPV.  She is  and monogamous.  Not really sexually active.    She went to the ENT for  some chronic sinus issues and dry mouth and was taken off of her Allegra daily and now only takes Mucinex as needed.    She has new neuropathy in both feet and this is thought to be due to to low back problems or to vascular issues by Dr. Bustamante.  She has been doing physical therapy and not really had improvement in neuropathic symptoms.  It is in both feet and all the time and sometimes is numb and tingly sometimes is more painful.  Does not think she is recently been checked for diabetes and would like to be checked.  The neuropathic symptoms are more on the tops of her feet and around her ankles then on the bottoms of her feet.  She notices the neuropathy more when sitting, less with standing or lying down.      Her spouse was diagnosed with pancreatic cancer in 2013, but she reports he is doing well now.    She has 2 adult kids, no grandkids but does admit to grand dog.    Had both pneumonia vaccines, would like to get Shingrix.  Does not get the flu vaccine.    Had negative cologuard in past, never had colonoscopy.  She would like to have one ordered.  Her  sees Dr. Dyer on a right-hand flex in her way and this is who she would like to see for her colonoscopy.    She needs a mammogram.  It has been 2 to 3 years.  She has had a cyst on her mammo-in the past but never any other problems.    She denies any family history of breast or colon cancer.      Social History     Tobacco Use   • Smoking status: Never Smoker   • Smokeless tobacco: Never Used   Substance Use Topics   • Alcohol use: No   • Drug use: No       The following portions of the patient's history were reviewed and updated as appropriate: allergies, current medications, past family history, past medical history, past social history, past surgical history and problem list.    Review of Systems   Constitutional: Negative for activity change, appetite change, chills, fatigue, fever and unexpected weight change.   HENT: Positive for hearing loss  "(In her right ear). Negative for congestion, rhinorrhea, sore throat and trouble swallowing.    Eyes: Negative for pain and visual disturbance.   Respiratory: Negative for cough and shortness of breath.    Cardiovascular: Positive for palpitations (She has past history of irregular heartbeat due to PVCs but never been evaluated by cardiology, none recently) and leg swelling (She gets mild leg and ankle swelling which seems improved with the HCTZ). Negative for chest pain.   Gastrointestinal: Negative for abdominal pain, blood in stool, constipation, diarrhea, nausea and vomiting.   Endocrine: Negative for cold intolerance, heat intolerance, polydipsia and polyuria.   Genitourinary: Negative for difficulty urinating, dysuria, hematuria, urgency, vaginal bleeding and vaginal discharge.   Musculoskeletal: Positive for arthralgias, back pain, gait problem, joint swelling, myalgias, neck pain and neck stiffness.   Skin: Negative for rash and wound.   Allergic/Immunologic: Negative for environmental allergies.   Neurological: Positive for weakness and headaches (Chronic headaches, controlled with migraine OTC medications). Negative for dizziness, seizures and light-headedness.        She has had several recent falls, thinks it is due to left knee or leg weakness   Hematological: Does not bruise/bleed easily.   Psychiatric/Behavioral: Negative for dysphoric mood and sleep disturbance. The patient is not nervous/anxious.         Anxiety depression and insomnia controlled with current medications       Objective   Blood pressure 138/80, pulse 76, temperature 98.1 °F (36.7 °C), temperature source Temporal, height 160 cm (62.99\"), weight 78.9 kg (173 lb 14.4 oz), SpO2 98 %, not currently breastfeeding.  Body mass index is 30.81 kg/m².    Physical Exam   Constitutional: She is oriented to person, place, and time. She appears well-developed and well-nourished. No distress.   HENT:   Head: Normocephalic and atraumatic.   Right " Ear: Tympanic membrane and ear canal normal.   Left Ear: Tympanic membrane, external ear and ear canal normal.   Right ear canal with cerumen impaction which was cleared with ear flush by MA.  Patient tolerated well.  Post disimpaction I was able to visualize TM which was without any erythema or fluid noted   Eyes: Pupils are equal, round, and reactive to light. Conjunctivae and EOM are normal. Right eye exhibits no discharge. Left eye exhibits no discharge.   Neck: Neck supple. No thyromegaly present.   Cardiovascular: Normal rate, regular rhythm, normal heart sounds and intact distal pulses.   No murmur heard.  Pulmonary/Chest: Effort normal and breath sounds normal.   Abdominal: Soft. Bowel sounds are normal. There is no hepatosplenomegaly. There is no tenderness.   Musculoskeletal: She exhibits no deformity.   Gait smooth and steady    No obvious spinal deformities.  Does have decreased range of motion in her neck and multiple other joints, but no erythema or tenderness with palpation noted   Lymphadenopathy:     She has no cervical adenopathy.   Neurological: She is alert and oriented to person, place, and time. No cranial nerve deficit (Cranial nerves II through XII grossly intact).   Skin: Skin is warm and dry.   Psychiatric: She has a normal mood and affect. Her behavior is normal.   Nursing note and vitals reviewed.      Assessment   Problem List Items Addressed This Visit        Cardiovascular and Mediastinum    Essential hypertension    Relevant Orders    Comprehensive metabolic panel    Lipid Panel With LDL/HDL Ratio    CBC and Differential    Microalbumin / Creatinine Urine Ratio - Urine, Clean Catch    TSH Rfx On Abnormal To Free T4       Nervous and Auditory    Fibromyalgia      Other Visit Diagnoses     Medicare annual wellness visit, subsequent    -  Primary    Impacted cerumen of right ear        Screening for breast cancer        Neuropathy of both feet        Relevant Orders    Vitamin B12     Elevated glucose        Relevant Orders    Hemoglobin A1c    Encounter for screening mammogram for malignant neoplasm of breast         Relevant Orders    Mammo Screening Bilateral With CAD    Chronic back pain greater than 3 months duration               Procedures           Impression and Plan: New patient to me here for chronic health management and annual wellness exam.    She is quite a complicated health history and I have reviewed her chart and blood work and imaging.  Most of that we reviewed in her visit today.    Hypertension seems to be controlled currently we will have her monitor at home and let me know if it is not staying close to goal of 130/80.  We will continue her current HCTZ.    Chronic pain: She is on pain medication per pain management and will let them continue to manage her chronic pain.  I do not see any chronic pain management notes in her chart.  She reports they cannot manage her low back pain as they do not diagnose just treat chronic pain.  She would like neurosurgery evaluation for her low back and neuropathy.  First we will get blood work to make sure there is no other source for her neuropathic symptoms.  I suspect though that they are due to low back pain.  We reviewed her most recent MRI of her lumbar spine just done this past June and there was not anything striking to be causing those symptoms.    Anxiety depression and insomnia are managed by psychiatry.  She is on quite a bit of medication that can cause somnolence and I suspect this may be causing some of her falls and weakness.    She wants a colonoscopy by Dr. Dyer on April him flex normally and I do not see that provider.  She will get more information and let me know so that I can order her colonoscopy.      Will order mammo today.  She can return for Pap.  If she needs one.  I will go back and review her records to see if she does actually have a cervix and ovaries.  Most likely she does not have a cervix and no longer  needs Paps.    We discussed needed vaccines and she will get these at her pharmacy.    We will check a vitamin B12 due to her neuropathic symptoms.  We will also check a urine for microalbumin due to chronic hypertension.    Right ear was disimpacted of cerumen and hearing was restored.  She tolerated the procedure well.     As part of wellness and prevention, the following topics were discussed with the patient:   Nutrition   Physical activity/regular exercise     Healthy weight    Injury prevention-fall prevention   Dental health-sees dentist regularly   Mental health-followed by psychiatry, stress mgmt reviewed   Immunization  Mammogram ordered  Colonoscopy will be ordered when she lets me know which provider she wants to see as she has a particular when she would like.      Health Maintenance Due   Topic Date Due   • TDAP/TD VACCINES (1 - Tdap) 11/10/1963   • ZOSTER VACCINE (1 of 2) 11/10/2002   • HEPATITIS C SCREENING  07/06/2017   • COLONOSCOPY  07/06/2017   • Pneumococcal Vaccine Once at 65 Years Old  11/10/2017   • MAMMOGRAM  12/20/2019   • INFLUENZA VACCINE  08/01/2020              EMR Dragon/Transcription disclaimer:   Much of this encounter note is an electronic transcription/translation of spoken language to printed text. The electronic translation of spoken language may permit erroneous, or at times, nonsensical words or phrases to be inadvertently transcribed; Although I have reviewed the note for such errors, some may still exist.      In preparation for this visit I have reviewed patients most recent labs, recent imaging, and consult notes.  And see any recent PCP notes to review.

## 2020-08-11 ENCOUNTER — TREATMENT (OUTPATIENT)
Dept: PHYSICAL THERAPY | Facility: CLINIC | Age: 70
End: 2020-08-11

## 2020-08-11 DIAGNOSIS — M25.561 ACUTE PAIN OF BOTH KNEES: ICD-10-CM

## 2020-08-11 DIAGNOSIS — R26.89 IMPAIRED GAIT AND MOBILITY: Primary | ICD-10-CM

## 2020-08-11 DIAGNOSIS — M25.512 ACUTE POSTOPERATIVE PAIN OF LEFT SHOULDER: ICD-10-CM

## 2020-08-11 DIAGNOSIS — M25.619 LIMITED RANGE OF MOTION (ROM) OF SHOULDER: ICD-10-CM

## 2020-08-11 DIAGNOSIS — M17.12 PRIMARY OSTEOARTHRITIS OF LEFT KNEE: ICD-10-CM

## 2020-08-11 DIAGNOSIS — M25.562 ACUTE PAIN OF BOTH KNEES: ICD-10-CM

## 2020-08-11 DIAGNOSIS — G89.18 ACUTE POSTOPERATIVE PAIN OF LEFT SHOULDER: ICD-10-CM

## 2020-08-11 DIAGNOSIS — M54.6 ACUTE BILATERAL THORACIC BACK PAIN: ICD-10-CM

## 2020-08-11 PROCEDURE — 97113 AQUATIC THERAPY/EXERCISES: CPT | Performed by: PHYSICAL THERAPIST

## 2020-08-11 NOTE — PROGRESS NOTES
Physical Therapy Daily Progress Note    Patient: Clarissa Matos   : 1950  Diagnosis/ICD-10 Code:  Impaired gait and mobility [R26.89]  Referring practitioner: RUBY Mckenna  Date of Initial Visit: Type: THERAPY  Noted: 2020  Today's Date: 2020  Patient seen for 15 sessions           Subjective   I officially joined the gym today. My back is still pretty sore, especially on the left side today.    Objective     Water Walk                               forward/backward/side step- 2 min warm up  Stretch 1                                  calf stretch 20 sec x2  Stretch 2                                  hamstring stretch with small noodle x20   Stretch 3                                  wall walk stretch 30 sec x2  Stretch Other 1                      seated piriformis stretch 30 sec x2  Stretch Other 2                      posterior shoulder 20 sec x 2 of each.   Short lever hip sweeps          SN, x15 *Skipped  Vertical Traction                     5 min with large noodle   Abdominals                             small noodle x15    Marching                                 walking, 2 laps LN  Toe/Heel Raises                    20x B  SKTC Stretch                         Seated 5x10 sec hold                Tuck ups                                 Seated 10x    Seated clamshells                 10x   Bicycle                                    seated-2 min  Scissor kicks                         seated x20  Exercise 1                              trunk rotation with LN x20  Exercise 2                              seated trunk flexion with LN and mild side bends 2 min -SKIPPED  Exercise 3                              shoulder horizontal abduction, L5 paddles x15   Alternating Rows                   L5 Paddles x15   Upper trap stretch                  1 min, B  Levator stretch                       1 min, B       Assessment/Plan  Clarissa is becoming more comfortable with her aquatic routine. She  was able to place the noodle independently for her hamstring stretch today and she had a better tolerance with the wall walk stretch and hamstring stretch. Her UE strength has significantly improved and she is tolerating more resistance from the noodle and paddles. She continues to have to take very small side steps/backward steps to avoid exacerbation of back pain but she is much more comfortable moving in the water compared to when she started.          Timed:  Aquatic Therapy    39     mins [id number];    Tere Zamora, PT  Physical Therapist

## 2020-08-12 ENCOUNTER — APPOINTMENT (OUTPATIENT)
Dept: PAIN MEDICINE | Facility: HOSPITAL | Age: 70
End: 2020-08-12

## 2020-08-12 ENCOUNTER — TRANSCRIBE ORDERS (OUTPATIENT)
Dept: ADMINISTRATIVE | Facility: HOSPITAL | Age: 70
End: 2020-08-12

## 2020-08-12 DIAGNOSIS — Z12.31 SCREENING MAMMOGRAM, ENCOUNTER FOR: Primary | ICD-10-CM

## 2020-08-13 ENCOUNTER — TREATMENT (OUTPATIENT)
Dept: PHYSICAL THERAPY | Facility: CLINIC | Age: 70
End: 2020-08-13

## 2020-08-13 DIAGNOSIS — M25.562 ACUTE PAIN OF BOTH KNEES: ICD-10-CM

## 2020-08-13 DIAGNOSIS — M25.512 ACUTE POSTOPERATIVE PAIN OF LEFT SHOULDER: ICD-10-CM

## 2020-08-13 DIAGNOSIS — M25.619 LIMITED RANGE OF MOTION (ROM) OF SHOULDER: ICD-10-CM

## 2020-08-13 DIAGNOSIS — G89.18 ACUTE POSTOPERATIVE PAIN OF LEFT SHOULDER: ICD-10-CM

## 2020-08-13 DIAGNOSIS — M54.6 ACUTE BILATERAL THORACIC BACK PAIN: ICD-10-CM

## 2020-08-13 DIAGNOSIS — R26.89 IMPAIRED GAIT AND MOBILITY: Primary | ICD-10-CM

## 2020-08-13 DIAGNOSIS — M25.561 ACUTE PAIN OF BOTH KNEES: ICD-10-CM

## 2020-08-13 PROCEDURE — 97113 AQUATIC THERAPY/EXERCISES: CPT | Performed by: PHYSICAL THERAPIST

## 2020-08-13 NOTE — PROGRESS NOTES
Physical Therapy Daily Progress Note    Patient: Clarissa Matos   : 1950  Diagnosis/ICD-10 Code:  Impaired gait and mobility [R26.89]  Referring practitioner: RUBY Mckenna  Date of Initial Visit: Type: THERAPY  Noted: 2020  Today's Date: 2020  Patient seen for 16 sessions             Subjective   My pain is about 4/10 in my back. I have a bruise in my right buttocks and I don't remember hitting it on anything but I did twist it funny so maybe that caused it.     Objective     AQUATIC EXERCISES:  Water Walk                               forward/backward/side step- 2 min warm up  Stretch 1                                  calf stretch 20 sec x2  Stretch 2                                  hamstring stretch with small noodle x20   Stretch 3                                  wall walk stretch 30 sec x2  Stretch Other 1                      seated piriformis stretch 30 sec x2  Stretch Other 2                      posterior shoulder 20 sec x 2 of each.   Short lever hip sweeps          SN, x15 *Skipped  Vertical Traction                     5 min with large noodle   Abdominals                             small noodle x15    Marching                                 walking, 2 laps LN  Toe/Heel Raises                    20x B  SKTC Stretch                         Seated 5x10 sec hold                Tuck ups                                 Seated 10x    Seated clamshells                 10x   Bicycle                                    seated-2 min  Scissor kicks                         seated x20  Exercise 1                              trunk rotation with LN x20  Exercise 2                              seated trunk flexion with LN and mild side bends 2 min -SKIPPED  Exercise 3                              shoulder horizontal abduction, L5 paddles x15   Alternating Rows                   L5 Paddles x15   Upper trap stretch                  1 min, B  Levator stretch                       1 min,  B    Assessment/Plan  Cued patient to push noodle all the way out to ankle and straighten knee for a stronger hamstring stretch. Clarissa has joined the gym and intends on continuing her aquatic routine independently to manage her back pain and maintain the strength she has gained in therapy. Ensured proper form with all stretches and encouraged her to continue core work in seated position to limit strain on low back.          Timed:  Aquatic Therapy    39     mins [id number];    Tere Zamora, PT  Physical Therapist

## 2020-08-25 RX ORDER — HYDROCHLOROTHIAZIDE 25 MG/1
25 TABLET ORAL DAILY
Qty: 90 TABLET | Refills: 1 | Status: SHIPPED | OUTPATIENT
Start: 2020-08-25 | End: 2021-05-13 | Stop reason: ALTCHOICE

## 2020-09-04 ENCOUNTER — APPOINTMENT (OUTPATIENT)
Dept: MAMMOGRAPHY | Facility: HOSPITAL | Age: 70
End: 2020-09-04

## 2020-09-10 LAB
ALBUMIN SERPL-MCNC: 4.7 G/DL (ref 3.5–5.2)
ALBUMIN/CREAT UR: 13 MG/G CREAT (ref 0–29)
ALBUMIN/GLOB SERPL: 2.4 G/DL
ALP SERPL-CCNC: 64 U/L (ref 39–117)
ALT SERPL-CCNC: 15 U/L (ref 1–33)
AST SERPL-CCNC: 17 U/L (ref 1–32)
BASOPHILS # BLD AUTO: 0.08 10*3/MM3 (ref 0–0.2)
BASOPHILS NFR BLD AUTO: 1.5 % (ref 0–1.5)
BILIRUB SERPL-MCNC: 0.3 MG/DL (ref 0–1.2)
BUN SERPL-MCNC: 12 MG/DL (ref 8–23)
BUN/CREAT SERPL: 15.8 (ref 7–25)
CALCIUM SERPL-MCNC: 9.6 MG/DL (ref 8.6–10.5)
CHLORIDE SERPL-SCNC: 96 MMOL/L (ref 98–107)
CHOLEST SERPL-MCNC: 157 MG/DL (ref 0–200)
CO2 SERPL-SCNC: 34.4 MMOL/L (ref 22–29)
CREAT SERPL-MCNC: 0.76 MG/DL (ref 0.57–1)
CREAT UR-MCNC: 76.1 MG/DL
EOSINOPHIL # BLD AUTO: 0.31 10*3/MM3 (ref 0–0.4)
EOSINOPHIL NFR BLD AUTO: 6 % (ref 0.3–6.2)
ERYTHROCYTE [DISTWIDTH] IN BLOOD BY AUTOMATED COUNT: 12 % (ref 12.3–15.4)
GLOBULIN SER CALC-MCNC: 2 GM/DL
GLUCOSE SERPL-MCNC: 87 MG/DL (ref 65–99)
HBA1C MFR BLD: 5.7 % (ref 4.8–5.6)
HCT VFR BLD AUTO: 48.9 % (ref 34–46.6)
HDLC SERPL-MCNC: 50 MG/DL (ref 40–60)
HGB BLD-MCNC: 16.7 G/DL (ref 12–15.9)
IMM GRANULOCYTES # BLD AUTO: 0.01 10*3/MM3 (ref 0–0.05)
IMM GRANULOCYTES NFR BLD AUTO: 0.2 % (ref 0–0.5)
LDLC SERPL CALC-MCNC: 73 MG/DL (ref 0–100)
LDLC/HDLC SERPL: 1.46 {RATIO}
LYMPHOCYTES # BLD AUTO: 2.03 10*3/MM3 (ref 0.7–3.1)
LYMPHOCYTES NFR BLD AUTO: 39.2 % (ref 19.6–45.3)
MCH RBC QN AUTO: 31.3 PG (ref 26.6–33)
MCHC RBC AUTO-ENTMCNC: 34.2 G/DL (ref 31.5–35.7)
MCV RBC AUTO: 91.7 FL (ref 79–97)
MICROALBUMIN UR-MCNC: 10 UG/ML
MONOCYTES # BLD AUTO: 0.47 10*3/MM3 (ref 0.1–0.9)
MONOCYTES NFR BLD AUTO: 9.1 % (ref 5–12)
NEUTROPHILS # BLD AUTO: 2.28 10*3/MM3 (ref 1.7–7)
NEUTROPHILS NFR BLD AUTO: 44 % (ref 42.7–76)
NRBC BLD AUTO-RTO: 0 /100 WBC (ref 0–0.2)
PLATELET # BLD AUTO: 249 10*3/MM3 (ref 140–450)
POTASSIUM SERPL-SCNC: 3.7 MMOL/L (ref 3.5–5.2)
PROT SERPL-MCNC: 6.7 G/DL (ref 6–8.5)
RBC # BLD AUTO: 5.33 10*6/MM3 (ref 3.77–5.28)
SODIUM SERPL-SCNC: 141 MMOL/L (ref 136–145)
TRIGL SERPL-MCNC: 171 MG/DL (ref 0–150)
TSH SERPL DL<=0.005 MIU/L-ACNC: 1.68 UIU/ML (ref 0.27–4.2)
VIT B12 SERPL-MCNC: 620 PG/ML (ref 211–946)
VLDLC SERPL CALC-MCNC: 34.2 MG/DL
WBC # BLD AUTO: 5.18 10*3/MM3 (ref 3.4–10.8)

## 2020-12-04 ENCOUNTER — TELEMEDICINE (OUTPATIENT)
Dept: FAMILY MEDICINE CLINIC | Facility: CLINIC | Age: 70
End: 2020-12-04

## 2020-12-04 DIAGNOSIS — G44.52 NEW DAILY PERSISTENT HEADACHE: Primary | ICD-10-CM

## 2020-12-04 PROCEDURE — 99214 OFFICE O/P EST MOD 30 MIN: CPT | Performed by: NURSE PRACTITIONER

## 2020-12-04 RX ORDER — CEFDINIR 300 MG/1
300 CAPSULE ORAL 2 TIMES DAILY
Qty: 20 CAPSULE | Refills: 0 | Status: SHIPPED | OUTPATIENT
Start: 2020-12-04 | End: 2020-12-14

## 2020-12-04 NOTE — PROGRESS NOTES
Subjective   Clarissa Matos is a 70 y.o. female.     No chief complaint on file.     CC: HA x 3-4 months    HPI patient scheduled a video visit for worsening headache for the last 3 to 4 months.  Is different than the headache she has had in the past.  It usually begins bilaterally in her temple area and sometimes on the back of her head and radiates along to the top of her head.  She has tried Excedrin Migraine which might help for a little while but then as soon as she wakes up the next morning she has the pain again.  They are debilitating and associated with blurred vision.  They radiate down to her teeth and ears.    She feels that they have worsened since she fell flat on her face in July.  She did not go anywhere to have this evaluated due to Covid.    She does admit she has had chronic sinusitis in the past but this does not feel quite like her sinusitis has in the past.  She has tried some sinusitis medicine but it has not been helpful.    She has a past medical history of migraines but not had 1 in 5 years.  Her headaches do feel somewhat like a migraine in that she has to lay down and light and noise make it worse and feels debilitating like a migraine.  She has no aura with these headaches.    She feels that they have worsened in the last month since the beginning of November.    pcn allergic       Social History     Tobacco Use   • Smoking status: Never Smoker   • Smokeless tobacco: Never Used   Substance Use Topics   • Alcohol use: No   • Drug use: No       The following portions of the patient's history were reviewed and updated as appropriate: allergies, current medications, past family history, past medical history, past social history, past surgical history and problem list.    Review of Systems   Constitutional: Positive for fatigue. Negative for chills and fever.   HENT: Negative for congestion, ear discharge, ear pain, rhinorrhea, sinus pressure, sinus pain and sore throat.    Eyes: Positive  for photophobia and visual disturbance. Negative for pain and redness.   Respiratory: Negative for cough and shortness of breath.    Cardiovascular: Negative for chest pain and palpitations.   Gastrointestinal: Negative for abdominal pain, diarrhea, nausea and vomiting.   Musculoskeletal: Negative for neck pain and neck stiffness.   Neurological: Negative for dizziness, seizures, syncope, speech difficulty, weakness, light-headedness and numbness.       Objective   not currently breastfeeding.  There is no height or weight on file to calculate BMI.    Physical Exam   Limited by video visit.  She is well appearing and does not seem to be distressed.  She seems alert and oriented and her mood and affect are normal, good historian of medical history.  No cough or dyspnea appreciated, able to complete sentences without problem.  Does not appear to have a headache currently.  She has no facial abnormalities, no slurring of her speech, no asymmetry of her face that is noticeable.      Assessment   Problem List Items Addressed This Visit     None      Visit Diagnoses     New daily persistent headache    -  Primary    Relevant Orders    MRI Brain With & Without Contrast           Procedures           Impression and Plan:  Will treat for sinusitis.  Most likely will improve.  However, I am going to order an MRI since this is new persistent HA.  She will let me know next week how she is doing and if her HA has resolved with tx we could consider cancelling the MRI.      Pt is agreeable with this plan.  She v/u of s/s that require emergent tx.  She will check her BP to make sure that it is at goal and not causing her HA.      We discussed s/s requiring emergent tx as well as those that would require f/u.      Health Maintenance Due   Topic Date Due   • COLONOSCOPY  1950   • TDAP/TD VACCINES (1 - Tdap) 11/10/1971   • ZOSTER VACCINE (1 of 2) 11/10/2002   • HEPATITIS C SCREENING  07/06/2017   • Pneumococcal Vaccine 65+ (1 of  1 - PPSV23) 11/10/2017   • MAMMOGRAM  12/20/2019   • INFLUENZA VACCINE  08/01/2020       Patient gave consent today for a telehealth video visit as following recommendations of our governor and CDC during the COVID-19 pandemic.    20 min was spent in discussion with pt and greater than 50% of that time was spent counseling.         EMR Dragon/Transcription disclaimer:   Much of this encounter note is an electronic transcription/translation of spoken language to printed text. The electronic translation of spoken language may permit erroneous, or at times, nonsensical words or phrases to be inadvertently transcribed; Although I have reviewed the note for such errors, some may still exist.       zoom platform used for visit with good A/V quality.

## 2020-12-28 ENCOUNTER — HOSPITAL ENCOUNTER (OUTPATIENT)
Dept: MRI IMAGING | Facility: HOSPITAL | Age: 70
Discharge: HOME | End: 2020-12-28
Admitting: NURSE PRACTITIONER

## 2020-12-28 DIAGNOSIS — G44.52 NEW DAILY PERSISTENT HEADACHE: ICD-10-CM

## 2020-12-28 LAB — CREAT BLDA-MCNC: 0.6 MG/DL (ref 0.6–1.3)

## 2020-12-28 PROCEDURE — 82565 ASSAY OF CREATININE: CPT

## 2020-12-28 PROCEDURE — A9577 INJ MULTIHANCE: HCPCS | Performed by: NURSE PRACTITIONER

## 2020-12-28 PROCEDURE — 70553 MRI BRAIN STEM W/O & W/DYE: CPT

## 2020-12-28 PROCEDURE — 0 GADOBENATE DIMEGLUMINE 529 MG/ML SOLUTION: Performed by: NURSE PRACTITIONER

## 2020-12-28 RX ADMIN — GADOBENATE DIMEGLUMINE 20 ML: 529 INJECTION, SOLUTION INTRAVENOUS at 18:38

## 2021-01-18 ENCOUNTER — DOCUMENTATION (OUTPATIENT)
Dept: PHYSICAL THERAPY | Facility: CLINIC | Age: 71
End: 2021-01-18

## 2021-01-18 DIAGNOSIS — M54.6 ACUTE BILATERAL THORACIC BACK PAIN: ICD-10-CM

## 2021-01-18 DIAGNOSIS — M25.512 ACUTE POSTOPERATIVE PAIN OF LEFT SHOULDER: ICD-10-CM

## 2021-01-18 DIAGNOSIS — M25.561 ACUTE PAIN OF BOTH KNEES: ICD-10-CM

## 2021-01-18 DIAGNOSIS — G89.18 ACUTE POSTOPERATIVE PAIN OF LEFT SHOULDER: ICD-10-CM

## 2021-01-18 DIAGNOSIS — M25.562 ACUTE PAIN OF BOTH KNEES: ICD-10-CM

## 2021-01-18 DIAGNOSIS — R26.89 IMPAIRED GAIT AND MOBILITY: Primary | ICD-10-CM

## 2021-01-18 NOTE — PROGRESS NOTES
Discharge Summary  Discharge Summary from Physical Therapy Report      Dates  PT visit: 6/4/2020  Number of Visits: 16     Discharge Status of Patient: See MD Note dated 8/13/2020    Goals: Partially Met    Short Term Goals for completion in 30 days:   -Patient will report a reduction in pain for 12-24 hours or greater following aquatic therapy session 6 wks- MET  -Patient will demonstrate good core stabilization strength with advanced  aquatic exercises such as bicycle kicks and tuck ups to help improve postural stability 6 wks  -NOT MET-unable to tolerate suspended exercises but better tolerance to seated core work  -Patient will report increased standing tolerance from 10 min to 20 min or greater to allow patient to complete ADLs such as cooking without pain/discomfort 6 wks -NOT MET, still 10 min  - increased lumbar AROM to WFL to allow for increased ease with dressing/grooming bathing activities. --NOT MET    LTGs for completion within 90 days:  -Patient will demonstrate sit to stand without use of hands in order to increase functional strength 12 wks. -MET  -Patient will increase walking tolerance from 10 min to 20 min or greater to allow for patient to walk for leisure/[id number] wks. MET  -Patient will demonstrate independence with water walks and stretches to promote independent managment of [id number] wks. MET  -Patient will improve 5xSTS from 24 seconds to </= 19 seconds in order to decrease risk of falls and increase functional [id number] wks. -MET   -Patient will increase LE strength to 4-/5 or greater to increase LE stability during gait 12 wks -MET      Discharge Plan: Continue with current home exercise program as instructed    Comments : none    Date of Discharge 1/18/2021        Tere Zamora, PT  Physical Therapist

## 2021-03-19 ENCOUNTER — BULK ORDERING (OUTPATIENT)
Dept: CASE MANAGEMENT | Facility: OTHER | Age: 71
End: 2021-03-19

## 2021-03-19 DIAGNOSIS — Z23 IMMUNIZATION DUE: ICD-10-CM

## 2021-05-13 ENCOUNTER — OFFICE VISIT (OUTPATIENT)
Dept: FAMILY MEDICINE CLINIC | Facility: CLINIC | Age: 71
End: 2021-05-13

## 2021-05-13 VITALS
HEART RATE: 94 BPM | DIASTOLIC BLOOD PRESSURE: 88 MMHG | OXYGEN SATURATION: 93 % | WEIGHT: 181.9 LBS | HEIGHT: 63 IN | TEMPERATURE: 97.5 F | BODY MASS INDEX: 32.23 KG/M2 | SYSTOLIC BLOOD PRESSURE: 144 MMHG

## 2021-05-13 DIAGNOSIS — I10 ESSENTIAL HYPERTENSION: ICD-10-CM

## 2021-05-13 DIAGNOSIS — J02.9 SORE THROAT: Primary | ICD-10-CM

## 2021-05-13 DIAGNOSIS — K14.6 SORENESS OF TONGUE: ICD-10-CM

## 2021-05-13 DIAGNOSIS — D58.2 ELEVATED HEMOGLOBIN (HCC): ICD-10-CM

## 2021-05-13 PROCEDURE — 99214 OFFICE O/P EST MOD 30 MIN: CPT | Performed by: NURSE PRACTITIONER

## 2021-05-13 RX ORDER — GUAIFENESIN 200 MG/1
TABLET ORAL
COMMUNITY
Start: 2020-06-05

## 2021-05-13 RX ORDER — CANDESARTAN CILEXETIL AND HYDROCHLOROTHIAZIDE 16; 12.5 MG/1; MG/1
1 TABLET ORAL DAILY
COMMUNITY
End: 2021-06-25 | Stop reason: SDUPTHER

## 2021-05-13 RX ORDER — KETOTIFEN FUMARATE 0.35 MG/ML
SOLUTION/ DROPS OPHTHALMIC
COMMUNITY
Start: 2020-06-05

## 2021-05-20 ENCOUNTER — TRANSCRIBE ORDERS (OUTPATIENT)
Dept: ADMINISTRATIVE | Facility: HOSPITAL | Age: 71
End: 2021-05-20

## 2021-05-20 DIAGNOSIS — Z12.31 VISIT FOR SCREENING MAMMOGRAM: Primary | ICD-10-CM

## 2021-05-24 RX ORDER — HYDROCHLOROTHIAZIDE 25 MG/1
25 TABLET ORAL DAILY
Qty: 90 TABLET | Refills: 1 | OUTPATIENT
Start: 2021-05-24

## 2021-05-24 NOTE — TELEPHONE ENCOUNTER
"Have attempted to call pt on several occasions and get information to see if pt d/c information    \"Hub\" Please ask pt if she is still continuing to take medication\" Thank you  "

## 2021-05-24 NOTE — TELEPHONE ENCOUNTER
I do not see this on her med list either, and one of her medications contain 12.5 HCTZ  Clarify  something I am not seeing here? thank you

## 2021-05-25 ENCOUNTER — TREATMENT (OUTPATIENT)
Dept: PHYSICAL THERAPY | Facility: CLINIC | Age: 71
End: 2021-05-25

## 2021-05-25 DIAGNOSIS — M79.10 MUSCLE PAIN: Primary | ICD-10-CM

## 2021-05-25 PROCEDURE — DRYNDL PR CUSTOM DRY NEEDLING SELF PAY: Performed by: PHYSICAL THERAPIST

## 2021-05-25 NOTE — PROGRESS NOTES
Physical Therapy Daily Progress Note-Dry Needling      Patient: Clarissa Matos   : 1950  Treatment Diagnosis:     ICD-10-CM ICD-9-CM   1. Muscle pain  M79.10 729.1     Referring practitioner: No ref. provider found  Date of Initial Visit: Type: THERAPY  Noted: 2021  Today's Date: 2021  Patient seen for 1 sessions         Clarissa Matos reports:     Subjective   Patient reports she has been having pain and tightness in her left shoulder which is limiting her ability to reach above shoulder level.    Objective          Palpation   Left   Hypertonic in the pectoralis major, pectoralis minor and subscapularis.   Tenderness of the anterior deltoid, infraspinatus, middle deltoid, pectoralis major, pectoralis minor, posterior deltoid, subscapularis, teres major and teres minor.       See Exercise, Manual, and Modality Logs for complete treatment.       Assessment/Plan  Soft tissue was assessed at left shoulder girdle. PT noted point tenderness as well as palpable trigger points within the muslce tissue. On this date patient stated that they would like to undergo a dry needling procedure for the soft tissue dysfunction. Patient was educated on the procedure for dry needling and consent waver was signed. Patient was informed of the risks, possible adverse effects, along with the benefits of TDN. Patient responded positively to DN with decreased muscle hypertonicity, decreased TTP, and improved left shoulder AROM.               Timed:  Manual Therapy:         mins  02304;  Therapeutic Exercise:         mins  77715;     Neuromuscular Krysta:        mins  37450;    Therapeutic Activity:          mins  01879;     Gait Training:           mins  35601;     Ultrasound:          mins  00004;    Iontophoresis:          mins  35973;  Dry Needling:     15     mins ;    Untimed:  Electrical Stimulation:         mins  55898 ( );  Mechanical Traction:         mins  67306;   Canalith Repositioning:        mins   17751;    Timed Treatment:   15   mins   Total Treatment:     30   mins    Tere Mcqueen, PT  Physical Therapist

## 2021-06-03 ENCOUNTER — APPOINTMENT (OUTPATIENT)
Dept: SLEEP MEDICINE | Facility: HOSPITAL | Age: 71
End: 2021-06-03

## 2021-06-04 ENCOUNTER — TREATMENT (OUTPATIENT)
Dept: PHYSICAL THERAPY | Facility: CLINIC | Age: 71
End: 2021-06-04

## 2021-06-04 DIAGNOSIS — M79.10 MUSCLE PAIN: Primary | ICD-10-CM

## 2021-06-04 PROCEDURE — DRYNDL PR CUSTOM DRY NEEDLING SELF PAY: Performed by: PHYSICAL THERAPIST

## 2021-06-04 NOTE — PROGRESS NOTES
Physical Therapy Daily Progress Note-Dry Needling      Patient: Clarissa Matos   : 1950  Treatment Diagnosis:     ICD-10-CM ICD-9-CM   1. Muscle pain  M79.10 729.1     Referring practitioner: No ref. provider found  Date of Initial Visit: Type: THERAPY  Noted: 2021  Today's Date: 2021  Patient seen for 2 sessions         Clarissa Matos reports:         Subjective   Patient reports she is having less pain in her shoulder and feels she had good results from last session.    Objective   See Exercise, Manual, and Modality Logs for complete treatment.       Assessment/Plan  Soft tissue was assessed at left shoulder girdle. PT noted point tenderness as well as palpable trigger points within the muslce tissue. On this date patient stated that they would like to undergo a dry needling procedure for the soft tissue dysfunction. Patient was educated on the procedure for dry needling and consent waver on file. Patient was informed of the risks, possible adverse effects, along with the benefits of DN. Patient had positive response to treatment with active twitch response achieved and decreased muscle hypertonicity and TTP post treatment.               Timed:  Manual Therapy:         mins  24006;  Therapeutic Exercise:         mins  04107;     Neuromuscular Krysta:        mins  95841;    Therapeutic Activity:          mins  34408;     Gait Training:           mins  02536;     Ultrasound:          mins  54107;    Iontophoresis:          mins  30763;  Dry Needling:     15     mins ;    Untimed:  Electrical Stimulation:         mins  55640 ( );  Mechanical Traction:         mins  29722;   Canalith Repositioning:        mins  58651;    Timed Treatment:   15   mins   Total Treatment:     30   mins    Tere Mcqueen, PT  Physical Therapist

## 2021-06-18 ENCOUNTER — TREATMENT (OUTPATIENT)
Dept: PHYSICAL THERAPY | Facility: CLINIC | Age: 71
End: 2021-06-18

## 2021-06-18 DIAGNOSIS — M79.10 MUSCLE PAIN: Primary | ICD-10-CM

## 2021-06-18 PROCEDURE — DRYNDL PR CUSTOM DRY NEEDLING SELF PAY: Performed by: PHYSICAL THERAPIST

## 2021-06-19 NOTE — PROGRESS NOTES
Physical Therapy Daily Progress Note      Patient: Clarissa Matos   : 1950  Treatment Diagnosis:     ICD-10-CM ICD-9-CM   1. Muscle pain  M79.10 729.1     Referring practitioner: No ref. provider found  Date of Initial Visit: Type: THERAPY  Noted: 2021  Today's Date: 2021  Patient seen for 3 sessions         Clarissa Matos reports:       Subjective   Patient reports her left shoulder pain has been improving but still present.  States she has also been having pain in her knees, Right worse than left and wants to treat that area also.    Objective   See Exercise, Manual, and Modality Logs for complete treatment.       Assessment/Plan  Soft tissue was assessed at left shoulder girdle and right knee. PT noted point tenderness as well as palpable trigger points within the muslce tissue. On this date patient stated that they would like to undergo a dry needling procedure for the soft tissue dysfunction. Patient was educated on the procedure for dry needling and consent waver on file. Patient was informed of the risks, possible adverse effects, along with the benefits of DN. Patient responded positively to treatment with decreased muscle TTP, decreased muscle hypertonicity and decreased pain.             Timed:  Manual Therapy:         mins  80890;  Therapeutic Exercise:         mins  78300;     Neuromuscular Krysta:        mins  88310;    Therapeutic Activity:          mins  09531;     Gait Training:           mins  11893;     Ultrasound:          mins  20698;    Iontophoresis:          mins  25451;  Dry Needlin     mins ;    Untimed:  Electrical Stimulation:         mins  03187 ( );  Mechanical Traction:         mins  65873;   Canalith Repositioning:        mins  11914;    Timed Treatment:   30   mins   Total Treatment:     40   mins    Tere Mcqueen, PT  Physical Therapist

## 2021-06-21 ENCOUNTER — TELEPHONE (OUTPATIENT)
Dept: PHYSICAL THERAPY | Facility: CLINIC | Age: 71
End: 2021-06-21

## 2021-06-21 NOTE — TELEPHONE ENCOUNTER
PATIENT CALLED TO SEE IF REPORT MARIA D WAS TYPING UP FOR DRY NEEDLING COULD BE FAXED OR EMAILED TO HER PCP - ALSO ASKED TO BE PLACED ON WAITLIST FOR UPCOMING APPTS.

## 2021-06-25 ENCOUNTER — TELEMEDICINE (OUTPATIENT)
Dept: FAMILY MEDICINE CLINIC | Facility: CLINIC | Age: 71
End: 2021-06-25

## 2021-06-25 ENCOUNTER — HOSPITAL ENCOUNTER (OUTPATIENT)
Dept: MAMMOGRAPHY | Facility: HOSPITAL | Age: 71
Discharge: HOME | End: 2021-06-25
Admitting: NURSE PRACTITIONER

## 2021-06-25 DIAGNOSIS — I10 ESSENTIAL HYPERTENSION: Primary | ICD-10-CM

## 2021-06-25 DIAGNOSIS — Z12.31 VISIT FOR SCREENING MAMMOGRAM: ICD-10-CM

## 2021-06-25 PROCEDURE — 77063 BREAST TOMOSYNTHESIS BI: CPT

## 2021-06-25 PROCEDURE — 99213 OFFICE O/P EST LOW 20 MIN: CPT | Performed by: NURSE PRACTITIONER

## 2021-06-25 PROCEDURE — 77067 SCR MAMMO BI INCL CAD: CPT

## 2021-06-25 RX ORDER — CANDESARTAN CILEXETIL AND HYDROCHLOROTHIAZIDE 16; 12.5 MG/1; MG/1
1 TABLET ORAL DAILY
Qty: 90 TABLET | Refills: 0 | Status: SHIPPED | OUTPATIENT
Start: 2021-06-25 | End: 2021-09-17 | Stop reason: SDUPTHER

## 2021-06-25 NOTE — PROGRESS NOTES
Chief Complaint  Hypertension    Subjective          Clarissa Matos presents to [facility]  History of Present Illness  Patient scheduled a video visit to discuss most recent pressure readings.  She has been taking and tolerating her Atacand HCTZ without any side effects.    Has been under quite a bit of stress recently-with family so thinks this has affected her blood pressure a little bit.    He typically takes her blood pressure medications around 9 30-10 a.m.  When she is checked her blood pressure just prior to medications in the morning it is around 132-135/82-86.  Recent check in the evening around 930 was 133/90 but she was very busy that evening.  Other times she has checked it has been 136/74 in the evenings which is more typical.  Few hours after her blood pressure medications when she checks her blood pressures 126/85-robert.  She denies headaches, dizziness, chest pain, palpitations, dyspnea with exertion or cough.    She is having some bilateral ankle edema.  She has compression stockings but does not always use them.  Her edema is usually about 1+ pitting.  Previously she was on 25 mg of HCTZ.  Waxes and wanes.  Usually improved in the morning and worsens after she is sitting up for a while.    She also has complaints about intermittent hands and feet getting numb and tingling and cold.  This has been going on for about 2 months.  However I think she discussed this in passing at our last visit.  It has never been evaluated.    She also complains of neck pain that has become constant since her fall last year in [place].  She had imaging done of her head and torso but not her neck.  She has been trying to stretch it and use topicals but she is not getting much relief.      Objective   Vital Signs:   There were no vitals taken for this visit.    Physical Exam  Limited by video visit.  She is well appearing and does not seem to be distressed.  She seems alert and oriented and  her mood and affect are normal, good historian of medical history.  No cough or dyspnea appreciated, able to complete sentences without problem. She is not weak or pale appearing.     Result Review :                 Assessment and Plan    Diagnoses and all orders for this visit:    1. Essential hypertension (Primary)  -     candesartan-hydrochlorothiazide (ATACAND HCT) 16-12.5 MG per tablet; Take 1 tablet by mouth Daily.  Dispense: 90 tablet; Refill: 0    Hypertension seems to be adequately controlled currently.  Would like it pushed down a bit further but will continue current medications for now.  We discussed signs and symptoms that require emergent evaluation.  Recommend using compression stockings.  We could consider increasing the HCTZ but I do not know that that will be that much more helpful and I would like to see her in the office prior to doing that.  I have also recommended that she come to the office for evaluation of her hand and leg tingling, numbness, coldness as well as for her neck pain so that I can do an appropriate physical examination.  She is agreeable with this plan and will schedule a follow-up in a week or so.  If she has any acute problems with these in the meantime she can go to the emergency room or urgent care over the weekend if needed.  Otherwise I am pretty happy with her blood pressure control for now and will continue current medications.    Patient gave consent today for a telehealth video visit as following recommendations of our governor and CDC during the COVID-19 pandemic.      Zoom platform used with acceptable A/V quality    I spent 21 minutes caring for Clarissa on this date of service. This time includes time spent by me in the following activities:performing a medically appropriate examination and/or evaluation , counseling and educating the patient/family/caregiver, ordering medications, tests, or procedures and documenting information in the medical record  Follow Up   No  follow-ups on file.  Patient was given instructions and counseling regarding her condition or for health maintenance advice. Please see specific information pulled into the AVS if appropriate.     21 minutes via zoo

## 2021-07-07 ENCOUNTER — OFFICE VISIT (OUTPATIENT)
Dept: FAMILY MEDICINE CLINIC | Facility: CLINIC | Age: 71
End: 2021-07-07

## 2021-07-07 ENCOUNTER — HOSPITAL ENCOUNTER (OUTPATIENT)
Dept: GENERAL RADIOLOGY | Facility: HOSPITAL | Age: 71
Discharge: HOME | End: 2021-07-07

## 2021-07-07 VITALS
SYSTOLIC BLOOD PRESSURE: 148 MMHG | BODY MASS INDEX: 31.45 KG/M2 | WEIGHT: 177.5 LBS | HEIGHT: 63 IN | TEMPERATURE: 96.8 F | HEART RATE: 86 BPM | OXYGEN SATURATION: 97 % | DIASTOLIC BLOOD PRESSURE: 83 MMHG

## 2021-07-07 DIAGNOSIS — M54.2 CHRONIC NECK AND BACK PAIN: ICD-10-CM

## 2021-07-07 DIAGNOSIS — M54.9 CHRONIC NECK AND BACK PAIN: ICD-10-CM

## 2021-07-07 DIAGNOSIS — Z12.31 SCREENING MAMMOGRAM, ENCOUNTER FOR: ICD-10-CM

## 2021-07-07 DIAGNOSIS — G89.29 CHRONIC BACK PAIN GREATER THAN 3 MONTHS DURATION: Primary | ICD-10-CM

## 2021-07-07 DIAGNOSIS — M54.9 CHRONIC BACK PAIN GREATER THAN 3 MONTHS DURATION: Primary | ICD-10-CM

## 2021-07-07 DIAGNOSIS — D58.2 ELEVATED HEMOGLOBIN (HCC): ICD-10-CM

## 2021-07-07 DIAGNOSIS — G89.29 CHRONIC NECK AND BACK PAIN: ICD-10-CM

## 2021-07-07 DIAGNOSIS — R73.03 PREDIABETES: ICD-10-CM

## 2021-07-07 DIAGNOSIS — Z12.31 ENCOUNTER FOR SCREENING MAMMOGRAM FOR MALIGNANT NEOPLASM OF BREAST: ICD-10-CM

## 2021-07-07 PROCEDURE — 72072 X-RAY EXAM THORAC SPINE 3VWS: CPT

## 2021-07-07 PROCEDURE — 99214 OFFICE O/P EST MOD 30 MIN: CPT | Performed by: NURSE PRACTITIONER

## 2021-07-07 PROCEDURE — 72040 X-RAY EXAM NECK SPINE 2-3 VW: CPT

## 2021-07-07 RX ORDER — PANTOPRAZOLE SODIUM 20 MG/1
20 TABLET, DELAYED RELEASE ORAL DAILY
COMMUNITY
End: 2021-07-20

## 2021-07-07 RX ORDER — FEXOFENADINE HCL 180 MG/1
TABLET ORAL
COMMUNITY

## 2021-07-07 RX ORDER — HYDROCODONE BITARTRATE AND ACETAMINOPHEN 10; 325 MG/1; MG/1
TABLET ORAL
COMMUNITY
Start: 2021-06-22

## 2021-07-07 NOTE — PATIENT INSTRUCTIONS
Raynaud Phenomenon    Raynaud phenomenon is a condition that affects the blood vessels (arteries) that carry blood to your fingers and toes. The arteries that supply blood to your ears, lips, nipples, or the tip of your nose might also be affected. Raynaud phenomenon causes the arteries to become narrow temporarily (spasm). As a result, the flow of blood to the affected areas is temporarily decreased. This usually occurs in response to cold temperatures or stress. During an attack, the skin in the affected areas turns white, then blue, and finally red. You may also feel tingling or numbness in those areas.  Attacks usually last for only a brief period, and then the blood flow to the area returns to normal. In most cases, Raynaud phenomenon does not cause serious health problems.  What are the causes?  In many cases, the cause of this condition is not known. The condition may occur on its own (primary Raynaud phenomenon) or may be associated with other diseases or factors (secondary Raynaud phenomenon).  Possible causes may include:  · Diseases or medical conditions that damage the arteries.  · Injuries and repetitive actions that hurt the hands or feet.  · Being exposed to certain chemicals.  · Taking medicines that narrow the arteries.  · Other medical conditions, such as lupus, scleroderma, rheumatoid arthritis, thyroid problems, blood disorders, Sjogren syndrome, or atherosclerosis.  What increases the risk?  The following factors may make you more likely to develop this condition:  · Being 20-40 years old.  · Being female.  · Having a family history of Raynaud phenomenon.  · Living in a cold climate.  · Smoking.  What are the signs or symptoms?  Symptoms of this condition usually occur when you are exposed to cold temperatures or when you have emotional stress. The symptoms may last for a few minutes or up to several hours. They usually affect your fingers but may also affect your toes, nipples, lips, ears, or  the tip of your nose. Symptoms may include:  · Changes in skin color. The skin in the affected areas will turn pale or white. The skin may then change from white to bluish to red as normal blood flow returns to the area.  · Numbness, tingling, or pain in the affected areas.  In severe cases, symptoms may include:  · Skin sores.  · Tissues decaying and dying (gangrene).  How is this diagnosed?  This condition may be diagnosed based on:  · Your symptoms and medical history.  · A physical exam. During the exam, you may be asked to put your hands in cold water to check for a reaction to cold temperature.  · Tests, such as:  ? Blood tests to check for other diseases or conditions.  ? A test to check the movement of blood through your arteries and veins (vascular ultrasound).  ? A test in which the skin at the base of your fingernail is examined under a microscope (nailfold capillaroscopy).  How is this treated?  Treatment for this condition often involves making lifestyle changes and taking steps to control your exposure to cold temperatures. For more severe cases, medicine (calcium channel blockers) may be used to improve blood flow. Surgery is sometimes done to block the nerves that control the affected arteries, but this is rare.  Follow these instructions at home:  Avoiding cold temperatures  Take these steps to avoid exposure to cold:  · If possible, stay indoors during cold weather.  · When you go outside during cold weather, dress in layers and wear mittens, a hat, a scarf, and warm footwear.  · Wear mittens or gloves when handling ice or frozen food.  · Use holders for glasses or cans containing cold drinks.  · Let warm water run for a while before taking a shower or bath.  · Warm up the car before driving in cold weather.  Lifestyle    · If possible, avoid stressful and emotional situations. Try to find ways to manage your stress, such as:  ? Exercise.  ? Yoga.  ? Meditation.  ? Biofeedback.  · Do not use any  products that contain nicotine or tobacco, such as cigarettes and e-cigarettes. If you need help quitting, ask your health care provider.  · Avoid secondhand smoke.  · Limit your use of caffeine.  ? Switch to decaffeinated coffee, tea, and soda.  ? Avoid chocolate.  · Avoid vibrating tools and machinery.  General instructions  · Protect your hands and feet from injuries, cuts, or bruises.  · Avoid wearing tight rings or wristbands.  · Wear loose fitting socks and comfortable, roomy shoes.  · Take over-the-counter and prescription medicines only as told by your health care provider.  Contact a health care provider if:  · Your discomfort becomes worse despite lifestyle changes.  · You develop sores on your fingers or toes that do not heal.  · Your fingers or toes turn black.  · You have breaks in the skin on your fingers or toes.  · You have a fever.  · You have pain or swelling in your joints.  · You have a rash.  · Your symptoms occur on only one side of your body.  Summary  · Raynaud phenomenon is a condition that affects the arteries that carry blood to your fingers, toes, ears, lips, nipples, or the tip of your nose.  · In many cases, the cause of this condition is not known.  · Symptoms of this condition include changes in skin color, and numbness and tingling of the affected area.  · Treatment for this condition includes lifestyle changes, reducing exposure to cold temperatures, and using medicines for severe cases of the condition.  · Contact your health care provider if your condition worsens despite treatment.  This information is not intended to replace advice given to you by your health care provider. Make sure you discuss any questions you have with your health care provider.  Document Revised: 12/21/2018 Document Reviewed: 01/29/2018  [employer] Patient Education © 2021 [employer]

## 2021-07-07 NOTE — PROGRESS NOTES
"Chief Complaint  Numbness (c/o numbness and tingling in all extremities, sometimes wakes her in her sleep, x 6months )    Subjective          Clarissa Matos presents to [facility]  History of Present Illness   Has been going to pain mgmt for chronic neck and upper back pain-has been seeing Dr. Alhaji Ring in [place] x 4 yrs.  He has been prescribing hydrocodone and MScontin for her pain.  Pt has had injections in her neck in the past and would like these again.  She takes MS contin BID and hydrocodone for breakthrough.  He is also treating her fibromyalgia with same.  She admits that she is taking a lot of pain medication without much improvement in her quality of life or in her pain.  Denies bowel or bladder changes or saddle anesthesia.  The symptoms have been going on for at least 6 months.  She has not been able to get into her pain management doctor for eval.  She has recently completed physical therapy for knee pain.  Did feel like that was very helpful.  Would consider PT for chronic back pain.  She has not done this in quite a while.    Currently is still having quite a bit of numbness in both arms and legs at times.  She has difficulty sleeping despite taking ambien and ativan.     She is not able to exercise to control her weight and does not want to have diabetes.  She really does not want to take more medications.  She denies s/s hyper or hypoglycemia.      Objective   Vital Signs:   /83   Pulse 86   Temp 96.8 °F (36 °C)   Ht 160 cm (63\")   Wt 80.5 kg (177 lb 8 oz)   SpO2 97%   BMI 31.44 kg/m²     Physical Exam  Vitals and nursing note reviewed.   Constitutional:       General: She is not in acute distress.     Appearance: She is well-developed. She is not ill-appearing or diaphoretic.   HENT:      Head: Normocephalic and atraumatic.   Eyes:      General:         Right eye: No discharge.         Left eye: No discharge.      Conjunctiva/sclera: Conjunctivae " normal.   Cardiovascular:      Rate and Rhythm: Normal rate and regular rhythm.   Pulmonary:      Effort: Pulmonary effort is normal.      Breath sounds: Normal breath sounds.   Abdominal:      General: Bowel sounds are normal.      Palpations: Abdomen is soft.      Tenderness: There is no abdominal tenderness.   Musculoskeletal:         General: No deformity.      Cervical back: Neck supple. No rigidity.      Comments: Gait smooth and steady   Lymphadenopathy:      Cervical: No cervical adenopathy.   Skin:     General: Skin is warm and dry.   Neurological:      Mental Status: She is alert and oriented to person, place, and time.   Psychiatric:         Mood and Affect: Mood is anxious. Affect is flat.         Speech: Speech is delayed.         Behavior: Behavior is cooperative.      Comments: Affect is a little flat which may be due to somnolence from medications        Result Review :                 Assessment and Plan    Diagnoses and all orders for this visit:    1. Chronic back pain greater than 3 months duration (Primary)    2. Chronic neck and back pain  -     XR Spine Cervical 2 or 3 View; Future  -     XR spine thoracic 3 vw; Future  -     Ambulatory Referral to Physical Therapy Evaluate and treat    3. Prediabetes  -     Hemoglobin A1c; Future      I had a gisella discussion with patient related to her medications for pain and anxiety and sleep.  Concern for accidental OD.  Quality of life does not seem good.  Will get XR today.  I dont think pain mgmt referral will be helpful unless patient would be willing to try to wean off some of her meds.  Will request records from current pain mgmt.     Prediabetes-discussed dietary mods.  Will recheck A1C in couple months to make sure not progressing, most likely will need medication.           Follow Up   No follow-ups on file.  Patient was given instructions and counseling regarding her condition or for health maintenance advice. Please see specific information  pulled into the AVS if appropriate.

## 2021-07-12 ENCOUNTER — TREATMENT (OUTPATIENT)
Dept: PHYSICAL THERAPY | Facility: CLINIC | Age: 71
End: 2021-07-12

## 2021-07-12 DIAGNOSIS — M79.10 MUSCLE PAIN: Primary | ICD-10-CM

## 2021-07-12 PROCEDURE — DRYNDL PR CUSTOM DRY NEEDLING SELF PAY: Performed by: PHYSICAL THERAPIST

## 2021-07-12 NOTE — PROGRESS NOTES
Physical Therapy Daily Progress Note      Patient: Clarissa Matos   : 1950  Treatment Diagnosis:     ICD-10-CM ICD-9-CM   1. Muscle pain  M79.10 729.1     Referring practitioner: No ref. provider found  Date of Initial Visit: Type: THERAPY  Noted: 2021  Today's Date: 2021  Patient seen for 4 sessions         Clarissa Matos reports:     Subjective   Patient reports she has been having more pain in her left shoulder.  States reaching up is not at bad as rotation.    Objective   See Exercise, Manual, and Modality Logs for complete treatment.       Assessment/Plan  Soft tissue was assessed at left shoulder/UQ. PT noted point tenderness as well as palpable trigger points within the muslce tissue. On this date patient stated that they would like to undergo a dry needling procedure for the soft tissue dysfunction. Patient was educated on the procedure for dry needling and consent waver was signed. Patient was informed of the risks, possible adverse effects, along with the benefits of DN. Patient had positive response to treatment with improved left shoulder mobility and decreased TTP in left shoulder muscles.               Timed:  Manual Therapy:         mins  67278;  Therapeutic Exercise:         mins  58903;     Neuromuscular Krysta:        mins  88153;    Therapeutic Activity:          mins  30802;     Gait Training:           mins  17625;     Ultrasound:          mins  61227;    Iontophoresis:          mins  36564;  Dry Needling:     15     mins ;    Untimed:  Electrical Stimulation:         mins  03738 ( );  Mechanical Traction:         mins  51262;   Canalith Repositioning:        mins  92860;    Timed Treatment:   15   mins   Total Treatment:     30   mins    Tere Mcqueen, PT  Physical Therapist

## 2021-07-14 DIAGNOSIS — M54.9 CHRONIC NECK AND BACK PAIN: Primary | ICD-10-CM

## 2021-07-14 DIAGNOSIS — M54.2 CHRONIC NECK AND BACK PAIN: Primary | ICD-10-CM

## 2021-07-14 DIAGNOSIS — G89.29 CHRONIC NECK AND BACK PAIN: Primary | ICD-10-CM

## 2021-07-20 ENCOUNTER — PREP FOR SURGERY (OUTPATIENT)
Dept: SURGERY | Facility: SURGERY CENTER | Age: 71
End: 2021-07-20

## 2021-07-20 ENCOUNTER — OFFICE VISIT (OUTPATIENT)
Dept: PAIN MEDICINE | Facility: CLINIC | Age: 71
End: 2021-07-20

## 2021-07-20 VITALS
RESPIRATION RATE: 18 BRPM | HEIGHT: 63 IN | BODY MASS INDEX: 31.4 KG/M2 | OXYGEN SATURATION: 94 % | HEART RATE: 74 BPM | WEIGHT: 177.2 LBS | TEMPERATURE: 96.9 F | DIASTOLIC BLOOD PRESSURE: 78 MMHG | SYSTOLIC BLOOD PRESSURE: 124 MMHG

## 2021-07-20 DIAGNOSIS — M47.816 LUMBAR FACET ARTHROPATHY: Primary | ICD-10-CM

## 2021-07-20 DIAGNOSIS — M54.50 CHRONIC BILATERAL LOW BACK PAIN, UNSPECIFIED WHETHER SCIATICA PRESENT: ICD-10-CM

## 2021-07-20 DIAGNOSIS — M54.9 MID BACK PAIN: ICD-10-CM

## 2021-07-20 DIAGNOSIS — G89.29 CHRONIC BILATERAL LOW BACK PAIN, UNSPECIFIED WHETHER SCIATICA PRESENT: ICD-10-CM

## 2021-07-20 DIAGNOSIS — G89.29 NECK PAIN, CHRONIC: ICD-10-CM

## 2021-07-20 DIAGNOSIS — M54.2 NECK PAIN, CHRONIC: ICD-10-CM

## 2021-07-20 DIAGNOSIS — M47.812 ARTHROPATHY OF CERVICAL FACET JOINT: ICD-10-CM

## 2021-07-20 DIAGNOSIS — G89.4 CHRONIC PAIN SYNDROME: ICD-10-CM

## 2021-07-20 PROCEDURE — 99215 OFFICE O/P EST HI 40 MIN: CPT | Performed by: NURSE PRACTITIONER

## 2021-07-20 RX ORDER — SODIUM CHLORIDE 0.9 % (FLUSH) 0.9 %
10 SYRINGE (ML) INJECTION AS NEEDED
Status: CANCELLED | OUTPATIENT
Start: 2021-07-20

## 2021-07-20 RX ORDER — SODIUM CHLORIDE 0.9 % (FLUSH) 0.9 %
10 SYRINGE (ML) INJECTION EVERY 12 HOURS SCHEDULED
Status: CANCELLED | OUTPATIENT
Start: 2021-07-20

## 2021-07-20 NOTE — PROGRESS NOTES
CHIEF COMPLAINT  Ms. Matos states that she has back and neck pain that started over 20 yrs ago. She has previously had PT that did help. She is currently in pain management at [facility] for her back and neck pain. She was also previously in pain management with Dr. Olivares 6 years ago.     Subjective   Clarissa Matos is a 70 y.o. female  who presents for follow-up.  She has a history of back and neck pain. Office visit from 7/7/2020 with RUBY Dang reviewed.  Patient is currently seen by Dr. Alhaji Ring in [place] for pain management and is maintained on hydrocodone and MS Contin for her pain.  He does not do procedures and she has been referred to our clinic for procedures only.  Patient reports previous injections in her neck in the past.  X-ray of cervical and thoracic spine ordered.    Today her pain is 8/10VAS in severity. Her primary pain complaint is her neck radiating into her shoulders and into her mid-back.  She also reports low back pain that occurs with prolonged sitting and standing.     She has had neck pain for approximately 20 years. She states that her neck pain worsened a year ago after falling.  She describes her neck pain as continuous aching, sharp, and shooting pain.  She reports pain in her bilateral TMJ and ears.  Her pain also radiates into her shoulder and bilateral arms and hands. She describes this radiating pain as tingling, aching, and burning pain. Her neck pain is worsened by ROM in her neck, bending, and lifting. Her neck pain is improved by lying down, rest, ice, heat, and medication (prescribed by Dr. Ring).     She has had low back pain for approximately 25 years. She reports that she has fallen down her steps at home at least 5 times in the past which has contributed to some of her back pain (she now has a chair lift). She states her back pain radiates into the lateral aspect of both thighs, stopping at the knee. Her back pain is worsened by lifting,  bending, housework, standing, and walking. Her back pain is improved by heat, ice, rest, and medication.     Past pain medications: Norco, Dilaudid     Current pain medications: Norco 10/325, MS Contin 15 mg (managed by Dr. Ring)     Past therapies:  Physical Therapy: Yes, last year, some improvement in low back pain  Chiropractor: Yes, temporarily helpful  Massage Therapy: Yes, some improvement in pain  TENS: None  Neck or back surgery: None  Past pain management: Currently a patient of Dr. Joiner for medication management in [place].  She has been referred to our clinic for interventions only. She was a previous patient of Dr. Pedroza--Patient states she was discharged due to abnormal UDS     Previous Injections: HOWARD--approximately 20 years ago. No injections in Lumbar spine  Effect of Injection (%): temporary improvement, patient does have side effects with steroids.     Patient remained masked during entire encounter. No cough present. I donned a mask and eye protection throughout entire visit. Prior to donning mask and eye protection, hand hygiene was performed, as well as when it was doffed.  I was closer than 6 feet, but not for an extended period of time. No obvious exposure to any bodily fluids.    History of Present Illness     PEG Assessment   What number best describes your pain on average in the past week?6  What number best describes how, during the past week, pain has interfered with your enjoyment of life?10  What number best describes how, during the past week, pain has interfered with your general activity?  8    The following portions of the patient's history were reviewed and updated as appropriate: allergies, current medications, past family history, past medical history, past social history, past surgical history and problem list.    Review of Systems   Constitutional: Negative for fatigue.   HENT: Negative for congestion.    Eyes: Negative for visual disturbance.   Respiratory: Negative  for cough, shortness of breath and wheezing.    Cardiovascular: Negative.    Gastrointestinal: Negative for constipation and diarrhea.   Genitourinary: Negative for difficulty urinating.   Musculoskeletal: Positive for back pain.   Psychiatric/Behavioral: Positive for sleep disturbance. Negative for suicidal ideas. The patient is nervous/anxious.      --  The aforementioned information the Chief Complaint section and above subjective data including any HPI data, and also the Review of Systems data, has been personally reviewed and affirmed.  --    LUMBAR SPINE MRI WITHOUT CONTRAST     HISTORY: Fall last week. Low back pain with lower extremity pain, right  more than left. Difficulty walking.     TECHNIQUE: Lumbar MRI was performed without contrast. This is correlated  with abdomen and pelvis CT from 11/28/2017.     FINDINGS: The tip of the conus lies at the lower L1 level. The lower  thoracic spine is remarkable for mild disc narrowing at T11-12. Distal  cord is normal in caliber and signal. Lumbar vertebral body height and  marrow signal are normal. There is about 4 mm anterolisthesis of L5 on  S1, not significantly changed in comparison with the 2017 CT, Alignment  at the other levels is normal.     At T12-L1 and L1-2, the discs appear normal and the canal and foramina  are patent.     At L2-3, there is moderate disc narrowing. The canal and foramina are  patent.     At L3-4, the disc is mildly narrowed. The canal and foramina are patent.     At L4-5, the disc is mildly narrowed. The canal and foramina are patent.     At L5-S1, there is mild disc narrowing with grade 1 anterolisthesis and  hypertrophic change at the facet joints which narrows the lateral  recesses bilaterally. The canal appears mildly narrowed. The foramina  are patent.     The elements of the cauda equina appear normal. The visualized upper  sacrum, SI joints, and retroperitoneum appear normal.     IMPRESSION:  Chronic degenerative disc and facet  "change with grade 1  anterolisthesis of L5 on S1 and bilateral lateral recess stenosis at  that level. The appearance appears similar to a CT scan from 11/28/2017.  No new abnormality is identified.     This report was finalized on 6/8/2020 4:08 PM by Dr. Tanner Cline M.D.     CERVICAL SPINE 2 VIEWS, THORACIC SPINE 3 VIEWS     HISTORY: Neck pain, back pain.     CERVICAL SPINE: AP and lateral views of the cervical spine demonstrates  mild-to-moderate loss of disc height at C3-C4, C4-C5 and C5-C6. There is  a grade 1 retrolisthesis of C5 on C6 estimated to be 2 mm. There is  mild-to-moderate prominence of the posterior aspect of the endplates at  C5-C6 and mild prominence from C3 to C5. There is no evidence of  prevertebral edema. Facet degenerative disease is noted bilaterally,  C3-C4 and C4-C5 more prominent on the left.     THORACIC SPINE: AP, lateral and swimmer's views of the thoracic spine  demonstrate normal alignment. There is mild loss of disc height at  T11-T12, and to lesser extent T10-T11. There is no evidence of fracture.     Further evaluation could be performed with a MRI examination of the  cervical and/or thoracic spine as indicated.     This report was finalized on 7/7/2021 2:52 PM by Dr. Jaime Blair M.D.       Vitals:    07/20/21 1405   BP: 124/78   Pulse: 74   Resp: 18   Temp: 96.9 °F (36.1 °C)   SpO2: 94%   Weight: 80.4 kg (177 lb 3.2 oz)   Height: 160 cm (63\")   PainSc:   6   PainLoc: Neck     Objective   Physical Exam  Vitals and nursing note reviewed.   Constitutional:       Appearance: Normal appearance. She is well-developed.   Eyes:      General: Lids are normal.   Cardiovascular:      Rate and Rhythm: Normal rate.   Pulmonary:      Effort: Pulmonary effort is normal.   Musculoskeletal:      Cervical back: Tenderness and bony tenderness present. Decreased range of motion.      Thoracic back: Bony tenderness present.      Lumbar back: Tenderness and bony tenderness present. Decreased " "range of motion. Negative right straight leg raise test and negative left straight leg raise test.      Comments: POS Lumbar loading maneuver  POS Pradip SI Compression  POS Right, NEG Left Alejandro  NEG Pradip Thigh Thrust  NEG Pradip Spurling   Neurological:      Mental Status: She is alert and oriented to person, place, and time.      Gait: Gait normal.   Psychiatric:         Attention and Perception: Attention normal.         Mood and Affect: Mood normal.         Speech: Speech normal.         Behavior: Behavior normal.         Judgment: Judgment normal.       Assessment/Plan   Diagnoses and all orders for this visit:    1. Lumbar facet arthropathy (Primary)    2. Arthropathy of cervical facet joint    3. Chronic bilateral low back pain, unspecified whether sciatica present    4. Neck pain, chronic    5. Mid back pain    6. Chronic pain syndrome      --- 42 minutes spent in chart review, face to face time with patient and documentation.   --- Bilateral L4-S1 Medial Branch Blocks (NO STEROIDS) Discussed sedation with patient and we will avoid this so as to get an accurate diagnostic evaluation.   -------  Education about Medial Branch Blockade and RF Therapy:    This medial branch blockade (MBB) suggested is intended for diagnostic purposes, with the intent of offering the patient Radiofrequency thermal rhizotomy (RF) if the MBB is diagnostically effective.  The diagnostic blockade is necessary to determine the likelihood that RF therapy could be efficacious in providing long term relief to the patient.    Medial branches are sensory nerve branches that connect to a facet joint and transmit sensations & pain signals from that joint.  Facet is a term for the type of joints found in the spine.  Medial branches are the nerves that go to a facet, and therefore are also sometimes called \"facet joint nerves\" (FJNs).      In a medial branch blockade procedure, xray fluoroscopy is used to verify the locations of the outside of the " joint lines which are being targeted.  Under xray guidance, needles are placed to these areas.  Contrast dye is injected to confirm proper placement, with dye flowing over the joint area, and to ensure that the dye does not flow into unintended areas such as a vein.  When this is confirmed, local anesthetic is injected to block the medial branch at that joint level.      If MBBs are diagnostically successful in blocking pain, then the patient is most likely a great candidate for Radiofrequency of those facet joint nerves.  In the RF procedure, needles are placed to the joint lines in the same fashion, and after testing, the needle tips are heated to thermally treat the nerves, blocking the nerves by in essence damaging the nerves with the heat treatment.       Medically, a successful RF procedure should provide a patient with 50% pain relief or more for at least 6 months.  Clinical experience suggests that successful patients receive relief more in the range of 12 months on average.  We also discussed that a fortunate minority of patients receive therapeutic success from the MBB, and may not require RF ablation.  If a patient receives more than 8 weeks of relief from MBB, then occasional repeat MBB for therapeutic purposes is a very reasonable alternative therapy.  This course of therapy is consistent with our LCDs according to our CMS  in the area, and therefore other insurance providers should follow accordingly.  We will monitor our patients to screen for these therapeutic responders and will offer RF therapy only when necessary.        We discussed that MBB & RF are not without risks.  Guidelines regarding anticoagulant use & neuraxial procedures will be respected.  Patients that are ill or otherwise may be at risk for sepsis will not have their spines accessed by neuraxial injections of any type.  This patient will not be offered these therapies if there is an increased risk.   We discussed that  there is a risk of postprocedural pain and also a risk of worsening of clinical picture with these procedures as with any neuraxial procedure.    -------  --- Pending MRI of cervical spine may consider cervical MBB with goal to perform RFA.   --- Plan to avoid epidurals as she has an allergy to methylprednisolone.   --- No plans to assume opioid prescriptions.  Patient will remain a patient of Dr. Ring's for medication regimen.   --- Follow-up after procedure     SANDRA REPORT    As the clinician, I personally reviewed the SANDRA from 7/20/2021 while the patient was in the office today.    EMR Dragon/Transcription disclaimer:   Much of this encounter note is an electronic transcription/translation of spoken language to printed text. The electronic translation of spoken language may permit erroneous, or at times, nonsensical words or phrases to be inadvertently transcribed; Although I have reviewed the note for such errors, some may still exist.

## 2021-07-21 ENCOUNTER — TRANSCRIBE ORDERS (OUTPATIENT)
Dept: SURGERY | Facility: SURGERY CENTER | Age: 71
End: 2021-07-21

## 2021-07-21 DIAGNOSIS — M47.816 LUMBAR FACET ARTHROPATHY: Primary | ICD-10-CM

## 2021-07-23 ENCOUNTER — TREATMENT (OUTPATIENT)
Dept: PHYSICAL THERAPY | Facility: CLINIC | Age: 71
End: 2021-07-23

## 2021-07-23 DIAGNOSIS — M79.10 MUSCLE PAIN: Primary | ICD-10-CM

## 2021-07-23 PROCEDURE — DRYNDL PR CUSTOM DRY NEEDLING SELF PAY: Performed by: PHYSICAL THERAPIST

## 2021-07-23 NOTE — PROGRESS NOTES
Physical Therapy Daily Progress Note-Dry Needling      Patient: Clarissa Matos   : 1950  Treatment Diagnosis:     ICD-10-CM ICD-9-CM   1. Muscle pain  M79.10 729.1     Referring practitioner: No ref. provider found  Date of Initial Visit: Type: THERAPY  Noted: 2021  Today's Date: 2021  Patient seen for 5 sessions         Clarissa Matos reports:     Subjective   Patient reports she did a lot of work and her shoulders are very sore.    Objective   See Exercise, Manual, and Modality Logs for complete treatment.       Assessment/Plan  Soft tissue was assessed at [facility]. PT noted point tenderness as well as palpable trigger points within the muslce tissue. On this date patient stated that they would like to undergo a dry needling procedure for the soft tissue dysfunction. Patient was educated on the procedure for dry needling and consent waver was signed. Patient was informed of the risks, possible adverse effects, along with the benefits of TDN. Patient had a positive response to treatment with active twitch response achieved and decreased muscle hypertonicity post treatment.               Timed:  Manual Therapy:         mins  24082;  Therapeutic Exercise:         mins  55183;     Neuromuscular Krysta:        mins  37258;    Therapeutic Activity:          mins  58878;     Gait Training:           mins  74720;     Ultrasound:          mins  19755;    Iontophoresis:          mins  44473;  Dry Needling:     15     mins ;    Untimed:  Electrical Stimulation:         mins  61681 ( );  Mechanical Traction:         mins  28352;   Canalith Repositioning:        mins  02665;    Timed Treatment:   15   mins   Total Treatment:     30   mins    Tere Mcqueen, PT  Physical Therapist

## 2021-07-27 ENCOUNTER — TREATMENT (OUTPATIENT)
Dept: PHYSICAL THERAPY | Facility: CLINIC | Age: 71
End: 2021-07-27

## 2021-07-27 DIAGNOSIS — M54.2 CHRONIC NECK AND BACK PAIN: Primary | ICD-10-CM

## 2021-07-27 DIAGNOSIS — G89.29 CHRONIC NECK AND BACK PAIN: Primary | ICD-10-CM

## 2021-07-27 DIAGNOSIS — M54.9 CHRONIC NECK AND BACK PAIN: Primary | ICD-10-CM

## 2021-07-27 PROCEDURE — 97140 MANUAL THERAPY 1/> REGIONS: CPT | Performed by: PHYSICAL THERAPIST

## 2021-07-27 PROCEDURE — 97530 THERAPEUTIC ACTIVITIES: CPT | Performed by: PHYSICAL THERAPIST

## 2021-07-27 PROCEDURE — 97162 PT EVAL MOD COMPLEX 30 MIN: CPT | Performed by: PHYSICAL THERAPIST

## 2021-07-27 PROCEDURE — G0283 ELEC STIM OTHER THAN WOUND: HCPCS | Performed by: PHYSICAL THERAPIST

## 2021-07-27 NOTE — PROGRESS NOTES
Physical Therapy Initial Evaluation and Plan of Care        Patient: Clarissa Matos   : 1950  Visit Diagnoses:     ICD-10-CM ICD-9-CM   1. Chronic neck and back pain  M54.2 723.1    M54.9 724.5    G89.29 338.29     Referring practitioner: RUBY Baig  Date of Initial Visit: 2021  Today's Date: 2021  Patient seen for 1 sessions           Subjective Questionnaire: NDI:30/50  Oswestry: 37/50      Subjective Evaluation    History of Present Illness  Date of onset: 2021  Mechanism of injury: Patient reports chronic neck and back pain.  Pain increases with activity.  States she has intermittent N/T in both arms to hands and both feet especially after walking.    PMH: Cervical and Lumbar DDD, Bilateral TKA, Left TSA, depression, anxiety, arthritis, Hypertension; Fibromyalgia; Irregular heart rate; Hiatal hernia; Hard to intubate; Limited joint range of motion; GERD (gastroesophageal reflux disease);     Subjective comment: Patient reports neck and back pain.  Patient Occupation: Retired Pain  Current pain ratin  At best pain rating: 3  At worst pain ratin  Location: neck, back  Quality: dull ache  Relieving factors: heat, ice and medications  Aggravating factors: standing, prolonged positioning, squatting, movement, ambulation and lifting (bending)    Social Support  Lives in: multiple-level home  Lives with: spouse    Hand dominance: right    Diagnostic Tests  X-ray: abnormal  MRI studies: abnormal (lumbar, cervical MRI pending)    Treatments  Previous treatment: physical therapy  Patient Goals  Patient goals for therapy: decreased pain and increased motion             Objective          Palpation   Left   Hypertonic in the cervical paraspinals, levator scapulae, lumbar paraspinals, quadratus lumborum, rhomboids, suboccipitals and upper trapezius.   Tenderness of the cervical paraspinals, levator scapulae, lumbar paraspinals, quadratus lumborum, rhomboids, scalenes,  suboccipitals and upper trapezius.   Trigger point to levator scapulae, quadratus lumborum and upper trapezius.     Right   Hypertonic in the cervical paraspinals, levator scapulae, lumbar paraspinals, quadratus lumborum, rhomboids, suboccipitals and upper trapezius. Tenderness of the cervical paraspinals, levator scapulae, lumbar paraspinals, quadratus lumborum, rhomboids, scalenes, suboccipitals and upper trapezius.   Trigger point to levator scapulae, quadratus lumborum and upper trapezius.     Tenderness   Cervical Spine   Tenderness in the left scapula, left ribs/costal cartilage, right scapula and right ribs/costal cartilage.     Active Range of Motion   Cervical/Thoracic Spine   Cervical    Flexion: 45 degrees   Extension: 40 degrees   Left lateral flexion: 30 degrees   Right lateral flexion: 33 degrees   Left rotation: 55 degrees   Right rotation: 60 degrees     Lumbar   Flexion: 40 degrees   Extension: 20 degrees   Left lateral flexion: 11 degrees   Right lateral flexion: 10 degrees   Left rotation: 40 degrees   Right rotation: 35 degrees     Strength/Myotome Testing   Cervical Spine   Neck extension: 5  Neck flexion: 5    Left   Normal strength    Right   Normal strength    Lumbar   Left   Normal strength    Right   Normal strength    Muscle Activation     Additional Muscle Activation Details  Decreased core muscle activation  Diastasis Recti    Tests   Cervical   Negative repeated extension and repeated flexion.     Left   Negative Spurling's sign.     Right   Negative Spurling's sign.     Thoracic   Negative slump.     Lumbar     Left   Negative passive SLR and quadrant.     Right   Negative passive SLR and quadrant.     Additional Tests Details  Decreased UQ muscle flexibility  SLR: L 60 degrees, R 55 degrees    Decreased hip girdle/LE muscle flexibility                  Assessment & Plan     Assessment  Impairments: abnormal muscle tone, abnormal or restricted ROM, activity intolerance and pain with  function  Assessment details: Clarissa Matos is a pleasant 70 y.o. female that presents with decreased cervical and lumbar spine ROM, hypertonic and decreased upper quadrant/hip girdle/LE muscle flexibility and pain limited functional activity tolerance.. Pt will benefit from skilled PT services in order to address listed impairments, decrease pain intensity and restore functional task tolerance.  Prognosis: good  Prognosis details: Patient demonstrates good rehab potential as evidenced by high motivation to participate with PT POC and to return to PLOF/ADLs/IADLs.  Functional Limitations: lifting, walking, uncomfortable because of pain and standing  Goals  Plan Goals: Short Term Goals (8-10 visits):  1.  Patient will have increased cervical spine ROM to WFLs.  2.  Patient will have increased lumbar spine ROM to WFLs.  3.  Patient will have increased gross muscle flexibility to WFLs.  4.  Patient will have increased core muscle activation to WFLs without provoking diastasis recti.      Long Term Goals (16 visits):  1.  Patient will be independent in performance of HEP for carryover upon discharge from skilled PT services.  2.  Patient will have improved Oswestry score of 27/50 or better.  3.  Patient will have increased NDI score of 20/50 or better.  4.  Patient will demonstrate functional reach patterns WFLs to improve tolerance to ADLs/IADLs.  5.  Patient will demonstrate functional squat patterns WFLs to improve tolerance to ADLs/IADLs.    Plan  Therapy options: will be seen for skilled physical therapy services  Planned modality interventions: cryotherapy, ultrasound, TENS, thermotherapy (hydrocollator packs), traction and dry needling  Planned therapy interventions: manual therapy, soft tissue mobilization, spinal/joint mobilization, strengthening, stretching, flexibility, functional ROM exercises, joint mobilization, home exercise program, therapeutic activities and abdominal trunk stabilization  Frequency:  2x week  Duration in visits: 16  Treatment plan discussed with: patient  Plan details: Pt was educated on the importance of their HEP and their current need for continued skilled physical therapy. Patients goals and potential limitations were discussed and pt is in agreement with current plan of care and treatment emphasis.            Timed:  Manual Therapy:    13     mins  02831;  Therapeutic Exercise:         mins  64813;     Neuromuscular Krysta:        mins  93927;    Therapeutic Activity:    10      mins  75120;     Gait Training:           mins  32736;     Ultrasound:          mins  23194;  Iontophoresis:          mins  25095;    Dry Needling:          mins;    Untimed:  Electrical Stimulation:    15     mins  47363 ( );  Mechanical Traction:         mins  04968;   Canalith Repositioning:        mins  96501;    Timed Treatment:   23   mins   Total Treatment:     60   mins    PT SIGNATURE: Tere Mcqueen, PT   DATE TREATMENT INITIATED: 7/27/2021    Initial Certification  Certification Period: 10/25/2021  I certify that the therapy services are furnished while this patient is under my care.  The services outlined above are required by this patient, and will be reviewed every 90 days.     PHYSICIAN: Judy Kaur APRN      DATE:     Please sign and return via fax to [phone]. Thank you, [facility].

## 2021-07-28 ENCOUNTER — HOSPITAL ENCOUNTER (OUTPATIENT)
Dept: GENERAL RADIOLOGY | Facility: SURGERY CENTER | Age: 71
Setting detail: HOSPITAL OUTPATIENT SURGERY
End: 2021-07-28

## 2021-07-28 ENCOUNTER — HOSPITAL ENCOUNTER (OUTPATIENT)
Facility: SURGERY CENTER | Age: 71
Setting detail: HOSPITAL OUTPATIENT SURGERY
Discharge: HOME | End: 2021-07-28
Attending: ANESTHESIOLOGY | Admitting: ANESTHESIOLOGY

## 2021-07-28 VITALS
HEIGHT: 63 IN | DIASTOLIC BLOOD PRESSURE: 70 MMHG | OXYGEN SATURATION: 100 % | BODY MASS INDEX: 31.36 KG/M2 | WEIGHT: 177 LBS | SYSTOLIC BLOOD PRESSURE: 134 MMHG | HEART RATE: 76 BPM | RESPIRATION RATE: 20 BRPM | TEMPERATURE: 98.4 F

## 2021-07-28 DIAGNOSIS — M47.816 LUMBAR FACET ARTHROPATHY: ICD-10-CM

## 2021-07-28 PROCEDURE — 64494 INJ PARAVERT F JNT L/S 2 LEV: CPT | Performed by: ANESTHESIOLOGY

## 2021-07-28 PROCEDURE — 3E0T33Z INTRODUCTION OF ANTI-INFLAMMATORY INTO PERIPHERAL NERVES AND PLEXI, PERCUTANEOUS APPROACH: ICD-10-PCS | Performed by: ANESTHESIOLOGY

## 2021-07-28 PROCEDURE — 64493 INJ PARAVERT F JNT L/S 1 LEV: CPT | Performed by: ANESTHESIOLOGY

## 2021-07-28 PROCEDURE — BR16ZZZ FLUOROSCOPY OF LUMBAR FACET JOINT(S): ICD-10-PCS | Performed by: ANESTHESIOLOGY

## 2021-07-28 PROCEDURE — 3E0T3BZ INTRODUCTION OF ANESTHETIC AGENT INTO PERIPHERAL NERVES AND PLEXI, PERCUTANEOUS APPROACH: ICD-10-PCS | Performed by: ANESTHESIOLOGY

## 2021-07-28 RX ORDER — SODIUM CHLORIDE 0.9 % (FLUSH) 0.9 %
10 SYRINGE (ML) INJECTION EVERY 12 HOURS SCHEDULED
Status: DISCONTINUED | OUTPATIENT
Start: 2021-07-28 | End: 2021-07-28 | Stop reason: HOSPADM

## 2021-07-28 RX ORDER — SODIUM CHLORIDE 0.9 % (FLUSH) 0.9 %
10 SYRINGE (ML) INJECTION AS NEEDED
Status: DISCONTINUED | OUTPATIENT
Start: 2021-07-28 | End: 2021-07-28 | Stop reason: HOSPADM

## 2021-08-04 ENCOUNTER — TREATMENT (OUTPATIENT)
Dept: PHYSICAL THERAPY | Facility: CLINIC | Age: 71
End: 2021-08-04

## 2021-08-04 DIAGNOSIS — M54.9 CHRONIC NECK AND BACK PAIN: Primary | ICD-10-CM

## 2021-08-04 DIAGNOSIS — M54.2 CHRONIC NECK AND BACK PAIN: Primary | ICD-10-CM

## 2021-08-04 DIAGNOSIS — G89.29 CHRONIC NECK AND BACK PAIN: Primary | ICD-10-CM

## 2021-08-04 PROCEDURE — 97110 THERAPEUTIC EXERCISES: CPT | Performed by: PHYSICAL THERAPIST

## 2021-08-04 NOTE — PROGRESS NOTES
Physical Therapy Daily Progress Note      Patient: Clarissa Matos   : 1950  Treatment Diagnosis:     ICD-10-CM ICD-9-CM   1. Chronic neck and back pain  M54.2 723.1    M54.9 724.5    G89.29 338.29     Referring practitioner: RUBY Baig  Date of Initial Visit: Type: THERAPY  Noted: 2021  Today's Date: 2021  Patient seen for 2 sessions         Clarissa Matos reports:     Subjective   Patient reports she had the back injections last week and the pain relief lasted for about 2 hours.  States she has one more injection appointment prior to the ablation procedure.  Patient reports she will be going out of town next week to help her father who is going to being having surgery.    Objective   See Exercise, Manual, and Modality Logs for complete treatment.       Assessment/Plan  Patient preformed exercises with no adverse symptoms.  She had some neural tension symptoms.  HEP will focus on flexibility and plan for introducing core stabilization next session.  Progress per Plan of Care and Progress strengthening /stabilization /functional activity           Timed:  Manual Therapy:         mins  94267;  Therapeutic Exercise:    25     mins  65526;     Neuromuscular Krysta:        mins  46125;    Therapeutic Activity:          mins  33275;     Gait Training:           mins  57558;     Ultrasound:          mins  16473;    Iontophoresis:          mins  36595;  Dry Needling:          mins ;    Untimed:  Electrical Stimulation:         mins  90102 ( );  Mechanical Traction:         mins  13528;   Canalith Repositioning:        mins  17100;    Timed Treatment:   25   mins   Total Treatment:     30   mins    Tere Mcqueen PT  Physical Therapist

## 2021-08-05 ENCOUNTER — OFFICE VISIT (OUTPATIENT)
Dept: SLEEP MEDICINE | Facility: HOSPITAL | Age: 71
End: 2021-08-05

## 2021-08-05 VITALS
SYSTOLIC BLOOD PRESSURE: 131 MMHG | BODY MASS INDEX: 31.18 KG/M2 | DIASTOLIC BLOOD PRESSURE: 79 MMHG | WEIGHT: 176 LBS | HEART RATE: 81 BPM | HEIGHT: 63 IN | OXYGEN SATURATION: 97 %

## 2021-08-05 DIAGNOSIS — G47.8 NON-RESTORATIVE SLEEP: ICD-10-CM

## 2021-08-05 DIAGNOSIS — G89.4 CHRONIC PAIN SYNDROME: ICD-10-CM

## 2021-08-05 DIAGNOSIS — F51.01 PRIMARY INSOMNIA: ICD-10-CM

## 2021-08-05 DIAGNOSIS — G47.30 OBSERVED SLEEP APNEA: Primary | ICD-10-CM

## 2021-08-05 DIAGNOSIS — R06.83 SNORING: ICD-10-CM

## 2021-08-05 DIAGNOSIS — D58.2 ELEVATED HEMOGLOBIN (HCC): ICD-10-CM

## 2021-08-05 DIAGNOSIS — E66.9 CLASS 1 OBESITY: ICD-10-CM

## 2021-08-05 DIAGNOSIS — D75.1 POLYCYTHEMIA: ICD-10-CM

## 2021-08-05 PROBLEM — E66.811 CLASS 1 OBESITY: Status: ACTIVE | Noted: 2021-08-05

## 2021-08-05 PROCEDURE — 99204 OFFICE O/P NEW MOD 45 MIN: CPT | Performed by: INTERNAL MEDICINE

## 2021-08-05 PROCEDURE — G0463 HOSPITAL OUTPT CLINIC VISIT: HCPCS

## 2021-08-05 NOTE — PROGRESS NOTES
[facility]  [address]  3rd [place] [address]  Phone   Fax       Clarissa Matos  1950  70 y.o.  female      PCP:Judy Kaur APRN    Type of service: Initial New Patient Office Visit  Date of service: 8/5/2021    Chief Complaint   Patient presents with   • Witnessed Apnea   • Snoring   • Fatigue   • Non-restorative Sleep   • Morning Headaches   • Insomnia       History of present illness;  Clarissa Matos is a new patient for me and was seen today for sleep related problems of snoring, nonrestorative sleep and witnessed apneas. The symptoms are present for many years and they are persistent in nature.  The snoring is present in all positions and it is loud.  Has no history of prior sleep evaluation and sleep studies. Patient gives no prior surgery namely tonsillectomy, nasal surgery and UPPP.   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  Additionally recently her blood work showed polycythemia and she wanted to have further investigation for sleep apnea    Patient gives the following sleep history.  Sleep schedule:  Bedtime: Between 930 and 10 PM  Wake time: 8:30 AM  Normally takes about 45-60 minutes to fall asleep  Average hours of sleep 8  Number of naps per day none  Symptoms  In addition to snoring, nonrestorative sleep and witnessed apneas patient gives the following associated symptoms.  Have you ever awakened gasping for breath, coughing, choking: Yes   Change in weight,  Yes gained 10 pounds  Morning headaches  Yes   Awaken with a sore throat or dry mouth  Yes   Leg jerking at night:  No   Crawly feeling/urge sensation to move in the legs: Yes   Teeth grinding:Yes   Have you ever awakened at night with a sour taste or burning sensation in your chest:  No   Do you have muscle weakness with laughing or anger or sleep paralysis:  No   Have you ever felt paralyzed while  "going to sleep or waking up:  No   Sleepwalking, nightmares, No   Nocturia (urination at night): 1 times per night  Memory Problem:No     Past medical history: (Relevant to sleep medicine)  1. Anxiety  2. Hypertension  3. Fibromyalgia  4. Arthritis  5. Insomnia  6. Chronic narcotic use  7. Depression    • Medications are reviewed by me and documented in the encounter  • Allergies reviewed and documented in encounter    Social history:  Do you drive a commercial vehicle:  No   Shift work:  No   Tobacco use:  No   Alcohol use:  0 per week  Caffeinated drinks: 3    FAMILY HISTORY (Your mother, father, brothers and sisters)  1. Sleep apnea with her brother and mom  2. Stroke  3. Hypertension  4. Heart disease    REVIEW OF SYSTEMS.  Full review of systems available on the intake form which is scanned in the media tab.  The relevant positive are noted below  1. [place] Sleepiness Scale :Total score: 3   2. Snoring  3. Nonrestorative sleep  4. Back pain  5. Insomnia  6. Fatigue      Physical exam:  Vitals:    08/05/21 1132   BP: 131/79   Pulse: 81   SpO2: 97%   Weight: 79.8 kg (176 lb)   Height: 160 cm (63\")    Body mass index is 31.18 kg/m². Neck Circumference: 15.25 inches  HEENT: Head is atraumatic, normocephalic  Eyes: pupils are round equal and reacting to light and accommodation, conjunctiva normal  Nose: no nasal septal defects or deviation and the nasal passages are clear, no nasal polyps,  Throat: tonsils are not enlarged, tongue normal, oral airway Mallampati class 3  NECK:Neck Circumference: 15.25 inches, trachea is in the midline, thyroid not enlarged  RESPIRATORY SYSTEM: Breath sounds are equal on both sides, there are no wheezes   CARDIOVASULAR SYSTEM: Heart sounds are regular rhythm and rekha rate, no edema  EXTREMITES: No cyanosis, clubbing  NEUROLOGICAL SYSTEM: Oriented x 3, no gross motor defects, gait normal    Office notes from care team reviewed. Office note dated June 25, 2021,reviewed  Labs " reviewed.  TSH Results:  TSH    TSH 9/9/20   TSH 1.680            Most Recent A1C    HGBA1C Most Recent 7/7/21   Hemoglobin A1C 5.70 (A)   (A) Abnormal value       Comments are available for some flowsheets but are not being displayed.         Hematocrit 48.9  Hemoglobin 16.7    Assessment and plan:  · Witnessed apnea (R06.81) patient's symptoms and examination is consistent with sleep apnea (G47.30)  I have talked to the patient about the signs and symptoms of sleep apnea. In addition, I have also discussed pathophysiology of sleep apnea.  I also discussed the complications of untreated sleep apnea including effects on hypertension, diabetes mellitus and nonrestorative sleep with hypersomnia which can increase risk for motor vehicle accidents.  Untreated sleep apnea is also a risk factor for development of atrial fibrillation, pulmonary hypertension and stroke.  Discussed in detail of various testing methods including home-based and lab based sleep studies.  Based on history and physical examination the most appropriate study is home sleep test.  The order for the sleep study is placed in [facility].  The test will be scheduled after approval from insurance. Treatment and management will be discussed after the test is completed.  Patient was given opportunity to ask questions and all the questions were answered.   · Snoring (R06.83) snoring is the sound created by turbulent airflow vibrating upper airway soft tissue.  I have also discussed factors affecting snoring including sleep deprivation, sleeping on the back and alcohol ingestion. To minimize snoring, patient is advised to have adequate sleep, sleep on the side and avoid alcohol and sedative medications before bedtime  · Obesity class 1, with BMI Body mass index is 31.18 kg/m².. I have discussed the relationship between weight and sleep apnea.There is direct correction between weight and severity of sleep apnea.  Weight reduction is encouraged, as it is going to  reduce the severity of sleep apnea. I have also discussed with the patient diet and exercise to achieve ideal body weight  · Polycythemia/erythrocytosis.  This could be related to sleep apnea and hypoxia can cause polycythemia.  I have talked to the patient about the connection and she understands that she needs sleep evaluation.  If she has sleep apnea correcting the sleep apnea would improve her counts    I have also discussed with the patient the following  • Sleep hygiene: Maintaining a regular bedtime and wake time, not to watch television or work in bed, limit caffeine-containing beverages before bed time and avoid naps during the day  • Adequate amount of sleep.  Generally most people needs about 7 to 8 hours of sleep.  • Return for 31 to 90 days after PAP setup with down load..  Patient's questions were answered.        Bahman Hyde MD  Sleep Medicine.  Medical Director, [facility] sleep [facility]  8/5/2021 ,

## 2021-08-07 ENCOUNTER — HOSPITAL ENCOUNTER (OUTPATIENT)
Dept: MRI IMAGING | Facility: HOSPITAL | Age: 71
Discharge: HOME | End: 2021-08-07
Admitting: NURSE PRACTITIONER

## 2021-08-07 DIAGNOSIS — M54.9 CHRONIC NECK AND BACK PAIN: ICD-10-CM

## 2021-08-07 DIAGNOSIS — M54.2 CHRONIC NECK AND BACK PAIN: ICD-10-CM

## 2021-08-07 DIAGNOSIS — G89.29 CHRONIC NECK AND BACK PAIN: ICD-10-CM

## 2021-08-07 PROCEDURE — 72141 MRI NECK SPINE W/O DYE: CPT

## 2021-08-17 ENCOUNTER — OFFICE VISIT (OUTPATIENT)
Dept: PAIN MEDICINE | Facility: CLINIC | Age: 71
End: 2021-08-17

## 2021-08-17 ENCOUNTER — PREP FOR SURGERY (OUTPATIENT)
Dept: SURGERY | Facility: SURGERY CENTER | Age: 71
End: 2021-08-17

## 2021-08-17 VITALS
HEIGHT: 63 IN | SYSTOLIC BLOOD PRESSURE: 141 MMHG | WEIGHT: 175.2 LBS | BODY MASS INDEX: 31.04 KG/M2 | OXYGEN SATURATION: 96 % | TEMPERATURE: 96.9 F | RESPIRATION RATE: 16 BRPM | HEART RATE: 72 BPM | DIASTOLIC BLOOD PRESSURE: 83 MMHG

## 2021-08-17 DIAGNOSIS — M47.812 ARTHROPATHY OF CERVICAL FACET JOINT: ICD-10-CM

## 2021-08-17 DIAGNOSIS — M47.812 ARTHROPATHY OF CERVICAL FACET JOINT: Primary | ICD-10-CM

## 2021-08-17 DIAGNOSIS — M54.9 MID BACK PAIN: ICD-10-CM

## 2021-08-17 DIAGNOSIS — G89.29 CHRONIC BILATERAL LOW BACK PAIN, UNSPECIFIED WHETHER SCIATICA PRESENT: ICD-10-CM

## 2021-08-17 DIAGNOSIS — G89.4 CHRONIC PAIN SYNDROME: Primary | ICD-10-CM

## 2021-08-17 DIAGNOSIS — M54.2 NECK PAIN, CHRONIC: ICD-10-CM

## 2021-08-17 DIAGNOSIS — M47.816 LUMBAR FACET ARTHROPATHY: ICD-10-CM

## 2021-08-17 DIAGNOSIS — M54.50 CHRONIC BILATERAL LOW BACK PAIN, UNSPECIFIED WHETHER SCIATICA PRESENT: ICD-10-CM

## 2021-08-17 DIAGNOSIS — G89.29 NECK PAIN, CHRONIC: ICD-10-CM

## 2021-08-17 PROCEDURE — 99214 OFFICE O/P EST MOD 30 MIN: CPT | Performed by: NURSE PRACTITIONER

## 2021-08-17 RX ORDER — SODIUM CHLORIDE 0.9 % (FLUSH) 0.9 %
10 SYRINGE (ML) INJECTION AS NEEDED
Status: CANCELLED | OUTPATIENT
Start: 2021-08-17

## 2021-08-17 RX ORDER — SODIUM CHLORIDE 0.9 % (FLUSH) 0.9 %
10 SYRINGE (ML) INJECTION EVERY 12 HOURS SCHEDULED
Status: CANCELLED | OUTPATIENT
Start: 2021-08-17

## 2021-08-17 NOTE — PROGRESS NOTES
"CHIEF COMPLAINT  F/U back pain-Bilateral L4-S1 MEDIAL BRANCH BLOCK    Subjective   Clarissa Matos is a 70 y.o. female  who presents to the office for follow-up of procedure.  She completed a bilateral L4-S1 MBB   on  7/28/2021 performed by Dr. Chicas for management of back pain. Patient reports minimal relief from the procedure.     Today her pain is 6/10VAS in severity. She has aching and burning pain in her back and neck.  Her back pain radidates into her posterior thighs, stopping at the knee. Her neck pain radiates into her bilateral upper arms over the biceps stopping at the elbow. She  Has been utilizing diclofenac gel on her neck, back, and shoulders which has been minimally helpful.  She continues with MS Contin and Hydrocodone which is managed by Dr. Ring.  She  Has been in PT, but her therapist has had to cancel her last 2 sessions.  She had just gotten started and had not seen improvement at the time, she is hopeful to resume PT soon.     Unfortunately she does not tolerate steroids well with significant side effects. \"It's like being set on fire for 3 days\".  Discussed that unfortunately this limits our ability to offer epidurals for pain relief.     Patient remained masked during entire encounter. No cough present. I donned a mask and eye protection throughout entire visit. Prior to donning mask and eye protection, hand hygiene was performed, as well as when it was doffed.  I was closer than 6 feet, but not for an extended period of time. No obvious exposure to any bodily fluids.    Back Pain  This is a chronic problem. The current episode started more than 1 year ago. The problem occurs constantly. The problem is unchanged. The pain is present in the lumbar spine and sacro-iliac. The quality of the pain is described as burning and aching. The pain radiates to the right thigh and left thigh (posterior). The pain is at a severity of 5/10. The pain is worse during the day. The symptoms are aggravated by " bending, twisting, standing, sitting and position (walking). Associated symptoms include headaches, numbness and weakness (hai arms and hands). Pertinent negatives include no abdominal pain, chest pain, dysuria or fever. She has tried heat, ice, analgesics and home exercises for the symptoms.   Neck Pain   This is a chronic problem. The current episode started more than 1 year ago. The problem occurs constantly. The problem has been unchanged. The pain is associated with a fall (July 2020 worsened her neck pain). The pain is present in the right side, left side and midline. The quality of the pain is described as aching and burning. The pain is at a severity of 7/10. The symptoms are aggravated by bending, position and twisting (reading, looking down). Associated symptoms include headaches, numbness and weakness (hai arms and hands). Pertinent negatives include no chest pain or fever. She has tried ice, heat, home exercises and oral narcotics (neck pillow) for the symptoms.      PEG Assessment   What number best describes your pain on average in the past week?6  What number best describes how, during the past week, pain has interfered with your enjoyment of life?8  What number best describes how, during the past week, pain has interfered with your general activity?  5    The following portions of the patient's history were reviewed and updated as appropriate: allergies, current medications, past family history, past medical history, past social history, past surgical history and problem list.    Review of Systems   Constitutional: Positive for fatigue. Negative for activity change (increased), chills and fever.   HENT: Negative for congestion.    Eyes: Negative for visual disturbance.   Respiratory: Negative for chest tightness and shortness of breath.    Cardiovascular: Negative for chest pain.   Gastrointestinal: Negative for abdominal pain, constipation and diarrhea.   Genitourinary: Negative for difficulty  "urinating, dyspareunia and dysuria.   Musculoskeletal: Positive for back pain and neck pain.   Neurological: Positive for dizziness, weakness (hai arms and hands), light-headedness, numbness and headaches.   Psychiatric/Behavioral: Positive for sleep disturbance. Negative for agitation. The patient is nervous/anxious.      --  The aforementioned information the Chief Complaint section and above subjective data including any HPI data, and also the Review of Systems data, has been personally reviewed and affirmed.  --     Vitals:    08/17/21 1316   BP: 141/83   Pulse: 72   Resp: 16   Temp: 96.9 °F (36.1 °C)   SpO2: 96%   Weight: 79.5 kg (175 lb 3.2 oz)   Height: 160 cm (63\")   PainSc:   6   PainLoc: Back     MRI CERVICAL SPINE WO CONTRAST-  08/07/2021     HISTORY: Patient has neck pain with bilateral upper extremity  radiculopathy. Worse on left.     Multiple noncontrast sagittal axial and oblique sagittal images were  obtained through the cervical spine.     At C2-3 there is a small right paracentral mixed spondylotic protrusion  with some minimal right foraminal narrowing. No cord flattening is seen.     At C3-4 there is a mild-to-moderate mixed spondylotic protrusion which  partially effaces the anterior thecal sac and abuts the anterior spinal  cord. There may be some very nominal mass effect on the anterior aspect  of the spinal cord as seen on axial series 8 image 18.     At C4-5 there is a small left paracentral and intraforaminal spondylotic  protrusion best seen on axial series 8 image 22 with some mild right  foraminal narrowing. No significant cord flattening is seen.     At C5-6 there is mild to moderate mixed spondylotic protrusion partially  effacing the anterior thecal sac and abutting the anterior spinal cord.  No significant cord flattening is seen.     At C6-7 there is a mild mixed spondylotic protrusion most pronounced in  the right paracentral and intraforaminal position. There is a minimal  right " foraminal narrowing. No cord flattening is seen at C6-7.     The C7-T1 level appears within normal limits.     At T1-2 there is very minimal disc bulge with no cord flattening.     Signal intensity of the bony structures appears normal. Spinal cord  appears within normal limits.     No fractures or significant subluxation is seen.     IMPRESSION:  Multilevel cervical degenerative disease most prominent at  C3-4 and C5-6. Please see full discussion above.     This report was finalized on 8/9/2021 3:22 PM by Dr. Scott Schulte M.D.    Objective   Physical Exam  Vitals and nursing note reviewed.   Constitutional:       Appearance: Normal appearance. She is well-developed.   Eyes:      General: Lids are normal.   Cardiovascular:      Rate and Rhythm: Normal rate.   Pulmonary:      Effort: Pulmonary effort is normal.   Musculoskeletal:      Cervical back: Tenderness and bony tenderness present. Pain with movement present. Decreased range of motion.      Lumbar back: Tenderness and bony tenderness present. Decreased range of motion. Negative right straight leg raise test and negative left straight leg raise test.   Neurological:      Mental Status: She is alert and oriented to person, place, and time.      Gait: Gait normal.   Psychiatric:         Attention and Perception: Attention normal.         Mood and Affect: Mood normal.         Speech: Speech normal.         Behavior: Behavior normal.         Judgment: Judgment normal.         Assessment/Plan   Diagnoses and all orders for this visit:    1. Chronic pain syndrome (Primary)    2. Lumbar facet arthropathy    3. Arthropathy of cervical facet joint    4. Chronic bilateral low back pain, unspecified whether sciatica present    5. Neck pain, chronic    6. Mid back pain      --- Review of results of MRI of cervical spine from 8/7/2021  --- With only minimal relief to her low back pain following medial branch blocks discussed that she is not a candidate for lumbar RFA.  "Patient states understanding.   --- Proceed with bilateral Cervical medial branch blocks at approximately C4-C6 x 1 (verify levels under fluoro)  -------  Education about Medial Branch Blockade and RF Therapy:    This medial branch blockade (MBB) suggested is intended for diagnostic purposes, with the intent of offering the patient Radiofrequency thermal rhizotomy (RF) if the MBB is diagnostically effective.  The diagnostic blockade is necessary to determine the likelihood that RF therapy could be efficacious in providing long term relief to the patient.    Medial branches are sensory nerve branches that connect to a facet joint and transmit sensations & pain signals from that joint.  Facet is a term for the type of joints found in the spine.  Medial branches are the nerves that go to a facet, and therefore are also sometimes called \"facet joint nerves\" (FJNs).      In a medial branch blockade procedure, xray fluoroscopy is used to verify the locations of the outside of the joint lines which are being targeted.  Under xray guidance, needles are placed to these areas.  Contrast dye is injected to confirm proper placement, with dye flowing over the joint area, and to ensure that the dye does not flow into unintended areas such as a vein.  When this is confirmed, local anesthetic is injected to block the medial branch at that joint level.      If MBBs are diagnostically successful in blocking pain, then the patient is most likely a great candidate for Radiofrequency of those facet joint nerves.  In the RF procedure, needles are placed to the joint lines in the same fashion, and after testing, the needle tips are heated to thermally treat the nerves, blocking the nerves by in essence damaging the nerves with the heat treatment.       Medically, a successful RF procedure should provide a patient with 50% pain relief or more for at least 6 months.  Clinical experience suggests that successful patients receive relief more " in the range of 12 months on average.  We also discussed that a fortunate minority of patients receive therapeutic success from the MBB, and may not require RF ablation.  If a patient receives more than 8 weeks of relief from MBB, then occasional repeat MBB for therapeutic purposes is a very reasonable alternative therapy.  This course of therapy is consistent with our LCDs according to our CMS  in the area, and therefore other insurance providers should follow accordingly.  We will monitor our patients to screen for these therapeutic responders and will offer RF therapy only when necessary.        We discussed that MBB & RF are not without risks.  Guidelines regarding anticoagulant use & neuraxial procedures will be respected.  Patients that are ill or otherwise may be at risk for sepsis will not have their spines accessed by neuraxial injections of any type.  This patient will not be offered these therapies if there is an increased risk.   We discussed that there is a risk of postprocedural pain and also a risk of worsening of clinical picture with these procedures as with any neuraxial procedure.    -------  --- Plan to avoid epidurals as she has an allergy to methylprednisolone.   --- No plans to assume opioid prescriptions.  Patient will remain a patient of Dr. Ring's for medication regimen  --- Follow-up after procedure     SANDRA REPORT    As the clinician, I personally reviewed the SANDRA from 8/17/2021 while the patient was in the office today.     Dictated utilizing Dragon dictation.

## 2021-08-18 ENCOUNTER — TRANSCRIBE ORDERS (OUTPATIENT)
Dept: SURGERY | Facility: SURGERY CENTER | Age: 71
End: 2021-08-18

## 2021-08-18 DIAGNOSIS — M47.812 ARTHROPATHY OF CERVICAL FACET JOINT: Primary | ICD-10-CM

## 2021-08-24 ENCOUNTER — TREATMENT (OUTPATIENT)
Dept: PHYSICAL THERAPY | Facility: CLINIC | Age: 71
End: 2021-08-24

## 2021-08-24 ENCOUNTER — APPOINTMENT (OUTPATIENT)
Dept: SLEEP MEDICINE | Facility: HOSPITAL | Age: 71
End: 2021-08-24

## 2021-08-24 DIAGNOSIS — M54.2 CHRONIC NECK AND BACK PAIN: Primary | ICD-10-CM

## 2021-08-24 DIAGNOSIS — G89.29 CHRONIC NECK AND BACK PAIN: Primary | ICD-10-CM

## 2021-08-24 DIAGNOSIS — M54.9 CHRONIC NECK AND BACK PAIN: Primary | ICD-10-CM

## 2021-08-24 DIAGNOSIS — R26.89 IMPAIRED GAIT AND MOBILITY: ICD-10-CM

## 2021-08-24 DIAGNOSIS — M79.10 MUSCLE PAIN: ICD-10-CM

## 2021-08-24 PROCEDURE — 97530 THERAPEUTIC ACTIVITIES: CPT | Performed by: PHYSICAL THERAPIST

## 2021-08-24 PROCEDURE — 97140 MANUAL THERAPY 1/> REGIONS: CPT | Performed by: PHYSICAL THERAPIST

## 2021-08-24 PROCEDURE — 97110 THERAPEUTIC EXERCISES: CPT | Performed by: PHYSICAL THERAPIST

## 2021-08-24 NOTE — PROGRESS NOTES
Physical Therapy Daily Treatment Note      Patient: Clarissa Matos   : 1950  Referring practitioner: RUBY Baig  Date of Initial Visit: Type: THERAPY  Noted: 2021  Today's Date: 2021  Patient seen for 3 sessions         Clarissa Matos reports: tightness in upper back and neck        Subjective     Objective   See Exercise, Manual, and Modality Logs for complete treatment.    Exercise rationale/ pain free exercise performance  Anatomy and structure of affected musculature  Posture/Postural awareness  Lumbar support/thoracic support  Sleeping positions with pillows  Alternate exercise positions  Verbal/Tactile cues to ensure correct exercise performance/technique            Assessment/Plan   Positive response to manual intervention this session in regards to improved mobility and decreased abnormal mm tone/tightness. Able to perform exercise/activity without increased symptoms/discomfort Benefits from verbal/tactile cues to ensure proper posture, exercise technique and performance for affected musculature. Should improve with continued PT intervention.          Progress strengthening /stabilization /functional activity as symptoms allow.           Timed:  Manual Therapy:  15       mins  34558;  Therapeutic Exercise:     20    mins  42217;     Neuromuscular Krysta:        mins  03350;    Therapeutic Activity:  14        mins  14809;     Gait Training:           mins  15891;     Ultrasound:          mins  61328;      Untimed:  Electrical Stimulation:         mins  42609 ( );  Mechanical Traction:         mins  23965;     Timed Treatment:  49    mins   Total Treatment:   49     mins  Yordan Ribeiro PTA  Physical Therapist  Assistant  [id number]

## 2021-08-27 ENCOUNTER — APPOINTMENT (OUTPATIENT)
Dept: GENERAL RADIOLOGY | Facility: SURGERY CENTER | Age: 71
End: 2021-08-27

## 2021-08-30 ENCOUNTER — HOSPITAL ENCOUNTER (OUTPATIENT)
Dept: GENERAL RADIOLOGY | Facility: SURGERY CENTER | Age: 71
Setting detail: HOSPITAL OUTPATIENT SURGERY
End: 2021-08-30

## 2021-08-30 ENCOUNTER — HOSPITAL ENCOUNTER (OUTPATIENT)
Facility: SURGERY CENTER | Age: 71
Setting detail: HOSPITAL OUTPATIENT SURGERY
Discharge: HOME | End: 2021-08-30
Attending: ANESTHESIOLOGY | Admitting: ANESTHESIOLOGY

## 2021-08-30 VITALS
OXYGEN SATURATION: 95 % | DIASTOLIC BLOOD PRESSURE: 68 MMHG | TEMPERATURE: 98.2 F | RESPIRATION RATE: 16 BRPM | WEIGHT: 175 LBS | BODY MASS INDEX: 31 KG/M2 | SYSTOLIC BLOOD PRESSURE: 128 MMHG | HEART RATE: 71 BPM

## 2021-08-30 DIAGNOSIS — M47.812 ARTHROPATHY OF CERVICAL FACET JOINT: ICD-10-CM

## 2021-08-30 PROCEDURE — 76000 FLUOROSCOPY <1 HR PHYS/QHP: CPT

## 2021-08-30 PROCEDURE — 64490 INJ PARAVERT F JNT C/T 1 LEV: CPT | Performed by: ANESTHESIOLOGY

## 2021-08-30 PROCEDURE — BR14ZZZ FLUOROSCOPY OF CERVICAL FACET JOINT(S): ICD-10-PCS | Performed by: ANESTHESIOLOGY

## 2021-08-30 PROCEDURE — 64491 INJ PARAVERT F JNT C/T 2 LEV: CPT | Performed by: ANESTHESIOLOGY

## 2021-08-30 PROCEDURE — 3E0T3BZ INTRODUCTION OF ANESTHETIC AGENT INTO PERIPHERAL NERVES AND PLEXI, PERCUTANEOUS APPROACH: ICD-10-PCS | Performed by: ANESTHESIOLOGY

## 2021-08-30 PROCEDURE — 25010000002 MIDAZOLAM PER 1 MG: Performed by: ANESTHESIOLOGY

## 2021-08-30 RX ORDER — MIDAZOLAM HYDROCHLORIDE 1 MG/ML
INJECTION INTRAMUSCULAR; INTRAVENOUS AS NEEDED
Status: DISCONTINUED | OUTPATIENT
Start: 2021-08-30 | End: 2021-08-30 | Stop reason: HOSPADM

## 2021-08-30 RX ORDER — SODIUM CHLORIDE, SODIUM LACTATE, POTASSIUM CHLORIDE, CALCIUM CHLORIDE 600; 310; 30; 20 MG/100ML; MG/100ML; MG/100ML; MG/100ML
9 INJECTION, SOLUTION INTRAVENOUS CONTINUOUS
Status: DISCONTINUED | OUTPATIENT
Start: 2021-08-30 | End: 2021-08-30 | Stop reason: HOSPADM

## 2021-08-30 RX ORDER — SODIUM CHLORIDE 0.9 % (FLUSH) 0.9 %
10 SYRINGE (ML) INJECTION AS NEEDED
Status: DISCONTINUED | OUTPATIENT
Start: 2021-08-30 | End: 2021-08-30 | Stop reason: HOSPADM

## 2021-08-30 RX ORDER — SODIUM CHLORIDE 0.9 % (FLUSH) 0.9 %
10 SYRINGE (ML) INJECTION EVERY 12 HOURS SCHEDULED
Status: DISCONTINUED | OUTPATIENT
Start: 2021-08-30 | End: 2021-08-30 | Stop reason: HOSPADM

## 2021-08-30 RX ORDER — SODIUM CHLORIDE 9 MG/ML
9 INJECTION, SOLUTION INTRAVENOUS CONTINUOUS
Status: DISCONTINUED | OUTPATIENT
Start: 2021-08-30 | End: 2021-08-30

## 2021-08-30 RX ADMIN — SODIUM CHLORIDE, POTASSIUM CHLORIDE, SODIUM LACTATE AND CALCIUM CHLORIDE 9 ML/HR: 600; 310; 30; 20 INJECTION, SOLUTION INTRAVENOUS at 08:53

## 2021-08-31 ENCOUNTER — TREATMENT (OUTPATIENT)
Dept: PHYSICAL THERAPY | Facility: CLINIC | Age: 71
End: 2021-08-31

## 2021-08-31 DIAGNOSIS — M54.2 CHRONIC NECK AND BACK PAIN: Primary | ICD-10-CM

## 2021-08-31 DIAGNOSIS — M54.9 CHRONIC NECK AND BACK PAIN: Primary | ICD-10-CM

## 2021-08-31 DIAGNOSIS — M79.10 MUSCLE PAIN: ICD-10-CM

## 2021-08-31 DIAGNOSIS — G89.29 CHRONIC NECK AND BACK PAIN: Primary | ICD-10-CM

## 2021-08-31 DIAGNOSIS — R26.89 IMPAIRED GAIT AND MOBILITY: ICD-10-CM

## 2021-08-31 PROCEDURE — 97140 MANUAL THERAPY 1/> REGIONS: CPT | Performed by: PHYSICAL THERAPIST

## 2021-08-31 PROCEDURE — 97530 THERAPEUTIC ACTIVITIES: CPT | Performed by: PHYSICAL THERAPIST

## 2021-08-31 PROCEDURE — 97110 THERAPEUTIC EXERCISES: CPT | Performed by: PHYSICAL THERAPIST

## 2021-08-31 NOTE — PROGRESS NOTES
"Physical Therapy Daily Treatment Note      Patient: Clarissa Matos   : 1950  Referring practitioner: RUBY Baig  Date of Initial Visit: Type: THERAPY  Noted: 2021  Today's Date: 2021  Patient seen for 4 sessions         Clarissa Matos reports: saw pain management yesterday, now has 12 holes in neck from mapping.  Felt some relief after treatment yesterday. Returns to pain management in week.    LB -stiff across back and into hips/gluts, feels hips are \"off' at lexie      Subjective     Objective   -increased abnormal mm tone/tightness with noted triggerpoint across UT/medial scap border    See Exercise, Manual, and Modality Logs for complete treatment.   Added standing red t band scapular retraction and hai sh ext and yellow t band no money  Reviewed exercises for SI/pelvic rotation.      Assessment/Plan  Compliant/Cooperative with rehab efforts.  Subjectively reports no increased symptoms or discomfort with therapeutic exercise today.  Able to perform increased exercise/functional activity without increased cervical/lumbar discomfort..  Benefits from verbal/tactile cues to ensure proper exercise technique.  Improved cervical mobility/decreased mm tightness post manual intervention.  Improved ambulation and mobility B hips post manual interventions.          Progress strengthening /stabilization /functional activity as symptoms allow.           Timed:  Manual Therapy:   30      mins  27609;  Therapeutic Exercise:  10       mins  31452;     Neuromuscular Krysta:        mins  87470;    Therapeutic Activity:   10       mins  67893;     Gait Training:           mins  06566;     Ultrasound:          mins  42421;      Untimed:  Electrical Stimulation:         mins  23509 ( );  Mechanical Traction:         mins  67505;     Timed Treatment:   50   mins   Total Treatment:    50    mins  Yordan Ribeiro PTA  Physical Therapist  Assistant  [id number]  "

## 2021-09-03 ENCOUNTER — TREATMENT (OUTPATIENT)
Dept: PHYSICAL THERAPY | Facility: CLINIC | Age: 71
End: 2021-09-03

## 2021-09-03 DIAGNOSIS — G89.29 CHRONIC NECK AND BACK PAIN: Primary | ICD-10-CM

## 2021-09-03 DIAGNOSIS — R26.89 IMPAIRED GAIT AND MOBILITY: ICD-10-CM

## 2021-09-03 DIAGNOSIS — M79.10 MUSCLE PAIN: ICD-10-CM

## 2021-09-03 DIAGNOSIS — M54.2 CHRONIC NECK AND BACK PAIN: Primary | ICD-10-CM

## 2021-09-03 DIAGNOSIS — M54.9 CHRONIC NECK AND BACK PAIN: Primary | ICD-10-CM

## 2021-09-03 PROCEDURE — 97530 THERAPEUTIC ACTIVITIES: CPT | Performed by: PHYSICAL THERAPIST

## 2021-09-03 PROCEDURE — 97140 MANUAL THERAPY 1/> REGIONS: CPT | Performed by: PHYSICAL THERAPIST

## 2021-09-07 ENCOUNTER — TREATMENT (OUTPATIENT)
Dept: PHYSICAL THERAPY | Facility: CLINIC | Age: 71
End: 2021-09-07

## 2021-09-07 DIAGNOSIS — R26.89 IMPAIRED GAIT AND MOBILITY: ICD-10-CM

## 2021-09-07 DIAGNOSIS — M54.9 CHRONIC NECK AND BACK PAIN: Primary | ICD-10-CM

## 2021-09-07 DIAGNOSIS — M79.10 MUSCLE PAIN: ICD-10-CM

## 2021-09-07 DIAGNOSIS — G89.29 CHRONIC NECK AND BACK PAIN: Primary | ICD-10-CM

## 2021-09-07 DIAGNOSIS — M54.2 CHRONIC NECK AND BACK PAIN: Primary | ICD-10-CM

## 2021-09-07 PROCEDURE — 97140 MANUAL THERAPY 1/> REGIONS: CPT | Performed by: PHYSICAL THERAPIST

## 2021-09-07 PROCEDURE — 97530 THERAPEUTIC ACTIVITIES: CPT | Performed by: PHYSICAL THERAPIST

## 2021-09-07 NOTE — PROGRESS NOTES
"Physical Therapy Daily Treatment Note      Patient: Clarissa Matos   : 1950  Referring practitioner: RUBY Baig  Date of Initial Visit: Type: THERAPY  Noted: 2021  Today's Date: 09/3/21  Patient seen for 5 sessions         Clarissa Matos reports: neck is a little tight on the right.  LB is more bothersome.  Feel like I am shifted        Subjective     Objective   -tender L PSIS with palpation  -noted L vs R SI innominate rotation with uneven medial malleoli; corrects with MET and long axis distraction with audible \"pop\" reported and subsequent relief of pain.    See Exercise, Manual, and Modality Logs for complete treatment.   - reinstructed in sktc, ltr and hip add ball squeeze, hip abd/ER with t band for self correction     Manual therapy/intervention  X 18 min:  STM/DTM with attempted triggerpoint release B UT/cerv pvm and superior spine scapula.  MET to pelvic followed by unilateral long axis distraction for correction of SI rotation.    Assessment/Plan  Positive response to manual interventions of MET and long axis distraction with correction of L  SI rotation.  Demonstrates good recall and performance of SI self correction techniques.          Progress strengthening /stabilization /functional activity to include cervical isometrics           Timed:  Manual Therapy:   18      mins  91554;  Therapeutic Exercise:         mins  67292;     Neuromuscular Krysta:        mins  92186;    Therapeutic Activity:      10    mins  76890;     Gait Training:           mins  18826;     Ultrasound:          mins  60156;      Untimed:  Electrical Stimulation:         mins  35848 ( );  Mechanical Traction:         mins  62235;     Timed Treatment:   28   mins   Total Treatment:    28    mins  Yordna Ribeiro PTA  Physical Therapist  Assistant  [id number]  "

## 2021-09-07 NOTE — PROGRESS NOTES
Physical Therapy Daily Treatment Note      Patient: Clarissa Matos   : 1950  Referring practitioner: RUBY Baig  Date of Initial Visit: Type: THERAPY  Noted: 2021  Today's Date: 2021  Patient seen for 6 sessions         Clarissa Matos reports: neck is tight/bothersome today(R vs L).  Death in family that will take her out of town for the next week or two. LB is feeling better after last treatment session.        Subjective     Objective   -increased abnormal mm tone/tightness R vs L UT/cerv pvm.  Noted triggerpoints R UT, medial scapular borders    See Exercise, Manual, and Modality Logs for complete treatment.   -reviewed HEP for cervical spine for stress relief, ROM, stretching.  -Discussed incorporation of sub max isometrics c spine(ROT and Lat Flex)    Assessment/Plan Positive response to manual intervention this session in regards to decreased UT, cervical mm tightness and triggerpoint size R UT.  Benefits from verbal/tactile cues to ensure proper posture, exercise technique and performance for affected musculature.           Other  Recert next visit.  Update outcomes, etc           Timed:  Manual Therapy:    15     mins  85915;  Therapeutic Exercise:         mins  59571;     Neuromuscular Krysta:        mins  22607;    Therapeutic Activity:    10      mins  45311;     Gait Training:           mins  18010;     Ultrasound:    10      mins  74396;      Untimed:  Electrical Stimulation:         mins  49327 ( );  Mechanical Traction:         mins  59191;     Timed Treatment:   35   mins   Total Treatment:    35    mins  Yordan Ribeiro, RAJEEV  Physical Therapist  Assistant  [id number]

## 2021-09-17 ENCOUNTER — TELEPHONE (OUTPATIENT)
Dept: FAMILY MEDICINE CLINIC | Facility: CLINIC | Age: 71
End: 2021-09-17

## 2021-09-17 DIAGNOSIS — I10 ESSENTIAL HYPERTENSION: ICD-10-CM

## 2021-09-17 RX ORDER — CANDESARTAN CILEXETIL AND HYDROCHLOROTHIAZIDE 16; 12.5 MG/1; MG/1
1 TABLET ORAL DAILY
Qty: 90 TABLET | Refills: 1 | Status: SHIPPED | OUTPATIENT
Start: 2021-09-17 | End: 2022-03-14

## 2021-09-17 RX ORDER — CANDESARTAN CILEXETIL AND HYDROCHLOROTHIAZIDE 16; 12.5 MG/1; MG/1
1 TABLET ORAL DAILY
Qty: 90 TABLET | Refills: 0 | Status: CANCELLED | OUTPATIENT
Start: 2021-09-17

## 2021-09-17 NOTE — TELEPHONE ENCOUNTER
Caller: Clarissa Matos    Relationship: Self    Best call back number: [phone]    Medication needed:   Requested Prescriptions     Pending Prescriptions Disp Refills   • candesartan-hydrochlorothiazide (ATACAND HCT) 16-12.5 MG per tablet 90 tablet 0     Sig: Take 1 tablet by mouth Daily.       When do you need the refill by: ASAP    What additional details did the patient provide when requesting the medication: PATIENT IS IN [place] AND WOULD LIKE THIS TO BE SENT TO [facility] THERE.     Does the patient have less than a 3 day supply:  [] Yes  [x] No    What is the patient's preferred pharmacy: [id number] - [place], [place] - [address]  [address] [facility] [phone] [facility] [phone] FX

## 2021-09-21 ENCOUNTER — APPOINTMENT (OUTPATIENT)
Dept: SLEEP MEDICINE | Facility: HOSPITAL | Age: 71
End: 2021-09-21

## 2021-10-04 ENCOUNTER — TREATMENT (OUTPATIENT)
Dept: PHYSICAL THERAPY | Facility: CLINIC | Age: 71
End: 2021-10-04

## 2021-10-04 DIAGNOSIS — G89.29 CHRONIC NECK AND BACK PAIN: Primary | ICD-10-CM

## 2021-10-04 DIAGNOSIS — M54.2 CHRONIC NECK AND BACK PAIN: Primary | ICD-10-CM

## 2021-10-04 DIAGNOSIS — M54.9 CHRONIC NECK AND BACK PAIN: Primary | ICD-10-CM

## 2021-10-04 PROCEDURE — 97012 MECHANICAL TRACTION THERAPY: CPT | Performed by: PHYSICAL THERAPIST

## 2021-10-04 PROCEDURE — 97530 THERAPEUTIC ACTIVITIES: CPT | Performed by: PHYSICAL THERAPIST

## 2021-10-04 PROCEDURE — 97140 MANUAL THERAPY 1/> REGIONS: CPT | Performed by: PHYSICAL THERAPIST

## 2021-10-04 NOTE — PROGRESS NOTES
Physical Therapy Re-Assessment / Re-Certification        Patient: Clarissa Matos   : 1950  Visit Diagnoses:     ICD-10-CM ICD-9-CM   1. Chronic neck and back pain  M54.2 723.1    M54.9 724.5    G89.29 338.29     Referring practitioner: RUBY Baig  Date of Initial Visit: Type: THERAPY  Noted: 2021  Today's Date: 10/4/2021  Patient seen for 7 sessions      Subjective:   Clarissa Matos reports:   Subjective Questionnaire: NDI:  Oswestry:   Clinical Progress: improved  Home Program Compliance: Yes  Treatment has included: therapeutic exercise, manual therapy, therapeutic activity, electrical stimulation, ultrasound and moist heat    Subjective Evaluation    Pain  At best pain ratin  At worst pain ratin      Patient reports she has been doing some better.  Still having pain.  Has been doing a lot of walking-~2 miles per week.  States she didn't have too much increased pain with traveling.  Has been very busy with helping her mother following the death of her father.         Objective     Active Range of Motion   Cervical/Thoracic Spine   Cervical     Flexion: 55 degrees   Extension: 45 degrees   Left lateral flexion: 40 degrees   Right lateral flexion: 40 degrees   Left rotation: 65 degrees   Right rotation: 55 degrees      Lumbar   Flexion: 40 degrees   Extension: 25 degrees   Left lateral flexion: 13 degrees   Right lateral flexion: 15 degrees   Left rotation: 40 degrees   Right rotation: 40 degrees      Strength/Myotome Testing   Cervical Spine   Neck extension: 5  Neck flexion: 5     Left   Normal strength     Right   Normal strength     Lumbar   Left   Normal strength     Right   Normal strength     Muscle Activation     Additional Muscle Activation Details  Decreased core muscle activation  Diastasis Recti     Tests   Left   Negative Spurling's sign.      Right   Negative Spurling's sign.      Thoracic   Negative slump.     Elevated and hypomobile right first  rib     Lumbar      Left   Negative passive SLR and quadrant.      Right   Negative passive SLR and quadrant.     Symmetrical SIJ alignment    Decreased UQ muscle flexibility    SLR: L 60 degrees, R 60 degrees    Decreased UQ/scapular, hip girdle/LE muscle flexibility     Assessment/Plan  Patient returns to PT following a gap due to death in the family.  She has improved cervical and lumbar ROM.  Flexibility continues to be a limitation.  She responds well to manual therapy and tolerated initiation of cervical traction well.  Will continue to progress as tolerated.  Progress toward previous goals: Partially Met    Goal Review  Short Term Goals (6-8 visits):  1.  Patient will have increased cervical spine ROM to WFLs.-met  2.  Patient will have increased lumbar spine ROM to WFLs.-partially met  3.  Patient will have increased gross muscle flexibility to WFLs.-not met  4.  Patient will have increased core muscle activation to WFLs without provoking diastasis recti.-progressing   5.  Patient will have right first rib alignment and mobility WNLs.     Long Term Goals (10 visits):  1.  Patient will be independent in performance of HEP for carryover upon discharge from skilled PT services.-progressing  2.  Patient will have improved Oswestry score of 30/50 or better.-progressing  3.  Patient will have increased NDI score of 20/50 or better.-progressing  4.  Patient will demonstrate functional reach patterns WFLs to improve tolerance to ADLs/IADLs.-progressing  5.  Patient will demonstrate functional squat patterns WFLs to improve tolerance to ADLs/IADLs.-progressing      Recommendations: Continue as planned  Timeframe: 1 month  Prognosis to achieve goals: good    PT Signature: Tere Mcqueen, PT      Based upon review of the patient's progress and continued therapy plan, it is my medical opinion that Clarissa Matos should continue physical therapy treatment at [facility]  [address],   [facility] [address]  [phone].    Signature: __________________________________  Judy Kaur APRN    Timed:  Manual Therapy:   13      mins  52005;  Therapeutic Exercise:         mins  26888;     Neuromuscular Krysta:        mins  96301;    Therapeutic Activity:     20     mins  52563;     Gait Training:           mins  85098;     Ultrasound:          mins  14172;  Iontophoresis:          mins  66488;    Dry Needling:          mins ;    Untimed:  Electrical Stimulation:         mins  14246 ( );  Mechanical Traction:    10     mins  27085;   Canalith Repositioning:        mins  50013;    Timed Treatment:   43   mins   Total Treatment:     50   mins

## 2021-10-06 ENCOUNTER — TREATMENT (OUTPATIENT)
Dept: PHYSICAL THERAPY | Facility: CLINIC | Age: 71
End: 2021-10-06

## 2021-10-06 DIAGNOSIS — M54.9 CHRONIC NECK AND BACK PAIN: Primary | ICD-10-CM

## 2021-10-06 DIAGNOSIS — M54.2 CHRONIC NECK AND BACK PAIN: Primary | ICD-10-CM

## 2021-10-06 DIAGNOSIS — G89.29 CHRONIC NECK AND BACK PAIN: Primary | ICD-10-CM

## 2021-10-06 DIAGNOSIS — M79.10 MUSCLE PAIN: ICD-10-CM

## 2021-10-06 PROCEDURE — 97110 THERAPEUTIC EXERCISES: CPT | Performed by: PHYSICAL THERAPIST

## 2021-10-06 PROCEDURE — 97140 MANUAL THERAPY 1/> REGIONS: CPT | Performed by: PHYSICAL THERAPIST

## 2021-10-11 ENCOUNTER — TREATMENT (OUTPATIENT)
Dept: PHYSICAL THERAPY | Facility: CLINIC | Age: 71
End: 2021-10-11

## 2021-10-11 DIAGNOSIS — M54.2 CHRONIC NECK AND BACK PAIN: Primary | ICD-10-CM

## 2021-10-11 DIAGNOSIS — R26.89 IMPAIRED GAIT AND MOBILITY: ICD-10-CM

## 2021-10-11 DIAGNOSIS — G89.29 CHRONIC NECK AND BACK PAIN: Primary | ICD-10-CM

## 2021-10-11 DIAGNOSIS — M79.10 MUSCLE PAIN: ICD-10-CM

## 2021-10-11 DIAGNOSIS — M54.9 CHRONIC NECK AND BACK PAIN: Primary | ICD-10-CM

## 2021-10-11 PROCEDURE — 97110 THERAPEUTIC EXERCISES: CPT | Performed by: PHYSICAL THERAPIST

## 2021-10-11 PROCEDURE — 97140 MANUAL THERAPY 1/> REGIONS: CPT | Performed by: PHYSICAL THERAPIST

## 2021-10-13 ENCOUNTER — TREATMENT (OUTPATIENT)
Dept: PHYSICAL THERAPY | Facility: CLINIC | Age: 71
End: 2021-10-13

## 2021-10-13 DIAGNOSIS — M54.2 CHRONIC NECK AND BACK PAIN: Primary | ICD-10-CM

## 2021-10-13 DIAGNOSIS — M54.9 CHRONIC NECK AND BACK PAIN: Primary | ICD-10-CM

## 2021-10-13 DIAGNOSIS — G89.29 CHRONIC NECK AND BACK PAIN: Primary | ICD-10-CM

## 2021-10-13 PROCEDURE — 97110 THERAPEUTIC EXERCISES: CPT | Performed by: PHYSICAL THERAPIST

## 2021-10-13 PROCEDURE — 97530 THERAPEUTIC ACTIVITIES: CPT | Performed by: PHYSICAL THERAPIST

## 2021-10-13 NOTE — PROGRESS NOTES
Physical Therapy Daily Progress Note      Patient: Clarissa Matos   : 1950  Treatment Diagnosis:     ICD-10-CM ICD-9-CM   1. Chronic neck and back pain  M54.2 723.1    M54.9 724.5    G89.29 338.29     Referring practitioner: RUBY Baig  Date of Initial Visit: Type: THERAPY  Noted: 2021  Today's Date: 10/13/2021  Patient seen for 10 sessions         Clarissa Matos reports:     Subjective   Patient reports she needs to work on core and leg strength.  States she is comfortable focusing on either her back or neck on alternating sessions-wants to focus on back today.    Objective   See Exercise, Manual, and Modality Logs for complete treatment.       Assessment/Plan  Patient performed program for core strengthening and dynamic stabilization.  She did require CGA for stability with walk outs and benefits from cueing for proper exercise technique and to prevent overflow and compensatory patterns.  Will continue to progress as tolerated.  Progress per Plan of Care           Timed:  Manual Therapy:         mins  47829;  Therapeutic Exercise:   24      mins  93947;     Neuromuscular Krysta:        mins  69328;    Therapeutic Activity:    15      mins  26324;     Gait Training:           mins  65572;     Ultrasound:          mins  08688;    Iontophoresis:          mins  99784;  Dry Needling:          mins  77232;  Dry Needling:          mins  03082;    Untimed:  Electrical Stimulation:         mins  74159 ( );  Mechanical Traction:         mins  06375;   Canalith Repositioning:      mins  19945;    Timed Treatment:   39   mins   Total Treatment:     53   mins    Tere Mcqueen PT  Physical Therapist

## 2021-10-19 ENCOUNTER — TREATMENT (OUTPATIENT)
Dept: PHYSICAL THERAPY | Facility: CLINIC | Age: 71
End: 2021-10-19

## 2021-10-19 DIAGNOSIS — M54.2 CHRONIC NECK AND BACK PAIN: Primary | ICD-10-CM

## 2021-10-19 DIAGNOSIS — M54.9 CHRONIC NECK AND BACK PAIN: Primary | ICD-10-CM

## 2021-10-19 DIAGNOSIS — G89.29 CHRONIC NECK AND BACK PAIN: Primary | ICD-10-CM

## 2021-10-19 PROCEDURE — 97140 MANUAL THERAPY 1/> REGIONS: CPT | Performed by: PHYSICAL THERAPIST

## 2021-10-19 PROCEDURE — 97110 THERAPEUTIC EXERCISES: CPT | Performed by: PHYSICAL THERAPIST

## 2021-10-19 NOTE — PROGRESS NOTES
Physical Therapy Daily Progress Note      Patient: Clarissa Matos   : 1950  Treatment Diagnosis:     ICD-10-CM ICD-9-CM   1. Chronic neck and back pain  M54.2 723.1    M54.9 724.5    G89.29 338.29     Referring practitioner: RUBY Baig  Date of Initial Visit: Type: THERAPY  Noted: 2021  Today's Date: 10/19/2021  Patient seen for 11 sessions         Clarissa Matos reports:     Subjective   Patient reports her low back is bothering her more today because she did a lot of laundry and up/down the stairs yesterday.  States she wants to focus on her low back today.  Reports she needs to go out of town to pick her mother up as she is coming to live with her.    Objective   See Exercise, Manual, and Modality Logs for complete treatment.       Assessment/Plan  Patient performed program to tolerance.  She responded well to manual therapy with improved pelvic alignment and lumbar mobility.  Overall she had decreased pain post treatment.  Will resume upon patients return to town and continue to progress as tolerated.  Progress per Plan of Care           Timed:  Manual Therapy:   10      mins  17097;  Therapeutic Exercise:   20      mins  98381 (10 min concurrently);     Neuromuscular Krysta:        mins  76876;    Therapeutic Activity:          mins  78024;     Gait Training:           mins  01184;     Ultrasound:          mins  20157;    Iontophoresis:          mins  20859;  Dry Needling:          mins  29373;  Dry Needling:          mins  40369;    Untimed:  Electrical Stimulation:         mins  44145 ( );  Mechanical Traction:         mins  09101;   Canalith Repositioning:      mins  34067;    Timed Treatment:   30   mins   Total Treatment:     50   mins    Tere Mcqueen, PT  Physical Therapist

## 2021-11-01 ENCOUNTER — TELEPHONE (OUTPATIENT)
Dept: PHYSICAL THERAPY | Facility: CLINIC | Age: 71
End: 2021-11-01

## 2021-11-01 NOTE — TELEPHONE ENCOUNTER
PATIENT CALLED TO CONFIRM SOME OF HER APPTS, BUT WAS ALSO WANTING TO GET HER MOTHER IN FOR DRYNEEDLING WITH MARIA D. THE FIRST THING I SEE WAS 11/30. PATIENT ASKED IF SHE COULD GIVE HER NEXT APPT TO HER MOTHER. I WASN'T SURE HOW THAT WOULD WORK. PLEASE CALL PATIENT [phone]. THANK YOU.

## 2021-11-04 ENCOUNTER — TREATMENT (OUTPATIENT)
Dept: PHYSICAL THERAPY | Facility: CLINIC | Age: 71
End: 2021-11-04

## 2021-11-04 DIAGNOSIS — M54.9 CHRONIC NECK AND BACK PAIN: Primary | ICD-10-CM

## 2021-11-04 DIAGNOSIS — G89.29 CHRONIC NECK AND BACK PAIN: Primary | ICD-10-CM

## 2021-11-04 DIAGNOSIS — M54.2 CHRONIC NECK AND BACK PAIN: Primary | ICD-10-CM

## 2021-11-04 PROCEDURE — 97110 THERAPEUTIC EXERCISES: CPT | Performed by: PHYSICAL THERAPIST

## 2021-11-04 PROCEDURE — 97530 THERAPEUTIC ACTIVITIES: CPT | Performed by: PHYSICAL THERAPIST

## 2021-11-09 ENCOUNTER — TREATMENT (OUTPATIENT)
Dept: PHYSICAL THERAPY | Facility: CLINIC | Age: 71
End: 2021-11-09

## 2021-11-09 DIAGNOSIS — R26.89 IMPAIRED GAIT AND MOBILITY: ICD-10-CM

## 2021-11-09 DIAGNOSIS — G89.29 CHRONIC NECK AND BACK PAIN: Primary | ICD-10-CM

## 2021-11-09 DIAGNOSIS — M54.9 CHRONIC NECK AND BACK PAIN: Primary | ICD-10-CM

## 2021-11-09 DIAGNOSIS — M79.10 MUSCLE PAIN: ICD-10-CM

## 2021-11-09 DIAGNOSIS — M54.2 CHRONIC NECK AND BACK PAIN: Primary | ICD-10-CM

## 2021-11-09 PROCEDURE — 97140 MANUAL THERAPY 1/> REGIONS: CPT | Performed by: PHYSICAL THERAPIST

## 2021-11-09 PROCEDURE — 97110 THERAPEUTIC EXERCISES: CPT | Performed by: PHYSICAL THERAPIST

## 2021-11-12 ENCOUNTER — TREATMENT (OUTPATIENT)
Dept: PHYSICAL THERAPY | Facility: CLINIC | Age: 71
End: 2021-11-12

## 2021-11-12 DIAGNOSIS — M54.9 CHRONIC NECK AND BACK PAIN: Primary | ICD-10-CM

## 2021-11-12 DIAGNOSIS — M54.2 CHRONIC NECK AND BACK PAIN: Primary | ICD-10-CM

## 2021-11-12 DIAGNOSIS — G89.29 CHRONIC NECK AND BACK PAIN: Primary | ICD-10-CM

## 2021-11-12 PROCEDURE — 97530 THERAPEUTIC ACTIVITIES: CPT | Performed by: PHYSICAL THERAPIST

## 2021-11-12 PROCEDURE — 97110 THERAPEUTIC EXERCISES: CPT | Performed by: PHYSICAL THERAPIST

## 2021-11-12 PROCEDURE — 97140 MANUAL THERAPY 1/> REGIONS: CPT | Performed by: PHYSICAL THERAPIST

## 2021-11-12 NOTE — PROGRESS NOTES
Physical Therapy Daily Progress Note      Patient: Clarissa Matos   : 1950  Treatment Diagnosis:     ICD-10-CM ICD-9-CM   1. Chronic neck and back pain  M54.2 723.1    M54.9 724.5    G89.29 338.29     Referring practitioner: RUBY Baig  Date of Initial Visit: Type: THERAPY  Noted: 2021  Today's Date: 2021  Patient seen for 14 sessions         Clarissa Matos reports:       Subjective   Patient reports she feels bad all over-feels it is her arthritis flared up.  States she has been busy with chores around the house and that has contributed to the increased pain.  States she has burning pain in the right side of neck and middle back-under left shoulder blade.    Objective   See Exercise, Manual, and Modality Logs for complete treatment.       Assessment/Plan  Patient responded positively to manual therapy.  She was able to perform progressed exercises for ROM and spinal stabilization.  Continues to benefit from cueing to ensure proper exercise technique-especially prone exercises.  She will benefit from continued exercise progression.  Progress per Plan of Care and Progress strengthening /stabilization /functional activity           Timed:  Manual Therapy:   13      mins  52303;  Therapeutic Exercise:   20      mins  90684;     Neuromuscular Krysta:        mins  18488;    Therapeutic Activity:    10      mins  48328;     Gait Training:           mins  54101;     Ultrasound:          mins  56541;    Iontophoresis:          mins  11521;  Dry Needling:          mins  94700;  Dry Needling:          mins  23819;    Untimed:  Electrical Stimulation:         mins  46367 ( );  Mechanical Traction:         mins  88666;   Canalith Repositioning:      mins  53384;    Timed Treatment:   43   mins   Total Treatment:     45   mins    Tere Mcqueen PT  Physical Therapist

## 2021-11-16 ENCOUNTER — TELEPHONE (OUTPATIENT)
Dept: PHYSICAL THERAPY | Facility: CLINIC | Age: 71
End: 2021-11-16

## 2021-11-18 NOTE — PROGRESS NOTES
Physical Therapy Daily Treatment Note      Patient: Clarissa Matos   : 1950  Referring practitioner: RUBY Baig  Date of Initial Visit: Type: THERAPY  Noted: 2021  Today's Date:10/6/21  Patient seen for 8 sessions         Clarissa Matos reports:      Subjective     Objective   See Exercise, Manual, and Modality Logs for complete treatment.   Core stab with DKTC on exercise ball 2 x 10  Core use with log roll as well as with position changes(sit to stand).    Assessment/Plan  Benefits from exercise performance to target affected musculature and improve overall core strength and postural musculature.  Manual interventions remain beneficial in regards to decreasing affected mm tightness and guarding which contributes to cervical symptoms/discomfort.          Progress per Plan of Care toward all goals           Timed:  Manual Therapy:     15    mins  32545;  Therapeutic Exercise:     20    mins  70077;     Neuromuscular Krysta:        mins  68121;    Therapeutic Activity:      5  mins  36186;     Gait Training:           mins  37236;     Ultrasound:          mins  03617;      Untimed:  Electrical Stimulation:         mins  25170 ( );  Mechanical Traction:         mins  06660;     Timed Treatment:   40   mins   Total Treatment:     50   mins  Yordan Ribeiro PTA  Physical Therapist  Assistant  [id number]

## 2021-11-19 ENCOUNTER — TREATMENT (OUTPATIENT)
Dept: PHYSICAL THERAPY | Facility: CLINIC | Age: 71
End: 2021-11-19

## 2021-11-19 DIAGNOSIS — M54.9 CHRONIC NECK AND BACK PAIN: Primary | ICD-10-CM

## 2021-11-19 DIAGNOSIS — G89.29 CHRONIC NECK AND BACK PAIN: Primary | ICD-10-CM

## 2021-11-19 DIAGNOSIS — M54.2 CHRONIC NECK AND BACK PAIN: Primary | ICD-10-CM

## 2021-11-19 PROCEDURE — 97530 THERAPEUTIC ACTIVITIES: CPT | Performed by: PHYSICAL THERAPIST

## 2021-11-19 PROCEDURE — 97110 THERAPEUTIC EXERCISES: CPT | Performed by: PHYSICAL THERAPIST

## 2021-11-19 NOTE — PROGRESS NOTES
Physical Therapy Daily Progress Note      Patient: Clarissa Matos   : 1950  Treatment Diagnosis:     ICD-10-CM ICD-9-CM   1. Chronic neck and back pain  M54.2 723.1    M54.9 724.5    G89.29 338.29     Referring practitioner: RUBY Baig  Date of Initial Visit: Type: THERAPY  Noted: 2021  Today's Date: 2021  Patient seen for 15 sessions         Clarissa Matos reports:     Subjective   Patient reports she went to the chiropractor and received adjustments in her neck and back.  States she is having increased back and left hip pain for the past 2-3 days.    Objective   See Exercise, Manual, and Modality Logs for complete treatment.       Assessment/Plan  Patient performed program to tolerance with focus on core stabilization/strengthening.  She reported decreased left hip pain following program completion.  Modalities post exercise also provided additional comfort.  Will continue to progress core strength, dynamic stability as well as flexibility.  Progress per Plan of Care and Progress strengthening /stabilization /functional activity           Timed:  Manual Therapy:         mins  69975;  Therapeutic Exercise:   30      mins  83052;     Neuromuscular Krysta:        mins  69788;    Therapeutic Activity:    10      mins  56838;     Gait Training:           mins  35671;     Ultrasound:          mins  02155;    Iontophoresis:          mins  33181;  Dry Needling:          mins  75500;  Dry Needling:          mins  95373;    Untimed:  Electrical Stimulation:         mins  23856 ( );  Mechanical Traction:         mins  10267;   Canalith Repositioning:      mins  18569;    Timed Treatment:   40   mins   Total Treatment:     50   mins    Tere Mcqueen PT  Physical Therapist

## 2021-11-23 ENCOUNTER — TREATMENT (OUTPATIENT)
Dept: PHYSICAL THERAPY | Facility: CLINIC | Age: 71
End: 2021-11-23

## 2021-11-23 DIAGNOSIS — G89.29 CHRONIC NECK AND BACK PAIN: Primary | ICD-10-CM

## 2021-11-23 DIAGNOSIS — M54.9 CHRONIC NECK AND BACK PAIN: ICD-10-CM

## 2021-11-23 DIAGNOSIS — M54.9 CHRONIC BACK PAIN GREATER THAN 3 MONTHS DURATION: ICD-10-CM

## 2021-11-23 DIAGNOSIS — G89.29 CHRONIC NECK AND BACK PAIN: ICD-10-CM

## 2021-11-23 DIAGNOSIS — Z13.820 OSTEOPOROSIS SCREENING: Primary | ICD-10-CM

## 2021-11-23 DIAGNOSIS — M54.2 CHRONIC NECK AND BACK PAIN: Primary | ICD-10-CM

## 2021-11-23 DIAGNOSIS — M17.12 PRIMARY OSTEOARTHRITIS OF LEFT KNEE: ICD-10-CM

## 2021-11-23 DIAGNOSIS — M54.2 CHRONIC NECK AND BACK PAIN: ICD-10-CM

## 2021-11-23 DIAGNOSIS — M79.7 FIBROMYALGIA: ICD-10-CM

## 2021-11-23 DIAGNOSIS — G89.29 CHRONIC BACK PAIN GREATER THAN 3 MONTHS DURATION: ICD-10-CM

## 2021-11-23 DIAGNOSIS — M54.9 CHRONIC NECK AND BACK PAIN: Primary | ICD-10-CM

## 2021-11-23 PROCEDURE — 97110 THERAPEUTIC EXERCISES: CPT | Performed by: PHYSICAL THERAPIST

## 2021-11-23 PROCEDURE — 97530 THERAPEUTIC ACTIVITIES: CPT | Performed by: PHYSICAL THERAPIST

## 2021-11-23 NOTE — PROGRESS NOTES
Physical Therapy Daily Progress Note      Patient: Clarissa Matos   : 1950  Treatment Diagnosis:     ICD-10-CM ICD-9-CM   1. Chronic neck and back pain  M54.2 723.1    M54.9 724.5    G89.29 338.29     Referring practitioner: RUBY Baig  Date of Initial Visit: Type: THERAPY  Noted: 2021  Today's Date: 2021  Patient seen for 16 sessions         Clarissa Matos reports:     Subjective   Patient reports she was a little sore after last session but okay.  States she is having a hard time emotionally with caring for her mother.  States she is considering canceling her remaining PT sessions due to the stress of having to care for her mother.    Objective   See Exercise, Manual, and Modality Logs for complete treatment.       Assessment/Plan  Patient performed exercises to tolerance.  Able to perform exercises with progressed parameters.  Benefits from cueing for proper exercise technique and core stabilization.  Encouraged performance of HEP to maintain gains achieved thus far.  Patient to inform clinic if she decides to pause PT to care for her mother.  Progress per Plan of Care           Timed:  Manual Therapy:         mins  86794;  Therapeutic Exercise:   30      mins  59482;     Neuromuscular Krysta:        mins  64286;    Therapeutic Activity:    10      mins  72232;     Gait Training:           mins  30236;     Ultrasound:          mins  12973;    Iontophoresis:          mins  35694;  Dry Needling:          mins  54403;  Dry Needling:          mins  22143;    Untimed:  Electrical Stimulation:         mins  08966 (MC );  Mechanical Traction:         mins  34797;   Canalith Repositioning:      mins  55047;    Timed Treatment:   40   mins   Total Treatment:     45   mins    Tere Mcqueen PT  Physical Therapist

## 2021-11-23 NOTE — PROGRESS NOTES
Physical Therapy Daily Treatment Note      Patient: Clarissa Matos   : 1950  Referring practitioner: RUBY Baig  Date of Initial Visit: Type: THERAPY  Noted: 2021  Today's Date: 21  Patient seen for 13 sessions         Clarissa Matos reports: neck is more tight today.          Subjective     Objective   - noted guarding c spine upon entry to PT with increased upper trap elevation and limited available rotation.  Improvement note post PT session/STM     See Exercise, Manual, and Modality Logs for complete treatment.   Manual interventions x 18 min consisting of STM bilateral UT/levator, scalene and cerv pvm mm with attempted triggerpoint release.    Cervical exercises consisting of Post sh rolls, pain free rot and lat flexion, UT/levator and scalene stretch for affected musculature.     Assessment/Plan  Relief post manual interventions/techniques this session with subsequent improved capability for self stretching of affected musculature in regards to c spine.  Exhibits decreased noted mm guarding and pt presents with improved freedom of motion post PT vs pre        Progress per Plan of Care toward all goals           Timed:  Manual Therapy:    18     mins  17117;  Therapeutic Exercise:    12     mins  41915;     Neuromuscular Krysta:        mins  72272;    Therapeutic Activity:          mins  32678;     Gait Training:           mins  36289;     Ultrasound:          mins  16029;      Untimed:  Electrical Stimulation:         mins  43668 ( );  Mechanical Traction:         mins  83798;     Timed Treatment:    30  mins   Total Treatment:    30    mins  Yordan Ribeiro PTA  Physical Therapist  Assistant  [id number]   [Sleep Apnea] : sleep apnea [Follow-Up] : a follow-up visit

## 2021-12-03 ENCOUNTER — TREATMENT (OUTPATIENT)
Dept: PHYSICAL THERAPY | Facility: CLINIC | Age: 71
End: 2021-12-03

## 2021-12-03 DIAGNOSIS — G89.29 CHRONIC NECK AND BACK PAIN: Primary | ICD-10-CM

## 2021-12-03 DIAGNOSIS — M54.9 CHRONIC NECK AND BACK PAIN: Primary | ICD-10-CM

## 2021-12-03 DIAGNOSIS — M79.10 MUSCLE PAIN: ICD-10-CM

## 2021-12-03 DIAGNOSIS — M54.2 CHRONIC NECK AND BACK PAIN: Primary | ICD-10-CM

## 2021-12-03 PROCEDURE — 97110 THERAPEUTIC EXERCISES: CPT | Performed by: PHYSICAL THERAPIST

## 2021-12-03 PROCEDURE — 97530 THERAPEUTIC ACTIVITIES: CPT | Performed by: PHYSICAL THERAPIST

## 2021-12-03 NOTE — PROGRESS NOTES
Physical Therapy Re-Assessment / Re-Certification        Patient: Clarissa Matos   : 1950  Visit Diagnoses:     ICD-10-CM ICD-9-CM   1. Chronic neck and back pain  M54.2 723.1    M54.9 724.5    G89.29 338.29   2. Muscle pain  M79.10 729.1     Referring practitioner: RUBY Baig  Date of Initial Visit: Type: THERAPY  Noted: 2021  Today's Date: 12/3/2021  Patient seen for 17 sessions      Subjective:   Clarissa Matos reports:   Subjective Questionnaire: NDI:  Oswestry:   Clinical Progress: improved  Home Program Compliance: Yes  Treatment has included: therapeutic exercise, manual therapy, therapeutic activity and moist heat    Subjective Evaluation    Pain  At best pain ratin  At worst pain ratin      Patient reports she has been very busy sorting boxes of her mother's belongings.  Reports her back neck and back have been painful and tight.  States she wants to plan for DN treatment next week.       Objective     Active Range of Motion   Cervical/Thoracic Spine   Cervical     Flexion: 30 degrees   Extension: 45 degrees   Left lateral flexion: 35 degrees   Right lateral flexion: 30 degrees   Left rotation: 55 degrees   Right rotation: 55 degrees      Lumbar   Flexion: 45 degrees   Extension: 25 degrees   Left lateral flexion: 13 degrees   Right lateral flexion: 13 degrees   Left rotation: 40 degrees   Right rotation: 40 degrees      Strength/Myotome Testing   Cervical Spine   Neck extension: 5  Neck flexion: 5     Left   Normal strength     Right   Normal strength     Lumbar   Left   Normal strength     Right   Normal strength     Muscle Activation     Additional Muscle Activation Details  Decreased core muscle activation  Diastasis Recti     Tests   Left   Negative Spurling's sign.      Right   Negative Spurling's sign.      Thoracic   Negative slump.      right first rib mobility WNLs     Lumbar      Left   Negative passive SLR and quadrant.      Right   Negative passive  SLR and quadrant.     Symmetrical SIJ alignment     Decreased UQ muscle flexibility     SLR: L 75 degrees, R 75 degrees    Decreased UQ/scapular, hip girdle/LE muscle flexibility     Assessment/Plan  Patient presents with improved ROM, continues with limited lumbar lateral flexion bilaterally.  She continues to have TTP/soft tissue dysfunction in the cervical/UQ as well as lumbar/pelvic girdle regions.  She performs exercises to tolerance and has been able to tolerate progression of the exercises. She will benefit from continued PT.  Will plan for DN treatments next week.   Progress toward previous goals: Partially Met    Goal Review  Short Term Goals (4 wks):  1.  Patient will have increased cervical spine ROM to WFLs.-met  2.  Patient will have increased lumbar spine ROM to WFLs.-partially met  3.  Patient will have increased gross muscle flexibility to WFLs.-progressing  4.  Patient will have increased core muscle activation to WFLs without provoking diastasis recti.-progressing   5.  Patient will have right first rib alignment and mobility WNLs.- met     Long Term Goals (6 wks):  1.  Patient will be independent in performance of HEP for carryover upon discharge from skilled PT services.-progressing  2.  Patient will have improved Oswestry score of 30/50 or better.-progressing  3.  Patient will have increased NDI score of 20/50 or better.-progressing  4.  Patient will demonstrate functional reach patterns WFLs to improve tolerance to ADLs/IADLs.-progressing  5.  Patient will demonstrate functional squat patterns WFLs to improve tolerance to ADLs/IADLs.-progressing       Recommendations: Continue as planned  Timeframe: 6 weeks  Prognosis to achieve goals: good    PT SIGNATURE: Tere Mcqueen PT     License Number: [id number]  Electronically signed by Tere Mcqueen PT, 12/03/21, 1:36 PM EST    Certification Period: 12/3/2021 thru 3/2/2022  I certify that the therapy services are furnished while this patient is  under my care.  The services outlined above are required by this patient, and will be reviewed every 90 days.    Signature: __________________________________  Judy Kaur APRN    Please sign and return via fax to [phone]. Thank you, [facility].      Timed:  Manual Therapy:         mins  04955;  Therapeutic Exercise:    25     mins  29077;     Neuromuscular Krysta:        mins  51269;    Therapeutic Activity:     15     mins  98824;     Gait Training:           mins  95372;     Ultrasound:          mins  98371;  Iontophoresis:          mins  86866;    Dry Needling:          mins  70229;  Dry Needling:          mins  84576;    Untimed:  Electrical Stimulation:         mins  80868 ( );  Mechanical Traction:         mins  72109;   Canalith Repositioning:        mins  66474;    Timed Treatment:   40   mins   Total Treatment:     45   mins

## 2021-12-07 ENCOUNTER — TELEPHONE (OUTPATIENT)
Dept: PHYSICAL THERAPY | Facility: CLINIC | Age: 71
End: 2021-12-07

## 2021-12-13 ENCOUNTER — APPOINTMENT (OUTPATIENT)
Dept: SLEEP MEDICINE | Facility: HOSPITAL | Age: 71
End: 2021-12-13

## 2022-03-12 DIAGNOSIS — I10 ESSENTIAL HYPERTENSION: ICD-10-CM

## 2022-03-14 DIAGNOSIS — I10 ESSENTIAL HYPERTENSION: ICD-10-CM

## 2022-03-14 RX ORDER — CANDESARTAN CILEXETIL AND HYDROCHLOROTHIAZIDE 16; 12.5 MG/1; MG/1
1 TABLET ORAL DAILY
Qty: 30 TABLET | Refills: 0 | Status: SHIPPED | OUTPATIENT
Start: 2022-03-14 | End: 2022-04-21 | Stop reason: SDUPTHER

## 2022-03-14 NOTE — TELEPHONE ENCOUNTER
Rx Refill Note  Requested Prescriptions     Pending Prescriptions Disp Refills   • candesartan-hydrochlorothiazide (ATACAND HCT) 16-12.5 MG per tablet [Pharmacy Med Name: CANDESARTAN HCT 16-12.5MG TABLETS] 90 tablet 1     Sig: TAKE 1 TABLET BY MOUTH DAILY      Last office visit with prescribing clinician: 7/7/2021      Next office visit with prescribing clinician: Visit date not found            Nasima Garcia MA  03/14/22, 13:14 EDT

## 2022-03-15 RX ORDER — CANDESARTAN CILEXETIL AND HYDROCHLOROTHIAZIDE 16; 12.5 MG/1; MG/1
1 TABLET ORAL DAILY
Qty: 90 TABLET | Refills: 0 | OUTPATIENT
Start: 2022-03-15

## 2022-03-15 NOTE — TELEPHONE ENCOUNTER
Rx Refill Note  Requested Prescriptions     Pending Prescriptions Disp Refills   • candesartan-hydrochlorothiazide (ATACAND HCT) 16-12.5 MG per tablet [Pharmacy Med Name: CANDESARTAN HCT 16-12.5MG TABLETS] 90 tablet      Sig: TAKE 1 TABLET BY MOUTH DAILY      Last office visit with prescribing clinician: 7/7/2021      Next office visit with prescribing clinician: Visit date not found            Nasima Garcia MA  03/15/22, 13:52 EDT

## 2022-04-21 ENCOUNTER — OFFICE VISIT (OUTPATIENT)
Dept: FAMILY MEDICINE CLINIC | Facility: CLINIC | Age: 72
End: 2022-04-21

## 2022-04-21 VITALS
DIASTOLIC BLOOD PRESSURE: 70 MMHG | SYSTOLIC BLOOD PRESSURE: 127 MMHG | HEART RATE: 88 BPM | WEIGHT: 181 LBS | TEMPERATURE: 98 F | HEIGHT: 63 IN | BODY MASS INDEX: 32.07 KG/M2

## 2022-04-21 DIAGNOSIS — Z13.820 SCREENING FOR OSTEOPOROSIS: ICD-10-CM

## 2022-04-21 DIAGNOSIS — E78.2 MIXED HYPERLIPIDEMIA: ICD-10-CM

## 2022-04-21 DIAGNOSIS — D75.1 POLYCYTHEMIA: ICD-10-CM

## 2022-04-21 DIAGNOSIS — R06.2 WHEEZE: ICD-10-CM

## 2022-04-21 DIAGNOSIS — G89.29 CHRONIC NECK AND BACK PAIN: ICD-10-CM

## 2022-04-21 DIAGNOSIS — M54.9 CHRONIC NECK AND BACK PAIN: ICD-10-CM

## 2022-04-21 DIAGNOSIS — I10 ESSENTIAL HYPERTENSION: ICD-10-CM

## 2022-04-21 DIAGNOSIS — M54.2 CHRONIC NECK AND BACK PAIN: ICD-10-CM

## 2022-04-21 DIAGNOSIS — Z23 NEED FOR 23-POLYVALENT PNEUMOCOCCAL POLYSACCHARIDE VACCINE: ICD-10-CM

## 2022-04-21 DIAGNOSIS — R73.03 PREDIABETES: ICD-10-CM

## 2022-04-21 DIAGNOSIS — Z00.00 MEDICARE ANNUAL WELLNESS VISIT, SUBSEQUENT: Primary | ICD-10-CM

## 2022-04-21 DIAGNOSIS — Z78.0 POSTMENOPAUSAL: ICD-10-CM

## 2022-04-21 PROCEDURE — 90732 PPSV23 VACC 2 YRS+ SUBQ/IM: CPT | Performed by: NURSE PRACTITIONER

## 2022-04-21 PROCEDURE — G0439 PPPS, SUBSEQ VISIT: HCPCS | Performed by: NURSE PRACTITIONER

## 2022-04-21 PROCEDURE — G0009 ADMIN PNEUMOCOCCAL VACCINE: HCPCS | Performed by: NURSE PRACTITIONER

## 2022-04-21 PROCEDURE — 1160F RVW MEDS BY RX/DR IN RCRD: CPT | Performed by: NURSE PRACTITIONER

## 2022-04-21 PROCEDURE — 99214 OFFICE O/P EST MOD 30 MIN: CPT | Performed by: NURSE PRACTITIONER

## 2022-04-21 PROCEDURE — 1170F FXNL STATUS ASSESSED: CPT | Performed by: NURSE PRACTITIONER

## 2022-04-21 RX ORDER — ALBUTEROL SULFATE 90 UG/1
2 AEROSOL, METERED RESPIRATORY (INHALATION) EVERY 4 HOURS PRN
Qty: 18 G | Refills: 0 | Status: SHIPPED | OUTPATIENT
Start: 2022-04-21

## 2022-04-21 RX ORDER — DULOXETIN HYDROCHLORIDE 30 MG/1
60 CAPSULE, DELAYED RELEASE ORAL DAILY
COMMUNITY
Start: 2022-03-08 | End: 2022-11-30 | Stop reason: DRUGHIGH

## 2022-04-21 RX ORDER — CANDESARTAN CILEXETIL AND HYDROCHLOROTHIAZIDE 16; 12.5 MG/1; MG/1
1 TABLET ORAL DAILY
Qty: 90 TABLET | Refills: 1 | Status: SHIPPED | OUTPATIENT
Start: 2022-04-21 | End: 2022-11-17

## 2022-04-21 NOTE — PROGRESS NOTES
The ABCs of the Annual Wellness Visit  Subsequent Medicare Wellness Visit    Chief Complaint   Patient presents with   • Annual Exam     AWV      Subjective    History of Present Illness:  Clarissa Matos is a 71 y.o. female who presents for a Subsequent Medicare Wellness Visit.    Pain mgmt injection was not successful-Dr Ring for medication mgmt only.  Still has significant back pain but feels like she is is stable as she can be right now.    Psychiatry for depression and feels like it is managed as well as possible.    Patient had a sleep study but apparently there was only 3 minutes on the study and this was not enough to complete it.  She is supposed to redo it but has not done that yet.        The following portions of the patient's history were reviewed and   updated as appropriate: allergies, current medications, past family history, past medical history, past social history, past surgical history and problem list.    Compared to one year ago, the patient feels her physical   health is worse.    Compared to one year ago, the patient feels her mental   health is worse.    Recent Hospitalizations:  She was not admitted to the hospital during the last year.       Current Medical Providers:  Patient Care Team:  Judy Kaur APRN as PCP - General (Nurse Practitioner)    Outpatient Medications Prior to Visit   Medication Sig Dispense Refill   • acetaminophen (TYLENOL) 325 MG tablet Take 2 tablets by mouth Every 4 (Four) Hours As Needed for Fever (greater than 101 F).     • aspirin-acetaminophen-caffeine (EXCEDRIN MIGRAINE) 250-250-65 MG per tablet Take 1 tablet by mouth Every 6 (Six) Hours As Needed for Headache. May resume on 07/07/18.     • CALCIUM PO Take  by mouth.     • Cholecalciferol (VITAMIN D3) 5000 units capsule capsule Take 5,000 Units by mouth Every Night.     • diclofenac (VOLTAREN) 1 % gel gel Apply 4 g topically to the appropriate area as directed 4 (Four) Times a Day As Needed.     •  docusate sodium 100 MG capsule Take 100 mg by mouth 2 (Two) Times a Day. (Patient taking differently: Take 100 mg by mouth As Needed.)     • DULoxetine (CYMBALTA) 30 MG capsule Take 90 mg by mouth Daily. Take 3 tablets orally at bedtime     • fexofenadine (ALLEGRA) 180 MG tablet      • guaiFENesin 200 MG tablet      • HYDROcodone-acetaminophen (NORCO)  MG per tablet Take 1-2 tablets daily prn     • ketotifen (ZADITOR) 0.025 % ophthalmic solution      • LORazepam (ATIVAN) 0.5 MG tablet Take 0.5 mg by mouth 2 (Two) Times a Day As Needed for Anxiety.     • MAGNESIUM PO Take  by mouth.     • Morphine (MS CONTIN) 15 MG 12 hr tablet Take 15 mg by mouth Every 12 (Twelve) Hours.     • zolpidem (AMBIEN) 10 MG tablet Take 10 mg by mouth At Night As Needed for Sleep.     • candesartan-hydrochlorothiazide (ATACAND HCT) 16-12.5 MG per tablet TAKE 1 TABLET BY MOUTH DAILY 30 tablet 0   • DULoxetine (CYMBALTA) 60 MG capsule Take 60 mg by mouth Every Morning.       Facility-Administered Medications Prior to Visit   Medication Dose Route Frequency Provider Last Rate Last Admin   • mupirocin (BACTROBAN) 2 % nasal ointment   Nasal BID Sam Bustamante MD           Opioid medication/s are on active medication list.  and I have evaluated her active treatment plan and pain score trends (see table).  Vitals:    04/21/22 1311   PainSc:   4   PainLoc: Knee     I have reviewed the chart for potential of high risk medication and harmful drug interactions in the elderly.            Aspirin is not on active medication list.  Aspirin use is not indicated based on review of current medical condition/s. Risk of harm outweighs potential benefits.  .    Patient Active Problem List   Diagnosis   • Cholecystitis   • Fibromyalgia   • Essential hypertension   • Closed fracture of upper end of humerus   • Rotator cuff arthropathy   • Primary osteoarthritis of left knee   • Lumbar facet arthropathy   • Arthropathy of cervical facet joint   • Neck pain,  "chronic   • Chronic bilateral low back pain   • Mid back pain   • Chronic pain syndrome   • Observed sleep apnea   • Snoring   • Class 1 obesity   • Non-restorative sleep   • Polycythemia   • Primary insomnia     Advance Care Planning  Advance Directive is on file.  ACP discussion was held with the patient during this visit. Patient has an advance directive in EMR which is still valid.     Review of Systems   Constitutional: Positive for fatigue.   HENT: Negative for congestion, ear pain, sore throat and trouble swallowing.    Respiratory: Positive for wheezing. Negative for cough and shortness of breath.    Cardiovascular: Negative for chest pain, palpitations and leg swelling.   Gastrointestinal: Negative for abdominal pain, blood in stool and constipation.   Endocrine: Negative for polydipsia and polyuria.   Genitourinary: Negative for difficulty urinating and hematuria.   Musculoskeletal: Positive for arthralgias and back pain.   Skin: Negative for rash.   Neurological: Negative for weakness.   Psychiatric/Behavioral: Positive for sleep disturbance.        Objective    Vitals:    04/21/22 1311   BP: 127/70   Pulse: 88   Temp: 98 °F (36.7 °C)   Weight: 82.1 kg (181 lb)   Height: 160 cm (63\")   PainSc:   4   PainLoc: Knee     BMI Readings from Last 1 Encounters:   04/21/22 32.06 kg/m²   BMI is above normal parameters. Recommendations include: none (medical contraindication)    Does the patient have evidence of cognitive impairment? Yes    Physical Exam  Vitals and nursing note reviewed.   Constitutional:       General: She is not in acute distress.     Appearance: She is well-developed. She is not ill-appearing.   HENT:      Head: Normocephalic and atraumatic.      Right Ear: Tympanic membrane, ear canal and external ear normal.      Left Ear: Tympanic membrane, ear canal and external ear normal.      Mouth/Throat:      Mouth: Mucous membranes are moist.      Pharynx: Uvula midline. No posterior oropharyngeal " erythema.   Eyes:      General: No scleral icterus.        Right eye: No discharge.         Left eye: No discharge.      Conjunctiva/sclera: Conjunctivae normal.      Pupils: Pupils are equal, round, and reactive to light.   Neck:      Thyroid: No thyromegaly.   Cardiovascular:      Rate and Rhythm: Normal rate and regular rhythm.      Heart sounds: Normal heart sounds.   Pulmonary:      Effort: Pulmonary effort is normal.      Breath sounds: Wheezing present.      Comments: Few scattered insp  Abdominal:      General: Bowel sounds are normal.      Palpations: Abdomen is soft.      Tenderness: There is no abdominal tenderness.   Musculoskeletal:         General: No deformity.      Cervical back: Neck supple.      Comments: Gait smooth and steady   Lymphadenopathy:      Cervical: No cervical adenopathy.   Skin:     General: Skin is warm and dry.   Neurological:      General: No focal deficit present.      Mental Status: She is alert and oriented to person, place, and time.      Cranial Nerves: No cranial nerve deficit.   Psychiatric:         Mood and Affect: Mood normal.         Behavior: Behavior normal.                 HEALTH RISK ASSESSMENT    Smoking Status:  Social History     Tobacco Use   Smoking Status Never Smoker   Smokeless Tobacco Never Used     Alcohol Consumption:  Social History     Substance and Sexual Activity   Alcohol Use No     Fall Risk Screen:    STEADI Fall Risk Assessment was completed, and patient is at LOW risk for falls.Assessment completed on:4/21/2022    Depression Screening:  PHQ-2/PHQ-9 Depression Screening 4/21/2022   Retired Total Score -   Little Interest or Pleasure in Doing Things 1-->several days   Feeling Down, Depressed or Hopeless 1-->several days   Trouble Falling or Staying Asleep, or Sleeping Too Much 0-->not at all   Feeling Tired or Having Little Energy 1-->several days   Poor Appetite or Overeating 0-->not at all   Feeling Bad about Yourself - or that You are a Failure or  Have Let Yourself or Your Family Down 1-->several days   Trouble Concentrating on Things, Such as Reading the Newspaper or Watching Television 1-->several days   Moving or Speaking So Slowly that Other People Could Have Noticed? Or the Opposite - Being So Fidgety 1-->several days   Thoughts that You Would be Better Off Dead or of Hurting Yourself in Some Way 0-->not at all   PHQ-9: Brief Depression Severity Measure Score 6   If You Checked Off Any Problems, How Difficult Have These Problems Made It For You to Do Your Work, Take Care of Things at Home, or Get Along with Other People? somewhat difficult       Health Habits and Functional and Cognitive Screening:  Functional & Cognitive Status 4/21/2022   Do you have difficulty preparing food and eating? No   Do you have difficulty bathing yourself, getting dressed or grooming yourself? No   Do you have difficulty using the toilet? No   Do you have difficulty moving around from place to place? No   Do you have trouble with steps or getting out of a bed or a chair? No   Current Diet Well Balanced Diet   Dental Exam Not up to date   Eye Exam Not up to date   Exercise (times per week) 7 times per week   Current Exercises Include Walking   Current Exercise Activities Include -   Do you need help using the phone?  No   Are you deaf or do you have serious difficulty hearing?  No   Do you need help with transportation? Yes   Do you need help shopping? No   Do you need help preparing meals?  No   Do you need help with housework?  No   Do you need help with laundry? No   Do you need help taking your medications? No   Do you need help managing money? No   Do you ever drive or ride in a car without wearing a seat belt? No   Have you felt unusual stress, anger or loneliness in the last month? Yes   Who do you live with? Spouse   If you need help, do you have trouble finding someone available to you? No   Have you been bothered in the last four weeks by sexual problems? -   Do you  have difficulty concentrating, remembering or making decisions? Yes       Age-appropriate Screening Schedule:  Refer to the list below for future screening recommendations based on patient's age, sex and/or medical conditions. Orders for these recommended tests are listed in the plan section. The patient has been provided with a written plan.    Health Maintenance   Topic Date Due   • DXA SCAN  Never done   • TDAP/TD VACCINES (1 - Tdap) Never done   • LIPID PANEL  04/21/2022   • INFLUENZA VACCINE  08/01/2022   • MAMMOGRAM  06/25/2023   • ZOSTER VACCINE  Completed              Assessment/Plan   CMS Preventative Services Quick Reference  Risk Factors Identified During Encounter  Chronic Pain   Depression/Dysphoria  Obesity/Overweight   The above risks/problems have been discussed with the patient.  Follow up actions/plans if indicated are seen below in the Assessment/Plan Section.  Pertinent information has been shared with the patient in the After Visit Summary.    Diagnoses and all orders for this visit:    1. Medicare annual wellness visit, subsequent (Primary)    2. Essential hypertension  -     CBC & Differential  -     Comprehensive Metabolic Panel  -     Microalbumin / Creatinine Urine Ratio - Urine, Clean Catch  -     candesartan-hydrochlorothiazide (ATACAND HCT) 16-12.5 MG per tablet; Take 1 tablet by mouth Daily.  Dispense: 90 tablet; Refill: 1    3. Wheeze  -     albuterol sulfate HFA (ProAir HFA) 108 (90 Base) MCG/ACT inhaler; Inhale 2 puffs Every 4 (Four) Hours As Needed for Wheezing or Shortness of Air.  Dispense: 18 g; Refill: 0    4. Chronic neck and back pain    5. Prediabetes  -     Hemoglobin A1c    6. Polycythemia  -     CBC & Differential    7. Mixed hyperlipidemia  -     Lipid Panel With LDL / HDL Ratio    8. Postmenopausal  -     DEXA Bone Density Axial    9. Screening for osteoporosis  -     DEXA Bone Density Axial    10. Need for 23-polyvalent pneumococcal polysaccharide vaccine  -      Pneumococcal Polysaccharide Vaccine 23-Valent (PPSV23) Greater Than or Equal To 1yo Subcutaneous / IM       As part of wellness and prevention, the following topics were discussed with the patient:   Nutrition-diet high in fresh/fozen veges, lower in carbs, unsaturated fats, and higher in lean proteins, adequate hydration   Physical activity/regular exercise-150 mins per week of moderate activity that increases HR and is enjoyable to lower stress and improve CVD-not able to exercise due to chronic pain    Healthy weight-above goal BMI    Dental health-recommend twice per year dental cleans and daily flossing to lower inflammation in body   Mental health-stress mgmt and sleep hygiene   Immunization-recommended vaccines discussed  Needs colonoscopy.  She and her spouse are planning to do together and will let me know who she wants to see.  Bone density scan ordered  She is up-to-date on mammo    Hypertension: Her blood pressure is well controlled and no side effects.  We will continue current medications.  She thinks she may need a PA for this medication.  She has failed others in the past and this seems to be the one that works the best and would like to continue it.    Followed by pain management and on chronic opioids for pain.    She had polycythemia consistently noted.  I had sent her for a sleep study which she has not been able to yet complete.  We discussed need to get this completed.  Polycythemia can increase risk for blood clots and she is not active so I have some concern for this.  Patient is agreeable that she will get it done as soon as possible.    Hyperlipidemia: Not on a statin.  We will check lipids today.    She has had stable prediabetes.  We will check A1c today.    She has new onset wheezing.  No history of asthma or COPD.  Non-smoker.  Will give her albuterol inhaler to use as needed.  If not improving she will let me know.  She does have significant allergies so may be due to reactive airway  disease.    Follow Up:   No follow-ups on file.     An After Visit Summary and PPPS were made available to the patient.

## 2022-07-12 ENCOUNTER — OFFICE VISIT (OUTPATIENT)
Dept: ORTHOPEDIC SURGERY | Facility: CLINIC | Age: 72
End: 2022-07-12

## 2022-07-12 VITALS — BODY MASS INDEX: 32.6 KG/M2 | TEMPERATURE: 96.1 F | WEIGHT: 184 LBS | HEIGHT: 63 IN

## 2022-07-12 DIAGNOSIS — Z96.653 STATUS POST BILATERAL KNEE REPLACEMENTS: ICD-10-CM

## 2022-07-12 DIAGNOSIS — R52 PAIN: Primary | ICD-10-CM

## 2022-07-12 PROCEDURE — 73562 X-RAY EXAM OF KNEE 3: CPT | Performed by: ORTHOPAEDIC SURGERY

## 2022-07-12 PROCEDURE — 99213 OFFICE O/P EST LOW 20 MIN: CPT | Performed by: ORTHOPAEDIC SURGERY

## 2022-07-12 NOTE — PROGRESS NOTES
Patient: Clarissa Matos  YOB: 1950 71 y.o. female  Medical Record Number: [id number]    Chief Complaints:   Chief Complaint   Patient presents with   • Right Knee - Follow-up, Pain   • Left Knee - Follow-up, Pain       History of Present Illness:Clarissa Matos is a 71 y.o. female who presents with complaints of bilateral knee pain both knees have been replaced she has a history of fibromyalgia.  She has pain diffusely throughout the knees and the hips the back and multiple sites.  She denies any fever or chills.  She had no previous history of infection.    Allergies:   Allergies   Allergen Reactions   • Percocet [Oxycodone-Acetaminophen] Itching   • Nsaids GI Intolerance   • Penicillins Rash   • Methylprednisolone Anxiety, Arrhythmia, Confusion, Dizziness, GI Intolerance, Headache, Irritability, Nausea Only, Palpitations and Tinnitus       Medications:   Current Outpatient Medications   Medication Sig Dispense Refill   • acetaminophen (TYLENOL) 325 MG tablet Take 2 tablets by mouth Every 4 (Four) Hours As Needed for Fever (greater than 101 F).     • albuterol sulfate HFA (ProAir HFA) 108 (90 Base) MCG/ACT inhaler Inhale 2 puffs Every 4 (Four) Hours As Needed for Wheezing or Shortness of Air. 18 g 0   • aspirin-acetaminophen-caffeine (EXCEDRIN MIGRAINE) 250-250-65 MG per tablet Take 1 tablet by mouth Every 6 (Six) Hours As Needed for Headache. May resume on 07/07/18.     • CALCIUM PO Take  by mouth.     • candesartan-hydrochlorothiazide (ATACAND HCT) 16-12.5 MG per tablet Take 1 tablet by mouth Daily. 90 tablet 1   • Cholecalciferol (VITAMIN D3) 5000 units capsule capsule Take 5,000 Units by mouth Every Night.     • diclofenac (VOLTAREN) 1 % gel gel Apply 4 g topically to the appropriate area as directed 4 (Four) Times a Day As Needed.     • docusate sodium 100 MG capsule Take 100 mg by mouth 2 (Two) Times a Day. (Patient taking differently: Take 100 mg by mouth As Needed.)     • DULoxetine  "(CYMBALTA) 30 MG capsule Take 90 mg by mouth Daily. Take 3 tablets orally at bedtime     • fexofenadine (ALLEGRA) 180 MG tablet      • HYDROcodone-acetaminophen (NORCO)  MG per tablet Take 1-2 tablets daily prn     • ketotifen (ZADITOR) 0.025 % ophthalmic solution      • LORazepam (ATIVAN) 0.5 MG tablet Take 0.5 mg by mouth 2 (Two) Times a Day As Needed for Anxiety.     • MAGNESIUM PO Take  by mouth.     • Morphine (MS CONTIN) 15 MG 12 hr tablet Take 15 mg by mouth Every 12 (Twelve) Hours.     • zolpidem (AMBIEN) 10 MG tablet Take 10 mg by mouth At Night As Needed for Sleep.     • guaiFENesin 200 MG tablet        No current facility-administered medications for this visit.     Facility-Administered Medications Ordered in Other Visits   Medication Dose Route Frequency Provider Last Rate Last Admin   • mupirocin (BACTROBAN) 2 % nasal ointment   Nasal BID Sam Bustamante MD             The following portions of the patient's history were reviewed and updated as appropriate: allergies, current medications, past family history, past medical history, past social history, past surgical history and problem list.    Review of Systems:   A 14 point review of systems was performed. All systems negative except pertinent positives/negative listed in HPI above    Physical Exam:   Vitals:    07/12/22 1609   Temp: 96.1 °F (35.6 °C)   Weight: 83.5 kg (184 lb)   Height: 160 cm (63\")       General: A and O x 3, ASA, NAD    SCLERA:    Normal    DENTITION:   Normal   Both knees overall look good the incisions are well-healed there is no effusion there is no varus valgus instability and her range of motion is roughly 0-1 20.  She has diffuse tenderness which is not necessarily related to any single anatomic structure.      Radiology:  Xrays 3views both knees (ap,lateral, sunrise) were ordered and reviewed for evaluation of knee pain demonstrating well positioned knee replacementS without evidence of wear, loosening or osteolysis.  " In comparison to previous films there is no change.    Assessment/Plan: Bilateral knee pain I think this is more related to fibromyalgia than to any organic knee problem.  I will send her to physical therapy.  I gave her a prescription for Art Tolentino gel to apply to both knees twice a day.  I will see her back as needed.      Sam Bustamante MD  7/12/2022

## 2022-07-26 ENCOUNTER — TELEPHONE (OUTPATIENT)
Dept: PHYSICAL THERAPY | Facility: OTHER | Age: 72
End: 2022-07-26

## 2022-09-15 ENCOUNTER — OFFICE VISIT (OUTPATIENT)
Dept: SLEEP MEDICINE | Facility: HOSPITAL | Age: 72
End: 2022-09-15

## 2022-09-15 VITALS
WEIGHT: 182 LBS | HEIGHT: 63 IN | HEART RATE: 84 BPM | SYSTOLIC BLOOD PRESSURE: 144 MMHG | OXYGEN SATURATION: 96 % | BODY MASS INDEX: 32.25 KG/M2 | DIASTOLIC BLOOD PRESSURE: 67 MMHG

## 2022-09-15 DIAGNOSIS — F51.01 PRIMARY INSOMNIA: ICD-10-CM

## 2022-09-15 DIAGNOSIS — G47.8 NON-RESTORATIVE SLEEP: ICD-10-CM

## 2022-09-15 DIAGNOSIS — R06.83 SNORING: ICD-10-CM

## 2022-09-15 DIAGNOSIS — E66.9 CLASS 1 OBESITY: ICD-10-CM

## 2022-09-15 DIAGNOSIS — D75.1 POLYCYTHEMIA: ICD-10-CM

## 2022-09-15 DIAGNOSIS — I10 ESSENTIAL HYPERTENSION: ICD-10-CM

## 2022-09-15 DIAGNOSIS — G47.30 OBSERVED SLEEP APNEA: Primary | ICD-10-CM

## 2022-09-15 PROCEDURE — G0463 HOSPITAL OUTPT CLINIC VISIT: HCPCS

## 2022-09-15 PROCEDURE — 99214 OFFICE O/P EST MOD 30 MIN: CPT | Performed by: INTERNAL MEDICINE

## 2022-09-15 NOTE — PROGRESS NOTES
[facility]  [address] [facility]  3rd [place] [address]  Phone   Fax       Clarissa Matos  1950  71 y.o.  female      PCP:Judy Kaur APRN    Type of service: Initial New Patient Office Visit  Date of service: 9/15/2022    Chief Complaint   Patient presents with   • Witnessed Apnea   • Snoring   • Non-restorative Sleep   • Fatigue   • Dry Mouth   • Morning Headaches   • Obesity       History of present illness;  Clarissa Matos is a new patient for me and was seen today for sleep related problems of snoring, nonrestorative sleep and witnessed apneas. The symptoms are present for many years and they are persistent in nature.  The snoring is present in all positions and it is loud.  Has no history of prior sleep evaluation and sleep studies. Patient gives no prior surgery namely tonsillectomy, nasal surgery and UPPP.   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  Additionally recently her blood work showed polycythemia and she wanted to have further investigation for sleep apnea    Last year she did a home sleep test and it was a failure.  She could not use the home unit and the data was only less than 10 minutes.  Unfortunately she was unable to come back and do another test because her father was sick and  of COVID.  Now she continues to have symptoms and wants to pursue sleep study    Patient gives the following sleep history.  Sleep schedule:  Bedtime: Between 930 and 10 PM  Wake time: 8:30 AM  Normally takes about 45-60 minutes to fall asleep  Average hours of sleep 8  Number of naps per day none  Symptoms  In addition to snoring, nonrestorative sleep and witnessed apneas patient gives the following associated symptoms.  Have you ever awakened gasping for breath, coughing, choking: Yes   Change in weight,  Yes gained 10 pounds  Morning headaches  Yes   Awaken with a sore throat or  "dry mouth  Yes   Leg jerking at night:  No   Crawly feeling/urge sensation to move in the legs: Yes   Teeth grinding:Yes   Have you ever awakened at night with a sour taste or burning sensation in your chest:  No   Do you have muscle weakness with laughing or anger or sleep paralysis:  No   Have you ever felt paralyzed while going to sleep or waking up:  No   Sleepwalking, nightmares, No   Nocturia (urination at night): 1 times per night  Memory Problem:No     Past medical history: (Relevant to sleep medicine)  1. Anxiety  2. Hypertension  3. Fibromyalgia  4. Arthritis  5. Insomnia  6. Chronic narcotic use  7. Depression    • Medications are reviewed by me and documented in the encounter  • Allergies reviewed and documented in encounter    Social history:  Do you drive a commercial vehicle:  No   Shift work:  No   Tobacco use:  No   Alcohol use:  0 per week  Caffeinated drinks: 3    FAMILY HISTORY (Your mother, father, brothers and sisters)  1. Sleep apnea with her brother and mom  2. Stroke  3. Hypertension  4. Heart disease    REVIEW OF SYSTEMS.  Full review of systems available on the intake form which is scanned in the media tab.  The relevant positive are noted below  1. Cobb Sleepiness Scale :Total score: 0   2. Snoring  3. Nonrestorative sleep  4. Back pain  5. Insomnia  6. Fatigue      Physical exam:  Vitals:    09/15/22 1326   BP: 144/67   Pulse: 84   SpO2: 96%   Weight: 82.6 kg (182 lb)   Height: 160 cm (63\")    Body mass index is 32.24 kg/m².    HEENT: Head is atraumatic, normocephalic  Eyes: pupils are round equal and reacting to light and accommodation, conjunctiva normal  Nose: no nasal septal defects or deviation and the nasal passages are clear, no nasal polyps,  Throat: tonsils are not enlarged, tongue normal, oral airway Mallampati class 3  NECK: , trachea is in the midline, thyroid not enlarged  RESPIRATORY SYSTEM: Breath sounds are equal on both sides, there are no wheezes   CARDIOVASULAR " SYSTEM: Heart sounds are regular rhythm and rekha rate, no edema  EXTREMITES: No cyanosis, clubbing  NEUROLOGICAL SYSTEM: Oriented x 3, no gross motor defects, gait normal    Labs reviewed.  TSH Results:     Hematocrit 48.9  Hemoglobin 16.7    Assessment and plan:  · Witnessed apnea (R06.81) patient's symptoms and examination is consistent with sleep apnea (G47.30)  I have talked to the patient about the signs and symptoms of sleep apnea. In addition, I have also discussed pathophysiology of sleep apnea.  I also discussed the complications of untreated sleep apnea including effects on hypertension, diabetes mellitus and nonrestorative sleep with hypersomnia which can increase risk for motor vehicle accidents.  Untreated sleep apnea is also a risk factor for development of atrial fibrillation, pulmonary hypertension and stroke.  Discussed in detail of various testing methods including home-based and lab based sleep studies.  Based on history and physical examination the most appropriate study is full night polysomnography.  The order for the sleep study is placed in [facility].  The test will be scheduled after approval from insurance. Treatment and management will be discussed after the test is completed.  Patient was given opportunity to ask questions and all the questions were answered.  Patient can take her zolpidem for the test  · Snoring (R06.83) snoring is the sound created by turbulent airflow vibrating upper airway soft tissue.  I have also discussed factors affecting snoring including sleep deprivation, sleeping on the back and alcohol ingestion. To minimize snoring, patient is advised to have adequate sleep, sleep on the side and avoid alcohol and sedative medications before bedtime  · Obesity class 1, with BMI Body mass index is 32.24 kg/m².. I have discussed the relationship between weight and sleep apnea.There is direct correction between weight and severity of sleep apnea.  Weight reduction is encouraged, as  it is going to reduce the severity of sleep apnea. I have also discussed with the patient diet and exercise to achieve ideal body weight  · Polycythemia/erythrocytosis.  This could be related to sleep apnea and hypoxia can cause polycythemia.  I have talked to the patient about the connection and she understands that she needs sleep evaluation.  If she has sleep apnea correcting the sleep apnea would improve her counts    I have also discussed with the patient the following  • Sleep hygiene: Maintaining a regular bedtime and wake time, not to watch television or work in bed, limit caffeine-containing beverages before bed time and avoid naps during the day  • Adequate amount of sleep.  Generally most people needs about 7 to 8 hours of sleep.  • Return for 31 to 90 days after PAP setup with down load..  Patient's questions were answered.        Bahman Hyde MD  Sleep Medicine.  Medical Director, [facility] sleep [facility]  9/15/2022 ,

## 2022-10-18 ENCOUNTER — TELEPHONE (OUTPATIENT)
Dept: ORTHOPEDIC SURGERY | Facility: CLINIC | Age: 72
End: 2022-10-18

## 2022-10-26 ENCOUNTER — TREATMENT (OUTPATIENT)
Dept: PHYSICAL THERAPY | Facility: CLINIC | Age: 72
End: 2022-10-26

## 2022-10-26 DIAGNOSIS — G89.29 CHRONIC PAIN OF BOTH KNEES: Primary | ICD-10-CM

## 2022-10-26 DIAGNOSIS — M25.661 KNEE STIFFNESS, RIGHT: ICD-10-CM

## 2022-10-26 DIAGNOSIS — R26.89 BALANCE PROBLEM: ICD-10-CM

## 2022-10-26 DIAGNOSIS — M25.562 CHRONIC PAIN OF BOTH KNEES: Primary | ICD-10-CM

## 2022-10-26 DIAGNOSIS — M25.561 CHRONIC PAIN OF BOTH KNEES: Primary | ICD-10-CM

## 2022-10-26 DIAGNOSIS — R26.9 GAIT DISTURBANCE: ICD-10-CM

## 2022-10-26 PROCEDURE — 97116 GAIT TRAINING THERAPY: CPT | Performed by: PHYSICAL THERAPIST

## 2022-10-26 PROCEDURE — 97162 PT EVAL MOD COMPLEX 30 MIN: CPT | Performed by: PHYSICAL THERAPIST

## 2022-10-26 PROCEDURE — 97530 THERAPEUTIC ACTIVITIES: CPT | Performed by: PHYSICAL THERAPIST

## 2022-10-26 NOTE — PROGRESS NOTES
Physical Therapy Initial Evaluation and Plan of Care    [facility]  [address] [place] [address]  [phone] (phone)  [phone] (fax)      Patient: Clarissa Matos   : 1950  Diagnosis/ICD-10 Code:  Chronic pain of both knees [M25.561, M25.562, G89.29]  Referring practitioner: Sam Bustamante MD  Date of Initial Visit: 10/26/2022  Today's Date: 10/26/2022  Patient seen for 1 sessions           Subjective Evaluation    History of Present Illness  Onset date: pain since  when did first surgery   Mechanism of injury: RIGHT > LEFT TKA  Have had both knees replaced and had pain off and on ever since   RIGHT tore a tendon in knee during surgery and had to be in a brace so it delayed therapy    RIGHT knee swelling is persistent   LEFT  TKA  2019  LEFT TSA  2018 from a fall into the front eron door      LEFT knee gave way while waiting for jiont replacement  NECk and BACK pain exacerbation from falls creating disc problem  FALLS were related to needsing the knee replaced   STANDING is the worst 5 min   Had therapy previously and water is best and wants to do that again  FIBROmyalgia  OA   HA    No HEP   NO walking program    Father passed away so mother moved in and is her care giver/ care for each other  occasionnal numbness or tingling in feet and toes worse with prolonged sitting > 20 min    GOES to pain management with Dr Jhonathan box cream and takes narcotics   DOES NOT DRIVE   WALK ok for 15 min     Subjective comment:  B knee pain R worse than L  Patient Occupation: retired  Quality of life: good    Pain  Current pain ratin  At best pain ratin  At worst pain ratin  Location: anterior knees R laterally and L medially    Quality: dull ache  Progression: worsening    Social Support  Lives in: multiple-level home (has a chair lift )  Lives with: parents (87 year old mother )    Hand dominance: right    Diagnostic Tests  X-ray:  abnormal    Treatments  Previous treatment: physical therapy  Current treatment: injection treatment  Patient Goals  Patient goals for therapy: decreased pain, increased motion, increased strength and independence with ADLs/IADLs  Patient goal: water therapy            Objective     POSTURE  Forward Head ;    Elevated Scapula LEFT ;    Iliac Crests Level ;    Genu Valgus LEFT;    trunk with list to RIGHT    ROM   KNEE RIGHT   LEFT  COMMENTS    ACTIVE PASSIVE  ACTIVE  PASSIVE    FLEXION  108   116     EXTENSION -2   0       MMT  MMT   LE RIGHT  LEFT  COMMENTS   HIP FLEXION 4+/5  4+/5 LEFT with noted trunk compensation    HIP ABDUCTION 2+/5  2+/5    QUADRICEPS 4/5  4/5    HAMSTRING 4+/5  4+/5      TRANSFERS  Sit to stand with us assist and avoiding LEFT weight bearing   SINGLE LEG STANCE  RIGHT  5 sec with trunk compensation RIGHT   unable to perform on LEFT with noted genu valgus   SLR  LEFT  60  RIGHT  45 with hamstring tightnses bilaterally   IBIS  LEFT liited 75%  RIGHT limited 50% both with hip stiffness   GAIT  Stance phase lacking RIGHT TERMINAL KNEE EXTENSION with flexion bias and poor heel toe mechanics (walking in concrete boots)       EDUCATION   Posture for up tall with focus on RIGHT terminal knee extension   GAIT    Verbal cueing for up tall posture   Terminal knee extension during stance phase    Arm swing back   SYMPTOM management   To avoid sitting    Decompression strategy during the ice    Ice           Functional Outcome Score: KOS ADL  29/80= 36% suggesting 63% percieved disability         Assessment & Plan     Assessment  Impairments: abnormal gait, abnormal or restricted ROM, activity intolerance, impaired balance, impaired physical strength, lacks appropriate home exercise program and pain with function  Functional Limitations: walking, uncomfortable because of pain, standing and unable to perform repetitive tasks  Assessment details: Clarissa Matos is a 71 y.o. year-old female referred to  physical therapy for bilaterally knee pain RIGHT > LEFT . She presents with a evolving clinical presentation.  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  Signs and symptoms are consistent with physical therapy diagnosis of bilaterally knee pain RIGHT  >LEFT .   Prognosis: good    Goals  Plan Goals: STG: to be met by 6 weeks  1- Patient will report pain < 3 /10 with light household activities  2-Patient will increase PROM to  RIGHT knee to 0-115+ without compensation or exacerbation   3-Patient will be independent with HEP without compensation or exacerbation   LTG: to be met by 12 weeks  1-Pt will report pain < 3 /10 with recreational activities   2-Pt will increase AROM to 0-115+ RIGHT knee  without compensation or exacerbation   3-Patient will increase strength SIDE LYING hip abd to at least 3+ /5 to perform single limb stance without compensation   4-Pt will be independent with comprehsive HEP     Plan  Therapy options: will be seen for skilled therapy services  Planned modality interventions: ultrasound  Planned therapy interventions: transfer training, therapeutic activities, stretching, strengthening, postural training, neuromuscular re-education, home exercise program, gait training, body mechanics training and manual therapy  Other planned therapy interventions: Aquatic therapy  Frequency: 2x week  Duration in weeks: 12  Treatment plan discussed with: patient (Diagnosis and plan of care)  Plan details: DURATION in visits 36        Timed:  Manual Therapy:    0     mins  89876;  Therapeutic Exercise:    0     mins  28037;     Neuromuscular Krysta:    0    mins  87999;    Therapeutic Activity:     15     mins  79184;     Gait Training:      10     mins  09525;     Ultrasound:     0     mins  57468;    Iontophoresis    0     mins 78526      Untimed:  Electrical  Stimulation:    0     mins  02147 ( );  Traction:  0     mins  83575;   Low Eval     0     Mins  96154  Mod Eval     20     Mins  79852  High Eval                       0     Mins  94827  Dry Needling  (1-2 muscles)            0     mins 12005 (Self-pay)  Dry Needling (3-4 muscles) 0     mins 20561 (Self-pay)  Dry Needling Trial    0     mins DRYNDLTRIAL  (No Charge)      Timed Treatment:   25   mins   Total Treatment:     45   mins    PT SIGNATURE: SHOBHA Gomez License Number: [id number]    Electronically signed by Rosalind Richey PT, 10/26/22, 1:54 PM EDT    DATE TREATMENT INITIATED: 10/26/2022    Initial Certification  Certification Period: 1/24/2023  I certify that the therapy services are furnished while this patient is under my care.  The services outlined above are required by this patient, and will be reviewed every 90 days.     PHYSICIAN: Sam Bustamante MD   NPI: [id number]                                         DATE:     Please sign and return via fax to [phone] Thank you, [facility].

## 2022-11-04 ENCOUNTER — TREATMENT (OUTPATIENT)
Dept: PHYSICAL THERAPY | Facility: CLINIC | Age: 72
End: 2022-11-04

## 2022-11-04 DIAGNOSIS — M25.561 CHRONIC PAIN OF BOTH KNEES: Primary | ICD-10-CM

## 2022-11-04 DIAGNOSIS — R26.9 GAIT DISTURBANCE: ICD-10-CM

## 2022-11-04 DIAGNOSIS — M25.661 KNEE STIFFNESS, RIGHT: ICD-10-CM

## 2022-11-04 DIAGNOSIS — M25.562 CHRONIC PAIN OF BOTH KNEES: Primary | ICD-10-CM

## 2022-11-04 DIAGNOSIS — G89.29 CHRONIC PAIN OF BOTH KNEES: Primary | ICD-10-CM

## 2022-11-04 DIAGNOSIS — R26.89 BALANCE PROBLEM: ICD-10-CM

## 2022-11-04 PROCEDURE — 97113 AQUATIC THERAPY/EXERCISES: CPT | Performed by: PHYSICAL THERAPIST

## 2022-11-04 NOTE — PROGRESS NOTES
Physical Therapy Daily Treatment Note    [facility]  [address] [place] [address]  [phone] (phone)  [phone] (fax)    Patient: Clarissa Matos   : 1950  Diagnosis/ICD-10 Code:  Chronic pain of both knees [M25.561, M25.562, G89.29]  Referring practitioner: Sam Bustamante MD  Date of Initial Visit: Type: THERAPY  Noted: 10/26/2022  Today's Date: 2022  Patient seen for 2 sessions             Subjective Evaluation    History of Present Illness    Subjective comment: Pain about 310 this afternoon in knees/back.  I do ride an ex bike (recumbent) at home for 10 min about 3x week.        Objective     Water Walk   Forward, backward, side step x 2 laps ea  Stretch 1                 Calf 20 sec x 2 ea  Stretch 2                   Hamstring 20 sec x 2 ea  Stretch 3                      Wall 30 sec x 2  Stretch Other 1         -  Stretch Other 2         -  Vertical Traction        LN/rail x 3 min   Abdominals              SN x 12  Clams                         Hip Abd/Add              10x ea  Hip Flex (SLR)  10x ea  Hip Ext               -  March in Place  12x  Mini Squat               10x  Toe/Heel Raises       -  Uni-Squat                  -  Uni-Clock                  -  Step Ups                   -  STS from pool bench -  Bicycle                         2 min, seated  Flutter/Scissor           - / 15, seated  Exercise 1                 LAQ + ankle DF x 10 ea, seated  Exercise 2                 -  Exercise 3    -    Assessment & Plan     Assessment    Assessment details: Patient seen today for initial aquatic therapy session including education and instruction in basic aquatic ex/activity for mobility, flexibility, and strength/stabilization.  Had her focus on standing tall, engaging abdominal, and performing ex with controlled movement in comfortable range to minimize strain on joints/spine.  She denied any increased pain with exercise during session.   PT provided demonstration and cuing throughout session for optimal posture, core/glut activation, and correct form/technique with ex/activity.    Plan:  Assess response to initial aquatic session and modify/progress as appropriate.                  Timed:  Aquatic Therapy    33     mins 13122;    Carissa Agarwal, PT  Physical Therapist    [place] License: [id number]

## 2022-11-15 ENCOUNTER — TREATMENT (OUTPATIENT)
Dept: PHYSICAL THERAPY | Facility: CLINIC | Age: 72
End: 2022-11-15

## 2022-11-15 DIAGNOSIS — M25.562 CHRONIC PAIN OF BOTH KNEES: Primary | ICD-10-CM

## 2022-11-15 DIAGNOSIS — R26.89 BALANCE PROBLEM: ICD-10-CM

## 2022-11-15 DIAGNOSIS — I10 ESSENTIAL HYPERTENSION: ICD-10-CM

## 2022-11-15 DIAGNOSIS — M25.661 KNEE STIFFNESS, RIGHT: ICD-10-CM

## 2022-11-15 DIAGNOSIS — M25.561 CHRONIC PAIN OF BOTH KNEES: Primary | ICD-10-CM

## 2022-11-15 DIAGNOSIS — G89.29 CHRONIC PAIN OF BOTH KNEES: Primary | ICD-10-CM

## 2022-11-15 PROCEDURE — 97113 AQUATIC THERAPY/EXERCISES: CPT | Performed by: PHYSICAL THERAPIST

## 2022-11-15 NOTE — PROGRESS NOTES
Physical Therapy Daily Treatment Note    [facility]  [address] [place] [address]  [phone] (phone)  [phone] (fax)    Patient: Clarissa Matos   : 1950  Diagnosis/ICD-10 Code:  Chronic pain of both knees [M25.561, M25.562, G89.29]  Referring practitioner: Sam Bustamante MD  Date of Initial Visit: Type: THERAPY  Noted: 10/26/2022  Today's Date: 11/15/2022  Patient seen for 3 sessions             Subjective   Patient reports she fell on Monday while trying to reorganize things in the closet. She feels okay but is sore.    Objective     AQUATIC EXERCISES:     Water Walk                 Forward, backward, side step x 2 laps ea  Stretch 1                     Calf 20 sec x 2 ea  Stretch 2                     Hamstring 20 sec x 2 ea with SN  Stretch 3                      Wall 30 sec x 2  Vertical Traction          LN/rail x 3 min   Abdominals                  SN x 20  Clams                         Hip Abd/Add                15x ea  Hip Flex (SLR)            15x ea  Hip Ext                        -  March in Place           1 5  Mini Squat                   15x  STS from pool bench  -  Bicycle                                    2 min, seated  Flutter/Scissor             - / 15, seated  Exercise 1                   LAQ + ankle DF x 10 ea, seated *defer    Assessment/Plan  Added small noodle for overpressure with hamstring stretch. Encouraged Cathy to perform all exercises within comfortable ROM to avoid any exacerbation from recent fall. Also encouraged intermittent wall walk stretch for decompression.          Timed:  Aquatic Therapy    23     mins 11056;  Total Treatment   30    mins    Tere Zamora PT  Physical Therapist    [place] License: [id number]

## 2022-11-17 ENCOUNTER — TELEPHONE (OUTPATIENT)
Dept: PHYSICAL THERAPY | Facility: CLINIC | Age: 72
End: 2022-11-17

## 2022-11-17 RX ORDER — CANDESARTAN CILEXETIL AND HYDROCHLOROTHIAZIDE 16; 12.5 MG/1; MG/1
1 TABLET ORAL DAILY
Qty: 30 TABLET | Refills: 0 | Status: SHIPPED | OUTPATIENT
Start: 2022-11-17 | End: 2022-11-30 | Stop reason: SDUPTHER

## 2022-11-17 NOTE — TELEPHONE ENCOUNTER
Rx Refill Note  Requested Prescriptions     Pending Prescriptions Disp Refills   • candesartan-hydrochlorothiazide (ATACAND HCT) 16-12.5 MG per tablet [Pharmacy Med Name: CANDESARTAN HCT 16-12.5MG TABLETS] 90 tablet 1     Sig: TAKE 1 TABLET BY MOUTH DAILY      Last office visit with prescribing clinician: 4/21/2022      Next office visit with prescribing clinician: Visit date not found       {TIP  Please add Last Relevant Lab Date if appropriate: 07/07/21    Leonor Thomson MA  11/17/22, 15:17 EST

## 2022-11-18 NOTE — TELEPHONE ENCOUNTER
CALLED PT TO SCHEDULE  OV FOR MED REFILL. NO ANSWER [facility]    HUB TO READ AND SCHEDULE PT

## 2022-11-23 ENCOUNTER — TREATMENT (OUTPATIENT)
Dept: PHYSICAL THERAPY | Facility: CLINIC | Age: 72
End: 2022-11-23

## 2022-11-23 DIAGNOSIS — G89.29 CHRONIC PAIN OF BOTH KNEES: Primary | ICD-10-CM

## 2022-11-23 DIAGNOSIS — M25.661 KNEE STIFFNESS, RIGHT: ICD-10-CM

## 2022-11-23 DIAGNOSIS — M25.562 CHRONIC PAIN OF BOTH KNEES: Primary | ICD-10-CM

## 2022-11-23 DIAGNOSIS — M25.561 CHRONIC PAIN OF BOTH KNEES: Primary | ICD-10-CM

## 2022-11-23 PROCEDURE — 97113 AQUATIC THERAPY/EXERCISES: CPT | Performed by: PHYSICAL THERAPIST

## 2022-11-23 NOTE — PROGRESS NOTES
Physical Therapy Daily Treatment Note    [facility]  [address] [place] [address]  [phone] (phone)  [phone] (fax)    Patient: Clarissa aMtos   : 1950  Diagnosis/ICD-10 Code:  Chronic pain of both knees [M25.561, M25.562, G89.29]  Referring practitioner: Sam Bustamante MD  Date of Initial Visit: Type: THERAPY  Noted: 10/26/2022  Today's Date: 2022  Patient seen for 4 sessions             Subjective   My back and neck are bothering me more since I fell onto my hands and knees last week. Knee pain rated 4/10 today.     Objective     AQUATIC EXERCISES:     Water Walk                 Forward, backward, side step x 2 laps ea  Decompression Float at rail w/ LN X 3 min.  Stretch 1                     Calf 20 sec x 2 ea  Stretch 2                     Hamstring 20 sec x 2 ea with SN  Stretch 3                      Wall Crawl (SN support) 20 sec x 3  Stretch 4                      Piriformis  20 sec X 2  Stretch 5                      Hip Sweeps w/ LN under bent knee X 10 ea  Vertical Traction          LN/rail x 3 min   Abdominals                  SN x 20                         Hip Abd/Add                15x ea  Defer  Hip Flex (SLR)            15x ea  Defer  Hip Ext                        -  March in Place           15X  Mini Squat                   15x  Defer  STS from pool bench  -  Bicycle  (Suspended)     2 min. And 2 min seated  Flutter/Scissor             - / 15, seated    Assessment/Plan  Patient is years s/p right and left TKA referred to [facility] for B knee pain.  She has pre-existing neck and back pain as well as fibromyalgia.  Her neck, back and knees were aggravated by a fall last week onto her hands and knees on a carpeted floor.  Introduced decompression float to help alleviate pain and focused more on stretches today to also help alleviate pain that may be influenced by fibromyalgia and recent fall.          Timed:  Aquatic  Therapy    24     mins 71296;    Arsen Horvath, PT  Physical Therapist    [place] License:  [id number]

## 2022-11-29 ENCOUNTER — TREATMENT (OUTPATIENT)
Dept: PHYSICAL THERAPY | Facility: CLINIC | Age: 72
End: 2022-11-29

## 2022-11-29 DIAGNOSIS — R26.89 BALANCE PROBLEM: ICD-10-CM

## 2022-11-29 DIAGNOSIS — M25.661 KNEE STIFFNESS, RIGHT: ICD-10-CM

## 2022-11-29 DIAGNOSIS — M25.562 CHRONIC PAIN OF BOTH KNEES: Primary | ICD-10-CM

## 2022-11-29 DIAGNOSIS — M25.561 CHRONIC PAIN OF BOTH KNEES: Primary | ICD-10-CM

## 2022-11-29 DIAGNOSIS — G89.29 CHRONIC PAIN OF BOTH KNEES: Primary | ICD-10-CM

## 2022-11-29 DIAGNOSIS — R26.9 GAIT DISTURBANCE: ICD-10-CM

## 2022-11-29 PROCEDURE — 97113 AQUATIC THERAPY/EXERCISES: CPT | Performed by: PHYSICAL THERAPIST

## 2022-11-29 NOTE — PROGRESS NOTES
Physical Therapy 30-Day Progress Note     [facility]  [address] [place] [address]  [phone] (phone)  [phone] (fax)    Patient: Clarissa Matos   : 1950  Diagnosis/ICD-10 Code:  Chronic pain of both knees [M25.561, M25.562, G89.29]  Referring practitioner: Sam Bustamante MD  Date of Initial Visit: Type: THERAPY  Noted: 10/26/2022  Today's Date: 2022  Patient seen for 5 sessions      Subjective:     Clinical Progress: slight improvement in knee flexion ROM on right and with LE MMT.  Home Program Compliance: N/A  Treatment has included:  aquatic therapy    Subjective Knee pain 3/10,  At worst 7/10 (getting up after sitting for an hour)    Objective     KNEE RIGHT    LEFT  COMMENTS     ACTIVE PASSIVE   ACTIVE  PASSIVE     FLEXION  120     116       EXTENSION -2     -3          MMT  MMT   LE RIGHT   LEFT  COMMENTS   HIP FLEXION 4+/5   5/5     HIP ABDUCTION 3+/5   3+/5 Tested in standing d/t being poolside    QUADRICEPS 4/5   4/5     HAMSTRING 5/5   5/5       Gait:  Independent    AQUATIC EXERCISES:     Water Walk                 Forward, backward, side step x 2 laps ea  Decompression Float at rail w/ LN X 3 min.  Stretch 1                     Calf 20 sec x 2 ea  Stretch 2                     Hamstring 20 sec x 2 ea   Stretch 3                      Wall Crawl (LN support) 20 sec x 2  Stretch 4                      Piriformis  20 sec X 2  Stretch 5                      Hip Sweeps w/ LN under bent knee X 10 ea  Vertical Traction          LN/rail x 3 min   Abdominals                  Hold due to new rib pain on R                      Hip Abd/Add                15x ea  Defer  Hip Flex (SLR)            15x ea  Defer  Hip Ext                        -  March in Place           15X  Mini Squat                   15x  Defer  STS from pool bench  13X  Bicycle                        2 min seated  Flutter/Scissor             - / 15, seated      Assessment/Plan    Clarissa Matos is a 71 y.o. year-old female referred to physical therapy for bilateral knee pain with history of right and left TKA.  Comorbidities include fibromyalgia, neck and back pain.  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  Today is her 4th session of aquatic therapy.  She reports falling onto her hands and knees on a carpeted floor in mid November that aggravated her neck and back pain.  In addition, she reports onset of new right sided rib pain (laterally) that started just before Thanksgiving.  She is seeing her PCP for this. Clarissa is receiving massage to her left hip at another facility.  Her knee flexion ROM did improve on the right and manual muscle testing improved as well compared to her initial visit.  Clarissa does feel aqua therapy is beneficial.  She continues to have pain and difficulty with squatting and going up and down stairs.  She has met one short term goal.    STGs:  1- Patient will report pain < 3 /10 with light household activities. ONGOING  2-Patient will increase PROM to  RIGHT knee to 0-115+ without compensation or exacerbation.  MET   3-Patient will be independent with HEP without compensation or exacerbation.  ONGOING, currently being treated with aquatic therapy.       Recommendations: Continue as planned  Timeframe: 1 month  Prognosis to achieve goals: good    PT Signature: Arsen Horvath, PT  [place] License: [id number]      Based upon review of the patient's progress and continued therapy plan, it is my medical opinion that Clarissa Matos should continue physical therapy treatment at [facility]  [address] [place] [address]  [phone].    Signature: __________________________________  Sam Bustamante MD  NPI: [id number]                                          Timed:  Aquatic therapy  [address]    38   mins

## 2022-11-30 ENCOUNTER — HOSPITAL ENCOUNTER (OUTPATIENT)
Dept: GENERAL RADIOLOGY | Facility: HOSPITAL | Age: 72
Discharge: HOME | End: 2022-11-30
Admitting: NURSE PRACTITIONER

## 2022-11-30 ENCOUNTER — OFFICE VISIT (OUTPATIENT)
Dept: FAMILY MEDICINE CLINIC | Facility: CLINIC | Age: 72
End: 2022-11-30

## 2022-11-30 VITALS
OXYGEN SATURATION: 98 % | SYSTOLIC BLOOD PRESSURE: 134 MMHG | DIASTOLIC BLOOD PRESSURE: 59 MMHG | BODY MASS INDEX: 32.96 KG/M2 | HEIGHT: 63 IN | HEART RATE: 60 BPM | TEMPERATURE: 97.1 F | WEIGHT: 186 LBS

## 2022-11-30 DIAGNOSIS — R07.81 RIB PAIN: ICD-10-CM

## 2022-11-30 DIAGNOSIS — D75.1 POLYCYTHEMIA: ICD-10-CM

## 2022-11-30 DIAGNOSIS — I10 ESSENTIAL HYPERTENSION: Primary | Chronic | ICD-10-CM

## 2022-11-30 DIAGNOSIS — E78.5 DYSLIPIDEMIA: ICD-10-CM

## 2022-11-30 DIAGNOSIS — R73.09 ELEVATED GLUCOSE: ICD-10-CM

## 2022-11-30 DIAGNOSIS — Z23 NEED FOR DIPHTHERIA-TETANUS-PERTUSSIS (TDAP) VACCINE: ICD-10-CM

## 2022-11-30 DIAGNOSIS — M79.7 FIBROMYALGIA: Chronic | ICD-10-CM

## 2022-11-30 DIAGNOSIS — K21.9 GASTROESOPHAGEAL REFLUX DISEASE, UNSPECIFIED WHETHER ESOPHAGITIS PRESENT: Chronic | ICD-10-CM

## 2022-11-30 DIAGNOSIS — R13.12 OROPHARYNGEAL DYSPHAGIA: ICD-10-CM

## 2022-11-30 PROCEDURE — 71101 X-RAY EXAM UNILAT RIBS/CHEST: CPT

## 2022-11-30 PROCEDURE — 99214 OFFICE O/P EST MOD 30 MIN: CPT | Performed by: NURSE PRACTITIONER

## 2022-11-30 RX ORDER — DULOXETIN HYDROCHLORIDE 60 MG/1
CAPSULE, DELAYED RELEASE ORAL
COMMUNITY
Start: 2022-11-29

## 2022-11-30 RX ORDER — CANDESARTAN CILEXETIL AND HYDROCHLOROTHIAZIDE 16; 12.5 MG/1; MG/1
1 TABLET ORAL DAILY
Qty: 30 TABLET | Refills: 0 | Status: SHIPPED | OUTPATIENT
Start: 2022-11-30 | End: 2023-02-02

## 2022-11-30 RX ORDER — OMEPRAZOLE 20 MG/1
20 CAPSULE, DELAYED RELEASE ORAL DAILY
COMMUNITY

## 2022-12-01 ENCOUNTER — TREATMENT (OUTPATIENT)
Dept: PHYSICAL THERAPY | Facility: CLINIC | Age: 72
End: 2022-12-01

## 2022-12-01 DIAGNOSIS — M25.661 KNEE STIFFNESS, RIGHT: ICD-10-CM

## 2022-12-01 DIAGNOSIS — G89.29 CHRONIC PAIN OF BOTH KNEES: Primary | ICD-10-CM

## 2022-12-01 DIAGNOSIS — M25.561 CHRONIC PAIN OF BOTH KNEES: Primary | ICD-10-CM

## 2022-12-01 DIAGNOSIS — M25.562 CHRONIC PAIN OF BOTH KNEES: Primary | ICD-10-CM

## 2022-12-01 LAB
ALBUMIN SERPL-MCNC: 4.7 G/DL (ref 3.5–5.2)
ALBUMIN/CREAT UR: 6 MG/G CREAT (ref 0–29)
ALBUMIN/GLOB SERPL: 1.7 G/DL
ALP SERPL-CCNC: 70 U/L (ref 39–117)
ALT SERPL-CCNC: 13 U/L (ref 1–33)
AST SERPL-CCNC: 18 U/L (ref 1–32)
BASOPHILS # BLD AUTO: 0.09 10*3/MM3 (ref 0–0.2)
BASOPHILS NFR BLD AUTO: 1.6 % (ref 0–1.5)
BILIRUB SERPL-MCNC: 0.3 MG/DL (ref 0–1.2)
BUN SERPL-MCNC: 10 MG/DL (ref 8–23)
BUN/CREAT SERPL: 14.9 (ref 7–25)
CALCIUM SERPL-MCNC: 9.8 MG/DL (ref 8.6–10.5)
CHLORIDE SERPL-SCNC: 101 MMOL/L (ref 98–107)
CHOLEST SERPL-MCNC: 195 MG/DL (ref 0–200)
CO2 SERPL-SCNC: 31.2 MMOL/L (ref 22–29)
CREAT SERPL-MCNC: 0.67 MG/DL (ref 0.57–1)
CREAT UR-MCNC: 101.2 MG/DL
EGFRCR SERPLBLD CKD-EPI 2021: 94.2 ML/MIN/1.73
EOSINOPHIL # BLD AUTO: 0.29 10*3/MM3 (ref 0–0.4)
EOSINOPHIL NFR BLD AUTO: 5.1 % (ref 0.3–6.2)
ERYTHROCYTE [DISTWIDTH] IN BLOOD BY AUTOMATED COUNT: 11.8 % (ref 12.3–15.4)
GLOBULIN SER CALC-MCNC: 2.8 GM/DL
GLUCOSE SERPL-MCNC: 81 MG/DL (ref 65–99)
HBA1C MFR BLD: 5.8 % (ref 4.8–5.6)
HCT VFR BLD AUTO: 48.3 % (ref 34–46.6)
HDLC SERPL-MCNC: 60 MG/DL (ref 40–60)
HGB BLD-MCNC: 16.8 G/DL (ref 12–15.9)
IMM GRANULOCYTES # BLD AUTO: 0.02 10*3/MM3 (ref 0–0.05)
IMM GRANULOCYTES NFR BLD AUTO: 0.3 % (ref 0–0.5)
LDLC SERPL CALC-MCNC: 94 MG/DL (ref 0–100)
LDLC/HDLC SERPL: 1.43 {RATIO}
LYMPHOCYTES # BLD AUTO: 2.05 10*3/MM3 (ref 0.7–3.1)
LYMPHOCYTES NFR BLD AUTO: 35.8 % (ref 19.6–45.3)
MCH RBC QN AUTO: 30.8 PG (ref 26.6–33)
MCHC RBC AUTO-ENTMCNC: 34.8 G/DL (ref 31.5–35.7)
MCV RBC AUTO: 88.6 FL (ref 79–97)
MICROALBUMIN UR-MCNC: 6.1 UG/ML
MONOCYTES # BLD AUTO: 0.46 10*3/MM3 (ref 0.1–0.9)
MONOCYTES NFR BLD AUTO: 8 % (ref 5–12)
NEUTROPHILS # BLD AUTO: 2.82 10*3/MM3 (ref 1.7–7)
NEUTROPHILS NFR BLD AUTO: 49.2 % (ref 42.7–76)
NRBC BLD AUTO-RTO: 0 /100 WBC (ref 0–0.2)
PLATELET # BLD AUTO: 284 10*3/MM3 (ref 140–450)
POTASSIUM SERPL-SCNC: 4.4 MMOL/L (ref 3.5–5.2)
PROT SERPL-MCNC: 7.5 G/DL (ref 6–8.5)
RBC # BLD AUTO: 5.45 10*6/MM3 (ref 3.77–5.28)
SODIUM SERPL-SCNC: 143 MMOL/L (ref 136–145)
TRIGL SERPL-MCNC: 247 MG/DL (ref 0–150)
TSH SERPL DL<=0.005 MIU/L-ACNC: 0.46 UIU/ML (ref 0.27–4.2)
VLDLC SERPL CALC-MCNC: 41 MG/DL (ref 5–40)
WBC # BLD AUTO: 5.73 10*3/MM3 (ref 3.4–10.8)

## 2022-12-01 PROCEDURE — 90715 TDAP VACCINE 7 YRS/> IM: CPT | Performed by: NURSE PRACTITIONER

## 2022-12-01 PROCEDURE — 97113 AQUATIC THERAPY/EXERCISES: CPT | Performed by: PHYSICAL THERAPIST

## 2022-12-01 PROCEDURE — 90471 IMMUNIZATION ADMIN: CPT | Performed by: NURSE PRACTITIONER

## 2022-12-01 NOTE — PROGRESS NOTES
Physical Therapy Daily Treatment Note    [facility]  [address] [place] [address]  [phone] (phone)  [phone] (fax)    Patient: Clarissa Matos   : 1950  Diagnosis/ICD-10 Code:  Chronic pain of both knees [M25.561, M25.562, G89.29]  Referring practitioner: Sam Bustamante MD  Date of Initial Visit: Type: THERAPY  Noted: 10/26/2022  Today's Date: 2022  Patient seen for 6 sessions             Subjective   I had an X ray of ribs done, but don't have the results.  They are  and sore to bend over.  Therapy didn't make it any worse.  Knees still sore, especially when trying to get to sleep.    Objective     AQUATIC EXERCISES:   Water Walk                 Forward, backward, side step x 2 laps ea  Decompression Float on lg Noodle - Independent  Stretch 1                     Calf 20 sec x 2 ea  Stretch 2                     Hamstring 20 sec x 2 ea   Stretch 3                      Wall Crawl (LN support) 20 sec x 2  Stretch 4                      Piriformis  20 sec X 2  Stretch 5                      Hip Sweeps w/ LN under bent knee X 20 ea  Abdominals                  Hold due to new rib pain on R   B Heel Raises             15 X                   Hip Abd/Add                15x ea   Hip Flex/Ext               15x ea  Defer                        -  March in Place           15X  Next -March Walk  Mini Squat                   15x    STS from pool bench  --  Bicycle                        2 min suspended  Flutter/Scissor             - / 15, seated   Tandem Walk  Next*    Assessment/Plan  Persistent knee soreness/stiffness, especially when trying to sleep.  She does use Voltaren cream at night.  Today she resumed mini squats and hip abduction exercises and progressed from seated cycling legs to suspended.  Also added B heel raises today.  Clarissa completed session without issue.          Timed:  Aquatic Therapy    40     mins 61012;    Arsen COPELAND  Alexandru PT  Physical Therapist    [place] License:  [id number]

## 2022-12-06 ENCOUNTER — TELEPHONE (OUTPATIENT)
Dept: PHYSICAL THERAPY | Facility: CLINIC | Age: 72
End: 2022-12-06

## 2022-12-08 ENCOUNTER — HOSPITAL ENCOUNTER (OUTPATIENT)
Dept: SLEEP MEDICINE | Facility: HOSPITAL | Age: 72
End: 2022-12-08

## 2022-12-08 ENCOUNTER — TELEPHONE (OUTPATIENT)
Dept: PHYSICAL THERAPY | Facility: CLINIC | Age: 72
End: 2022-12-08

## 2022-12-13 ENCOUNTER — HOSPITAL ENCOUNTER (OUTPATIENT)
Dept: SLEEP MEDICINE | Facility: HOSPITAL | Age: 72
Discharge: HOME | End: 2022-12-13
Admitting: INTERNAL MEDICINE

## 2022-12-13 ENCOUNTER — TREATMENT (OUTPATIENT)
Dept: PHYSICAL THERAPY | Facility: CLINIC | Age: 72
End: 2022-12-13

## 2022-12-13 DIAGNOSIS — M25.561 CHRONIC PAIN OF BOTH KNEES: Primary | ICD-10-CM

## 2022-12-13 DIAGNOSIS — G47.30 OBSERVED SLEEP APNEA: ICD-10-CM

## 2022-12-13 DIAGNOSIS — M25.562 CHRONIC PAIN OF BOTH KNEES: Primary | ICD-10-CM

## 2022-12-13 DIAGNOSIS — G47.8 NON-RESTORATIVE SLEEP: ICD-10-CM

## 2022-12-13 DIAGNOSIS — F51.01 PRIMARY INSOMNIA: ICD-10-CM

## 2022-12-13 DIAGNOSIS — E66.9 CLASS 1 OBESITY: ICD-10-CM

## 2022-12-13 DIAGNOSIS — D75.1 POLYCYTHEMIA: ICD-10-CM

## 2022-12-13 DIAGNOSIS — I10 ESSENTIAL HYPERTENSION: ICD-10-CM

## 2022-12-13 DIAGNOSIS — R26.89 BALANCE PROBLEM: ICD-10-CM

## 2022-12-13 DIAGNOSIS — G89.29 CHRONIC PAIN OF BOTH KNEES: Primary | ICD-10-CM

## 2022-12-13 DIAGNOSIS — R06.83 SNORING: ICD-10-CM

## 2022-12-13 DIAGNOSIS — M25.661 KNEE STIFFNESS, RIGHT: ICD-10-CM

## 2022-12-13 PROCEDURE — 95810 POLYSOM 6/> YRS 4/> PARAM: CPT

## 2022-12-13 PROCEDURE — 95810 POLYSOM 6/> YRS 4/> PARAM: CPT | Performed by: INTERNAL MEDICINE

## 2022-12-13 PROCEDURE — 97113 AQUATIC THERAPY/EXERCISES: CPT | Performed by: PHYSICAL THERAPIST

## 2022-12-13 NOTE — PROGRESS NOTES
Physical Therapy Daily Treatment Note    [facility]  [address] [place] [address]  [phone] (phone)  [phone] (fax)    Patient: Clarissa Matos   : 1950  Diagnosis/ICD-10 Code:  Chronic pain of both knees [M25.561, M25.562, G89.29]  Referring practitioner: Sam Bustamante MD  Date of Initial Visit: Type: THERAPY  Noted: 10/26/2022  Today's Date: 2022  Patient seen for 7 sessions             Subjective   Rib pain is better, Xray was negative.  Having trouble standing more than 2 minutes because of back and knee pain and balance.      Objective     AQUATIC EXERCISES:   Water Walk                All directions - Independent prior to appt.  Decompression Float on lg Noodle - Independent  Stretch 1                     Calf 20 sec x 2 ea  Stretch 2                     Hamstring 20 sec x 2 ea   Stretch 3                      Wall Crawl (LN support) 20 sec x 3  Stretch 4                      Piriformis  20 sec X 2  Stretch 5                      Hip Sweeps w/ LN under bent knee X 20 ea  Abdominals  LN Pushdowns X 15                  B Heel Raises             15 X                   Hip Abd/Add                15x ea   Hip Flex/Ext                15x ea                        -  March Walk                 25 ft X 2  Mini Squat                   15x    Bicycle                        2 min suspended  Flutter/Scissor             - / 15, seated  Tandem Walk at railing   15 ft X 2   Balance w/ feet together eyes open and eyes closed, up to 30 sec.    Assessment/Plan  It has been 2 weeks since patient's last session, she cancelled due to a sinus infection.  Educated on KTC stretch for home.  Resumed Noodle pushdowns now that rib pain has resolved.  Added tandem walk and march walk for balance training, required rail support to maintain balance.  Education on balance training at home with feet together and arms crossed over chest- eyes open and eyes  closed.      Timed:  Aquatic Therapy    43     mins [id number];    Arsen Horvath, PT  Physical Therapist    [place] License:  [id number]

## 2022-12-16 ENCOUNTER — TREATMENT (OUTPATIENT)
Dept: PHYSICAL THERAPY | Facility: CLINIC | Age: 72
End: 2022-12-16

## 2022-12-16 ENCOUNTER — TELEPHONE (OUTPATIENT)
Dept: SLEEP MEDICINE | Facility: HOSPITAL | Age: 72
End: 2022-12-16

## 2022-12-16 DIAGNOSIS — G89.29 CHRONIC PAIN OF BOTH KNEES: Primary | ICD-10-CM

## 2022-12-16 DIAGNOSIS — M25.562 CHRONIC PAIN OF BOTH KNEES: Primary | ICD-10-CM

## 2022-12-16 DIAGNOSIS — R26.9 GAIT DISTURBANCE: ICD-10-CM

## 2022-12-16 DIAGNOSIS — M25.561 CHRONIC PAIN OF BOTH KNEES: Primary | ICD-10-CM

## 2022-12-16 DIAGNOSIS — M25.661 KNEE STIFFNESS, RIGHT: ICD-10-CM

## 2022-12-16 PROCEDURE — 97113 AQUATIC THERAPY/EXERCISES: CPT | Performed by: PHYSICAL THERAPIST

## 2022-12-16 NOTE — PROGRESS NOTES
Physical Therapy Daily Treatment Note    [facility]  [address] [place] [address]  [phone] (phone)  [phone] (fax)    Patient: Clarissa Matos   : 1950  Diagnosis/ICD-10 Code:  Chronic pain of both knees [M25.561, M25.562, G89.29]  Referring practitioner: Sam Bustamante MD  Date of Initial Visit: Type: THERAPY  Noted: 10/26/2022  Today's Date: 2022  Patient seen for 8 sessions             Subjective   My left hip hurt the last couple nights, had to put cold pack on it.    Objective     AQUATIC EXERCISES:   Water Walk                All directions - Independent prior to appt.  Decompression Float on lg Noodle - Independent  Stretch 1                     Calf 20 sec x 2 ea  Stretch 2                     Hamstring 20 sec x 3 ea   Stretch 3                      Wall Crawl (LN support) 20 sec x 3  Stretch 4                      Piriformis  20 sec X 3  Stretch 5                      Hip Sweeps w/ LN under bent knee X 20 ea  Abdominals  LN Pushdowns X 15                  B Heel Raises             15 X                   Hip Abd/Add                15x ea   Hip Flex/Ext                15x ea                        -  March Walk                 25 ft X 2  Mini Squat                   15x    Bicycle                        2 min suspended  Flutter/Scissor             - / 15, seated  Tandem Walk at railing   15 ft X 2      Assessment/Plan  Patient reports temporary improvement during her aquatic therapy session, however very little carry-over once she leaves the pool.          Timed:  Aquatic Therapy    30     mins [id number];    Arsen Horvath, PT  Physical Therapist    [place] License:  [id number]

## 2022-12-20 ENCOUNTER — TREATMENT (OUTPATIENT)
Dept: PHYSICAL THERAPY | Facility: CLINIC | Age: 72
End: 2022-12-20

## 2022-12-20 DIAGNOSIS — M25.561 CHRONIC PAIN OF BOTH KNEES: Primary | ICD-10-CM

## 2022-12-20 DIAGNOSIS — R26.9 GAIT DISTURBANCE: ICD-10-CM

## 2022-12-20 DIAGNOSIS — M25.562 CHRONIC PAIN OF BOTH KNEES: Primary | ICD-10-CM

## 2022-12-20 DIAGNOSIS — M25.661 KNEE STIFFNESS, RIGHT: ICD-10-CM

## 2022-12-20 DIAGNOSIS — G89.29 CHRONIC PAIN OF BOTH KNEES: Primary | ICD-10-CM

## 2022-12-20 PROCEDURE — 97113 AQUATIC THERAPY/EXERCISES: CPT | Performed by: PHYSICAL THERAPIST

## 2022-12-20 NOTE — PROGRESS NOTES
Physical Therapy Daily Treatment Note    [facility]  [address] [place] [address]  [phone] (phone)  [phone] (fax)    Patient: Clarissa Matos   : 1950  Diagnosis/ICD-10 Code:  Chronic pain of both knees [M25.561, M25.562, G89.29]  Referring practitioner: Sam Bustamante MD  Date of Initial Visit: Type: THERAPY  Noted: 10/26/2022  Today's Date: 2022  Patient seen for 9 sessions             Subjective   My knees are doing better, having more trouble with my back because we have been moving furniture around.    Objective     AQUATIC EXERCISES:   Water Walk                All directions - Independent prior to appt.  Decompression Float on lg Noodle - Independent  Stretch 1                     Calf 20 sec x 2 ea  Stretch 2                     Hamstring 20 sec x 3 ea   Stretch 3                      Wall Crawl (LN support) 20 sec x 3  Stretch 4                      Piriformis  20 sec X 3  Stretch 5                      Hip Sweeps w/ LN under bent knee X 20 ea  Abdominals  LN Pushdowns X 15                  B Heel Raises             15 X                   Hip Abd/Add                15x ea   Hip Flex/Ext                15x ea                        -  March Walk                 25 ft X 2  Mini Squat                   15x    Bicycle                        2 min suspended - on own after treatment  Flutter/Scissor             - / 15, seated  Semi-Tandem Walk at railing   15 ft X 2       Assessment/Plan  Clarissa reports improvement in her knees, however now having more back pain due to moving furniture.  She was able to participate fully in her aquatic therapy today. She does feel her leg strength is improving and has noticed greater ease with household tasks.          Timed:  Aquatic Therapy    39     mins [id number];    Arsen Horvath, PT  Physical Therapist    [place] License:  [id number]

## 2023-01-04 ENCOUNTER — TELEPHONE (OUTPATIENT)
Dept: PHYSICAL THERAPY | Facility: OTHER | Age: 73
End: 2023-01-04

## 2023-01-06 ENCOUNTER — TELEPHONE (OUTPATIENT)
Dept: PHYSICAL THERAPY | Facility: CLINIC | Age: 73
End: 2023-01-06

## 2023-01-09 ENCOUNTER — TELEPHONE (OUTPATIENT)
Dept: PHYSICAL THERAPY | Facility: CLINIC | Age: 73
End: 2023-01-09

## 2023-01-11 ENCOUNTER — TELEPHONE (OUTPATIENT)
Dept: PHYSICAL THERAPY | Facility: OTHER | Age: 73
End: 2023-01-11
Payer: MEDICARE

## 2023-01-11 NOTE — TELEPHONE ENCOUNTER
Caller: Clarissa Matos    Relationship: Self    Best call back number: [phone]    What is the best time to reach you: ANY    What was the call regarding: PATIENT CANCELLED ALL HER APPTS BECAUSE SHE IS STILL HAVING ISSUES WITH BLOOD PRESSURE

## 2023-02-02 DIAGNOSIS — I10 ESSENTIAL HYPERTENSION: Chronic | ICD-10-CM

## 2023-02-02 RX ORDER — CANDESARTAN CILEXETIL AND HYDROCHLOROTHIAZIDE 16; 12.5 MG/1; MG/1
1 TABLET ORAL DAILY
Qty: 30 TABLET | Refills: 0 | Status: SHIPPED | OUTPATIENT
Start: 2023-02-02 | End: 2023-03-03

## 2023-02-02 NOTE — TELEPHONE ENCOUNTER
Rx Refill Note  Requested Prescriptions     Pending Prescriptions Disp Refills   • candesartan-hydrochlorothiazide (ATACAND HCT) 16-12.5 MG per tablet [Pharmacy Med Name: CANDESARTAN HCT 16-12.5MG TABLETS] 30 tablet 0     Sig: TAKE 1 TABLET BY MOUTH DAILY      Last office visit with prescribing clinician: 11/30/2022   Last telemedicine visit with prescribing clinician: 5/30/2023   Next office visit with prescribing clinician: 5/30/2023                         Would you like a call back once the refill request has been completed: [] Yes [] No    If the office needs to give you a call back, can they leave a voicemail: [] Yes [] No    Amanda Mauro MA  02/02/23, 12:39 EST

## 2023-02-13 ENCOUNTER — TELEPHONE (OUTPATIENT)
Dept: ORTHOPEDIC SURGERY | Facility: CLINIC | Age: 73
End: 2023-02-13
Payer: COMMERCIAL

## 2023-02-13 NOTE — TELEPHONE ENCOUNTER
[facility] stating pt is requesting a refill on her topical compounding rx (Diclofenac/Bupivicaine/Gabapentin)   Last filled 10/2022    He is aware RBB is currently in the OR and we may possibly not have a response until tomorrow.    Please advise.

## 2023-02-14 NOTE — TELEPHONE ENCOUNTER
I called and spoke with [facility] pharmacy and gave them verbal permission to refill this medication.

## 2023-02-15 ENCOUNTER — TELEPHONE (OUTPATIENT)
Dept: ORTHOPEDIC SURGERY | Facility: CLINIC | Age: 73
End: 2023-02-15
Payer: COMMERCIAL

## 2023-02-15 NOTE — TELEPHONE ENCOUNTER
I called and spoke with them yesterday and gave them a verbal order to continue the cream. Note should be in the chart from yesterday.

## 2023-02-21 ENCOUNTER — HOSPITAL ENCOUNTER (OUTPATIENT)
Dept: BONE DENSITY | Facility: HOSPITAL | Age: 73
Discharge: HOME | End: 2023-02-21
Admitting: NURSE PRACTITIONER
Payer: MEDICARE

## 2023-02-21 PROCEDURE — 77080 DXA BONE DENSITY AXIAL: CPT

## 2023-03-02 DIAGNOSIS — I10 ESSENTIAL HYPERTENSION: Chronic | ICD-10-CM

## 2023-03-03 RX ORDER — CANDESARTAN CILEXETIL AND HYDROCHLOROTHIAZIDE 16; 12.5 MG/1; MG/1
1 TABLET ORAL DAILY
Qty: 90 TABLET | Refills: 0 | Status: SHIPPED | OUTPATIENT
Start: 2023-03-03

## 2023-03-08 ENCOUNTER — HOSPITAL ENCOUNTER (OUTPATIENT)
Dept: GENERAL RADIOLOGY | Facility: HOSPITAL | Age: 73
Discharge: HOME | End: 2023-03-08
Admitting: NURSE PRACTITIONER
Payer: MEDICARE

## 2023-03-08 DIAGNOSIS — R13.12 OROPHARYNGEAL DYSPHAGIA: ICD-10-CM

## 2023-03-08 PROCEDURE — 92611 MOTION FLUOROSCOPY/SWALLOW: CPT | Performed by: SPEECH-LANGUAGE PATHOLOGIST

## 2023-03-08 PROCEDURE — 74230 X-RAY XM SWLNG FUNCJ C+: CPT

## 2023-03-08 PROCEDURE — 92610 EVALUATE SWALLOWING FUNCTION: CPT | Performed by: SPEECH-LANGUAGE PATHOLOGIST

## 2023-03-08 RX ADMIN — BARIUM SULFATE 55 ML: 0.81 POWDER, FOR SUSPENSION ORAL at 13:38

## 2023-03-08 RX ADMIN — BARIUM SULFATE 4 ML: 980 POWDER, FOR SUSPENSION ORAL at 13:39

## 2023-03-08 RX ADMIN — BARIUM SULFATE 1 TEASPOON(S): 0.6 CREAM ORAL at 13:39

## 2023-03-08 NOTE — MBS/VFSS/FEES
Outpatient Speech Language Pathology   Adult Swallow Initial Evaluation  Psychiatric     Patient Name: Clarissa Matos  : 1950  MRN: [id number]  Today's Date: 3/8/2023         Visit Date: 2023   Patient Active Problem List   Diagnosis   • Cholecystitis   • Fibromyalgia   • Essential hypertension   • Closed fracture of upper end of humerus   • Rotator cuff arthropathy   • Primary osteoarthritis of left knee   • Lumbar facet arthropathy   • Arthropathy of cervical facet joint   • Neck pain, chronic   • Chronic bilateral low back pain   • Mid back pain   • Chronic pain syndrome   • Observed sleep apnea   • Snoring   • Class 1 obesity   • Non-restorative sleep   • Polycythemia   • Primary insomnia   • GERD (gastroesophageal reflux disease)        Past Medical History:   Diagnosis Date   • Anxiety    • Arthritis    • Depression    • Fibromyalgia     DX    • GERD (gastroesophageal reflux disease)    • Hard to intubate     STATES TOOK 30 MINUTES TO BE INTUBATED   • Headache    • Hiatal hernia    • Hypertension    • Irregular heart rate     PVC'S   • Limited joint range of motion     LT KNEE        Past Surgical History:   Procedure Laterality Date   • BREAST BIOPSY Right    • CATARACT EXTRACTION W/ INTRAOCULAR LENS IMPLANT Bilateral    •  SECTION      x 2   • CHOLECYSTECTOMY WITH INTRAOPERATIVE CHOLANGIOGRAM N/A 2017    Procedure: CHOLECYSTECTOMY LAPAROSCOPIC INTRAOPERATIVE CHOLANGIOGRAM;  Surgeon: Demi Madden MD;  Location: [facility] MAIN OR;  Service:    • HYSTERECTOMY     • MEDIAL BRANCH BLOCK Bilateral 2021    Procedure: Bilateral L4-S1 MEDIAL BRANCH BLOCK;  Surgeon: Mia Chicas MD;  Location: [facility] MAIN OR;  Service: Pain Management;  Laterality: Bilateral;   • MEDIAL BRANCH BLOCK Bilateral 2021    Procedure: Bilateral cervical MEDIAL BRANCH BLOCK at approximatley C4-C6;  Surgeon: Mia Chicas MD;  Location: [facility] MAIN OR;  Service: Pain Management;   Laterality: Bilateral;   • REPLACEMENT TOTAL KNEE Right    • ROTATOR CUFF REPAIR Right 2001   • TOTAL KNEE ARTHROPLASTY Left 7/22/2019    Procedure: LEFT TOTAL KNEE ARTHROPLASTY;  Surgeon: Sam Bustamante MD;  Location: [facility] OR;  Service: Orthopedics   • TOTAL SHOULDER ARTHROPLASTY W/ DISTAL CLAVICLE EXCISION Left 7/5/2018    Procedure: Reverse Total Shoulder Arthroplsty for fracture;  Surgeon: Louis Prasad MD;  Location: [facility] OR;  Service: Orthopedics   • TUBAL ABDOMINAL LIGATION  1986         Visit Dx:     ICD-10-CM ICD-9-CM   1. Oropharyngeal dysphagia  R13.12 787.22        Patient History     Row Name 03/08/23 1400             History    Chief Complaint --  see history form  -KA            User Key  (r) = Recorded By, (t) = Taken By, (c) = Cosigned By    Initials Name Provider Type    Javier Connors MA,[facility]-SLP Speech and Language Pathologist                 OP SLP Assessment/Plan - 03/08/23 1424        SLP Assessment    Functional Problems Swallowing  -KA    Impact on Function: Swallowing Risk of aspiration;Risk of pneumonia  -KA    Clinical Impression: Swallowing WNL;esophageal dysphagia  -KA    Please refer to paper survey for additional self-reported information Yes  -KA    Please refer to items scanned into chart for additional diagnostic informaiton and handouts as provided by clinician Yes  -KA       SLP Plan    Plan Comments Ed  -KA          User Key  (r) = Recorded By, (t) = Taken By, (c) = Cosigned By    Initials Name Provider Type    Javier Connors MA,[facility]-SLP Speech and Language Pathologist               SLP Swallow Evaluation - 03/08/23 1400        Swallow Assessment/Intervention    Document Type evaluation  -KA    Subjective Information no complaints  -KA    Patient Observations alert;cooperative  -KA    Patient Effort good  -KA    Symptoms Noted During/After Treatment none  -KA       General Information    Patient Profile Reviewed yes  -KA    Pertinent History Of Current  Problem Patient referred for outpatient VFSS due to c/o difficulty swallowing. She reports once or twice a week she coughs and chokes on solids. No consistency on texture or type of solid.  Patient reports history of reflux and hiatal hernia.  -KA    Precautions/Limitations, Vision WFL;difficult to assess  -KA    Precautions/Limitations, Hearing WFL;difficult to assess  -KA          User Key  (r) = Recorded By, (t) = Taken By, (c) = Cosigned By    Initials Name Provider Type    Javier Connors MA,[facility]-SLP Speech and Language Pathologist                 SLP Adult Swallow Evaluation     Row Name 03/08/23 1400       MBS/VFSS    Utensils Used spoon;cup;straw  -KA    Consistencies Trialed regular textures;soft to chew textures;chopped;mixed consistency;pureed;thin liquids  -ALMAZ       MBS/VFSS Interpretation    VFSS Summary Radiologist Dr Thorne present: Patient demonstrates a normal oropharyngeal swallow with adequate epiglottic deflection, hyolaryngeal excursion and elevation. No penetration/aspiration on all tested consistencies. Patient demonstrated trace BOT/vallaculae residue with all solids, and mild to moderate vallacule/pyriform sinus residue on second bite of cracker. Cleared with spontaneous second swallow and liquid wash. Trace to mild statis upper esophagus with puree at times. Esophageal scan revealed reflux with solids, multiple sips of thin liquids required to assist with clearing.  -KA       Esophageal Phase    Esophageal Phase esophageal retention;esophageal retention with retrograde flow below PES  -ALMAZ       SLP Communication to Radiology    Summary Statement Radiologist Dr Thorne present: Patient demonstrates a normal oropharyngeal swallow with adequate epiglottic deflection, hyolaryngeal excursion and elevation. No penetration/aspiration on all tested consistencies. Patient demonstrated trace BOT/vallaculae residue with all solids, and mild to moderate vallacule/pyriform sinus residue on second  bite of cracker. Cleared with spontaneous second swallow and liquid wash. Trace to mild statis upper esophagus with puree at times. Esophageal scan revealed reflux with solids, multiple sips of thin liquids required to assist with clearing.  -       SLP Evaluation Clinical Impression    SLP Swallowing Diagnosis swallow WFL/no suspected pharyngeal impairment;esophageal dysphagia  -KA    Functional Impact no impact on function  -    Swallow Criteria for Skilled Therapeutic Interventions Met no problems identified which require skilled intervention  -       Recommendations    Therapy Frequency (Swallow) evaluation only  -    SLP Diet Recommendation regular textures;thin liquids  -KA    Recommended Precautions and Strategies multiple swallows per bite of food;alternate between small bites of food and sips of liquid  -KA    Oral Care Recommendations Oral Care BID/PRN  -KA    SLP Rec. for Method of Medication Administration meds whole;as tolerated  -KA    Monitor for Signs of Aspiration yes;notify SLP if any concerns  -    Anticipated Discharge Disposition (SLP) home  -KA    Demonstrates Need for Referral to Another Service gastroenterology  -          User Key  (r) = Recorded By, (t) = Taken By, (c) = Cosigned By    Initials Name Provider Type    Javier Connors MA,[facility]-SLP Speech and Language Pathologist                               OP SLP Education     Row Name 03/08/23 5244       Education    Barriers to Learning No barriers identified  -    Education Provided Described results of evaluation;Patient expressed understanding of evaluation  -    Assessed Learning needs;Learning motivation  -KA    Learning Motivation Strong  -    Learning Method Explanation  -    Teaching Response Verbalized understanding  -    Education Comments Educated patient on results including showing images. Discussed recommend multiple swallows with dry solid consistencies and alternating liquids and solids and referral  to GI.  -ALMAZ          User Key  (r) = Recorded By, (t) = Taken By, (c) = Cosigned By    Initials Name Effective Dates    Javier Connors MA,CCC-SLP 06/02/22 -                          Time Calculation:   SLP Start Time: 1515  Untimed Charges  SLP Eval/Re-eval : ST Motion Fluoro Eval Swallow - 11349  06914-[place] Motion Fluoro Eval Swallow Minutes: 75  Total Minutes  Untimed Charges Total Minutes: 75   Total Minutes: 75    Therapy Charges for Today     Code Description Service Date Service Provider Modifiers Qty    [id number]  ST MOTION FLUORO EVAL SWALLOW 5 3/8/2023 Javier Nieves MA,CCC-SLP GN 1                   Javier Nieves MA,CCC-SLP  3/8/2023

## 2023-03-10 ENCOUNTER — TELEPHONE (OUTPATIENT)
Dept: FAMILY MEDICINE CLINIC | Facility: CLINIC | Age: 73
End: 2023-03-10

## 2023-03-10 NOTE — TELEPHONE ENCOUNTER
"  Caller: LouisClarissa gonzales I \"Cathy\"    Relationship: Self    Best call back number: [phone]     What is the best time to reach you: ANY    Who are you requesting to speak with (clinical staff, provider,  specific staff member): CLINICAL    What was the call regarding: PATIENT RECEIVED A CALL FROM THE OFFICE TO CHANGE AN APPOINTMENT WITH DR. GUY TO [facility] STAFF UNABLE TO ASSIST DUE TO CHART BEING ACCESSED BY MIGUEL HATHAWAY, ROSSI WT BUT NO ANSWER.    Do you require a callback: YES          "

## 2023-03-13 ENCOUNTER — OFFICE VISIT (OUTPATIENT)
Dept: FAMILY MEDICINE CLINIC | Facility: CLINIC | Age: 73
End: 2023-03-13
Payer: MEDICARE

## 2023-03-13 VITALS
HEART RATE: 64 BPM | OXYGEN SATURATION: 97 % | TEMPERATURE: 96.9 F | DIASTOLIC BLOOD PRESSURE: 73 MMHG | BODY MASS INDEX: 32.95 KG/M2 | HEIGHT: 63 IN | SYSTOLIC BLOOD PRESSURE: 129 MMHG

## 2023-03-13 DIAGNOSIS — H61.22 EXCESSIVE CERUMEN IN LEFT EAR CANAL: ICD-10-CM

## 2023-03-13 DIAGNOSIS — H69.83 EUSTACHIAN TUBE DYSFUNCTION, BILATERAL: Primary | ICD-10-CM

## 2023-03-13 DIAGNOSIS — I10 ESSENTIAL HYPERTENSION: Chronic | ICD-10-CM

## 2023-03-13 DIAGNOSIS — K21.9 GASTROESOPHAGEAL REFLUX DISEASE, UNSPECIFIED WHETHER ESOPHAGITIS PRESENT: Chronic | ICD-10-CM

## 2023-03-13 DIAGNOSIS — R71.8 ELEVATED HEMATOCRIT: Chronic | ICD-10-CM

## 2023-03-13 PROCEDURE — 99214 OFFICE O/P EST MOD 30 MIN: CPT | Performed by: NURSE PRACTITIONER

## 2023-03-13 PROCEDURE — 69209 REMOVE IMPACTED EAR WAX UNI: CPT | Performed by: NURSE PRACTITIONER

## 2023-03-13 RX ORDER — ERYTHROMYCIN 5 MG/G
OINTMENT OPHTHALMIC
COMMUNITY
Start: 2023-03-01

## 2023-03-13 NOTE — PROGRESS NOTES
Chief Complaint  Earache (C/o earache both sides, more painful on R side, mild sore throat, nasal drainage, teeth are sensitive on R side, headaches, x 1month )    Subjective        Clarissa Matos presents to [facility]  History of Present Illness     The patient presents today for right ear pain. She has consented to the use of JIMENA.    The patient reports right ear pain for a month. It has not worsened. She has used heat. The pain occasionally radiates into her jaw area. At times she has headaches with it. She uses Flonase every night. The patient also takes Claritin. She states she is experiencing diaphoresis and body odor. The patient uses deodorant at night and has to wash again in the morning.    She shortness of breath. She has a mild cough. The patient denies nausea, vomiting, or diarrhea. She adds that once last week she ate chicken salad and cream of potato soup while out and was sick afterwards. The patient experiences allergy symptoms. Sudafed makes her sleepy. The patient is using Mucinex. She puts her head down when she uses the Flonase. The patient reports previously having a drop of blood in her ear when she was cleaning up with a Q-tip too deep. She would like to have the cerumen removed in our office today.    The patient is taking a supplement for fibromyalgia and asks for my opinion. She states she has not taken it enough to know if it is helping.     The patient had a sleep study performed. It was negative.     Her swallow study was completed and she was told they were going to refer her to a gastroenterologist. The patient is still having the same issues. She is still taking omeprazole for acid reflux.    The patient reports would like to wait to have laboratory tests done.    The Review of Systems has been reviewed and is negative other than what has been discussed within the HPI.    Objective   Vital Signs:  /73   Pulse 64   Temp 96.9 °F (36.1 °C)   Ht  "160 cm (63\")   SpO2 97%   BMI 32.95 kg/m²   Estimated body mass index is 32.95 kg/m² as calculated from the following:    Height as of this encounter: 160 cm (63\").    Weight as of 11/30/22: 84.4 kg (186 lb).       BMI is >= 30 and <35. (Class 1 Obesity). The following options were offered after discussion;: weight loss educational material (shared in after visit summary)      Physical Exam  Vitals and nursing note reviewed.   Constitutional:       General: She is not in acute distress.     Appearance: She is well-developed. She is not diaphoretic.   HENT:      Head: Normocephalic and atraumatic.      Ears:      Comments: Cerumen noted in left canal. Bilateral TMs not erythematous are dull      Mouth/Throat:      Mouth: Mucous membranes are moist.      Pharynx: Posterior oropharyngeal erythema (PND) present.   Eyes:      General:         Right eye: No discharge.         Left eye: No discharge.      Conjunctiva/sclera: Conjunctivae normal.   Cardiovascular:      Rate and Rhythm: Normal rate and regular rhythm.   Pulmonary:      Effort: Pulmonary effort is normal.      Breath sounds: Normal breath sounds.   Abdominal:      General: Bowel sounds are normal.      Palpations: Abdomen is soft.      Tenderness: There is no abdominal tenderness.   Musculoskeletal:         General: No deformity.      Cervical back: Neck supple.      Comments: Gait smooth and steady   Lymphadenopathy:      Cervical: No cervical adenopathy.   Skin:     General: Skin is warm and dry.   Neurological:      Mental Status: She is alert and oriented to person, place, and time.   Psychiatric:         Mood and Affect: Mood normal.         Behavior: Behavior normal.        Result Review :          No results were reviewed or obtained today.         Assessment and Plan   Diagnoses and all orders for this visit:    1. Eustachian tube dysfunction, bilateral (Primary)    2. Gastroesophageal reflux disease, unspecified whether esophagitis present  -     " Ambulatory Referral to Gastroenterology    3. Excessive cerumen in left ear canal    4. Essential hypertension    5. Elevated hematocrit    Eustachian tube dysfunction likely from allergies.  She will increase Flonase to twice daily for 3 days, then decrease to once daily. Recommended Sudafed for 2 to 3 days. She will contact the office if her symptoms do not improve. The patient was advised to push fluids.     Left ear canal disimpacted by MA using lavage, patient tolerated well.    GERD: We will continue PPI.  Reviewed swallow study and have referred to gastro for further recommendations.    Hypertension  She will continue taking candesartan-hydrochlorothiazide. Blood pressure is good today.    Elevated hematocrit: I suspected it was due to sleep apnea.  Patient had not followed up after sleep study was negative-I reviewed sleep study and provider note.  This will need further investigation and will have her schedule a follow-up so we can further discuss and evaluate this.  Patient did say today she has excessive sweating also.           Follow Up   Return in about 1 month (around 4/13/2023) for Recheck.   Follow up at end of 04/2023.    Patient was given instructions and counseling regarding her condition or for health maintenance advice. Please see specific information pulled into the AVS if appropriate.       Transcribed from ambient dictation for RUBY Baig by Deidra Stewart.  03/13/23   13:05 EDT    Patient or patient representative verbalized consent to the visit recording.  I have personally performed the services described in this document as transcribed by the above individual, and it is both accurate and complete.

## 2023-04-28 ENCOUNTER — PREP FOR SURGERY (OUTPATIENT)
Dept: SURGERY | Facility: SURGERY CENTER | Age: 73
End: 2023-04-28
Payer: COMMERCIAL

## 2023-04-28 ENCOUNTER — OFFICE VISIT (OUTPATIENT)
Dept: GASTROENTEROLOGY | Facility: CLINIC | Age: 73
End: 2023-04-28
Payer: MEDICARE

## 2023-04-28 VITALS
HEIGHT: 63 IN | OXYGEN SATURATION: 95 % | SYSTOLIC BLOOD PRESSURE: 124 MMHG | TEMPERATURE: 97.5 F | BODY MASS INDEX: 32.69 KG/M2 | HEART RATE: 93 BPM | WEIGHT: 184.5 LBS | DIASTOLIC BLOOD PRESSURE: 74 MMHG

## 2023-04-28 DIAGNOSIS — K21.9 GASTROESOPHAGEAL REFLUX DISEASE, UNSPECIFIED WHETHER ESOPHAGITIS PRESENT: ICD-10-CM

## 2023-04-28 DIAGNOSIS — R10.13 EPIGASTRIC PAIN: ICD-10-CM

## 2023-04-28 DIAGNOSIS — Z12.11 COLON CANCER SCREENING: ICD-10-CM

## 2023-04-28 DIAGNOSIS — K21.9 GASTROESOPHAGEAL REFLUX DISEASE, UNSPECIFIED WHETHER ESOPHAGITIS PRESENT: Chronic | ICD-10-CM

## 2023-04-28 DIAGNOSIS — R13.10 DYSPHAGIA, UNSPECIFIED TYPE: Primary | Chronic | ICD-10-CM

## 2023-04-28 DIAGNOSIS — R13.10 DYSPHAGIA, UNSPECIFIED TYPE: Primary | ICD-10-CM

## 2023-04-28 RX ORDER — SODIUM CHLORIDE 0.9 % (FLUSH) 0.9 %
10 SYRINGE (ML) INJECTION AS NEEDED
OUTPATIENT
Start: 2023-04-28

## 2023-04-28 RX ORDER — SODIUM CHLORIDE 0.9 % (FLUSH) 0.9 %
3 SYRINGE (ML) INJECTION EVERY 12 HOURS SCHEDULED
OUTPATIENT
Start: 2023-04-28

## 2023-04-28 RX ORDER — OMEPRAZOLE 40 MG/1
40 CAPSULE, DELAYED RELEASE ORAL DAILY
Qty: 90 CAPSULE | Refills: 3 | Status: SHIPPED | OUTPATIENT
Start: 2023-04-28

## 2023-04-28 RX ORDER — SODIUM CHLORIDE, SODIUM LACTATE, POTASSIUM CHLORIDE, CALCIUM CHLORIDE 600; 310; 30; 20 MG/100ML; MG/100ML; MG/100ML; MG/100ML
30 INJECTION, SOLUTION INTRAVENOUS CONTINUOUS PRN
OUTPATIENT
Start: 2023-04-28

## 2023-04-28 NOTE — PATIENT INSTRUCTIONS
For GERD we will have you discontinue omeprazole 20 mg and we will increase your dosing to omeprazole 40 mg once daily. New prescription has been sent to your pharmacy. We recommend that you take this medication 1-2  hours before your evening meal.     2.   For further evaluation of dysphagia and GERD we will schedule an EGD.     3.  For colon cancer screening we have ordered ColoGuard testing. We recommend that you have this testing done prior to your EGD so that if were to be positive, we could add a colonoscopy to your procedure.     4.  Plan for office follow up 4 weeks after procedures to discuss results and reassess symptoms.     5.   We recommend swallowing precautions such as remaining upright when eating and after meals, eating slowly and chewing food thoroughly. We also recommend taking sips of water/fluid between bites of food.

## 2023-04-28 NOTE — PROGRESS NOTES
Chief Complaint   Patient presents with   • Difficulty Swallowing   • Heartburn   • Colon Cancer Screening         History of Present Illness  72-year-old female presents the office today for evaluation of GERD and dysphagia.  Her  has accompanied her to her office visit today.  She is currently taking omeprazole 20 mg once daily.Sometimes she will take 2 tabs daily for worsening symptoms.  She underwent video swallow study on 3/8/2023 revealing a trace of vallecular residue with all solids and mild to moderate vallecula/piriform sinus residue on second bite of a cracker.  Food clear with spontaneous second swallow and liquid wash.  There is a trace to mild stasis in the upper esophagus with puréed foods at times.  Patient was recommended to take multiple sips of thin liquids to assist with clearing food.    When she swallows she feels at times it is difficult to get food down. Symptoms have been ongoing for a couple of years. She has had to regurgitate food, but not very often, including chicken salad most recently. Any types of food can cause symptoms. She reports epigastric pain and reflux at least once per week. She reports that symptoms can occur anytime during the day but are generally more prominent at night. At times she will get a sharp pain in her epigastric pain and pain will gradually go away. She has never had an EGD. She denies any nausea or vomiting. She reports some weight gain.  She reports a history of esophageal dilation in her father due to dysphagia.    She has never had a colonoscopy. She reports having a ColoGuard test greater than 3 years ago which was negative. She reports a family history of colon polyps in her father. She denies any family history of colon cancer. She reports having regular daily BMs. She eats prunes regularly. She denies any abdominal pain or rectal bleeding. She has had a cholecystectomy about 10 yrs ago.     Review of Systems   Constitutional: Positive for  "unexpected weight change. Negative for fever.   HENT: Positive for trouble swallowing.    Cardiovascular: Negative for chest pain.   Gastrointestinal: Positive for abdominal pain. Negative for abdominal distention, anal bleeding, blood in stool, constipation, diarrhea, nausea, rectal pain and vomiting.      Result Review :       FL Video Swallow With Speech Single Contrast (03/08/2023 13:39)  Comprehensive Metabolic Panel (11/30/2022 15:49)  CBC & Differential (11/30/2022 15:49)    Vital Signs:   /74   Pulse 93   Temp 97.5 °F (36.4 °C)   Ht 160 cm (63\")   Wt 83.7 kg (184 lb 8 oz)   SpO2 95%   BMI 32.68 kg/m²     Body mass index is 32.68 kg/m².     Physical Exam  Vitals reviewed.   Constitutional:       Appearance: Normal appearance.   HENT:      Head: Normocephalic.      Nose: Nose normal.      Mouth/Throat:      Mouth: Mucous membranes are moist.   Eyes:      General: No scleral icterus.     Extraocular Movements: Extraocular movements intact.   Cardiovascular:      Rate and Rhythm: Normal rate and regular rhythm.      Pulses: Normal pulses.      Heart sounds: Normal heart sounds.   Pulmonary:      Effort: Pulmonary effort is normal. No respiratory distress.      Breath sounds: Normal breath sounds.   Abdominal:      General: Abdomen is flat. Bowel sounds are normal. There is no distension.      Palpations: Abdomen is soft. There is no mass.      Tenderness: There is no abdominal tenderness. There is no guarding.   Musculoskeletal:         General: Normal range of motion.      Cervical back: Normal range of motion and neck supple.   Skin:     General: Skin is warm and dry.      Coloration: Skin is not jaundiced.   Neurological:      General: No focal deficit present.      Mental Status: She is alert and oriented to person, place, and time.   Psychiatric:         Mood and Affect: Mood normal.         Behavior: Behavior normal.         Thought Content: Thought content normal.         Judgment: Judgment " normal.       Assessment and Plan    Diagnoses and all orders for this visit:    1. Dysphagia, unspecified type (Primary)    2. Gastroesophageal reflux disease, unspecified whether esophagitis present    3. Colon cancer screening  -     Cologuard - Stool, Per Rectum; Future    Other orders  -     omeprazole (priLOSEC) 40 MG capsule; Take 1 capsule by mouth Daily.  Dispense: 90 capsule; Refill: 3           Patient Instructions   1. For GERD we will have you discontinue omeprazole 20 mg and we will increase your dosing to omeprazole 40 mg once daily. New prescription has been sent to your pharmacy. We recommend that you take this medication 1-2  hours before your evening meal.     2.   For further evaluation of dysphagia and GERD we will schedule an EGD.     3.  For colon cancer screening we have ordered ColoGuard testing. We recommend that you have this testing done prior to your EGD so that if were to be positive, we could add a colonoscopy to your procedure.     4.  Plan for office follow up 4 weeks after procedures to discuss results and reassess symptoms.     5.   We recommend swallowing precautions such as remaining upright when eating and after meals, eating slowly and chewing food thoroughly. We also recommend taking sips of water/fluid between bites of food.      Discussion:    Due to worsening GERD symptoms we will increase the patient's omeprazole from 20 to 40 mg daily and have her change the timing of her medication and take this 1 to 2 hours before her evening meal.  We will proceed with EGD for further evaluation of GERD and dysphagia.  Patient may require empiric dilation.  This seems to be a familial symptom, as her father has undergone esophageal dilation in the past.    Patient's previous Cologuard test was more than 3 years ago.  We will order Cologuard testing.  If positive we will plan to add a colonoscopy to the patient's procedure, if negative we will plan for Cologuard again in 3 years.  Plan  for office follow-up 4 weeks after EGD to discuss results and reassess symptoms.  Both the patient and her  verbalized understanding of above plan of care and are in agreement.  All questions answered and support provided.  EMR Dragon/Transcription Disclaimer:  This document has been Dictated utilizing Dragon dictation.

## 2023-05-01 PROBLEM — R13.10 DYSPHAGIA: Status: ACTIVE | Noted: 2023-05-01

## 2023-05-01 PROBLEM — R10.13 EPIGASTRIC PAIN: Status: ACTIVE | Noted: 2023-05-01

## 2023-05-22 ENCOUNTER — TELEPHONE (OUTPATIENT)
Dept: GASTROENTEROLOGY | Facility: CLINIC | Age: 73
End: 2023-05-22
Payer: MEDICARE

## 2023-05-23 ENCOUNTER — PREP FOR SURGERY (OUTPATIENT)
Dept: SURGERY | Facility: SURGERY CENTER | Age: 73
End: 2023-05-23
Payer: MEDICARE

## 2023-05-23 DIAGNOSIS — Z12.11 COLON CANCER SCREENING: ICD-10-CM

## 2023-05-23 DIAGNOSIS — R13.10 DYSPHAGIA, UNSPECIFIED TYPE: Primary | ICD-10-CM

## 2023-05-23 DIAGNOSIS — R19.5 POSITIVE COLORECTAL CANCER SCREENING USING COLOGUARD TEST: ICD-10-CM

## 2023-05-23 DIAGNOSIS — R10.13 EPIGASTRIC PAIN: ICD-10-CM

## 2023-05-23 DIAGNOSIS — K21.9 GASTROESOPHAGEAL REFLUX DISEASE, UNSPECIFIED WHETHER ESOPHAGITIS PRESENT: ICD-10-CM

## 2023-05-23 RX ORDER — SODIUM CHLORIDE 0.9 % (FLUSH) 0.9 %
10 SYRINGE (ML) INJECTION AS NEEDED
OUTPATIENT
Start: 2023-05-23

## 2023-05-23 RX ORDER — SODIUM CHLORIDE, SODIUM LACTATE, POTASSIUM CHLORIDE, CALCIUM CHLORIDE 600; 310; 30; 20 MG/100ML; MG/100ML; MG/100ML; MG/100ML
30 INJECTION, SOLUTION INTRAVENOUS CONTINUOUS PRN
OUTPATIENT
Start: 2023-05-23

## 2023-05-23 RX ORDER — SODIUM CHLORIDE 0.9 % (FLUSH) 0.9 %
3 SYRINGE (ML) INJECTION EVERY 12 HOURS SCHEDULED
OUTPATIENT
Start: 2023-05-23

## 2023-05-25 ENCOUNTER — OFFICE VISIT (OUTPATIENT)
Dept: FAMILY MEDICINE CLINIC | Facility: CLINIC | Age: 73
End: 2023-05-25

## 2023-05-25 VITALS
BODY MASS INDEX: 32.25 KG/M2 | WEIGHT: 182 LBS | HEART RATE: 84 BPM | SYSTOLIC BLOOD PRESSURE: 113 MMHG | HEIGHT: 63 IN | DIASTOLIC BLOOD PRESSURE: 72 MMHG | TEMPERATURE: 97.5 F

## 2023-05-25 DIAGNOSIS — R73.03 PREDIABETES: ICD-10-CM

## 2023-05-25 DIAGNOSIS — Z00.00 MEDICARE ANNUAL WELLNESS VISIT, SUBSEQUENT: Primary | ICD-10-CM

## 2023-05-25 DIAGNOSIS — N89.8 VAGINAL DISCHARGE: ICD-10-CM

## 2023-05-25 DIAGNOSIS — M79.89 LEG SWELLING: ICD-10-CM

## 2023-05-25 DIAGNOSIS — I10 ESSENTIAL HYPERTENSION: Chronic | ICD-10-CM

## 2023-05-25 DIAGNOSIS — D75.1 POLYCYTHEMIA: ICD-10-CM

## 2023-05-25 DIAGNOSIS — E78.5 DYSLIPIDEMIA: ICD-10-CM

## 2023-05-25 RX ORDER — CANDESARTAN CILEXETIL AND HYDROCHLOROTHIAZIDE 16; 12.5 MG/1; MG/1
1 TABLET ORAL DAILY
Qty: 90 TABLET | Refills: 0 | Status: SHIPPED | OUTPATIENT
Start: 2023-05-25

## 2023-05-25 RX ORDER — HYDROCHLOROTHIAZIDE 12.5 MG/1
12.5 TABLET ORAL DAILY PRN
Qty: 30 TABLET | Refills: 1 | Status: SHIPPED | OUTPATIENT
Start: 2023-05-25

## 2023-05-25 NOTE — PROGRESS NOTES
The ABCs of the Annual Wellness Visit  Subsequent Medicare Wellness Visit    Subjective    Clarissa Matos is a 72 y.o. female who presents for a Subsequent Medicare Wellness Visit.    The following portions of the patient's history were reviewed and   updated as appropriate: allergies, current medications, past family history, past medical history, past social history, past surgical history and problem list.    Compared to one year ago, the patient feels her physical   health is the same.    Compared to one year ago, the patient feels her mental   health is the same.    Recent Hospitalizations:  She was not admitted to the hospital during the last year.       Current Medical Providers:  Patient Care Team:  Judy Kaur APRN as PCP - General (Nurse Practitioner)    Outpatient Medications Prior to Visit   Medication Sig Dispense Refill   • acetaminophen (TYLENOL) 325 MG tablet Take 2 tablets by mouth Every 4 (Four) Hours As Needed for Fever (greater than 101 F).     • albuterol sulfate HFA (ProAir HFA) 108 (90 Base) MCG/ACT inhaler Inhale 2 puffs Every 4 (Four) Hours As Needed for Wheezing or Shortness of Air. 18 g 0   • aspirin-acetaminophen-caffeine (EXCEDRIN MIGRAINE) 250-250-65 MG per tablet Take 1 tablet by mouth Every 6 (Six) Hours As Needed for Headache. May resume on 07/07/18.     • CALCIUM PO Take  by mouth.     • Cholecalciferol (VITAMIN D3) 5000 units capsule capsule Take 1 capsule by mouth Every Night.     • diclofenac (VOLTAREN) 1 % gel gel Apply 4 g topically to the appropriate area as directed 4 (Four) Times a Day As Needed.     • docusate sodium 100 MG capsule Take 100 mg by mouth 2 (Two) Times a Day. (Patient taking differently: Take 1 capsule by mouth As Needed.)     • DULoxetine (CYMBALTA) 60 MG capsule      • erythromycin (ROMYCIN) 5 MG/GM ophthalmic ointment      • fexofenadine (ALLEGRA) 180 MG tablet      • guaiFENesin 200 MG tablet      • HYDROcodone-acetaminophen (NORCO)  MG per  tablet Take 1-2 tablets daily prn     • ketotifen (ZADITOR) 0.025 % ophthalmic solution      • LORazepam (ATIVAN) 0.5 MG tablet Take 1 tablet by mouth 2 (Two) Times a Day As Needed for Anxiety.     • MAGNESIUM PO Take  by mouth.     • Morphine (MS CONTIN) 15 MG 12 hr tablet Take 1 tablet by mouth Every 12 (Twelve) Hours.     • NON FORMULARY Fibroprin supplement     • omeprazole (priLOSEC) 40 MG capsule Take 1 capsule by mouth Daily. 90 capsule 3   • zolpidem (AMBIEN) 10 MG tablet Take 1 tablet by mouth At Night As Needed for Sleep.     • candesartan-hydrochlorothiazide (ATACAND HCT) 16-12.5 MG per tablet TAKE 1 TABLET BY MOUTH DAILY 90 tablet 0     Facility-Administered Medications Prior to Visit   Medication Dose Route Frequency Provider Last Rate Last Admin   • mupirocin (BACTROBAN) 2 % nasal ointment   Nasal BID Sam Bustamante MD           Opioid medication/s are on active medication list.  and I have evaluated her active treatment plan and pain score trends (see table).  There were no vitals filed for this visit.  I have reviewed the chart for potential of high risk medication and harmful drug interactions in the elderly.            Aspirin is not on active medication list.  Aspirin use is not indicated based on review of current medical condition/s. Risk of harm outweighs potential benefits.  .    Patient Active Problem List   Diagnosis   • Cholecystitis   • Fibromyalgia   • Essential hypertension   • Closed fracture of upper end of humerus   • Rotator cuff arthropathy   • Primary osteoarthritis of left knee   • Lumbar facet arthropathy   • Arthropathy of cervical facet joint   • Neck pain, chronic   • Chronic bilateral low back pain   • Mid back pain   • Chronic pain syndrome   • Observed sleep apnea   • Snoring   • Class 1 obesity   • Non-restorative sleep   • Polycythemia   • Primary insomnia   • GERD (gastroesophageal reflux disease)   • Dysphagia   • Epigastric pain     Advance Care Planning   Advance Care  "Planning     Advance Directive is on file.  ACP discussion was held with the patient during this visit. Patient has an advance directive in EMR which is still valid.      Objective    Vitals:    23 1316   BP: 113/72   Pulse: 84   Temp: 97.5 °F (36.4 °C)   Weight: 82.6 kg (182 lb)   Height: 160 cm (63\")     Estimated body mass index is 32.24 kg/m² as calculated from the following:    Height as of this encounter: 160 cm (63\").    Weight as of this encounter: 82.6 kg (182 lb).    BMI is >= 30 and <35. (Class 1 Obesity). The following options were offered after discussion;: weight loss educational material (shared in after visit summary)      Does the patient have evidence of cognitive impairment? Yes          HEALTH RISK ASSESSMENT    Smoking Status:  Social History     Tobacco Use   Smoking Status Never   Smokeless Tobacco Never     Alcohol Consumption:  Social History     Substance and Sexual Activity   Alcohol Use No     Fall Risk Screen:    STEADI Fall Risk Assessment was completed, and patient is at LOW risk for falls.Assessment completed on:2023    Depression Screenin/25/2023     4:00 PM   PHQ-2/PHQ-9 Depression Screening   Little Interest or Pleasure in Doing Things 0-->not at all   Feeling Down, Depressed or Hopeless 0-->not at all   Feeling Tired or Having Little Energy 1-->several days   PHQ-9: Brief Depression Severity Measure Score 1   If You Checked Off Any Problems, How Difficult Have These Problems Made It For You to Do Your Work, Take Care of Things at Home, or Get Along with Other People? somewhat difficult       Health Habits and Functional and Cognitive Screenin/25/2023     1:00 PM   Functional & Cognitive Status   Do you have difficulty preparing food and eating? No   Do you have difficulty bathing yourself, getting dressed or grooming yourself? No   Do you have difficulty using the toilet? No   Do you have difficulty moving around from place to place? No   Do you have " trouble with steps or getting out of a bed or a chair? No   Current Diet Well Balanced Diet   Dental Exam Up to date   Eye Exam Up to date   Exercise (times per week) 3 times per week   Current Exercises Include Walking   Do you need help using the phone?  No   Are you deaf or do you have serious difficulty hearing?  No   Do you need help with transportation? No   Do you need help shopping? No   Do you need help preparing meals?  No   Do you need help with housework?  No   Do you need help with laundry? No   Do you need help taking your medications? No   Do you need help managing money? No   Do you ever drive or ride in a car without wearing a seat belt? No   Have you felt unusual stress, anger or loneliness in the last month? No   Who do you live with? Spouse   If you need help, do you have trouble finding someone available to you? No   Do you have difficulty concentrating, remembering or making decisions? No       Age-appropriate Screening Schedule:  Refer to the list below for future screening recommendations based on patient's age, sex and/or medical conditions. Orders for these recommended tests are listed in the plan section. The patient has been provided with a written plan.    Health Maintenance   Topic Date Due   • HEPATITIS C SCREENING  Never done   • COVID-19 Vaccine (2 - Booster for Ariadna series) 06/02/2021   • Pneumococcal Vaccine 65+ (2 - PCV) 04/21/2023   • COLORECTAL CANCER SCREENING  05/24/2023   • MAMMOGRAM  06/25/2023   • INFLUENZA VACCINE  08/01/2023   • LIPID PANEL  11/30/2023   • ANNUAL WELLNESS VISIT  05/25/2024   • DXA SCAN  02/21/2025   • TDAP/TD VACCINES (2 - Td or Tdap) 12/01/2032   • ZOSTER VACCINE  Completed                  CMS Preventative Services Quick Reference  Risk Factors Identified During Encounter  Depression/Dysphoria: Elevated PHQ score reflective of other stress or illness, not depression  Inactivity/Sedentary: Patient was advised to exercise at least 150 minutes a week  per CDC recommendations. and as much as she can tolerate  Polypharmacy: Medication List reviewed  The above risks/problems have been discussed with the patient.  Pertinent information has been shared with the patient in the After Visit Summary.  An After Visit Summary and PPPS were made available to the patient.    Follow Up:   Next Medicare Wellness visit to be scheduled in 1 year.       Additional E&M Note during same encounter follows:  Patient has multiple medical problems which are significant and separately identifiable that require additional work above and beyond the Medicare Wellness Visit.      Chief Complaint  Annual Exam (AWV)    Subjective        HPI  Clarissa Matos is also being seen today for upcoming EGD/CRC for positive cologuard.     Fishy odor from vagina-tried monistat and did not work-has capsules nightly OTC which also not working.   No bathes, bothering her most of her life.  Never had dx other than chronic yeast infections.  Not followed by GYN recently for this.   Dx with plantar fasciitis, bunion-keeps her off feet more than back and shoulder pain does.      She has had chronic B/l feet swelling which she thinks is worsening.   Compression stockings helpful-but are really hot during summer and is not able to wear them.  Does elevated when sitting during day.     Has basal cell on left anterior lower leg-followed by derm.     Seeing ENT June for right ear pain worsened at dentist.              Review of Systems   Constitutional: Positive for fatigue.   HENT: Positive for ear pain. Negative for hearing loss and trouble swallowing.    Respiratory: Negative for cough and shortness of breath.    Cardiovascular: Negative for chest pain and palpitations.   Gastrointestinal: Negative for abdominal pain.   Musculoskeletal: Positive for arthralgias, back pain, gait problem, joint swelling and myalgias.   Allergic/Immunologic: Positive for environmental allergies.   Neurological: Negative for  "weakness.   Psychiatric/Behavioral: Positive for dysphoric mood and sleep disturbance. The patient is nervous/anxious.        Objective   Vital Signs:  /72   Pulse 84   Temp 97.5 °F (36.4 °C)   Ht 160 cm (63\")   Wt 82.6 kg (182 lb)   BMI 32.24 kg/m²     Physical Exam  Vitals and nursing note reviewed.   Constitutional:       General: She is not in acute distress.     Appearance: She is well-developed. She is not ill-appearing.   HENT:      Head: Normocephalic and atraumatic.      Right Ear: Ear canal and external ear normal.      Left Ear: Ear canal and external ear normal.      Ears:      Comments: B/l TM slightly dull-no bulge, erythema     Mouth/Throat:      Mouth: Mucous membranes are moist.      Pharynx: Uvula midline. No posterior oropharyngeal erythema.   Eyes:      General: No scleral icterus.        Right eye: No discharge.         Left eye: No discharge.      Conjunctiva/sclera: Conjunctivae normal.   Neck:      Thyroid: No thyromegaly.   Cardiovascular:      Rate and Rhythm: Normal rate and regular rhythm.      Heart sounds: Normal heart sounds.   Pulmonary:      Effort: Pulmonary effort is normal.      Breath sounds: Normal breath sounds.   Abdominal:      General: Bowel sounds are normal. There is no distension.      Palpations: Abdomen is soft.      Tenderness: There is no abdominal tenderness.   Musculoskeletal:      Cervical back: Neck supple.      Right lower leg: Edema (1+) present.      Left lower leg: Edema (2+) present.      Comments: Gait smooth and steady   Lymphadenopathy:      Cervical: No cervical adenopathy.   Skin:     General: Skin is warm and dry.   Neurological:      General: No focal deficit present.      Mental Status: She is alert and oriented to person, place, and time.   Psychiatric:         Mood and Affect: Mood normal.         Behavior: Behavior normal.                         Assessment and Plan   Diagnoses and all orders for this visit:    1. Medicare annual " wellness visit, subsequent (Primary)    2. Essential hypertension  -     candesartan-hydrochlorothiazide (ATACAND HCT) 16-12.5 MG per tablet; Take 1 tablet by mouth Daily.  Dispense: 90 tablet; Refill: 0  -     Comprehensive Metabolic Panel; Future    3. Leg swelling  -     hydroCHLOROthiazide (HYDRODIURIL) 12.5 MG tablet; Take 1 tablet by mouth Daily As Needed (ankle swelling).  Dispense: 30 tablet; Refill: 1  -     Comprehensive Metabolic Panel; Future    4. Vaginal discharge  -     NuSwab VG+ & HSV    5. Prediabetes  -     Hemoglobin A1c; Future    6. Polycythemia  -     CBC & Differential; Future  -     Ambulatory Referral to Hematology    7. Dyslipidemia  -     Lipid Panel With LDL / HDL Ratio; Future    Appropriate health maintenance and prevention topics specific for this patient were discussed today.  Additionally, health goals, and health concerns addressed as appropriate.  Pt was encouraged to stay up to date on recommended screenings and vaccines based on USPSTF guidelines.     Upcoming EGD/CRC for positive cologuard.      HTN:  BP excellent today, will continue current meds.  I will add additional HCTZ to take as needed for leg swelling.  Side effects discussed.  Her blood pressure is already on the lower side and may make her little orthostatic.  Leg elevation recommended.  Lowering salt in diet.  Compression likely helpful.    Chronic polycythemia.  I suspected sleep apnea which was was recently negative.  I will refer to hematology for further evaluation.     NU swab for vaginal discharge.  Will await results prior to tx.  Sounds like may be BV.          Follow Up   No follow-ups on file.  Patient was given instructions and counseling regarding her condition or for health maintenance advice. Please see specific information pulled into the AVS if appropriate.

## 2023-05-31 LAB
A VAGINAE DNA VAG QL NAA+PROBE: NORMAL SCORE
BVAB2 DNA VAG QL NAA+PROBE: NORMAL SCORE
C ALBICANS DNA VAG QL NAA+PROBE: NEGATIVE
C GLABRATA DNA VAG QL NAA+PROBE: NEGATIVE
C TRACH DNA VAG QL NAA+PROBE: NEGATIVE
HSV1 DNA SPEC QL NAA+PROBE: NEGATIVE
HSV2 DNA SPEC QL NAA+PROBE: NEGATIVE
MEGA1 DNA VAG QL NAA+PROBE: NORMAL SCORE
N GONORRHOEA DNA VAG QL NAA+PROBE: NEGATIVE
T VAGINALIS DNA VAG QL NAA+PROBE: NEGATIVE

## 2023-06-08 ENCOUNTER — TELEPHONE (OUTPATIENT)
Dept: GASTROENTEROLOGY | Facility: CLINIC | Age: 73
End: 2023-06-08
Payer: MEDICARE

## 2023-06-08 NOTE — TELEPHONE ENCOUNTER
Patient called to let us know she took Dulcolax at 1:00 and vomited at 2:30.  Informed patient there was likely enough time between so she doesn't need to repeat the dose.    Offered to send in Zofran but patient reports she doesn't need it as she felt better after she vomited.    She plans to keep the appt tomorrow for the EGD & CLS.  Instructed patient to drink the miralax prep very slowly.  Pk/cc

## 2023-06-08 NOTE — TELEPHONE ENCOUNTER
Patient calls asking if she has to have her nail polish removed prior to upcoming CLS/EGD.  Per KP, she does not have to remove it.

## 2023-06-09 ENCOUNTER — OUTSIDE FACILITY SERVICE (OUTPATIENT)
Dept: GASTROENTEROLOGY | Facility: CLINIC | Age: 73
End: 2023-06-09
Payer: MEDICARE

## 2023-06-09 PROBLEM — R19.5 POSITIVE COLORECTAL CANCER SCREENING USING COLOGUARD TEST: Status: ACTIVE | Noted: 2023-06-09

## 2023-06-09 PROBLEM — Z12.11 COLON CANCER SCREENING: Status: ACTIVE | Noted: 2023-06-09

## 2023-08-23 ENCOUNTER — TELEPHONE (OUTPATIENT)
Dept: PAIN MEDICINE | Facility: CLINIC | Age: 73
End: 2023-08-23

## 2023-08-23 NOTE — TELEPHONE ENCOUNTER
"Caller: Clarissa Matos \"Cathy\"    Relationship to patient: Self    Best call back number:     Chief complaint: BACK PAIN     Type of visit: NEW PROBLEM BUT SAME AREA    Requested date: ASAP     Additional notes:PATIENT WAS SEEN FOR BACK IN THE PAST BACK IN 8/30/21 SO STILL CURRENT.  SHE FELL RECENTLY AND INJURED BACK AGAIN.  CAN WE SEE HER FOR POSSIBLE INJECTION OR DOES SHE NEED A NEW REFERRAL SINCE NEW PROBLEM.  PATIENT STATED DR GONZALO WALTNO WOULD SEND REFERRAL OVER BUT I ONLY SEE ONE FOR PHYSICAL THERAPY.  PLEASE ADVISE          "

## 2023-08-24 NOTE — TELEPHONE ENCOUNTER
I spoke with MsMargaret Carlo and informed her she doesn't have to be referred back to our office because she is a current patient. She has scheduled a follow-up appt with Dr. Chicas.

## 2023-08-25 ENCOUNTER — TELEPHONE (OUTPATIENT)
Dept: ONCOLOGY | Facility: CLINIC | Age: 73
End: 2023-08-25

## 2023-08-25 NOTE — TELEPHONE ENCOUNTER
"    Caller: Clarissa Matos \"Cathy\"    Relationship to patient: Self    Best call back number: [phone] (home)      Chief complaint: R/S    Type of visit: LAB & FOLLOW UP    Requested date: CALL PT TO R/S FOR [facility]    If rescheduling, when is the original appointment: 9/1             "

## 2023-08-28 ENCOUNTER — ANESTHESIA (OUTPATIENT)
Dept: SURGERY | Facility: SURGERY CENTER | Age: 73
End: 2023-08-28
Payer: MEDICARE

## 2023-08-28 ENCOUNTER — HOSPITAL ENCOUNTER (OUTPATIENT)
Facility: SURGERY CENTER | Age: 73
Setting detail: HOSPITAL OUTPATIENT SURGERY
Discharge: HOME | End: 2023-08-28
Attending: INTERNAL MEDICINE | Admitting: INTERNAL MEDICINE
Payer: MEDICARE

## 2023-08-28 ENCOUNTER — ANESTHESIA EVENT (OUTPATIENT)
Dept: SURGERY | Facility: SURGERY CENTER | Age: 73
End: 2023-08-28
Payer: MEDICARE

## 2023-08-28 VITALS
BODY MASS INDEX: 30.44 KG/M2 | OXYGEN SATURATION: 96 % | DIASTOLIC BLOOD PRESSURE: 80 MMHG | HEIGHT: 63 IN | RESPIRATION RATE: 16 BRPM | WEIGHT: 171.8 LBS | TEMPERATURE: 97.5 F | HEART RATE: 66 BPM | SYSTOLIC BLOOD PRESSURE: 140 MMHG

## 2023-08-28 DIAGNOSIS — R19.5 POSITIVE COLORECTAL CANCER SCREENING USING COLOGUARD TEST: ICD-10-CM

## 2023-08-28 DIAGNOSIS — Z12.11 COLON CANCER SCREENING: ICD-10-CM

## 2023-08-28 DIAGNOSIS — R13.10 DYSPHAGIA, UNSPECIFIED TYPE: ICD-10-CM

## 2023-08-28 DIAGNOSIS — R10.13 EPIGASTRIC PAIN: ICD-10-CM

## 2023-08-28 DIAGNOSIS — K21.9 GASTROESOPHAGEAL REFLUX DISEASE, UNSPECIFIED WHETHER ESOPHAGITIS PRESENT: ICD-10-CM

## 2023-08-28 PROCEDURE — C1726 CATH, BAL DIL, NON-VASCULAR: HCPCS | Performed by: INTERNAL MEDICINE

## 2023-08-28 PROCEDURE — 43239 EGD BIOPSY SINGLE/MULTIPLE: CPT | Performed by: INTERNAL MEDICINE

## 2023-08-28 PROCEDURE — 25010000002 PROPOFOL 10 MG/ML EMULSION: Performed by: STUDENT IN AN ORGANIZED HEALTH CARE EDUCATION/TRAINING PROGRAM

## 2023-08-28 PROCEDURE — 43249 ESOPH EGD DILATION <30 MM: CPT | Performed by: INTERNAL MEDICINE

## 2023-08-28 PROCEDURE — 88305 TISSUE EXAM BY PATHOLOGIST: CPT | Performed by: INTERNAL MEDICINE

## 2023-08-28 PROCEDURE — 87081 CULTURE SCREEN ONLY: CPT | Performed by: INTERNAL MEDICINE

## 2023-08-28 PROCEDURE — 45378 DIAGNOSTIC COLONOSCOPY: CPT | Performed by: INTERNAL MEDICINE

## 2023-08-28 RX ORDER — PROMETHAZINE HYDROCHLORIDE 25 MG/1
25 SUPPOSITORY RECTAL ONCE AS NEEDED
Status: DISCONTINUED | OUTPATIENT
Start: 2023-08-28 | End: 2023-08-28 | Stop reason: HOSPADM

## 2023-08-28 RX ORDER — FEXOFENADINE HCL 180 MG/1
180 TABLET ORAL DAILY
COMMUNITY

## 2023-08-28 RX ORDER — DROPERIDOL 2.5 MG/ML
0.62 INJECTION, SOLUTION INTRAMUSCULAR; INTRAVENOUS
Status: DISCONTINUED | OUTPATIENT
Start: 2023-08-28 | End: 2023-08-28 | Stop reason: HOSPADM

## 2023-08-28 RX ORDER — SODIUM CHLORIDE 0.9 % (FLUSH) 0.9 %
10 SYRINGE (ML) INJECTION AS NEEDED
Status: DISCONTINUED | OUTPATIENT
Start: 2023-08-28 | End: 2023-08-28 | Stop reason: HOSPADM

## 2023-08-28 RX ORDER — PROPOFOL 10 MG/ML
VIAL (ML) INTRAVENOUS AS NEEDED
Status: DISCONTINUED | OUTPATIENT
Start: 2023-08-28 | End: 2023-08-28 | Stop reason: SURG

## 2023-08-28 RX ORDER — SODIUM CHLORIDE 0.9 % (FLUSH) 0.9 %
3 SYRINGE (ML) INJECTION EVERY 12 HOURS SCHEDULED
Status: DISCONTINUED | OUTPATIENT
Start: 2023-08-28 | End: 2023-08-28 | Stop reason: HOSPADM

## 2023-08-28 RX ORDER — PROMETHAZINE HYDROCHLORIDE 12.5 MG/1
25 TABLET ORAL ONCE AS NEEDED
Status: DISCONTINUED | OUTPATIENT
Start: 2023-08-28 | End: 2023-08-28 | Stop reason: HOSPADM

## 2023-08-28 RX ORDER — PROPOFOL 10 MG/ML
VIAL (ML) INTRAVENOUS CONTINUOUS PRN
Status: DISCONTINUED | OUTPATIENT
Start: 2023-08-28 | End: 2023-08-28 | Stop reason: SURG

## 2023-08-28 RX ORDER — FLUMAZENIL 0.1 MG/ML
0.2 INJECTION INTRAVENOUS AS NEEDED
Status: DISCONTINUED | OUTPATIENT
Start: 2023-08-28 | End: 2023-08-28 | Stop reason: HOSPADM

## 2023-08-28 RX ORDER — SODIUM CHLORIDE, SODIUM LACTATE, POTASSIUM CHLORIDE, CALCIUM CHLORIDE 600; 310; 30; 20 MG/100ML; MG/100ML; MG/100ML; MG/100ML
30 INJECTION, SOLUTION INTRAVENOUS CONTINUOUS PRN
Status: DISCONTINUED | OUTPATIENT
Start: 2023-08-28 | End: 2023-08-28 | Stop reason: HOSPADM

## 2023-08-28 RX ORDER — ONDANSETRON 2 MG/ML
4 INJECTION INTRAMUSCULAR; INTRAVENOUS ONCE AS NEEDED
Status: DISCONTINUED | OUTPATIENT
Start: 2023-08-28 | End: 2023-08-28 | Stop reason: HOSPADM

## 2023-08-28 RX ORDER — DIPHENHYDRAMINE HYDROCHLORIDE 50 MG/ML
12.5 INJECTION INTRAMUSCULAR; INTRAVENOUS
Status: DISCONTINUED | OUTPATIENT
Start: 2023-08-28 | End: 2023-08-28 | Stop reason: HOSPADM

## 2023-08-28 RX ORDER — SODIUM CHLORIDE, SODIUM LACTATE, POTASSIUM CHLORIDE, CALCIUM CHLORIDE 600; 310; 30; 20 MG/100ML; MG/100ML; MG/100ML; MG/100ML
INJECTION, SOLUTION INTRAVENOUS CONTINUOUS PRN
Status: DISCONTINUED | OUTPATIENT
Start: 2023-08-28 | End: 2023-08-28 | Stop reason: SURG

## 2023-08-28 RX ORDER — NALOXONE HCL 0.4 MG/ML
0.2 VIAL (ML) INJECTION AS NEEDED
Status: DISCONTINUED | OUTPATIENT
Start: 2023-08-28 | End: 2023-08-28 | Stop reason: HOSPADM

## 2023-08-28 RX ADMIN — SODIUM CHLORIDE, POTASSIUM CHLORIDE, SODIUM LACTATE AND CALCIUM CHLORIDE 30 ML/HR: 600; 310; 30; 20 INJECTION, SOLUTION INTRAVENOUS at 09:12

## 2023-08-28 RX ADMIN — Medication 300 MCG/KG/MIN: at 09:51

## 2023-08-28 RX ADMIN — SODIUM CHLORIDE, SODIUM LACTATE, POTASSIUM CHLORIDE, AND CALCIUM CHLORIDE: .6; .31; .03; .02 INJECTION, SOLUTION INTRAVENOUS at 09:47

## 2023-08-28 RX ADMIN — PROPOFOL 50 MG: 10 INJECTION, EMULSION INTRAVENOUS at 09:51

## 2023-08-28 NOTE — H&P
No chief complaint on file.      HPI  Patient today for an EGD due to GERD and dysphagia and a colonoscopy for screening.  She had a positive Cologuard.         Problem List:    Patient Active Problem List   Diagnosis    Cholecystitis    Fibromyalgia    Essential hypertension    Closed fracture of upper end of humerus    Rotator cuff arthropathy    Primary osteoarthritis of left knee    Lumbar facet arthropathy    Arthropathy of cervical facet joint    Neck pain, chronic    Chronic bilateral low back pain    Mid back pain    Chronic pain syndrome    Observed sleep apnea    Snoring    Class 1 obesity    Non-restorative sleep    Polycythemia    Primary insomnia    GERD (gastroesophageal reflux disease)    Dysphagia    Epigastric pain    Positive colorectal cancer screening using Cologuard test    Colon cancer screening       Medical History:    Past Medical History:   Diagnosis Date    Anxiety     Arthritis     Depression     Dyslipidemia     Fibromyalgia     DX 2005    GERD (gastroesophageal reflux disease)     Hard to intubate     STATES TOOK 30 MINUTES TO BE INTUBATED    Headache     Hiatal hernia     Hypertension     Irregular heart rate     PVC'S    Limited joint range of motion     LT KNEE    Polycythemia     Prediabetes         Social History:    Social History     Socioeconomic History    Marital status:      Spouse name: Jaime   Tobacco Use    Smoking status: Never    Smokeless tobacco: Never   Vaping Use    Vaping Use: Never used   Substance and Sexual Activity    Alcohol use: No    Drug use: No    Sexual activity: Defer       Family History:   Family History   Problem Relation Age of Onset    Heart disease Mother     Hypertension Mother     Arthritis Mother     Colon polyps Father     Heart disease Father     Hypertension Father     Arthritis Father     No Known Problems Sister     Arthritis Brother     No Known Problems Maternal Aunt     No Known Problems Paternal Aunt     No Known Problems  Maternal Grandmother     No Known Problems Paternal Grandmother     No Known Problems Daughter     No Known Problems Son     BRCA 1/2 Neg Hx     Breast cancer Neg Hx     Colon cancer Neg Hx     Endometrial cancer Neg Hx     Ovarian cancer Neg Hx     Malig Hyperthermia Neg Hx     Crohn's disease Neg Hx     Irritable bowel syndrome Neg Hx     Ulcerative colitis Neg Hx        Surgical History:   Past Surgical History:   Procedure Laterality Date    BREAST BIOPSY Right     CATARACT EXTRACTION W/ INTRAOCULAR LENS IMPLANT Bilateral 2014     SECTION      x 2    CHOLECYSTECTOMY WITH INTRAOPERATIVE CHOLANGIOGRAM N/A 2017    Procedure: CHOLECYSTECTOMY LAPAROSCOPIC INTRAOPERATIVE CHOLANGIOGRAM;  Surgeon: Demi Madden MD;  Location: [facility] MAIN OR;  Service:     HYSTERECTOMY  2010    MEDIAL BRANCH BLOCK Bilateral 2021    Procedure: Bilateral L4-S1 MEDIAL BRANCH BLOCK;  Surgeon: Mia Chicas MD;  Location: [facility] MAIN OR;  Service: Pain Management;  Laterality: Bilateral;    MEDIAL BRANCH BLOCK Bilateral 2021    Procedure: Bilateral cervical MEDIAL BRANCH BLOCK at approximatley C4-C6;  Surgeon: Mia Chicas MD;  Location: [facility] MAIN OR;  Service: Pain Management;  Laterality: Bilateral;    REPLACEMENT TOTAL KNEE Right     ROTATOR CUFF REPAIR Right     TOOTH EXTRACTION Right 2023    TOTAL KNEE ARTHROPLASTY Left 2019    Procedure: LEFT TOTAL KNEE ARTHROPLASTY;  Surgeon: Sam Bustamante MD;  Location: [facility] MAIN OR;  Service: Orthopedics    TOTAL SHOULDER ARTHROPLASTY W/ DISTAL CLAVICLE EXCISION Left 2018    Procedure: Reverse Total Shoulder Arthroplsty for fracture;  Surgeon: Louis Prasad MD;  Location: [facility] MAIN OR;  Service: Orthopedics    TUBAL ABDOMINAL LIGATION           Current Facility-Administered Medications:     lactated ringers infusion, 30 mL/hr, Intravenous, Continuous PRN, Loree, Nya, APRN    sodium chloride 0.9 % flush 10 mL, 10 mL,  Intravenous, PRN, Nya Ralph APRN    sodium chloride 0.9 % flush 3 mL, 3 mL, Intravenous, Q12H, Nya Ralph APRN    Facility-Administered Medications Ordered in Other Encounters:     mupirocin (BACTROBAN) 2 % nasal ointment, , Nasal, BID, Sam Bustamante MD    Allergies:   Allergies   Allergen Reactions    Percocet [Oxycodone-Acetaminophen] Itching    Nsaids GI Intolerance    Penicillins Rash    Bupropion Unknown - Low Severity    Cortisone Unknown - Low Severity    Methylprednisolone Anxiety, Arrhythmia, Confusion, Dizziness, GI Intolerance, Headache, Irritability, Nausea Only, Palpitations and Tinnitus        The following portions of the patient's history were reviewed by me and updated as appropriate: review of systems, allergies, current medications, past family history, past medical history, past social history, past surgical history and problem list.    There were no vitals filed for this visit.    PHYSICAL EXAM:    CONSTITUTIONAL:  today's vital signs reviewed by me  GASTROINTESTINAL: abdomen is soft nontender nondistended with normal active bowel sounds, no masses are appreciated    Assessment/ Plan  We will proceed today with EGD and colonoscopy.    Risks and benefits as well as alternatives to endoscopic evaluation were explained to the patient and they voiced understanding and wish to proceed.  These risks include but are not limited to the risk of bleeding, perforation, adverse reaction to sedation, and missed lesions.  The patient was given the opportunity to ask questions prior to the endoscopic procedure.

## 2023-08-28 NOTE — ANESTHESIA POSTPROCEDURE EVALUATION
Patient: Clarissa Matos    Procedure Summary       Date: 08/28/23 Room / Location: [facility]    Anesthesia Start: 0947 Anesthesia Stop: 1026    Procedures:       ESOPHAGOGASTRODUODENOSCOPY WITH DILATION      COLONOSCOPY TO CECUM Diagnosis:       Dysphagia, unspecified type      Gastroesophageal reflux disease, unspecified whether esophagitis present      Epigastric pain      Positive colorectal cancer screening using Cologuard test      Colon cancer screening      (Dysphagia, unspecified type [R13.10])      (Gastroesophageal reflux disease, unspecified whether esophagitis present [K21.9])      (Epigastric pain [R10.13])      (Positive colorectal cancer screening using Cologuard test [R19.5])      (Colon cancer screening [Z12.11])    Surgeons: Gui Cuello MD Provider: Yoan Howell MD    Anesthesia Type: MAC ASA Status: 3            Anesthesia Type: MAC    Vitals  Vitals Value Taken Time   /66 08/28/23 1025   Temp 36.4 øC (97.5 øF) 08/28/23 1025   Pulse 63 08/28/23 1025   Resp 16 08/28/23 1025   SpO2 93 % 08/28/23 1025           Post Anesthesia Care and Evaluation    Patient location during evaluation: bedside  Patient participation: complete - patient cannot participate  Level of consciousness: sleepy but conscious  Pain management: adequate    Airway patency: patent  Anesthetic complications: No anesthetic complications  PONV Status: controlled  Cardiovascular status: acceptable  Respiratory status: acceptable  Hydration status: acceptable

## 2023-08-28 NOTE — ANESTHESIA PREPROCEDURE EVALUATION
Anesthesia Evaluation     Patient summary reviewed and Nursing notes reviewed   history of anesthetic complications:  difficult airway  NPO Solid Status: > 8 hours  NPO Liquid Status: > 2 hours           Airway   Mallampati: II  Possible difficult intubation  Dental      Pulmonary - normal exam   Cardiovascular - normal exam  Exercise tolerance: (Limited due to knee pain)    Rhythm: regular    (+) hypertension, dysrhythmias (skipped beat but no workup performed)  (-) past MI, CAD, angina      Neuro/Psych  GI/Hepatic/Renal/Endo    (+) hiatal hernia, GERD    Musculoskeletal     (+) neck pain  Abdominal    Substance History      OB/GYN          Other   arthritis (knees, shoulder, back),       Other Comment: Fibromyalgia  ROS/Med Hx Other: taking opioids chronically                  Anesthesia Plan    ASA 3     MAC       Anesthetic plan, risks, benefits, and alternatives have been provided, discussed and informed consent has been obtained with: patient.

## 2023-08-29 LAB
LAB AP CASE REPORT: NORMAL
LAB AP CLINICAL INFORMATION: NORMAL
PATH REPORT.FINAL DX SPEC: NORMAL
PATH REPORT.GROSS SPEC: NORMAL

## 2023-08-30 LAB — UREASE TISS QL: NEGATIVE

## 2023-09-01 ENCOUNTER — TREATMENT (OUTPATIENT)
Dept: PHYSICAL THERAPY | Facility: CLINIC | Age: 73
End: 2023-09-01
Payer: MEDICARE

## 2023-09-01 ENCOUNTER — TRANSCRIBE ORDERS (OUTPATIENT)
Dept: PHYSICAL THERAPY | Facility: CLINIC | Age: 73
End: 2023-09-01
Payer: MEDICARE

## 2023-09-01 DIAGNOSIS — R26.9 GAIT DISTURBANCE: ICD-10-CM

## 2023-09-01 DIAGNOSIS — R29.3 POSTURE IMBALANCE: ICD-10-CM

## 2023-09-01 DIAGNOSIS — R26.89 BALANCE PROBLEM: ICD-10-CM

## 2023-09-01 DIAGNOSIS — M47.26 OTHER SPONDYLOSIS WITH RADICULOPATHY, LUMBAR REGION: Primary | ICD-10-CM

## 2023-09-01 DIAGNOSIS — M54.50 ACUTE MIDLINE LOW BACK PAIN WITHOUT SCIATICA: Primary | ICD-10-CM

## 2023-09-01 PROCEDURE — 97162 PT EVAL MOD COMPLEX 30 MIN: CPT | Performed by: PHYSICAL THERAPIST

## 2023-09-01 PROCEDURE — 97530 THERAPEUTIC ACTIVITIES: CPT | Performed by: PHYSICAL THERAPIST

## 2023-09-01 NOTE — PROGRESS NOTES
Physical Therapy Initial Evaluation and Plan of Care    [facility] [place] [address]  [phone] (phone)  [phone] (fax)      Patient: Clarissa Matos   : 1950  Diagnosis/ICD-10 Code:  Acute midline low back pain without sciatica [M54.50]  Referring practitioner: RUBY Marsh  Date of Initial Visit: 2023  Today's Date: 2023  Patient seen for 1 sessions    Visit Diagnoses:    ICD-10-CM ICD-9-CM   1. Acute midline low back pain without sciatica  M54.50 724.2   2. Gait disturbance  R26.9 781.2   3. Balance problem  R26.89 781.99   4. Posture imbalance  R29.3 729.90              Subjective Evaluation    History of Present Illness  Date of onset: 2023  Mechanism of injury: BACK pain  Fell out of rolling chair and fell to the floor   Numbness and tingling down both legs  to the toes  Intermittant symptom depending on the day / activity  Sleeping 4 hours on RIGHT side then up with pain   MRI shows narrowing L5 S1  Chronic neck mid back and shoulder pain   Can not do laundry cooking walking the dog beucause of pain   Laying down using ice   Short walks  Bilateral TKA  > 5 years ago   Recent diagnosis of raynaud disease  Fibromyalgia  Not falls for 2 years   No regular exercise program   Otherwise healthy no significant PMH  WORSE with lifting   arms over head     Standing cooking   Goes to pain management and takes some meds  May try epidurals has an appt schedule     Subjective comment: BACK pain iwth numbness/tingling in both legs  Patient Occupation: retired nurse Quality of life: good    Pain  Current pain ratin  At best pain ratin  At worst pain ratin  Quality: dull ache, radiating and pressure  Relieving factors: ice  Progression: worsening    Social Support  Lives in: multiple-level home  Lives with: spouse    Hand dominance: right    Diagnostic Tests  MRI studies: abnormal    Patient Goals  Patient goals  for therapy: decreased pain, improved balance, increased motion, increased strength and independence with ADLs/IADLs  Patient goal: do housework cook walk without pain         Objective     POSTURE  Prominent CT junction;     Elevated Scapula RIGHT;    Increased Kyphosis;    Iliac Crest Higher LEFT;     RIGHT scapular protracted  bilaterally knee flexion   ROM   LUMBAR  RIGHT  LEFT  COMMENTS   FLEXION 100%    Goood reversl of lumbar curve    EXTENSION 50%    Without exacerbation    SIDE FLEXION  25%  25% LEFT painful with pain in LEFT diane eo back      MMT  MMT   LE RIGHT  LEFT  COMMENTS   HIP FLEXION 5/5  5/5 With noted core compensation    HIP ABDUCTION 2/5  2-/5    QUADRICEPS 4/5  4/5    HAMSTRING 5/5  5/5    DORSIFLEXION 5/5  5/5    PLANTARFLEXION 5/5  5/5    Ok with heel and toe walking bilaterally     TRANSFERS sit to stand guarded antalgic   SINGLE LEG STANCE < 3 sec bilaterally with poor core activaiton and noted trunk SIDE FLEXION    SLR  RIGHT  45 with hamstring tighess  LEFT  60    IBIS  LEFT stiff 75%  RIGHT  stiff 50% wit noted back roation   GAIT flexion bias with antalgic pattern     EDUCATION and trianing     READY (POSTURE)  SET (CORE)  GO (ACTIVITY)   Core activation with pelvic floor   POSTURE ed for knees straight   Decompression strategy for symptom management       Functional Outcome Score: BACK OSWESTRY  68%        Assessment & Plan       Assessment  Impairments: abnormal gait, abnormal or restricted ROM, activity intolerance, impaired balance, impaired physical strength, lacks appropriate home exercise program and pain with function   Functional limitations: walking, uncomfortable because of pain, standing and unable to perform repetitive tasks   Assessment details: Clarissa Matos is a 72 y.o. year-old female referred to physical therapy for BACK pain and bilateral numbness/tingling  after falling out of a chair. She presents with a evolving clinical presentation.  She has comorbidities  fibromyalgia and FLEXION TKA  and personal factors fear of falling  that may affect her progress in the plan of care.  Signs and symptoms are consistent with physical therapy diagnosis of back pain with numbness/tingling in both legs .   Prognosis: good    Goals  Plan Goals: STG: to be met by 6 weeks  1- Patient will report pain < 3 /10 with light household activities  2-Patient will increase PROM to  SLR at least 60 bilaterally  without compensation or exacerbation for ease with dressing and self care   3-Patient will be independent with HEP without compensation or exacerbation   LTG: to be met by 12 weeks  1-Pt will report pain < 3 /10 with recreational activities   2-Pt will increase IBIS to WNL  without compensation or exacerbation   3-Patient will increase strength 5 /5 to perform SINGLE LEG STANCE 10+ sec to decrease risk of falls   4-Pt will be independent with comprehsive HEP     Plan  Therapy options: will be seen for skilled therapy services  Planned modality interventions: ultrasound  Planned therapy interventions: transfer training, therapeutic activities, stretching, strengthening, postural training, neuromuscular re-education, home exercise program, gait training, body mechanics training and manual therapy  Other planned therapy interventions: Aquatic therapy  Frequency: 2x week  Duration in weeks: 12  Treatment plan discussed with: patient (Diagnosis and plan of care)  Plan details: DURATION in visits 36      Timed:         Manual Therapy:    0     mins  88854;     Therapeutic Exercise:    0     mins  59290;     Neuromuscular Krysta:    0    mins  99755;    Therapeutic Activity:     23     mins  25650;     Gait Trainin     mins  96742;     Ultrasound:     0     mins  30937;    Self Care                       0     mins   42133      Un-Timed:  Electrical Stimulation:    0     mins  47501 ( );  Dry Needling  (1-2 muscles)            0     mins 64333 (Self-pay)  Dry Needling (3-4 muscles)  0     mins 20561 (Self-pay)  Dry Needling Trial    0     mins DRYNDLTRIAL  (No Charge)  Traction     0     mins 06060  Low Eval     0     Mins  72784  Mod Eval     20     Mins  62937  High Eval                       0     Mins  91487    Timed Treatment:   23   mins   Total Treatment:     43   mins    PT SIGNATURE: Rosalind Richey PT     [place] License Number: [id number]    Electronically signed by Rosalind Richey PT, 09/01/23, 11:10 AM EDT    DATE TREATMENT INITIATED: 9/1/2023    Initial Certification  Certification Period: 11/30/2023  I certify that the therapy services are furnished while this patient is under my care.  The services outlined above are required by this patient, and will be reviewed every 90 days.     PHYSICIAN:    NPI:                                          DATE:     Please sign and return via fax to [phone] Thank you, [facility].

## 2023-09-05 ENCOUNTER — OFFICE VISIT (OUTPATIENT)
Dept: PAIN MEDICINE | Facility: CLINIC | Age: 73
End: 2023-09-05
Payer: MEDICARE

## 2023-09-05 ENCOUNTER — PREP FOR SURGERY (OUTPATIENT)
Dept: SURGERY | Facility: SURGERY CENTER | Age: 73
End: 2023-09-05
Payer: MEDICARE

## 2023-09-05 VITALS
SYSTOLIC BLOOD PRESSURE: 114 MMHG | RESPIRATION RATE: 12 BRPM | HEIGHT: 63 IN | DIASTOLIC BLOOD PRESSURE: 60 MMHG | OXYGEN SATURATION: 96 % | HEART RATE: 85 BPM | BODY MASS INDEX: 31.08 KG/M2 | TEMPERATURE: 96.9 F | WEIGHT: 175.4 LBS

## 2023-09-05 DIAGNOSIS — M54.16 LUMBAR RADICULOPATHY: ICD-10-CM

## 2023-09-05 DIAGNOSIS — M51.36 DDD (DEGENERATIVE DISC DISEASE), LUMBAR: Primary | ICD-10-CM

## 2023-09-05 PROCEDURE — 1125F AMNT PAIN NOTED PAIN PRSNT: CPT | Performed by: NURSE PRACTITIONER

## 2023-09-05 PROCEDURE — 1160F RVW MEDS BY RX/DR IN RCRD: CPT | Performed by: NURSE PRACTITIONER

## 2023-09-05 PROCEDURE — 3078F DIAST BP <80 MM HG: CPT | Performed by: NURSE PRACTITIONER

## 2023-09-05 PROCEDURE — 1159F MED LIST DOCD IN RCRD: CPT | Performed by: NURSE PRACTITIONER

## 2023-09-05 PROCEDURE — 3074F SYST BP LT 130 MM HG: CPT | Performed by: NURSE PRACTITIONER

## 2023-09-05 PROCEDURE — 99214 OFFICE O/P EST MOD 30 MIN: CPT | Performed by: NURSE PRACTITIONER

## 2023-09-05 RX ORDER — DIAZEPAM 5 MG/1
10 TABLET ORAL ONCE
OUTPATIENT
Start: 2023-09-05 | End: 2023-09-05

## 2023-09-05 NOTE — PROGRESS NOTES
"CHIEF COMPLAINT  Back pain  Patient here to discuss low back pain below waist line bilaterally radiates  down both legs. Had a fall back on 6-2023 'fell on my side, I was going to sit on a chair that has wheels on it and fell\"    Subjective   Clarissa Matos is a 72 y.o. female  who presents for follow-up.  She has a history of back pain, neck pain.      Patient was last seen in clinic 8/17/2021.  She was evaluated by RUBY Martins.  Seen for neck and back pain.  She had a diagnostic L4-S1 MBB which provided minimal relief.  Therefore it was not recommended to proceed with RFA.  She is a patient of Dr. Ring's who manages her pain medication.  She is only seen by our clinic for interventional options.  At the time of her last office visit cervical MBB was recommended.  She had 1 diagnostic injection and did not follow-up.      Today she presents with acutely worsening low back pain after a recent fall.  Says her pain is a 5/10 VAS. The pain is across the low back, it radiates into both hips, down the lateral legs.  She went to [facility] and had an MRI.  They have recommended a LESI (L5/S1).  Also PT which she has started.      Back Pain  This is a chronic problem. The current episode started more than 1 month ago. The problem occurs daily. The problem has been gradually worsening since onset. The pain is present in the lumbar spine. The quality of the pain is described as aching and burning. The pain radiates to the left thigh and right thigh. The pain is at a severity of 6/10. The pain is severe. The symptoms are aggravated by standing, sitting and position. Associated symptoms include numbness (legs) and weakness (legs). Pertinent negatives include no abdominal pain, chest pain, dysuria, fever or headaches. She has tried analgesics, ice, muscle relaxant and home exercises for the symptoms. The treatment provided mild relief.      PEG Assessment   What number best describes your pain on average " "in the past week?7  What number best describes how, during the past week, pain has interfered with your enjoyment of life?5  What number best describes how, during the past week, pain has interfered with your general activity?  7    Review of Pertinent Medical Data ---      The following portions of the patient's history were reviewed and updated as appropriate: allergies, current medications, past family history, past medical history, past social history, past surgical history, and problem list.    Review of Systems   Constitutional:  Negative for activity change (less), fatigue and fever.   Respiratory:  Negative for cough, chest tightness and shortness of breath.    Cardiovascular:  Negative for chest pain.   Gastrointestinal:  Negative for abdominal pain, constipation and diarrhea.   Genitourinary:  Negative for difficulty urinating and dysuria.   Musculoskeletal:  Positive for back pain.   Neurological:  Positive for weakness (legs) and numbness (legs). Negative for dizziness, light-headedness and headaches.   Psychiatric/Behavioral:  Negative for agitation, sleep disturbance and suicidal ideas. The patient is not nervous/anxious.      I have reviewed and confirmed the accuracy of the ROS as documented by the MA/LPN/RN RUBY Colin    Vitals:    09/05/23 1453   BP: 114/60   BP Location: Right arm   Patient Position: Sitting   Cuff Size: Large Adult   Pulse: 85   Resp: 12   Temp: 96.9 °F (36.1 °C)   TempSrc: Temporal   SpO2: 96%   Weight: 79.6 kg (175 lb 6.4 oz)   Height: 160 cm (63\")   PainSc:   5     Objective   Physical Exam  Vitals and nursing note reviewed.   Constitutional:       General: She is not in acute distress.     Appearance: Normal appearance. She is not ill-appearing.   Pulmonary:      Effort: Pulmonary effort is normal. No respiratory distress.   Musculoskeletal:      Lumbar back: Tenderness and bony tenderness present. Positive right straight leg raise test and positive left straight " leg raise test.   Neurological:      Mental Status: She is alert and oriented to person, place, and time.   Psychiatric:         Mood and Affect: Mood normal.         Behavior: Behavior normal.     Assessment & Plan   Diagnoses and all orders for this visit:    1. DDD (degenerative disc disease), lumbar (Primary)    2. Lumbar radiculopathy      --- Bilateral L5/S1 LTFESI (LESI cannot be performed at this level as requested due to severe spinal stenosis).        ---  Indications for epidural injection:  Plan is to proceed with epidural at the appropriate level.  If the patient receives significant pain reduction and improvement in function and the plan will be to repeat the epidural when the pain worsens.  If a second epidural provides at least 6 weeks of sustained improvement that includes both pain reduction and improvement in function then an epidural injection could be repeated once again at the same level.  This is a mutual decision between the clinician and the patient that includes discussions including risks and benefits in detail as well as alternative therapies.  Patient's questions were answered to their satisfaction and to their understanding.  ---      Reviewed the procedure at length with the patient.  Included in the review was expectations, complications, risk and benefits.The procedure was described in detail and the risks, benefits and alternatives were discussed with the patient (including but not limited to: bleeding, infection, nerve damage, worsening of pain, inability to perform injection, paralysis, seizures, coma, no pain relief and death) who agreed to proceed.  Discussed the potential for sedation if warranted/wanted.  The procedure will plan to be performed at [facility] with fluoroscopic guidance(unless ultrasound is indicated) and could potentially have steroids and contrast dye used. Questions were answered and in a way the patient could understand.  Patient  verbalized understanding and wishes to proceed.  This intervention will be ordered.  Discussed with patient that all procedures are part of a multimodal plan of care and include either formal PT or a home exercise program.  Patient has no evidence of coagulopathy or current infection.    Clarissa Matos reports a pain score of 5.  Given her pain assessment as noted, treatment options were discussed and the following options were decided upon as a follow-up plan to address the patient's pain:  see plan above .      --- Follow-up for injection and 4 weeks post procedure                 Dictated utilizing Dragon dictation.

## 2023-09-05 NOTE — H&P (VIEW-ONLY)
"CHIEF COMPLAINT  Back pain  Patient here to discuss low back pain below waist line bilaterally radiates  down both legs. Had a fall back on 6-2023 'fell on my side, I was going to sit on a chair that has wheels on it and fell\"    Subjective   Clarissa Matos is a 72 y.o. female  who presents for follow-up.  She has a history of back pain, neck pain.      Patient was last seen in clinic 8/17/2021.  She was evaluated by RUBY Martins.  Seen for neck and back pain.  She had a diagnostic L4-S1 MBB which provided minimal relief.  Therefore it was not recommended to proceed with RFA.  She is a patient of Dr. Ring's who manages her pain medication.  She is only seen by our clinic for interventional options.  At the time of her last office visit cervical MBB was recommended.  She had 1 diagnostic injection and did not follow-up.      Today she presents with acutely worsening low back pain after a recent fall.  Says her pain is a 5/10 VAS. The pain is across the low back, it radiates into both hips, down the lateral legs.  She went to [facility] and had an MRI.  They have recommended a LESI (L5/S1).  Also PT which she has started.      Back Pain  This is a chronic problem. The current episode started more than 1 month ago. The problem occurs daily. The problem has been gradually worsening since onset. The pain is present in the lumbar spine. The quality of the pain is described as aching and burning. The pain radiates to the left thigh and right thigh. The pain is at a severity of 6/10. The pain is severe. The symptoms are aggravated by standing, sitting and position. Associated symptoms include numbness (legs) and weakness (legs). Pertinent negatives include no abdominal pain, chest pain, dysuria, fever or headaches. She has tried analgesics, ice, muscle relaxant and home exercises for the symptoms. The treatment provided mild relief.      PEG Assessment   What number best describes your pain on average " "in the past week?7  What number best describes how, during the past week, pain has interfered with your enjoyment of life?5  What number best describes how, during the past week, pain has interfered with your general activity?  7    Review of Pertinent Medical Data ---      The following portions of the patient's history were reviewed and updated as appropriate: allergies, current medications, past family history, past medical history, past social history, past surgical history, and problem list.    Review of Systems   Constitutional:  Negative for activity change (less), fatigue and fever.   Respiratory:  Negative for cough, chest tightness and shortness of breath.    Cardiovascular:  Negative for chest pain.   Gastrointestinal:  Negative for abdominal pain, constipation and diarrhea.   Genitourinary:  Negative for difficulty urinating and dysuria.   Musculoskeletal:  Positive for back pain.   Neurological:  Positive for weakness (legs) and numbness (legs). Negative for dizziness, light-headedness and headaches.   Psychiatric/Behavioral:  Negative for agitation, sleep disturbance and suicidal ideas. The patient is not nervous/anxious.      I have reviewed and confirmed the accuracy of the ROS as documented by the MA/LPN/RN RUBY Colin    Vitals:    09/05/23 1453   BP: 114/60   BP Location: Right arm   Patient Position: Sitting   Cuff Size: Large Adult   Pulse: 85   Resp: 12   Temp: 96.9 °F (36.1 °C)   TempSrc: Temporal   SpO2: 96%   Weight: 79.6 kg (175 lb 6.4 oz)   Height: 160 cm (63\")   PainSc:   5     Objective   Physical Exam  Vitals and nursing note reviewed.   Constitutional:       General: She is not in acute distress.     Appearance: Normal appearance. She is not ill-appearing.   Pulmonary:      Effort: Pulmonary effort is normal. No respiratory distress.   Musculoskeletal:      Lumbar back: Tenderness and bony tenderness present. Positive right straight leg raise test and positive left straight " leg raise test.   Neurological:      Mental Status: She is alert and oriented to person, place, and time.   Psychiatric:         Mood and Affect: Mood normal.         Behavior: Behavior normal.     Assessment & Plan   Diagnoses and all orders for this visit:    1. DDD (degenerative disc disease), lumbar (Primary)    2. Lumbar radiculopathy      --- Bilateral L5/S1 LTFESI (LESI cannot be performed at this level as requested due to severe spinal stenosis).        ---  Indications for epidural injection:  Plan is to proceed with epidural at the appropriate level.  If the patient receives significant pain reduction and improvement in function and the plan will be to repeat the epidural when the pain worsens.  If a second epidural provides at least 6 weeks of sustained improvement that includes both pain reduction and improvement in function then an epidural injection could be repeated once again at the same level.  This is a mutual decision between the clinician and the patient that includes discussions including risks and benefits in detail as well as alternative therapies.  Patient's questions were answered to their satisfaction and to their understanding.  ---      Reviewed the procedure at length with the patient.  Included in the review was expectations, complications, risk and benefits.The procedure was described in detail and the risks, benefits and alternatives were discussed with the patient (including but not limited to: bleeding, infection, nerve damage, worsening of pain, inability to perform injection, paralysis, seizures, coma, no pain relief and death) who agreed to proceed.  Discussed the potential for sedation if warranted/wanted.  The procedure will plan to be performed at [facility] with fluoroscopic guidance(unless ultrasound is indicated) and could potentially have steroids and contrast dye used. Questions were answered and in a way the patient could understand.  Patient  verbalized understanding and wishes to proceed.  This intervention will be ordered.  Discussed with patient that all procedures are part of a multimodal plan of care and include either formal PT or a home exercise program.  Patient has no evidence of coagulopathy or current infection.    Clarissa Matos reports a pain score of 5.  Given her pain assessment as noted, treatment options were discussed and the following options were decided upon as a follow-up plan to address the patient's pain:  see plan above .      --- Follow-up for injection and 4 weeks post procedure                 Dictated utilizing Dragon dictation.

## 2023-09-06 DIAGNOSIS — I10 ESSENTIAL HYPERTENSION: Chronic | ICD-10-CM

## 2023-09-07 ENCOUNTER — TRANSCRIBE ORDERS (OUTPATIENT)
Dept: SURGERY | Facility: SURGERY CENTER | Age: 73
End: 2023-09-07
Payer: MEDICARE

## 2023-09-07 DIAGNOSIS — Z41.9 SURGERY, ELECTIVE: Primary | ICD-10-CM

## 2023-09-07 PROBLEM — M51.36 DDD (DEGENERATIVE DISC DISEASE), LUMBAR: Status: ACTIVE | Noted: 2023-09-07

## 2023-09-07 PROBLEM — M51.369 DDD (DEGENERATIVE DISC DISEASE), LUMBAR: Status: ACTIVE | Noted: 2023-09-07

## 2023-09-07 PROBLEM — M54.16 LUMBAR RADICULOPATHY: Status: ACTIVE | Noted: 2023-09-07

## 2023-09-07 NOTE — TELEPHONE ENCOUNTER
"  Caller: Clarissa Matos \"Cathy\"    Relationship: Self    Best call back number:[phone]     Requested Prescriptions:   Requested Prescriptions     Pending Prescriptions Disp Refills    candesartan-hydrochlorothiazide (ATACAND HCT) 16-12.5 MG per tablet [Pharmacy Med Name: CANDESA/HCTZ 16-12.5] 90 tablet 0     Sig: TAKE 1 TABLET BY MOUTH DAILY.        Pharmacy where request should be sent: [facility] & [address] - [phone]  - [phone]      Last office visit with prescribing clinician: 5/25/2023   Last telemedicine visit with prescribing clinician: Visit date not found   Next office visit with prescribing clinician: Visit date not found     Additional details provided by patient: PATIENT IS OUT OF THE MEDICATION     Does the patient have less than a 3 day supply:  [x] Yes  [] No    Would you like a call back once the refill request has been completed: [] Yes [x] No    If the office needs to give you a call back, can they leave a voicemail: [] Yes [x] No    Alexander Walker Rep   09/07/23 14:31 EDT         "

## 2023-09-08 ENCOUNTER — TREATMENT (OUTPATIENT)
Dept: PHYSICAL THERAPY | Facility: CLINIC | Age: 73
End: 2023-09-08
Payer: MEDICARE

## 2023-09-08 DIAGNOSIS — M54.50 ACUTE MIDLINE LOW BACK PAIN WITHOUT SCIATICA: Primary | ICD-10-CM

## 2023-09-08 DIAGNOSIS — R29.3 POSTURE IMBALANCE: ICD-10-CM

## 2023-09-08 DIAGNOSIS — M25.661 KNEE STIFFNESS, RIGHT: ICD-10-CM

## 2023-09-08 DIAGNOSIS — M25.561 CHRONIC PAIN OF BOTH KNEES: ICD-10-CM

## 2023-09-08 DIAGNOSIS — M25.562 CHRONIC PAIN OF BOTH KNEES: ICD-10-CM

## 2023-09-08 DIAGNOSIS — M54.9 CHRONIC NECK AND BACK PAIN: ICD-10-CM

## 2023-09-08 DIAGNOSIS — M54.2 CHRONIC NECK AND BACK PAIN: ICD-10-CM

## 2023-09-08 DIAGNOSIS — I10 ESSENTIAL HYPERTENSION: Chronic | ICD-10-CM

## 2023-09-08 DIAGNOSIS — G89.29 CHRONIC PAIN OF BOTH KNEES: ICD-10-CM

## 2023-09-08 DIAGNOSIS — R26.89 BALANCE PROBLEM: ICD-10-CM

## 2023-09-08 DIAGNOSIS — R26.9 GAIT DISTURBANCE: ICD-10-CM

## 2023-09-08 DIAGNOSIS — G89.29 CHRONIC NECK AND BACK PAIN: ICD-10-CM

## 2023-09-08 PROCEDURE — 97113 AQUATIC THERAPY/EXERCISES: CPT | Performed by: PHYSICAL THERAPIST

## 2023-09-08 RX ORDER — CANDESARTAN CILEXETIL AND HYDROCHLOROTHIAZIDE 16; 12.5 MG/1; MG/1
1 TABLET ORAL DAILY
Qty: 90 TABLET | Refills: 0 | Status: SHIPPED | OUTPATIENT
Start: 2023-09-08

## 2023-09-08 RX ORDER — CANDESARTAN CILEXETIL AND HYDROCHLOROTHIAZIDE 16; 12.5 MG/1; MG/1
1 TABLET ORAL DAILY
Qty: 90 TABLET | Refills: 0 | OUTPATIENT
Start: 2023-09-08

## 2023-09-08 NOTE — TELEPHONE ENCOUNTER
Rx Refill Note  Requested Prescriptions     Pending Prescriptions Disp Refills    candesartan-hydrochlorothiazide (ATACAND HCT) 16-12.5 MG per tablet [Pharmacy Med Name: CANDESA/HCTZ 16-12.5] 90 tablet 0     Sig: TAKE 1 TABLET BY MOUTH DAILY.      Last office visit with prescribing clinician: 5/25/2023   Last telemedicine visit with prescribing clinician: Visit date not found   Next office visit with prescribing clinician: Visit date not found                         Would you like a call back once the refill request has been completed: [] Yes [] No    If the office needs to give you a call back, can they leave a voicemail: [] Yes [] No    Enrique Evans MA  09/08/23, 08:58 EDT

## 2023-09-08 NOTE — TELEPHONE ENCOUNTER
"  Caller: Clarissa Matos \"Cathy\"    Relationship: Self    Best call back number: [phone]    Requested Prescriptions:   Requested Prescriptions     Pending Prescriptions Disp Refills    candesartan-hydrochlorothiazide (ATACAND HCT) 16-12.5 MG per tablet 90 tablet 0     Sig: Take 1 tablet by mouth Daily.        [id number] [place], [place] - [address] [address] AT [facility] & [facility] [phone] [facility] [phone]      Last office visit with prescribing clinician: 5/25/2023   Last telemedicine visit with prescribing clinician: Visit date not found   Next office visit with prescribing clinician: Visit date not found     Additional details provided by patient: [facility] DOES NOT HAVE THIS IN STOCK. PLEASE SEND TO THE [facility] ABOVE.    Does the patient have less than a 3 day supply:  [x] Yes  [] No      Alexander Hernandez Rep   09/08/23 13:23 EDT         "

## 2023-09-08 NOTE — PROGRESS NOTES
Physical Therapy Daily Treatment Note    [facility]  [address] [place], [place] [id number] [phone] (phone)  [phone] (fax)    Patient: Clarissa Maots   : 1950  Diagnosis/ICD-10 Code:  Acute midline low back pain without sciatica [M54.50]  Referring practitioner: RUBY Marsh  Date of Initial Visit: Type: THERAPY  Noted: 2023  Today's Date: 2023  Patient seen for 2 sessions             Subjective   Back pain. No changes since evaluation one week ago.    Objective     AQUATIC THERAPY EXERCISE:  Seated bicycling X 2 min.  Water walking Forwards, Sideways and backwards X 100 ft each  Hamstring Stretch 20 sec X 2 ea  Piriformis Stretch 20 sec X 2 ea  Wall Crawl Stretch (BKTC) 20 sec X 3  Decompression supported on foam noodle/Railing X 2 min.  Mini Squats Next*  Abdominals -  Pushdowns with Lg Noodle 10X  Straight Leg Raises --  Hip Extension --  Hip Abduction 15x  Leg Press w/ large foam ring --  Rows (Alternating) w/ closed paddles --  Stir the Pot w/ closed paddles --  Shoulder Flexion/Extension AROM --      Assessment/Plan  Today is patient's first session of aquatic therapy for back pain related to falling off a rolling chair.  Instructed Cathy in therapeutic exercise in a warm water pool.  Exercises included stretching, core strengthening and ambulation against water resistance in multiple directions.  She had no increase in pain during the session and denied numbness/tingling in her legs.  Her  is having knee replacement next week, so she will miss next week and resume the following week.          Timed:  Aquatic Therapy    32     mins 37439;    Arsen Horvath, PT  Physical Therapist    [place] License:  [id number]

## 2023-09-13 NOTE — PROGRESS NOTES
[facility]    Date of visit: 9/13/2023    Chief complaint:   Chief Complaint   Patient presents with    Follow Up     Follow-up  Hands, Feet Pain       Interval history:  Hanna Campo was last seen by me on 7/12/2023.      Since her last visit, Hanna Campo reports:    Doing well with current regimen  Has been more active  Taking Norco 2 to 3 times per day and has Butrans patch    Chaga tea from mushrooms      Pain scores:  Pain intensity on average is 4 on a scale of 0-10.     Current pain treatments:   Butrans Patch 10 mcg/hr  Hydrocodone-Acetaminophen 5-325 90/30 days      Side Effects: denies any problems    Medications:  Current Outpatient Medications   Medication Sig Dispense Refill    buprenorphine (BUTRANS) 10 MCG/HR WK patch Place 1 patch onto the skin every 7 days 4 patch 2    Cetirizine HCl (ZYRTEC ALLERGY PO) Take 5 mg by mouth daily      chlorthalidone (HYGROTON) 50 MG tablet Take 1 tablet (50 mg) by mouth daily Due for visit and labs for refills** 90 tablet 0    HYDROcodone-acetaminophen (NORCO) 5-325 MG tablet Take 1 tablet by mouth every 8 hours as needed for severe pain May fill on/after 8/4/23. 90 tablet 0    levothyroxine (SYNTHROID/LEVOTHROID) 88 MCG tablet Take 88 mcg by mouth daily      losartan (COZAAR) 25 MG tablet Take 1 tablet (25 mg) by mouth 2 times daily 180 tablet 0    medical cannabis (Patient's own supply) (The purpose of this order is to document that the patient reports taking medical cannabis.  This is not a prescription, and is not used to certify that the patient has a qualifying medical condition.) 0 Information only 0    vitamin B complex with vitamin C (STRESS TAB) tablet Take 1 tablet by mouth daily  0    VITAMIN D, CHOLECALCIFEROL, PO Take 5,000 Units by mouth daily      levothyroxine (SYNTHROID/LEVOTHROID) 75 MCG tablet Take 1 tablet (75 mcg) by mouth daily 90 tablet 1    naloxone (NARCAN) 4 MG/0.1ML nasal spray Spray 1 spray (4 mg) into one  Physical Therapy Re-Assessment / Re-Certification        Patient: Clarissa Matos   : 1950  Visit Diagnoses:     ICD-10-CM ICD-9-CM   1. Chronic neck and back pain  M54.2 723.1    M54.9 724.5    G89.29 338.29     Referring practitioner: RUBY Baig  Date of Initial Visit: Type: THERAPY  Noted: 2021  Today's Date: 2021  Patient seen for 12 sessions      Subjective:   Clarissa Matos reports:   Subjective Questionnaire: NDI:  Oswestry:   Clinical Progress: unchanged  Home Program Compliance: No  Treatment has included: therapeutic exercise, manual therapy, therapeutic activity and moist heat    Subjective   Patient reports she has been having more pain in her back than her neck.  States her neck pain range is 1-3/10 and the back pain range is 3-6/10.  Reports she has not been getting her home exercises.  Has been busy taking care of her mother who required a significant amount of care.    Objective     Active Range of Motion   Cervical/Thoracic Spine   Cervical     Flexion: 40 degrees   Extension: 45 degrees   Left lateral flexion: 33 degrees   Right lateral flexion: 30 degrees   Left rotation: 45 degrees   Right rotation: 40 degrees      Lumbar   Flexion: 40 degrees   Extension: 17 degrees   Left lateral flexion: 13 degrees   Right lateral flexion: 13 degrees   Left rotation: 40 degrees   Right rotation: 40 degrees      Strength/Myotome Testing   Cervical Spine   Neck extension: 5  Neck flexion: 5     Left   Normal strength     Right   Normal strength     Lumbar   Left   Normal strength     Right   Normal strength     Muscle Activation     Additional Muscle Activation Details  Decreased core muscle activation  Diastasis Recti     Tests   Left   Negative Spurling's sign.      Right   Negative Spurling's sign.      Thoracic   Negative slump.      Hypomobile right first rib     Lumbar      Left   Negative passive SLR and quadrant.      Right   Negative passive SLR and quadrant.      Symmetrical SIJ alignment     Decreased UQ muscle flexibility     SLR: L 65 degrees, R 60 degrees    Decreased UQ/scapular, hip girdle/LE muscle flexibility     Assessment/Plan  Patient returns to PT following a gap in treatment due to having to go out of town to relocate her elderly mother and bring her to live with her and her .  SHe has not had any significant changes in ROM or flexibility since last assessment.  NDI and Oswestry scores did improve slightly.  Her pain levels continue to fluctuate and are activity dependent.  She has not been able to maintain HEP compliance.  She will benefit from continued PT and project good potential for progress with consistent attendance and HEP compliance.  Progress toward previous goals: Partially Met    Goal Review  Short Term Goals (4 wks):  1.  Patient will have increased cervical spine ROM to WFLs.-met  2.  Patient will have increased lumbar spine ROM to WFLs.-partially met  3.  Patient will have increased gross muscle flexibility to WFLs.-not met  4.  Patient will have increased core muscle activation to WFLs without provoking diastasis recti.-progressing   5.  Patient will have right first rib alignment and mobility WNLs.     Long Term Goals (8 wks):  1.  Patient will be independent in performance of HEP for carryover upon discharge from skilled PT services.-progressing  2.  Patient will have improved Oswestry score of 30/50 or better.-progressing  3.  Patient will have increased NDI score of 20/50 or better.-progressing  4.  Patient will demonstrate functional reach patterns WFLs to improve tolerance to ADLs/IADLs.-progressing  5.  Patient will demonstrate functional squat patterns WFLs to improve tolerance to ADLs/IADLs.-progressing        Recommendations: Continue as planned  Timeframe: 2 months  Prognosis to achieve goals: good    PT Signature: Tere Mcqueen, PT      Based upon review of the patient's progress and continued therapy plan, it is my  "nostril alternating nostrils once as needed for opioid reversal every 2-3 minutes until assistance arrives 0.2 mL 1       Medical History: any changes in medical history since they were last seen? No    Review of Systems:  The 14 system ROS was reviewed from the intake questionnaire, and is positive for: bilateral foot pain, neuropathy  Any bowel or bladder problems: denies  Mood: stable    Physical Exam:  Blood pressure 130/78, pulse 65, height 1.689 m (5' 6.5\"), weight 82.1 kg (181 lb), SpO2 100 %, not currently breastfeeding.  General: AAOx3, NAD  Gait: Normal  MSK exam: deferred for conversation    Assessment:   Idiopathic Peripheral Neuropathy     Hanna Campo is a 68 year old female who is seen at the pain clinic for follow up to discuss management of her chronic pain symptoms.    Plan:  Physical Therapy:  continue current daily HEPs  Clinical Health Psychologist to address issues of relaxation, behavioral change, coping style, and other factors important to improvement.  Not at this time  Diagnostic Studies:  none  Medication Management:  Refills provided as needed  Further procedures recommended: none  Recommendations to PCP: none  Follow up: 2 months    Lisa Morales MD    Pain Medicine  Department of Anesthesiology  [facility]          Answers submitted by the patient for this visit:  Symptoms you have experienced in the last 30 days (Submitted on 9/12/2023)  General Symptoms: No  Skin Symptoms: No  HENT Symptoms: No  EYE SYMPTOMS: No  HEART SYMPTOMS: No  LUNG SYMPTOMS: No  INTESTINAL SYMPTOMS: No  URINARY SYMPTOMS: No  GYNECOLOGIC SYMPTOMS: No  BREAST SYMPTOMS: No  SKELETAL SYMPTOMS: Yes  BLOOD SYMPTOMS: No  NERVOUS SYSTEM SYMPTOMS: No  MENTAL HEALTH SYMPTOMS: No  Please answer the questions below to tell us what condition you are experiencing: (Submitted on 9/12/2023)  Back pain: No  Neck pain: No  Swollen joints: No  Joint pain: No  Bone pain: No  Muscle cramps: " Yes  Muscle weakness: Yes  Joint stiffness: No  Bone fracture: No     medical opinion that Clarissa Matos should continue physical therapy treatment at [facility]  [address] [facility], [address] [address] [phone].    Signature: __________________________________  Judy Kaur APRN    Timed:  Manual Therapy:         mins  69011;  Therapeutic Exercise:    25     mins  96172;     Neuromuscular Krysta:        mins  82665;    Therapeutic Activity:     20     mins  44671;     Gait Training:           mins  15775;     Ultrasound:          mins  16113;  Iontophoresis:          mins  49411;    Dry Needling:          mins ;    Untimed:  Electrical Stimulation:         mins  59229 ( );  Mechanical Traction:         mins  77584;   Canalith Repositioning:        mins  54754;    Timed Treatment:   45   mins   Total Treatment:     45   mins

## 2023-09-20 ENCOUNTER — TREATMENT (OUTPATIENT)
Dept: PHYSICAL THERAPY | Facility: CLINIC | Age: 73
End: 2023-09-20
Payer: MEDICARE

## 2023-09-20 DIAGNOSIS — R26.89 BALANCE PROBLEM: ICD-10-CM

## 2023-09-20 DIAGNOSIS — R26.9 GAIT DISTURBANCE: ICD-10-CM

## 2023-09-20 DIAGNOSIS — M54.50 ACUTE MIDLINE LOW BACK PAIN WITHOUT SCIATICA: Primary | ICD-10-CM

## 2023-09-20 DIAGNOSIS — R29.3 POSTURE IMBALANCE: ICD-10-CM

## 2023-09-20 PROCEDURE — 97113 AQUATIC THERAPY/EXERCISES: CPT | Performed by: PHYSICAL THERAPIST

## 2023-09-20 NOTE — PROGRESS NOTES
Physical Therapy Daily Treatment Note    [facility]  [address] [place] [address]  [phone] (phone)  [phone] (fax)    Patient: Clarissa Matos   : 1950  Diagnosis/ICD-10 Code:  Acute midline low back pain without sciatica [M54.50]  Referring practitioner: RUBY Marsh  Date of Initial Visit: Type: THERAPY  Noted: 2023  Today's Date: 2023  Patient seen for 3 sessions             Subjective    had knee replacement recently and she has had to help him with getting socks and shoes, etc. So back is aggravated.    Objective     AQUATIC THERAPY EXERCISE:  Suspended bicycling X 2 min.  Water walking Forwards, Sideways and backwards X 100 ft each  Hamstring Stretch 20 sec X 2 ea  Piriformis Stretch 20 sec X 2 ea  Wall Crawl Stretch (BKTC) 20 sec X 3  Decompression supported on foam noodle/Railing X 2 min.  Mini Squats 10X  Abdominals -  Pushdowns with Lg Noodle 10X  Straight Leg Raises --  Hip Extension --  Hip Abduction --  Leg Press w/ large foam ring --  Rows (Alternating) w/ closed paddles --  Stir the Pot w/ closed paddles --    Assessment/Plan  Cathy reports flare up of back pain.  She has been helping her  as he recovers from knee replacement surgery.  She was able to complete prescribed aquatic exercise comfortably with verbal instruction.            Timed:  Aquatic Therapy    38     mins 30899;    Arsen Horvath, PT  Physical Therapist    [place] License:  [id number]

## 2023-09-22 ENCOUNTER — TELEPHONE (OUTPATIENT)
Dept: PHYSICAL THERAPY | Facility: CLINIC | Age: 73
End: 2023-09-22

## 2023-09-25 ENCOUNTER — TELEPHONE (OUTPATIENT)
Dept: ONCOLOGY | Facility: CLINIC | Age: 73
End: 2023-09-25

## 2023-09-25 NOTE — TELEPHONE ENCOUNTER
"    Caller: Clarissa Matos \"Cathy\"    Relationship to patient: Self    Best call back number: [phone]    Type of visit: LAB AND F/U APPT    Requested date: 10/10, 10/17, OR 10/24/23    If rescheduling, when is the original appointment: 9/26/23    "

## 2023-09-28 NOTE — SIGNIFICANT NOTE
Patient educated on the following :    - If you are receiving Sedation for your procedure Nothing to Eat 6 hours and only clear liquids for 2 hours prior to your procedure.    -You will need to have someone drive you home after your PROCEDURE and remain with you for 24 hours after the PROCEDURE  - The date of your procedure, your are welcome to have one visitor at bedside or remain within 10-15 minutes of [facility]  -You will need to arrive at 0815 on 9-29-23 PROCEDURE  -Please contact [employer] at: [phone] with any questions and/or concerns

## 2023-09-28 NOTE — DISCHARGE INSTRUCTIONS
[facility] - Post-procedure Instructions          --  While there are no absolute restrictions, it is recommended that you do not perform strenuous activity today. In the morning, you may resume your level of activity as before your block.    --  If you have a band-aid at your injection site, please remove it later today. Observe the area for any redness, swelling, pus-like drainage, or a temperature over 101°. If any of these symptoms occur, please call your doctor at [phone]. If after office hours, leave a message and the on-call provider will return your call.    --  Ice may be applied to your injection site. It is recommended you avoid direct heat (heating pad; hot tub) for 1-2 days.    --  Call [facility] at [phone] if you experience persistent headache, persistent bleeding from the injection site, or severe pain not relieved by heat or oral medication.    --  Do not make important decisions today.    --  Due to the effects of the block and/or the I.V. Sedation, DO NOT drive or operate hazardous machinery for 12 hours.  Local anesthetics may cause numbness after procedure and precautions must be taken with regards to operating equipment as well as with walking, even if ambulating with assistance of another person or with an assistive device.    --  Do not drink alcohol for 12 hours.    -- You may return to work tomorrow, or as directed by your referring doctor.    --  Occasionally you may notice a slight increase in your pain after the procedure. This should start to improve within the next 24-48 hours. Radiofrequency ablation procedure pain may last 3-4 weeks.    --  It may take as long as 3-4 days before you notice a gradual improvement in your pain and/or other symptoms.    -- You may continue to take your prescribed pain medication as needed.    --  Some normal possible side effects of steroid use could include fluid retention, increased blood sugar, dull headache,  increased sweating, increased appetite, mood swings and flushing.    --  Diabetics are recommended to watch their blood glucose level closely for 24-48 hours after the injection.    --  Must stay in PACU for 20 min upon arrival and prove no leg weakness before being discharged.    --  IN THE EVENT OF A LIFE THREATENING EMERGENCY, (CHEST PAIN, BREATHING DIFFICULTIES, PARALYSIS…) YOU SHOULD GO TO YOUR NEAREST EMERGENCY ROOM.    --  You should be contacted by our office within 2-3 days to schedule follow up or next appointment date.  If not contacted within 7 days, please call the office at [phone]

## 2023-09-29 ENCOUNTER — HOSPITAL ENCOUNTER (OUTPATIENT)
Facility: SURGERY CENTER | Age: 73
Setting detail: HOSPITAL OUTPATIENT SURGERY
Discharge: HOME | End: 2023-09-29
Attending: ANESTHESIOLOGY | Admitting: ANESTHESIOLOGY
Payer: MEDICARE

## 2023-09-29 ENCOUNTER — HOSPITAL ENCOUNTER (OUTPATIENT)
Dept: GENERAL RADIOLOGY | Facility: SURGERY CENTER | Age: 73
Setting detail: HOSPITAL OUTPATIENT SURGERY
End: 2023-09-29
Payer: MEDICARE

## 2023-09-29 VITALS
HEART RATE: 75 BPM | RESPIRATION RATE: 16 BRPM | TEMPERATURE: 98.4 F | SYSTOLIC BLOOD PRESSURE: 123 MMHG | DIASTOLIC BLOOD PRESSURE: 57 MMHG | OXYGEN SATURATION: 97 %

## 2023-09-29 DIAGNOSIS — M51.36 DDD (DEGENERATIVE DISC DISEASE), LUMBAR: ICD-10-CM

## 2023-09-29 DIAGNOSIS — M54.16 LUMBAR RADICULOPATHY: ICD-10-CM

## 2023-09-29 DIAGNOSIS — Z41.9 SURGERY, ELECTIVE: ICD-10-CM

## 2023-09-29 PROCEDURE — 25510000001 IOPAMIDOL 61 % SOLUTION 30 ML VIAL: Performed by: ANESTHESIOLOGY

## 2023-09-29 PROCEDURE — 77002 NEEDLE LOCALIZATION BY XRAY: CPT

## 2023-09-29 PROCEDURE — 76000 FLUOROSCOPY <1 HR PHYS/QHP: CPT

## 2023-09-29 PROCEDURE — 25010000002 BUPIVACAINE (PF) 0.25 % SOLUTION 10 ML VIAL: Performed by: ANESTHESIOLOGY

## 2023-09-29 PROCEDURE — 25010000002 DEXAMETHASONE SODIUM PHOSPHATE 100 MG/10ML SOLUTION 10 ML VIAL: Performed by: ANESTHESIOLOGY

## 2023-09-29 PROCEDURE — 64483 NJX AA&/STRD TFRM EPI L/S 1: CPT | Performed by: ANESTHESIOLOGY

## 2023-09-29 RX ORDER — DIAZEPAM 5 MG/1
10 TABLET ORAL ONCE
Status: COMPLETED | OUTPATIENT
Start: 2023-09-29 | End: 2023-09-29

## 2023-09-29 RX ADMIN — DIAZEPAM 10 MG: 5 TABLET ORAL at 08:31

## 2023-09-29 NOTE — OP NOTE
Lumbar Transforaminal Epidural Steroid Injection Bilateral L5-S1 Levels  [facility]    PREOPERATIVE DIAGNOSIS:  Lumbar radicular pain, Lumbar Radiculopathy    POSTOPERATIVE DIAGNOSIS:  Same as preop diagnosis    PROCEDURE:    1. CPT 09036 --  Diagnostic & Therapeutic Transforaminal Epidural Steroid Injection at the L5 level, on the bilateral     PRE-PROCEDURE DISCUSSION WITH PATIENT:    Risks and complications were discussed with the patient prior to starting the procedure and informed consent was obtained.  We discussed various topics including but not limited to bleeding, infection, injury, nerve injury, paralysis, coma, death, postprocedural painful flare-up, postprocedural site soreness, and a lack of pain relief.  We discussed the diagnostic aspect of transforaminal epidural / selective nerve root blockade.    SURGEON:  Mia Chicas MD    SEDATION:  Anxiolysis was used for this procedure which included PO Valium 10mg  ANESTHETIC:  0.25% bupivacaine  STEROID:  10mg dexamethasone    DESCRIPTON OF PROCEDURE:  After obtaining informed consent, an I.V. was not started in the preoperative area. The patient taken to the operating room and was placed in the prone position with a pillow under the abdomen.  All pressure points were well padded.  EKG, blood pressure, and pulse oximeter were monitored.  The lumbar area was prepped with Chloraprep and draped in a sterile fashion.     The AP fluoroscopic image was used to visualize the L5 vertebral body.  The superior endplate was squared off radiographically.  The image was then obliqued towards the patient's right side to maximize visualization of the pedicle. The skin and subcutaneous tissue at the 6 o'clock position of the pedicle was anesthetized with 1% lidocaine. A 22-gauge spinal needle was then advanced percutaneously through the anesthetized skin tract under fluoroscopic guidance in AP and lateral views until the needle tip lie in the  annelise-lateral aspect of the epidural space.  After negative aspiration of the needle for blood or CSF, a volume of 1 mL of Isovue was injected producing good epidural spread with no evidence of loculation, vascular run-off, or intrathecal spread. Subsequently, a volume of 3 mL of injectate was administered without resistance.  The needle was removed intact.  Vital signs were stable throughout.      The same procedure was then performed on the contralateral side in the exact same fashion.      The total volume injected consisted of 10 mg of dexamethasone with 1 mL of 0.25% bupivacaine and preservative-free normal saline.       ESTIMATED BLOOD LOSS:  <5 mL  SPECIMENS:  none    COMPLICATIONS:   No complications were noted., There was no indication of vascular uptake on live injection of contrast dye., and There was no indication of intrathecal uptake on live injection of contrast dye.    TOLERANCE & DISCHARGE CONDITION:    The patient tolerated the procedure well.  The patient was transported to the recovery area without difficulties.  The patient was discharged to home under the care of family in stable and satisfactory condition.    PLAN OF CARE:  The patient was given our standard instruction sheet.  The patient will Return to clinic 4-6 wks.  The patient will resume all medications as per the medication reconciliation sheet.

## 2023-10-04 ENCOUNTER — TREATMENT (OUTPATIENT)
Dept: PHYSICAL THERAPY | Facility: CLINIC | Age: 73
End: 2023-10-04
Payer: MEDICARE

## 2023-10-04 DIAGNOSIS — R26.89 BALANCE PROBLEM: ICD-10-CM

## 2023-10-04 DIAGNOSIS — R29.3 POSTURE IMBALANCE: ICD-10-CM

## 2023-10-04 DIAGNOSIS — M54.50 ACUTE MIDLINE LOW BACK PAIN WITHOUT SCIATICA: Primary | ICD-10-CM

## 2023-10-04 DIAGNOSIS — R26.9 GAIT DISTURBANCE: ICD-10-CM

## 2023-10-04 PROCEDURE — 97113 AQUATIC THERAPY/EXERCISES: CPT | Performed by: PHYSICAL THERAPIST

## 2023-10-04 NOTE — PROGRESS NOTES
Physical Therapy Daily Treatment Note    [facility]  [address] [place] [address]  [phone] (phone)  [phone] (fax)    Patient: Clarissa Matos   : 1950  Diagnosis/ICD-10 Code:  Acute midline low back pain without sciatica [M54.50]  Referring practitioner: RUBY Marsh  Date of Initial Visit: Type: THERAPY  Noted: 2023  Today's Date: 10/4/2023  Patient seen for 4 sessions

## 2023-10-04 NOTE — PROGRESS NOTES
Physical Therapy 30-Day Progress Note     [facility]  [address] [place] [address]  [phone] (phone)  [phone] (fax)    Patient: Clarissa Matos   : 1950  Diagnosis/ICD-10 Code:  Acute midline low back pain without sciatica [M54.50]  Referring practitioner: RUBY Marsh  Date of Initial Visit: Type: THERAPY  Noted: 2023  Today's Date: 10/4/2023  Patient seen for 4 sessions      Subjective:     Clinical Progress: unchanged  Home Program Compliance: N/A  Treatment has included:  aquatic therapy    Subjective   Had epidural last Friday and had a reaction with headache and flushing in face and yesterday my legs felt like jello.  It seemed to make my pain worse.  Pain ratings  Current 4/10, At Best 4/10,  Peak pain  10/10    Objective       Functional outcome score: Oswestry 64%    AQUATIC THERAPY EXERCISE:  Suspended bicycling X 2 min.  Water walking Forwards, Sideways and backwards X 100 ft each  Hamstring Stretch 20 sec X 2 ea  Piriformis Stretch 20 sec X 2 ea  Wall Crawl Stretch (BKTC) 20 sec X 3  Decompression supported on foam noodle/Railing X 2 min.  Mini Squats 15X  March in Place  10X  Tuck Ups X 15  Abdominals -  Pushdowns with Lg Noodle 15X  Straight Leg Raises --  Hip Extension --  Hip Circles  10/10  Leg Press w/ large foam ring --  Rows (Alternating) w/ closed paddles 15X  Stir the Pot w/ closed paddles 10/10    Assessment/Plan  Clarissa Matos is a 72 y.o. year-old female referred to physical therapy for back pain and bilateral LE numbness/tingling  after falling out of a chair in late . She has diagnosis of lumbar spondylosis with radiculopathy.  Clarissa has attended 3 sessions of aquatic physical therapy.  She missed some appointments due to helping her  who is recovering from surgery.  Clarissa reports having her first lumbar epidural last Friday that seemed to make her back pain worse.  She has met 1 of 3 short term  goals and remains appropriate for continued skilled physical therapy.      STG: to be met by 6 weeks  1- Patient will report pain < 3 /10 with light household activities. ONGOING    2-Patient will increase PROM to  SLR at least 60 bilaterally  without compensation or exacerbation for ease with dressing and self care. MET when tested in standing while in pool Unable to test on dry land poolside.    3-Patient will be independent with HEP without compensation or exacerbation. ONGOING, Currently only doing aquatic therapy.     Recommendations: Continue as planned  Timeframe: 1 month  Prognosis to achieve goals: good    PT Signature: Arsen Horvath, PT  [place] License: [id number]      Based upon review of the patient's progress and continued therapy plan, it is my medical opinion that Clarissa Matos should continue physical therapy treatment at [facility]  [address] [place] [address] [address] [place] [address]  [phone].    Signature: __________________________________  Nya Palomares APRN  NPI: [id number]                                          Timed:  Aquatic therapy  488[address]    38   mins

## 2023-10-06 ENCOUNTER — TREATMENT (OUTPATIENT)
Dept: PHYSICAL THERAPY | Facility: CLINIC | Age: 73
End: 2023-10-06
Payer: MEDICARE

## 2023-10-06 DIAGNOSIS — R26.9 GAIT DISTURBANCE: ICD-10-CM

## 2023-10-06 DIAGNOSIS — M25.561 CHRONIC PAIN OF BOTH KNEES: ICD-10-CM

## 2023-10-06 DIAGNOSIS — M54.50 ACUTE MIDLINE LOW BACK PAIN WITHOUT SCIATICA: Primary | ICD-10-CM

## 2023-10-06 DIAGNOSIS — G89.29 CHRONIC PAIN OF BOTH KNEES: ICD-10-CM

## 2023-10-06 DIAGNOSIS — M25.562 CHRONIC PAIN OF BOTH KNEES: ICD-10-CM

## 2023-10-06 DIAGNOSIS — R29.3 POSTURE IMBALANCE: ICD-10-CM

## 2023-10-06 DIAGNOSIS — R26.89 BALANCE PROBLEM: ICD-10-CM

## 2023-10-06 DIAGNOSIS — M25.661 KNEE STIFFNESS, RIGHT: ICD-10-CM

## 2023-10-06 PROCEDURE — 97113 AQUATIC THERAPY/EXERCISES: CPT | Performed by: PHYSICAL THERAPIST

## 2023-10-06 NOTE — PROGRESS NOTES
Physical Therapy Daily Treatment Note    [facility]  [address] [place] [address]  [phone] (phone)  [phone] (fax)    Patient: Clarissa Matos   : 1950  Diagnosis/ICD-10 Code:  Acute midline low back pain without sciatica [M54.50]  Referring practitioner: RUBY Marsh  Date of Initial Visit: Type: THERAPY  Noted: 2023  Today's Date: 10/6/2023  Patient seen for 5 sessions             Subjective   The tingling in my leg is starting to go away.      Objective     AQUATIC THERAPY EXERCISE:  Suspended bicycling X 2 min.  Water walking Forwards, Sideways and backwards pre-treatment  Hamstring Stretch 20 sec X 2 ea  Piriformis Stretch 20 sec X 2 ea  Wall Crawl Stretch (BKTC) 20 sec X 2  Decompression supported on foam noodle/Railing X 2 min.  Mini Squats 15X  March in Place  20X  Tuck Ups X 15  Abdominals -  Pushdowns with Lg Noodle 15X  Straight Leg Raises --  Hip Extension --  Hip Abduction 10X each  Leg Press w/ large foam ring --  Rows (Alternating) w/ closed paddles 15X  Stir the Pot w/ closed paddles 10/10  Shldr Flex/ Ext w/ open paddles --      Assessment/Plan  Cathy is noticing improvement from Pain management injection with less tingling in her leg.            Timed:  Aquatic Therapy    30     mins 46916;    Arsen Horvath, PT  Physical Therapist    [place] License:  [id number]

## 2023-10-11 ENCOUNTER — TREATMENT (OUTPATIENT)
Dept: PHYSICAL THERAPY | Facility: CLINIC | Age: 73
End: 2023-10-11
Payer: MEDICARE

## 2023-10-11 DIAGNOSIS — M54.50 ACUTE MIDLINE LOW BACK PAIN WITHOUT SCIATICA: Primary | ICD-10-CM

## 2023-10-11 DIAGNOSIS — R29.3 POSTURE IMBALANCE: ICD-10-CM

## 2023-10-11 DIAGNOSIS — R26.9 GAIT DISTURBANCE: ICD-10-CM

## 2023-10-11 DIAGNOSIS — R26.89 BALANCE PROBLEM: ICD-10-CM

## 2023-10-11 PROCEDURE — 97113 AQUATIC THERAPY/EXERCISES: CPT | Performed by: PHYSICAL THERAPIST

## 2023-10-11 NOTE — PROGRESS NOTES
Physical Therapy Daily Treatment Note    [facility]  [address] [place] [address]  [phone] (phone)  [phone] (fax)    Patient: Clarissa Matos   : 1950  Diagnosis/ICD-10 Code:  Acute midline low back pain without sciatica [M54.50]  Referring practitioner: RUBY Marsh  Date of Initial Visit: Type: THERAPY  Noted: 2023  Today's Date: 10/11/2023  Patient seen for 6 sessions             Subjective   My right hip was sore after last time, I think from the hip stretch.    Objective     AQUATIC THERAPY EXERCISE:  Suspended bicycling X 2 min.  Water walking Forwards, Sideways and backwards pre-treatment  Hamstring Stretch w/ Lg Noodle under ankle 20 sec X 2 ea  Piriformis Stretch 20 sec X 2 ea HOLD  Wall Crawl Stretch (BKTC) 20 sec X 2  Decompression supported on foam noodle/Railing X 2 min.  Mini Squats 20X  March Walk 25 ft X 3  Tuck Ups X 20  Abdominals -  Pushdowns with Lg Noodle 15X  Straight Leg Raises 15x  each  Hip Extension --  Hip Abduction 15X each  Leg Press w/ large foam ring 15X each  Rows (Alternating) w/ closed paddles 20X  Stir the Pot w/ closed paddles 10/10  Shldr Flex/ Ext w/ open paddles --    Assessment/Plan  Progressed from march in place to march walk without problem and added leg press with large foam ring resistance and SLRs.  Increased repetitions from 15 to 20 on some exercises to increase muscle strength and endurance.          Timed:  Aquatic Therapy    25     mins [id number];    Arsen Horvath, PT  Physical Therapist    [place] License:  [id number]

## 2023-10-13 ENCOUNTER — TELEPHONE (OUTPATIENT)
Dept: PHYSICAL THERAPY | Facility: CLINIC | Age: 73
End: 2023-10-13

## 2023-10-17 ENCOUNTER — OFFICE VISIT (OUTPATIENT)
Dept: ONCOLOGY | Facility: CLINIC | Age: 73
End: 2023-10-17
Payer: MEDICARE

## 2023-10-17 ENCOUNTER — LAB (OUTPATIENT)
Dept: OTHER | Facility: HOSPITAL | Age: 73
End: 2023-10-17
Payer: MEDICARE

## 2023-10-17 VITALS
SYSTOLIC BLOOD PRESSURE: 120 MMHG | RESPIRATION RATE: 16 BRPM | HEIGHT: 63 IN | BODY MASS INDEX: 31.57 KG/M2 | DIASTOLIC BLOOD PRESSURE: 74 MMHG | OXYGEN SATURATION: 98 % | WEIGHT: 178.2 LBS | TEMPERATURE: 97.5 F | HEART RATE: 75 BPM

## 2023-10-17 DIAGNOSIS — D75.1 POLYCYTHEMIA: Primary | ICD-10-CM

## 2023-10-17 DIAGNOSIS — D75.1 ERYTHROCYTOSIS: ICD-10-CM

## 2023-10-17 LAB
BASOPHILS # BLD AUTO: 0.07 10*3/MM3 (ref 0–0.2)
BASOPHILS NFR BLD AUTO: 1.1 % (ref 0–1.5)
DEPRECATED RDW RBC AUTO: 41.4 FL (ref 37–54)
EOSINOPHIL # BLD AUTO: 0.27 10*3/MM3 (ref 0–0.4)
EOSINOPHIL NFR BLD AUTO: 4.3 % (ref 0.3–6.2)
ERYTHROCYTE [DISTWIDTH] IN BLOOD BY AUTOMATED COUNT: 12.2 % (ref 12.3–15.4)
HCT VFR BLD AUTO: 44.6 % (ref 34–46.6)
HGB BLD-MCNC: 14.9 G/DL (ref 12–15.9)
IMM GRANULOCYTES # BLD AUTO: 0.02 10*3/MM3 (ref 0–0.05)
IMM GRANULOCYTES NFR BLD AUTO: 0.3 % (ref 0–0.5)
LYMPHOCYTES # BLD AUTO: 2.22 10*3/MM3 (ref 0.7–3.1)
LYMPHOCYTES NFR BLD AUTO: 35 % (ref 19.6–45.3)
MCH RBC QN AUTO: 30.8 PG (ref 26.6–33)
MCHC RBC AUTO-ENTMCNC: 33.4 G/DL (ref 31.5–35.7)
MCV RBC AUTO: 92.1 FL (ref 79–97)
MONOCYTES # BLD AUTO: 0.44 10*3/MM3 (ref 0.1–0.9)
MONOCYTES NFR BLD AUTO: 6.9 % (ref 5–12)
NEUTROPHILS NFR BLD AUTO: 3.32 10*3/MM3 (ref 1.7–7)
NEUTROPHILS NFR BLD AUTO: 52.4 % (ref 42.7–76)
NRBC BLD AUTO-RTO: 0 /100 WBC (ref 0–0.2)
PLATELET # BLD AUTO: 260 10*3/MM3 (ref 140–450)
PMV BLD AUTO: 9.8 FL (ref 6–12)
RBC # BLD AUTO: 4.84 10*6/MM3 (ref 3.77–5.28)
WBC NRBC COR # BLD: 6.34 10*3/MM3 (ref 3.4–10.8)

## 2023-10-17 PROCEDURE — 85025 COMPLETE CBC W/AUTO DIFF WBC: CPT | Performed by: INTERNAL MEDICINE

## 2023-10-17 PROCEDURE — 85652 RBC SED RATE AUTOMATED: CPT | Performed by: INTERNAL MEDICINE

## 2023-10-17 PROCEDURE — 36415 COLL VENOUS BLD VENIPUNCTURE: CPT

## 2023-10-17 NOTE — PROGRESS NOTES
Subjective     REASON FOR CONSULTATION: Erythrocytosis  Provide an opinion on any further workup or treatment                             REQUESTING PHYSICIAN: RUBY Dang    RECORDS OBTAINED:  Records of the patients history including those obtained from the referring provider were reviewed and summarized in detail.    HISTORY OF PRESENT ILLNESS:  The patient is a 72 y.o. year old female who is here for an opinion about the above issue.  She is referred to us from her primary care office for evaluation of erythrocytosis noted on previous labs.  Most recent available lab in [facility] was from 11/30/2022 showing a hemoglobin of 16.8 g/dL with a hematocrit of 48%.  Her white cells and platelets were within normal limits.  She has never been a smoker and does not have any known chronic lung disease.    She is on diuretic therapy with hydrochlorothiazide for her blood pressure.    He had a positive Cologuard screening test on 5/10/2023 and is currently scheduled to undergo a colonoscopy and EGD with Dr. Gui Cuello of gastroenterology on 8/28/2023.    CBC in our office today is normal with a hemoglobin of 15.3 and hematocrit of 45.5%.  White cells and platelets remain normal.  Eosinophils were mildly increased consistent with seasonal allergies.    With her initial consult visit of 7/14/2023 we performed additional studies including a carbon monoxide level which was normal and an erythropoietin level which was normal.  She been having issues with back pain and undergoing physical therapy.  She had a lumbar epidural steroid injection a few weeks ago on 9/29/2023.    Her blood count in the office today is unremarkable with hemoglobin of 14.9 and hematocrit 44.6.  Her white cells and platelets are normal and her eosinophil percentage is normal today as well.    History of Present Illness     Past Medical History:   Diagnosis Date    Anxiety     Arthritis     Depression     Dyslipidemia     Fibromyalgia     DX      GERD (gastroesophageal reflux disease)     Hard to intubate     STATES TOOK 30 MINUTES TO BE INTUBATED    Headache     Hiatal hernia     Hypertension     Irregular heart rate     PVC'S    Limited joint range of motion     LT KNEE    Polycythemia     Prediabetes         Past Surgical History:   Procedure Laterality Date    BREAST BIOPSY Right     CATARACT EXTRACTION W/ INTRAOCULAR LENS IMPLANT Bilateral 2014     SECTION      x 2    CHOLECYSTECTOMY WITH INTRAOPERATIVE CHOLANGIOGRAM N/A 2017    Procedure: CHOLECYSTECTOMY LAPAROSCOPIC INTRAOPERATIVE CHOLANGIOGRAM;  Surgeon: Demi Madden MD;  Location:  [facility];  Service:     COLONOSCOPY N/A 2023    Procedure: COLONOSCOPY TO CECUM;  Surgeon: Gui Cuello MD;  Location: [facility] OR;  Service: Gastroenterology;  Laterality: N/A;  DIVERTICULUM, HEMORRRHOIDS    ENDOSCOPY N/A 2023    Procedure: ESOPHAGOGASTRODUODENOSCOPY WITH DILATION;  Surgeon: Gui Cuello MD;  Location: [facility] MAIN OR;  Service: Gastroenterology;  Laterality: N/A;  DILATION TO 20MM, GASTRITIS, SCHATSKIS RING, HIATAL HERNIA    EPIDURAL Bilateral 2023    Procedure: LUMBAR/SACRAL TRANSFORAMINAL EPIDURAL. bilateral L5/S1. 70247, [id number];  Surgeon: Mia Chicas MD;  Location: [facility] MAIN OR;  Service: Pain Management;  Laterality: Bilateral;    HYSTERECTOMY      MEDIAL BRANCH BLOCK Bilateral 2021    Procedure: Bilateral L4-S1 MEDIAL BRANCH BLOCK;  Surgeon: Mia Chicas MD;  Location: [facility] OR;  Service: Pain Management;  Laterality: Bilateral;    MEDIAL BRANCH BLOCK Bilateral 2021    Procedure: Bilateral cervical MEDIAL BRANCH BLOCK at approximatley C4-C6;  Surgeon: Mia Chicas MD;  Location: [facility] OR;  Service: Pain Management;  Laterality: Bilateral;    REPLACEMENT TOTAL KNEE Right     ROTATOR CUFF REPAIR Right     TOOTH EXTRACTION Right 2023    TOTAL KNEE ARTHROPLASTY Left 2019    Procedure:  LEFT TOTAL KNEE ARTHROPLASTY;  Surgeon: Sam Bustamante MD;  Location: [facility] MAIN OR;  Service: Orthopedics    TOTAL SHOULDER ARTHROPLASTY W/ DISTAL CLAVICLE EXCISION Left 07/05/2018    Procedure: Reverse Total Shoulder Arthroplsty for fracture;  Surgeon: Louis Prasad MD;  Location: [facility] MAIN OR;  Service: Orthopedics    TUBAL ABDOMINAL LIGATION  1986        Current Outpatient Medications on File Prior to Visit   Medication Sig Dispense Refill    acetaminophen (TYLENOL) 325 MG tablet Take 2 tablets by mouth Every 4 (Four) Hours As Needed for Fever (greater than 101 F).      albuterol sulfate HFA (ProAir HFA) 108 (90 Base) MCG/ACT inhaler Inhale 2 puffs Every 4 (Four) Hours As Needed for Wheezing or Shortness of Air. 18 g 0    aspirin-acetaminophen-caffeine (EXCEDRIN MIGRAINE) 250-250-65 MG per tablet Take 1 tablet by mouth Every 6 (Six) Hours As Needed for Headache. May resume on 07/07/18.      CALCIUM PO Take  by mouth.      candesartan-hydrochlorothiazide (ATACAND HCT) 16-12.5 MG per tablet TAKE 1 TABLET BY MOUTH DAILY. 90 tablet 0    Cholecalciferol (VITAMIN D3) 5000 units capsule capsule Take 1 capsule by mouth Every Night.      Diclofenac Sodium (VOLTAREN) 1 % gel gel Apply 4 g topically to the appropriate area as directed 2 (Two) Times a Day.      docusate sodium 100 MG capsule Take 100 mg by mouth 2 (Two) Times a Day. (Patient taking differently: Take 1 capsule by mouth As Needed.)      DULoxetine (CYMBALTA) 60 MG capsule       fexofenadine (ALLEGRA) 180 MG tablet Take 1 tablet by mouth Daily.      guaiFENesin 200 MG tablet       hydroCHLOROthiazide (HYDRODIURIL) 12.5 MG tablet Take 1 tablet by mouth Daily As Needed (ankle swelling). 30 tablet 1    HYDROcodone-acetaminophen (NORCO)  MG per tablet Take 1-2 tablets daily prn      ketotifen (ZADITOR) 0.025 % ophthalmic solution       LORazepam (ATIVAN) 0.5 MG tablet Take 1 tablet by mouth 2 (Two) Times a Day As Needed for Anxiety.      MAGNESIUM PO  Take  by mouth.      Morphine (MS CONTIN) 15 MG 12 hr tablet Take 1 tablet by mouth Every 12 (Twelve) Hours.      omeprazole (priLOSEC) 40 MG capsule Take 1 capsule by mouth Daily. 90 capsule 3    zolpidem (AMBIEN) 10 MG tablet Take 1 tablet by mouth At Night As Needed for Sleep.       Current Facility-Administered Medications on File Prior to Visit   Medication Dose Route Frequency Provider Last Rate Last Admin    mupirocin (BACTROBAN) 2 % nasal ointment   Nasal BID Sam Bustamante MD            ALLERGIES:    Allergies   Allergen Reactions    Percocet [Oxycodone-Acetaminophen] Itching    Nsaids GI Intolerance    Penicillins Rash    Bupropion Unknown - Low Severity    Cortisone Unknown - Low Severity     Flushing         Social History     Socioeconomic History    Marital status:      Spouse name: Jaime   Tobacco Use    Smoking status: Never    Smokeless tobacco: Never   Vaping Use    Vaping Use: Never used   Substance and Sexual Activity    Alcohol use: No    Drug use: No    Sexual activity: Defer        Family History   Problem Relation Age of Onset    Heart disease Mother     Hypertension Mother     Arthritis Mother     Colon polyps Father     Heart disease Father     Hypertension Father     Arthritis Father     No Known Problems Sister     Arthritis Brother     No Known Problems Maternal Aunt     No Known Problems Paternal Aunt     No Known Problems Maternal Grandmother     No Known Problems Paternal Grandmother     No Known Problems Daughter     No Known Problems Son     BRCA 1/2 Neg Hx     Breast cancer Neg Hx     Colon cancer Neg Hx     Endometrial cancer Neg Hx     Ovarian cancer Neg Hx     Malig Hyperthermia Neg Hx     Crohn's disease Neg Hx     Irritable bowel syndrome Neg Hx     Ulcerative colitis Neg Hx         Review of Systems   Constitutional:  Negative for activity change, chills, fatigue and fever.   HENT:  Negative for mouth sores, trouble swallowing and voice change.    Eyes:  Negative for  "pain and visual disturbance.   Respiratory:  Negative for cough, shortness of breath and wheezing.    Cardiovascular:  Negative for chest pain and palpitations.   Gastrointestinal:  Negative for abdominal pain, constipation, diarrhea, nausea and vomiting.   Genitourinary:  Negative for difficulty urinating, frequency and urgency.   Musculoskeletal:  Positive for back pain. Negative for arthralgias and joint swelling.   Skin:  Negative for rash.   Neurological:  Negative for dizziness, seizures, weakness and headaches.   Hematological:  Negative for adenopathy. Does not bruise/bleed easily.   Psychiatric/Behavioral:  Negative for behavioral problems and confusion. The patient is not nervous/anxious.         Objective     Vitals:    10/17/23 1431   BP: 120/74   Pulse: 75   Resp: 16   Temp: 97.5 °F (36.4 °C)   TempSrc: Temporal   SpO2: 98%   Weight: 80.8 kg (178 lb 3.2 oz)   Height: 160 cm (62.99\")   PainSc:   3   PainLoc: Back           10/17/2023     2:32 PM   Current Status   ECOG score 0       Physical Exam  Constitutional:       General: She is not in acute distress.     Appearance: She is well-developed.   HENT:      Head: Normocephalic.   Eyes:      General: No scleral icterus.     Conjunctiva/sclera: Conjunctivae normal.      Pupils: Pupils are equal, round, and reactive to light.   Neck:      Thyroid: No thyromegaly.      Vascular: No JVD.   Cardiovascular:      Rate and Rhythm: Normal rate and regular rhythm.      Heart sounds: No murmur heard.     No friction rub. No gallop.   Pulmonary:      Effort: Pulmonary effort is normal.      Breath sounds: Normal breath sounds. No wheezing or rales.   Abdominal:      General: There is no distension.      Palpations: Abdomen is soft. There is no mass.      Tenderness: There is no abdominal tenderness.   Musculoskeletal:         General: No deformity. Normal range of motion.      Cervical back: Normal range of motion and neck supple.   Lymphadenopathy:      Cervical: " No cervical adenopathy.   Skin:     General: Skin is warm and dry.      Findings: No erythema or rash.   Neurological:      Mental Status: She is alert and oriented to person, place, and time.      Cranial Nerves: No cranial nerve deficit.      Deep Tendon Reflexes: Reflexes are normal and symmetric.   Psychiatric:         Behavior: Behavior normal.         Judgment: Judgment normal.           RECENT LABS:  Hematology WBC   Date Value Ref Range Status   10/17/2023 6.34 3.40 - 10.80 10*3/mm3 Final   11/30/2022 5.73 3.40 - 10.80 10*3/mm3 Final     RBC   Date Value Ref Range Status   10/17/2023 4.84 3.77 - 5.28 10*6/mm3 Final   11/30/2022 5.45 (H) 3.77 - 5.28 10*6/mm3 Final     Hemoglobin   Date Value Ref Range Status   10/17/2023 14.9 12.0 - 15.9 g/dL Final     Hematocrit   Date Value Ref Range Status   10/17/2023 44.6 34.0 - 46.6 % Final     Platelets   Date Value Ref Range Status   10/17/2023 260 140 - 450 10*3/mm3 Final          Assessment & Plan     1.  Mild erythrocytosis.  We suspect this is mild secondary erythrocytosis perhaps due to hemoconcentration from diuretic therapy.  Her blood count has now been normal and her work-up was unrevealing.  At this point we do not feel she would need any additional testing.  2.  Positive Cologuard test.  She is scheduled to undergo upper and lower GI endoscopy with Dr. Gui Cuello on 8/28/2023    Recommendations  1.  Reviewed the results of today's CBC and the previous laboratory work-up from the initial consult with the patient and explained that we do not see any signs of a significant hematologic disorder.  2.  We have not scheduled any routine follow-up in our office but certainly would be happy to see Cathy again in the future if any new concerns arise.    Thanks for allowing us to see this nice patient in consultation.

## 2023-10-17 NOTE — LETTER
October 17, 2023     RUBY Baig  [address]  [address]  [place] [id number]    Patient: Clarissa Matos   YOB: 1950   Date of Visit: 10/17/2023     Dear RUBY Baig:       Thank you for referring Clarissa Matos to me for evaluation. Below are the relevant portions of my assessment and plan of care.    If you have questions, please do not hesitate to call me. I look forward to following Clarissa along with you.         Sincerely,        Juan J Hammonds MD        CC: No Recipients    Juan J Hammonds MD  10/17/23 8793  Sign when Signing Visit    Subjective    REASON FOR CONSULTATION: Erythrocytosis  Provide an opinion on any further workup or treatment                             REQUESTING PHYSICIAN: RUBY Dang    RECORDS OBTAINED:  Records of the patients history including those obtained from the referring provider were reviewed and summarized in detail.    HISTORY OF PRESENT ILLNESS:  The patient is a 72 y.o. year old female who is here for an opinion about the above issue.  She is referred to us from her primary care office for evaluation of erythrocytosis noted on previous labs.  Most recent available lab in Psychiatric was from 11/30/2022 showing a hemoglobin of 16.8 g/dL with a hematocrit of 48%.  Her white cells and platelets were within normal limits.  She has never been a smoker and does not have any known chronic lung disease.    She is on diuretic therapy with hydrochlorothiazide for her blood pressure.    He had a positive Cologuard screening test on 5/10/2023 and is currently scheduled to undergo a colonoscopy and EGD with Dr. Gui Cuello of gastroenterology on 8/28/2023.    CBC in our office today is normal with a hemoglobin of 15.3 and hematocrit of 45.5%.  White cells and platelets remain normal.  Eosinophils were mildly increased consistent with seasonal allergies.    With her initial consult visit of 7/14/2023 we performed additional studies  including a carbon monoxide level which was normal and an erythropoietin level which was normal.  She been having issues with back pain and undergoing physical therapy.  She had a lumbar epidural steroid injection a few weeks ago on 2023.    Her blood count in the office today is unremarkable with hemoglobin of 14.9 and hematocrit 44.6.  Her white cells and platelets are normal and her eosinophil percentage is normal today as well.    History of Present Illness     Past Medical History:   Diagnosis Date   • Anxiety    • Arthritis    • Depression    • Dyslipidemia    • Fibromyalgia     DX    • GERD (gastroesophageal reflux disease)    • Hard to intubate     STATES TOOK 30 MINUTES TO BE INTUBATED   • Headache    • Hiatal hernia    • Hypertension    • Irregular heart rate     PVC'S   • Limited joint range of motion     LT KNEE   • Polycythemia    • Prediabetes         Past Surgical History:   Procedure Laterality Date   • BREAST BIOPSY Right    • CATARACT EXTRACTION W/ INTRAOCULAR LENS IMPLANT Bilateral    •  SECTION      x 2   • CHOLECYSTECTOMY WITH INTRAOPERATIVE CHOLANGIOGRAM N/A 2017    Procedure: CHOLECYSTECTOMY LAPAROSCOPIC INTRAOPERATIVE CHOLANGIOGRAM;  Surgeon: Demi Madden MD;  Location: [facility] MAIN OR;  Service:    • COLONOSCOPY N/A 2023    Procedure: COLONOSCOPY TO CECUM;  Surgeon: Gui Cuello MD;  Location: [facility] MAIN OR;  Service: Gastroenterology;  Laterality: N/A;  DIVERTICULUM, HEMORRRHOIDS   • ENDOSCOPY N/A 2023    Procedure: ESOPHAGOGASTRODUODENOSCOPY WITH DILATION;  Surgeon: Gui Cuello MD;  Location: [facility] MAIN OR;  Service: Gastroenterology;  Laterality: N/A;  DILATION TO 20MM, GASTRITIS, SCHATSKIS RING, HIATAL HERNIA   • EPIDURAL Bilateral 2023    Procedure: LUMBAR/SACRAL TRANSFORAMINAL EPIDURAL. bilateral L5/S1. 25560, 27582;  Surgeon: Mia Chicas MD;  Location: [facility] MAIN OR;  Service: Pain Management;  Laterality: Bilateral;    • HYSTERECTOMY  2010   • MEDIAL BRANCH BLOCK Bilateral 07/28/2021    Procedure: Bilateral L4-S1 MEDIAL BRANCH BLOCK;  Surgeon: Mia Chicas MD;  [place] [facility] [place];  Service: Pain Management;  Laterality: Bilateral;   • MEDIAL BRANCH BLOCK Bilateral 08/30/2021    Procedure: Bilateral cervical MEDIAL BRANCH BLOCK at approximatley C4-C6;  Surgeon: Mia Chicas MD;  [place] [facility] [place];  Service: Pain Management;  Laterality: Bilateral;   • REPLACEMENT TOTAL KNEE Right    • ROTATOR CUFF REPAIR Right 2001   • TOOTH EXTRACTION Right 04/24/2023   • TOTAL KNEE ARTHROPLASTY Left 07/22/2019    Procedure: LEFT TOTAL KNEE ARTHROPLASTY;  Surgeon: Sam Bustamante MD;  Location: [facility] MAIN OR;  Service: Orthopedics   • TOTAL SHOULDER ARTHROPLASTY W/ DISTAL CLAVICLE EXCISION Left 07/05/2018    Procedure: Reverse Total Shoulder Arthroplsty for fracture;  Surgeon: Louis Prasad MD;  Location: [facility] MAIN OR;  Service: Orthopedics   • TUBAL ABDOMINAL LIGATION  1986        Current Outpatient Medications on File Prior to Visit   Medication Sig Dispense Refill   • acetaminophen (TYLENOL) 325 MG tablet Take 2 tablets by mouth Every 4 (Four) Hours As Needed for Fever (greater than 101 F).     • albuterol sulfate HFA (ProAir HFA) 108 (90 Base) MCG/ACT inhaler Inhale 2 puffs Every 4 (Four) Hours As Needed for Wheezing or Shortness of Air. 18 g 0   • aspirin-acetaminophen-caffeine (EXCEDRIN MIGRAINE) 250-250-65 MG per tablet Take 1 tablet by mouth Every 6 (Six) Hours As Needed for Headache. May resume on 07/07/18.     • CALCIUM PO Take  by mouth.     • candesartan-hydrochlorothiazide (ATACAND HCT) 16-12.5 MG per tablet TAKE 1 TABLET BY MOUTH DAILY. 90 tablet 0   • Cholecalciferol (VITAMIN D3) 5000 units capsule capsule Take 1 capsule by mouth Every Night.     • Diclofenac Sodium (VOLTAREN) 1 % gel gel Apply 4 g topically to the appropriate area as directed 2 (Two) Times a Day.     • docusate sodium 100 MG capsule Take  100 mg by mouth 2 (Two) Times a Day. (Patient taking differently: Take 1 capsule by mouth As Needed.)     • DULoxetine (CYMBALTA) 60 MG capsule      • fexofenadine (ALLEGRA) 180 MG tablet Take 1 tablet by mouth Daily.     • guaiFENesin 200 MG tablet      • hydroCHLOROthiazide (HYDRODIURIL) 12.5 MG tablet Take 1 tablet by mouth Daily As Needed (ankle swelling). 30 tablet 1   • HYDROcodone-acetaminophen (NORCO)  MG per tablet Take 1-2 tablets daily prn     • ketotifen (ZADITOR) 0.025 % ophthalmic solution      • LORazepam (ATIVAN) 0.5 MG tablet Take 1 tablet by mouth 2 (Two) Times a Day As Needed for Anxiety.     • MAGNESIUM PO Take  by mouth.     • Morphine (MS CONTIN) 15 MG 12 hr tablet Take 1 tablet by mouth Every 12 (Twelve) Hours.     • omeprazole (priLOSEC) 40 MG capsule Take 1 capsule by mouth Daily. 90 capsule 3   • zolpidem (AMBIEN) 10 MG tablet Take 1 tablet by mouth At Night As Needed for Sleep.       Current Facility-Administered Medications on File Prior to Visit   Medication Dose Route Frequency Provider Last Rate Last Admin   • mupirocin (BACTROBAN) 2 % nasal ointment   Nasal BID Sam Bustamante MD            ALLERGIES:    Allergies   Allergen Reactions   • Percocet [Oxycodone-Acetaminophen] Itching   • Nsaids GI Intolerance   • Penicillins Rash   • Bupropion Unknown - Low Severity   • Cortisone Unknown - Low Severity     Flushing         Social History     Socioeconomic History   • Marital status:      Spouse name: Jaime   Tobacco Use   • Smoking status: Never   • Smokeless tobacco: Never   Vaping Use   • Vaping Use: Never used   Substance and Sexual Activity   • Alcohol use: No   • Drug use: No   • Sexual activity: Defer        Family History   Problem Relation Age of Onset   • Heart disease Mother    • Hypertension Mother    • Arthritis Mother    • Colon polyps Father    • Heart disease Father    • Hypertension Father    • Arthritis Father    • No Known Problems Sister    • Arthritis  "Brother    • No Known Problems Maternal Aunt    • No Known Problems Paternal Aunt    • No Known Problems Maternal Grandmother    • No Known Problems Paternal Grandmother    • No Known Problems Daughter    • No Known Problems Son    • BRCA 1/2 Neg Hx    • Breast cancer Neg Hx    • Colon cancer Neg Hx    • Endometrial cancer Neg Hx    • Ovarian cancer Neg Hx    • Malig Hyperthermia Neg Hx    • Crohn's disease Neg Hx    • Irritable bowel syndrome Neg Hx    • Ulcerative colitis Neg Hx         Review of Systems   Constitutional:  Negative for activity change, chills, fatigue and fever.   HENT:  Negative for mouth sores, trouble swallowing and voice change.    Eyes:  Negative for pain and visual disturbance.   Respiratory:  Negative for cough, shortness of breath and wheezing.    Cardiovascular:  Negative for chest pain and palpitations.   Gastrointestinal:  Negative for abdominal pain, constipation, diarrhea, nausea and vomiting.   Genitourinary:  Negative for difficulty urinating, frequency and urgency.   Musculoskeletal:  Positive for back pain. Negative for arthralgias and joint swelling.   Skin:  Negative for rash.   Neurological:  Negative for dizziness, seizures, weakness and headaches.   Hematological:  Negative for adenopathy. Does not bruise/bleed easily.   Psychiatric/Behavioral:  Negative for behavioral problems and confusion. The patient is not nervous/anxious.         Objective    Vitals:    10/17/23 1431   BP: 120/74   Pulse: 75   Resp: 16   Temp: 97.5 °F (36.4 °C)   TempSrc: Temporal   SpO2: 98%   Weight: 80.8 kg (178 lb 3.2 oz)   Height: 160 cm (62.99\")   PainSc:   3   PainLoc: Back           10/17/2023     2:32 PM   Current Status   ECOG score 0       Physical Exam  Constitutional:       General: She is not in acute distress.     Appearance: She is well-developed.   HENT:      Head: Normocephalic.   Eyes:      General: No scleral icterus.     Conjunctiva/sclera: Conjunctivae normal.      Pupils: Pupils " are equal, round, and reactive to light.   Neck:      Thyroid: No thyromegaly.      Vascular: No JVD.   Cardiovascular:      Rate and Rhythm: Normal rate and regular rhythm.      Heart sounds: No murmur heard.     No friction rub. No gallop.   Pulmonary:      Effort: Pulmonary effort is normal.      Breath sounds: Normal breath sounds. No wheezing or rales.   Abdominal:      General: There is no distension.      Palpations: Abdomen is soft. There is no mass.      Tenderness: There is no abdominal tenderness.   Musculoskeletal:         General: No deformity. Normal range of motion.      Cervical back: Normal range of motion and neck supple.   Lymphadenopathy:      Cervical: No cervical adenopathy.   Skin:     General: Skin is warm and dry.      Findings: No erythema or rash.   Neurological:      Mental Status: She is alert and oriented to person, place, and time.      Cranial Nerves: No cranial nerve deficit.      Deep Tendon Reflexes: Reflexes are normal and symmetric.   Psychiatric:         Behavior: Behavior normal.         Judgment: Judgment normal.           RECENT LABS:  Hematology WBC   Date Value Ref Range Status   10/17/2023 6.34 3.40 - 10.80 10*3/mm3 Final   11/30/2022 5.73 3.40 - 10.80 10*3/mm3 Final     RBC   Date Value Ref Range Status   10/17/2023 4.84 3.77 - 5.28 10*6/mm3 Final   11/30/2022 5.45 (H) 3.77 - 5.28 10*6/mm3 Final     Hemoglobin   Date Value Ref Range Status   10/17/2023 14.9 12.0 - 15.9 g/dL Final     Hematocrit   Date Value Ref Range Status   10/17/2023 44.6 34.0 - 46.6 % Final     Platelets   Date Value Ref Range Status   10/17/2023 260 140 - 450 10*3/mm3 Final          Assessment & Plan    1.  Mild erythrocytosis.  We suspect this is mild secondary erythrocytosis perhaps due to hemoconcentration from diuretic therapy.  Her blood count has now been normal and her work-up was unrevealing.  At this point we do not feel she would need any additional testing.  2.  Positive Cologuard test.   She is scheduled to undergo upper and lower GI endoscopy with Dr. Gui Cuello on 8/28/2023    Recommendations  1.  Reviewed the results of today's CBC and the previous laboratory work-up from the initial consult with the patient and explained that we do not see any signs of a significant hematologic disorder.  2.  We have not scheduled any routine follow-up in our office but certainly would be happy to see Cathy again in the future if any new concerns arise.    Thanks for allowing us to see this nice patient in consultation.

## 2023-10-18 ENCOUNTER — TREATMENT (OUTPATIENT)
Dept: PHYSICAL THERAPY | Facility: CLINIC | Age: 73
End: 2023-10-18
Payer: MEDICARE

## 2023-10-18 DIAGNOSIS — R26.89 BALANCE PROBLEM: ICD-10-CM

## 2023-10-18 DIAGNOSIS — M54.50 ACUTE MIDLINE LOW BACK PAIN WITHOUT SCIATICA: Primary | ICD-10-CM

## 2023-10-18 DIAGNOSIS — R26.9 GAIT DISTURBANCE: ICD-10-CM

## 2023-10-18 DIAGNOSIS — R29.3 POSTURE IMBALANCE: ICD-10-CM

## 2023-10-18 PROCEDURE — 97113 AQUATIC THERAPY/EXERCISES: CPT | Performed by: PHYSICAL THERAPIST

## 2023-10-18 NOTE — PROGRESS NOTES
Physical Therapy Daily Treatment Note    [facility]  [address] [place] [address]  [phone] (phone)  [phone] (fax)    Patient: Clarissa Matos   : 1950  Diagnosis/ICD-10 Code:  Acute midline low back pain without sciatica [M54.50]  Referring practitioner: RUBY Marsh  Date of Initial Visit: Type: THERAPY  Noted: 2023  Today's Date: 10/18/2023  Patient seen for 7 sessions             Subjective   My upper back is hurting more - sat with  at Dr cohen for 3 hours. Had a cold and was in bed for awhile, better now.    Objective     AQUATIC THERAPY EXERCISE:  Suspended bicycling X 2 min.  Open the Book (stretch) 5 X each side  Water walking Forwards, Sideways and backwards pre-treatment  Hamstring Stretch w/ Lg Noodle under ankle 20 sec X 2 ea  Piriformis Stretch 20 sec X 2 ea HOLD  Wall Crawl Stretch (BKTC) 20 sec X 2  Calf Stretch 20 sec X 2 each  Decompression supported on foam noodle/Railing X 2 min.  Mini Squats 20X  March Walk 25 ft X 2  Tuck Ups X 5 discontinued due to persistent calf cramping on right   Abdominals -  Pushdowns with Lg Noodle 15X  Straight Leg Raises 15x  each  Hip Extension --  Hip Abduction 15X each  Leg Press w/ large foam ring 15X each  Rows (Alternating) w/ closed paddles 20X  Stir the Pot w/ closed paddles 10/10  Shldr Flex/ Ext w/ open paddles 10X    Assessment/Plan  Some upper back pain this day that Cathy attributes to sitting at Dr cohen for 3 hours.  She was unable to complete tuck ups today due to persistent right calf cramping despite calf stretching.  Plan: Begin dry land therapy  and continue aqua therapy.          Timed:  Aquatic Therapy    40     mins 41310;    Arsen Horvath, PT  Physical Therapist    [place] License:  [id number]

## 2023-10-20 ENCOUNTER — TREATMENT (OUTPATIENT)
Dept: PHYSICAL THERAPY | Facility: CLINIC | Age: 73
End: 2023-10-20
Payer: MEDICARE

## 2023-10-20 DIAGNOSIS — R26.9 GAIT DISTURBANCE: ICD-10-CM

## 2023-10-20 DIAGNOSIS — R26.89 BALANCE PROBLEM: ICD-10-CM

## 2023-10-20 DIAGNOSIS — G89.29 CHRONIC PAIN OF BOTH KNEES: ICD-10-CM

## 2023-10-20 DIAGNOSIS — M25.561 CHRONIC PAIN OF BOTH KNEES: ICD-10-CM

## 2023-10-20 DIAGNOSIS — M54.50 ACUTE MIDLINE LOW BACK PAIN WITHOUT SCIATICA: Primary | ICD-10-CM

## 2023-10-20 DIAGNOSIS — M25.562 CHRONIC PAIN OF BOTH KNEES: ICD-10-CM

## 2023-10-20 PROCEDURE — 97113 AQUATIC THERAPY/EXERCISES: CPT | Performed by: PHYSICAL THERAPIST

## 2023-10-20 NOTE — PROGRESS NOTES
Physical Therapy Daily Treatment Note    [facility]  [address] [place] [address]  [phone] (phone)  [phone] (fax)    Patient: Clarissa Matos   : 1950  Diagnosis/ICD-10 Code:  Acute midline low back pain without sciatica [M54.50]  Referring practitioner: RUBY Marsh  Date of Initial Visit: Type: THERAPY  Noted: 2023  Today's Date: 10/20/2023  Patient seen for 8 sessions             Subjective   My back feels better, pain rated 2/10, but my knees are sore.  I  need to get back on my stationary bike.    Objective     AQUATIC THERAPY EXERCISE:  Suspended bicycling X 2 min.  Water walking Forwards, Sideways and backwards pre-treatment  Hamstring Stretch 20 sec X 2 ea  Piriformis Stretch 20 sec X 2 ea  Wall Crawl Stretch (BKTC) 20 sec X 2  Calf Stretch 20 sec X 2 each  Decompression supported on foam noodle/Railing X 2 min.  Mini Squats 20X  Tuck Ups     20X  Abdominals -  Pushdowns with Lg Noodle 20X  Straight Leg Raises 20x  each  Hip Flex/Extension  20X each   Hip Abduction          20X each  Leg Press w/ large foam ring 20X each  Rows (Alternating) w/ closed paddles 20X  Stir the Pot w/ closed paddles 10/10  Shldr Flex/ Ext w/ open paddles 10X    Assessment/Plan  Cathy reports improvement in back pain, however more soreness in her knees with the weather change. Continue core  and LE strengthening in aquatic environment.  Plan: Add dry land therapy the last week of October.          Timed:  Aquatic Therapy    40     mins 98880;    Arsen Horvath, PT  Physical Therapist    [place] License:  [id number]

## 2023-10-23 ENCOUNTER — TREATMENT (OUTPATIENT)
Dept: PHYSICAL THERAPY | Facility: CLINIC | Age: 73
End: 2023-10-23
Payer: MEDICARE

## 2023-10-23 DIAGNOSIS — R26.89 BALANCE PROBLEM: ICD-10-CM

## 2023-10-23 DIAGNOSIS — M25.561 CHRONIC PAIN OF BOTH KNEES: ICD-10-CM

## 2023-10-23 DIAGNOSIS — R26.9 GAIT DISTURBANCE: ICD-10-CM

## 2023-10-23 DIAGNOSIS — R29.3 POSTURE IMBALANCE: ICD-10-CM

## 2023-10-23 DIAGNOSIS — M54.50 ACUTE MIDLINE LOW BACK PAIN WITHOUT SCIATICA: Primary | ICD-10-CM

## 2023-10-23 DIAGNOSIS — G89.29 CHRONIC PAIN OF BOTH KNEES: ICD-10-CM

## 2023-10-23 DIAGNOSIS — M25.562 CHRONIC PAIN OF BOTH KNEES: ICD-10-CM

## 2023-10-23 PROCEDURE — 97113 AQUATIC THERAPY/EXERCISES: CPT | Performed by: PHYSICAL THERAPIST

## 2023-10-23 NOTE — PROGRESS NOTES
Physical Therapy Daily Treatment Note    [facility]  [address] [place] [address]  [phone] (phone)  [phone] (fax)    Patient: Clarissa Matos   : 1950  Diagnosis/ICD-10 Code:  Acute midline low back pain without sciatica [M54.50]  Referring practitioner: RUBY Marsh  Date of Initial Visit: Type: THERAPY  Noted: 2023  Today's Date: 10/23/2023  Patient seen for 9 sessions             Subjective   My left knee hurts on both sides and I'm having trouble bending it far enough to get a sock on.  I started back on my stationary bike.    Objective     AQUATIC THERAPY EXERCISE:  Suspended bicycling X 2 min.  Water walking Forwards, Sideways and backwards pre-treatment  Hamstring Stretch w/ noodle under ankle, 20 sec X 2 ea  Quad Stretch  w/ manual assist 30 sec X 2 each  Piriformis Stretch 20 sec X 2 ea  Wall Crawl Stretch (BKTC) 20 sec X 2  Calf Stretch 20 sec X 2 each  Decompression supported on foam noodle/Railing X 2 min.  Mini Squats 20X  Tuck Ups     20X  Abdominals -  Pushdowns with Lg Noodle 20X  Straight Leg Raises 20x  each  Hip Flex/Extension  20X each   Hip Abduction          20X each  Leg Press w/ large foam ring 20X each  Rows (Alternating) w/ closed paddles 20X  Stir the Pot w/ closed paddles 10/10  Shldr Flex/ Ext w/ open paddles 10X      Assessment/Plan  Cathy was instructed in quadricep stretch in water and general knee flexion ROM exercises for home to improve her ability to jeanie a sock on her left.  Her back pain has become more manageable and she is ready to begin dry land therapy for instruction in home exercises and further self care.          Timed:  Aquatic Therapy    30     mins 38364;    Arsen Horvath, PT  Physical Therapist    [place] License:  [id number]

## 2023-10-25 ENCOUNTER — TREATMENT (OUTPATIENT)
Dept: PHYSICAL THERAPY | Facility: CLINIC | Age: 73
End: 2023-10-25
Payer: MEDICARE

## 2023-10-25 DIAGNOSIS — R26.89 BALANCE PROBLEM: ICD-10-CM

## 2023-10-25 DIAGNOSIS — G89.29 CHRONIC PAIN OF BOTH KNEES: ICD-10-CM

## 2023-10-25 DIAGNOSIS — M25.562 CHRONIC PAIN OF BOTH KNEES: ICD-10-CM

## 2023-10-25 DIAGNOSIS — M54.50 ACUTE MIDLINE LOW BACK PAIN WITHOUT SCIATICA: Primary | ICD-10-CM

## 2023-10-25 DIAGNOSIS — M25.561 CHRONIC PAIN OF BOTH KNEES: ICD-10-CM

## 2023-10-25 DIAGNOSIS — R26.9 GAIT DISTURBANCE: ICD-10-CM

## 2023-10-25 PROCEDURE — 97113 AQUATIC THERAPY/EXERCISES: CPT | Performed by: PHYSICAL THERAPIST

## 2023-10-25 NOTE — PROGRESS NOTES
Physical Therapy Daily Treatment Note    [facility]  [address] [place] [address]  [phone] (phone)  [phone] (fax)    Patient: Clarissa Matos   : 1950  Diagnosis/ICD-10 Code:  Acute midline low back pain without sciatica [M54.50]  Referring practitioner: RUBY Marsh  Date of Initial Visit: Type: THERAPY  Noted: 2023  Today's Date: 10/25/2023  Patient seen for 10 sessions             Subjective   Back and left buttock pain rated 2/10, with intermittent numbness in left ankle.     Objective     AQUATIC THERAPY EXERCISE:  Water walking Forwards, Sideways and backwards 100 ft each  Hamstring Stretch w/ noodle under ankle, 20 sec X 2 ea  Quad Stretch  w/ noodle at ankle 30 sec X 2 each  Piriformis Stretch 20 sec X 2 ea  Wall Crawl Stretch (BKTC) 20 sec X 2  Calf Stretch 20 sec X 2 each Deferred  Decompression supported on foam noodle/Railing X 2 min.  Mini Squats 20X  Tuck Ups     20X  Abdominals -  Pushdowns with Lg Noodle 20X  Straight Leg Raises 20x  each  Hip Flex/Extension  20X each   Hip Abduction          20X each  Leg Press w/ large foam ring 20X each  Rows (Alternating) w/ closed paddles 20X  Stir the Pot w/ closed paddles 10/10  Shldr Flex/ Ext w/ open paddles 10X    Assessment/Plan  Cathy has made progress overall with reports of reduced back and left buttock pain.  She had one epidural that has helped. The numbness in her left ankle has returned intermittently. She will begin land physical therapy 1 day/week starting tomorrow for instruction in home exercises and further self care.    STGs:  1- Patient will report pain < 3 /10 with light household activities. MET     2-Patient will increase PROM to  SLR at least 60 bilaterally  without compensation or exacerbation for ease with dressing and self care. Unable to test poolside.     3-Patient will be independent with HEP without compensation or exacerbation. ONGOING, Currently  only doing aquatic therapy, starting land therapy tomorrow.             Timed:  Aquatic Therapy    40    mins 78378;    Arsen Horvath, PT  Physical Therapist    [place] License:  [id number]

## 2023-10-26 ENCOUNTER — TREATMENT (OUTPATIENT)
Dept: PHYSICAL THERAPY | Facility: CLINIC | Age: 73
End: 2023-10-26
Payer: MEDICARE

## 2023-10-26 DIAGNOSIS — M25.561 CHRONIC PAIN OF BOTH KNEES: ICD-10-CM

## 2023-10-26 DIAGNOSIS — R26.89 BALANCE PROBLEM: ICD-10-CM

## 2023-10-26 DIAGNOSIS — R29.3 POSTURE IMBALANCE: ICD-10-CM

## 2023-10-26 DIAGNOSIS — R26.9 GAIT DISTURBANCE: ICD-10-CM

## 2023-10-26 DIAGNOSIS — G89.29 CHRONIC PAIN OF BOTH KNEES: ICD-10-CM

## 2023-10-26 DIAGNOSIS — M54.50 ACUTE MIDLINE LOW BACK PAIN WITHOUT SCIATICA: Primary | ICD-10-CM

## 2023-10-26 DIAGNOSIS — M25.562 CHRONIC PAIN OF BOTH KNEES: ICD-10-CM

## 2023-10-26 PROCEDURE — 97530 THERAPEUTIC ACTIVITIES: CPT | Performed by: PHYSICAL THERAPIST

## 2023-10-26 PROCEDURE — 97116 GAIT TRAINING THERAPY: CPT | Performed by: PHYSICAL THERAPIST

## 2023-10-26 PROCEDURE — 97110 THERAPEUTIC EXERCISES: CPT | Performed by: PHYSICAL THERAPIST

## 2023-10-26 NOTE — PROGRESS NOTES
Physical Therapy Daily Treatment Note    [facility]  [address] [place] [address]  [phone] (phone)  [phone] (fax)    Patient: Clarissa Matos   : 1950  Diagnosis/ICD-10 Code:  Acute midline low back pain without sciatica [M54.50]  Referring practitioner: RUBY Marsh  Today's Date: 10/26/2023  Patient seen for 11 sessions    Visit Diagnoses:    ICD-10-CM ICD-9-CM   1. Acute midline low back pain without sciatica  M54.50 724.2   2. Balance problem  R26.89 781.99   3. Gait disturbance  R26.9 781.2   4. Chronic pain of both knees  M25.561 719.46    M25.562 338.29    G89.29    5. Posture imbalance  R29.3 729.90              Subjective had 1 injection and had a reaction but it has helped   At least 50% better   Sitting and walking are still the worse   Water therapy is going well   OK to walk the dog about 20 min  2x/day but does have pain sometimes  PAIN 2/10  now   may go uip to 5/10   NOT sleeping well at home worse since  had replaced     Objective     1- windshield wiper  x 5   2   hamstring stretch with rope      Knee straight P1  20 sec   P2   20 sec   PLAN 3 20 sec=1 min      GOAL 4 /day   3- PRETZEL stretch   20-30 sec 2-3x each leg  4- STRAIGH LEG RAISE    BEND 1 knee    Lock the other knee sriaght LIFT    Relock and lower half way     GOAL is 2  sets of 15   5- SIDE LYING LEG LIFT     Roll towards your belly    Lock the knee LIFT     RLOCK the knee and lower half way     GOAL 2 sets of 15     3 sets of all with verbal cueing for proper technique    Modifications as LEFT knee painful and swollen     POSTURE ed at mirror    Feet closer together   Some weight on heels    FOCUS on Knees straight   Up tall     GAIT training  with focus on     Heels hit the ground first    ARMS swing BACK         Assessment/Plan    A: LEFT knee pain and stiffness affecting ability to do stretches  standing posture and gait affected by bilaterally  knees lacking terminal knee extension but improved with verbal cueing and practice at mirror   PLAN reassess ROM and closed chain activities   add strengthening machines        Timed:    Manual Therapy:    0     mins  49836;  Therapeutic Exercise:    20     mins  80355;     Neuromuscular Krysta:    0    mins  02339;    Therapeutic Activity:     10     mins  63197;     Gait Training:      10     mins  14746;     Ultrasound:     0     mins  18852;    Aquatic Therapy    0     mins 95565;  Self Care                       0     mins   87433        Untimed:  Electrical Stimulation:    0     mins  52221 ( );  Traction:    0     mins  33097;   Dry Needling  (1-2 muscles)            0     mins 10420 (Self-pay)  Dry Needling (3-4 muscles) 0     20561 (Self-pay)  Dry Needling Trial    0     DRYNDLTRIAL  (No Charge)    Timed Treatment:   40  mins   Total Treatment:     40  mins    Rosalind Richey, PT  Physical Therapist    [place] License:[id number]

## 2023-10-27 ENCOUNTER — OFFICE VISIT (OUTPATIENT)
Dept: PAIN MEDICINE | Facility: CLINIC | Age: 73
End: 2023-10-27
Payer: MEDICARE

## 2023-10-27 VITALS
OXYGEN SATURATION: 96 % | HEART RATE: 76 BPM | TEMPERATURE: 95.9 F | WEIGHT: 178.4 LBS | HEIGHT: 63 IN | SYSTOLIC BLOOD PRESSURE: 132 MMHG | DIASTOLIC BLOOD PRESSURE: 86 MMHG | BODY MASS INDEX: 31.61 KG/M2 | RESPIRATION RATE: 18 BRPM

## 2023-10-27 DIAGNOSIS — M54.16 LUMBAR RADICULOPATHY: ICD-10-CM

## 2023-10-27 DIAGNOSIS — M51.36 DDD (DEGENERATIVE DISC DISEASE), LUMBAR: Primary | ICD-10-CM

## 2023-10-27 PROCEDURE — 1125F AMNT PAIN NOTED PAIN PRSNT: CPT | Performed by: NURSE PRACTITIONER

## 2023-10-27 PROCEDURE — 3075F SYST BP GE 130 - 139MM HG: CPT | Performed by: NURSE PRACTITIONER

## 2023-10-27 PROCEDURE — 1160F RVW MEDS BY RX/DR IN RCRD: CPT | Performed by: NURSE PRACTITIONER

## 2023-10-27 PROCEDURE — 3079F DIAST BP 80-89 MM HG: CPT | Performed by: NURSE PRACTITIONER

## 2023-10-27 PROCEDURE — 99212 OFFICE O/P EST SF 10 MIN: CPT | Performed by: NURSE PRACTITIONER

## 2023-10-27 PROCEDURE — 1159F MED LIST DOCD IN RCRD: CPT | Performed by: NURSE PRACTITIONER

## 2023-10-27 NOTE — PROGRESS NOTES
CHIEF COMPLAINT  Back pain    Subjective   Clarissa Matos is a 72 y.o. female  who presents to the office for follow-up of procedure.  She completed a LUMBAR/SACRAL TRANSFORAMINAL EPIDURAL. bilateral L5/S1. on  9/29/23 performed by Dr. Chicas for management of back pain. Patient reports 80% relief from the procedure. She reports flushing for three days after the injection.  Says this is a typical response to steroids, but does make her quite uncomfortable.      Back Pain  This is a chronic problem. The current episode started more than 1 month ago. The problem occurs daily. The problem has been gradually worsening since onset. The pain is present in the lumbar spine. The quality of the pain is described as aching and burning. The pain radiates to the left thigh and right thigh. The pain is at a severity of 2/10. The pain is severe. The symptoms are aggravated by standing, sitting and position. Associated symptoms include numbness. Pertinent negatives include no abdominal pain, chest pain, dysuria, fever, headaches or weakness. She has tried analgesics, ice, muscle relaxant and home exercises for the symptoms. The treatment provided mild relief.      PEG Assessment   What number best describes your pain on average in the past week?2  What number best describes how, during the past week, pain has interfered with your enjoyment of life?4afterPT  What number best describes how, during the past week, pain has interfered with your general activity?  4    Review of Pertinent Medical Data ---    The following portions of the patient's history were reviewed and updated as appropriate: allergies, current medications, past family history, past medical history, past social history, past surgical history, and problem list.    Review of Systems   Constitutional:  Positive for activity change. Negative for fatigue and fever.   Cardiovascular:  Negative for chest pain.   Gastrointestinal:  Negative for abdominal pain, constipation  "and diarrhea.   Genitourinary:  Negative for difficulty urinating and dysuria.   Musculoskeletal:  Positive for back pain and neck pain.   Neurological:  Positive for numbness. Negative for dizziness, weakness, light-headedness and headaches.   Psychiatric/Behavioral:  Positive for sleep disturbance. Negative for agitation and suicidal ideas. The patient is not nervous/anxious.      I have reviewed and confirmed the accuracy of the ROS as documented by the MA/LPN/RN RUBY Colin     Vitals:    10/27/23 1249   BP: 132/86   BP Location: Left arm   Patient Position: Sitting   Cuff Size: Adult   Pulse: 76   Resp: 18   Temp: 95.9 °F (35.5 °C)   SpO2: 96%   Weight: 80.9 kg (178 lb 6.4 oz)   Height: 160 cm (62.99\")   PainSc:   2         Objective   Physical Exam  Vitals and nursing note reviewed.   Constitutional:       General: She is not in acute distress.     Appearance: Normal appearance. She is not ill-appearing.   Pulmonary:      Effort: Pulmonary effort is normal. No respiratory distress.   Neurological:      Mental Status: She is alert and oriented to person, place, and time.   Psychiatric:         Mood and Affect: Mood normal.         Behavior: Behavior normal.           Assessment & Plan   Diagnoses and all orders for this visit:    1. DDD (degenerative disc disease), lumbar (Primary)    2. Lumbar radiculopathy      --- LTFESI can be repeated PRN. No more often than every 3 months.     Clarissa Matos reports a pain score of 2.  Given her pain assessment as noted, treatment options were discussed and the following options were decided upon as a follow-up plan to address the patient's pain:  see plan above .    --- Follow-up PRN                      Dictated utilizing Dragon dictation.     "

## 2023-11-27 DIAGNOSIS — I10 ESSENTIAL HYPERTENSION: Chronic | ICD-10-CM

## 2023-11-27 RX ORDER — OMEPRAZOLE 40 MG/1
40 CAPSULE, DELAYED RELEASE ORAL DAILY
Qty: 90 CAPSULE | Refills: 2 | Status: SHIPPED | OUTPATIENT
Start: 2023-11-27

## 2023-11-27 RX ORDER — CANDESARTAN CILEXETIL AND HYDROCHLOROTHIAZIDE 16; 12.5 MG/1; MG/1
1 TABLET ORAL DAILY
Qty: 90 TABLET | Refills: 0 | Status: SHIPPED | OUTPATIENT
Start: 2023-11-27

## 2023-11-27 NOTE — TELEPHONE ENCOUNTER
Rx Refill Note  Requested Prescriptions     Pending Prescriptions Disp Refills    candesartan-hydrochlorothiazide (ATACAND HCT) 16-12.5 MG per tablet [Pharmacy Med Name: CANDESA/HCTZ 16-12.5] 90 tablet 0     Sig: TAKE 1 TABLET BY MOUTH DAILY.      Last office visit with prescribing clinician: 5/25/2023   Last telemedicine visit with prescribing clinician: Visit date not found   Next office visit with prescribing clinician: Visit date not found                         Would you like a call back once the refill request has been completed: [] Yes [] No    If the office needs to give you a call back, can they leave a voicemail: [] Yes [] No    Amanda Mauro MA  11/27/23, 17:25 EST

## 2024-03-01 ENCOUNTER — OFFICE VISIT (OUTPATIENT)
Dept: PAIN MEDICINE | Facility: CLINIC | Age: 74
End: 2024-03-01
Payer: MEDICARE

## 2024-03-01 ENCOUNTER — PREP FOR SURGERY (OUTPATIENT)
Dept: SURGERY | Facility: SURGERY CENTER | Age: 74
End: 2024-03-01
Payer: MEDICARE

## 2024-03-01 VITALS
RESPIRATION RATE: 18 BRPM | HEART RATE: 87 BPM | SYSTOLIC BLOOD PRESSURE: 127 MMHG | BODY MASS INDEX: 33.13 KG/M2 | OXYGEN SATURATION: 98 % | DIASTOLIC BLOOD PRESSURE: 77 MMHG | WEIGHT: 187 LBS | HEIGHT: 63 IN | TEMPERATURE: 96.9 F

## 2024-03-01 DIAGNOSIS — M47.812 ARTHROPATHY OF CERVICAL FACET JOINT: Primary | ICD-10-CM

## 2024-03-01 DIAGNOSIS — M51.36 DDD (DEGENERATIVE DISC DISEASE), LUMBAR: Primary | ICD-10-CM

## 2024-03-01 DIAGNOSIS — M54.16 LUMBAR RADICULOPATHY: ICD-10-CM

## 2024-03-01 PROCEDURE — 1159F MED LIST DOCD IN RCRD: CPT | Performed by: NURSE PRACTITIONER

## 2024-03-01 PROCEDURE — 1125F AMNT PAIN NOTED PAIN PRSNT: CPT | Performed by: NURSE PRACTITIONER

## 2024-03-01 PROCEDURE — 3078F DIAST BP <80 MM HG: CPT | Performed by: NURSE PRACTITIONER

## 2024-03-01 PROCEDURE — 1160F RVW MEDS BY RX/DR IN RCRD: CPT | Performed by: NURSE PRACTITIONER

## 2024-03-01 PROCEDURE — 99214 OFFICE O/P EST MOD 30 MIN: CPT | Performed by: NURSE PRACTITIONER

## 2024-03-01 PROCEDURE — 3074F SYST BP LT 130 MM HG: CPT | Performed by: NURSE PRACTITIONER

## 2024-03-01 RX ORDER — DIAZEPAM 5 MG/1
10 TABLET ORAL ONCE
OUTPATIENT
Start: 2024-03-01 | End: 2024-03-01

## 2024-03-01 RX ORDER — AZITHROMYCIN 250 MG/1
TABLET, FILM COATED ORAL
COMMUNITY
Start: 2024-03-01

## 2024-03-01 RX ORDER — HYDROCODONE BITARTRATE AND ACETAMINOPHEN 5; 325 MG/1; MG/1
TABLET ORAL
COMMUNITY
Start: 2024-02-26

## 2024-03-01 RX ORDER — CYCLOBENZAPRINE HCL 10 MG
TABLET ORAL
Qty: 30 TABLET | Refills: 1 | COMMUNITY
Start: 2024-03-01

## 2024-03-01 NOTE — H&P (VIEW-ONLY)
CHIEF COMPLAINT  Back pain  Neck pain  Patient stated that her back  started to hurt again, patient stated that she is having bilateral hip pain. Patient complain of having pain in her left shoulder going across to the right shoulder.     Subjective   Clarissa Matos is a 73 y.o. female  who presents for follow-up.  She has a history of back and neck pain.    SHe reports worsening pain of the neck for the past month.  The pain is present in the lower cervical spine and radiates across the shoulder. No pain/numbness/tingling into the arms.  Pain is aggravated by reading and watching television.      LUMBAR/SACRAL TRANSFORAMINAL EPIDURAL. bilateral L5/S1. on  9/29/23 performed by Dr. Chicas for management of back pain. Patient reports 80% relief from the procedure, lasted for 5 months, waning.  She did experience a significant amount flushing for few days afterwards.  She said this was quite uncomfortable.  We have discussed that this is a potential side effect related to steroids.    Back Pain  This is a chronic problem. The current episode started more than 1 month ago. The problem occurs daily. The problem has been comes and goes since onset. The pain is present in the lumbar spine. The quality of the pain is described as aching and burning. The pain radiates to the left thigh and right thigh. The pain is at a severity of 7/10. The pain is severe. The symptoms are aggravated by standing, sitting and position. Associated symptoms include headaches. Pertinent negatives include no abdominal pain, chest pain, dysuria, fever, numbness or weakness. She has tried analgesics, ice, muscle relaxant and home exercises for the symptoms. The treatment provided mild relief.   Neck Pain   This is a chronic problem. The problem occurs daily. The problem has been gradually worsening. The pain is present in the midline, right side and left side. The quality of the pain is described as aching and burning. The pain is at a severity of  "7/10. The symptoms are aggravated by position. Associated symptoms include headaches. Pertinent negatives include no chest pain, fever, numbness or weakness. She has tried oral narcotics, neck support, home exercises, heat and ice for the symptoms. The treatment provided mild relief.        PEG Assessment   What number best describes your pain on average in the past week?7  What number best describes how, during the past week, pain has interfered with your enjoyment of life?7  What number best describes how, during the past week, pain has interfered with your general activity?  7    Review of Pertinent Medical Data ---      The following portions of the patient's history were reviewed and updated as appropriate: allergies, current medications, past family history, past medical history, past social history, past surgical history, and problem list.    Review of Systems   Constitutional:  Positive for activity change and fatigue. Negative for fever.   Cardiovascular:  Negative for chest pain.   Gastrointestinal:  Positive for constipation. Negative for abdominal pain and diarrhea.   Genitourinary:  Negative for difficulty urinating and dysuria.   Musculoskeletal:  Positive for back pain and neck pain.   Neurological:  Positive for headaches. Negative for dizziness, weakness, light-headedness and numbness.   Psychiatric/Behavioral:  Positive for sleep disturbance. Negative for agitation and suicidal ideas. The patient is not nervous/anxious.      I have reviewed and confirmed the accuracy of the ROS as documented by the MA/ALISSA/RN RUBY Colin    Vitals:    03/01/24 1442   BP: 127/77   BP Location: Left arm   Patient Position: Sitting   Cuff Size: Large Adult   Pulse: 87   Resp: 18   Temp: 96.9 °F (36.1 °C)   SpO2: 98%   Weight: 84.8 kg (187 lb)   Height: 160 cm (62.99\")   PainSc:   7   PainLoc: Neck         Objective   Physical Exam  Vitals and nursing note reviewed.   Constitutional:       General: She is " not in acute distress.     Appearance: Normal appearance. She is not ill-appearing.   Pulmonary:      Effort: Pulmonary effort is normal. No respiratory distress.   Musculoskeletal:      Cervical back: Tenderness and bony tenderness present.      Lumbar back: Tenderness and bony tenderness present.      Comments: +cervical facet tenderness/loading    Neurological:      Mental Status: She is alert and oriented to person, place, and time.      Motor: Motor function is intact. No weakness.      Gait: Gait normal.   Psychiatric:         Mood and Affect: Mood normal.         Behavior: Behavior normal.         Assessment & Plan   Diagnoses and all orders for this visit:    1. DDD (degenerative disc disease), lumbar (Primary)    2. Lumbar radiculopathy        --- Cervical MBB X 2 (Bilateral C4-C6)    Diagnostic Facet Joint Procedure:   MBB     The first diagnostic facet joint procedure is considered medically reasonable and necessary for the diagnosis and treatment of chronic pain for this patient due to the patient meeting all of the following criteria:    - 1. Moderate to severe chronic neck or low back pain, predominantly axial, that causes functional deficit measured on pain or disability scale.  - 2. Pain present for minimum of 3 months with documented failure to respond to noninvasive conservative management (as tolerated)  - 3. Absence of untreated radiculopathy or neurogenic claudication (except for radiculopathy caused by facet joint synovial cyst)  - 4. There is no non-facet pathology per clinical assessment or radiology studies that could explain the source of the patient’s pain, including but not limited to fracture, tumor, infection, or significant deformity.    The confirmatory diagnostic facet joint procedure is considered medically reasonable and necessary for the diagnosis and treatment of chronic pain for this patient due to the patient meeting all of the following criteria:    - 1. Moderate to severe  chronic neck or low back pain, predominantly axial, that causes functional deficit measured on pain or disability scale.  - 2. Pain present for minimum of 3 months with documented failure to respond to noninvasive conservative management (as tolerated)  - 3. Absence of untreated radiculopathy or neurogenic claudication (except for radiculopathy caused by facet joint synovial cyst)  - 4. There is no non-facet pathology per clinical assessment or radiology studies that could explain the source of the patient’s pain, including but not limited to fracture, tumor, infection, or significant deformity.    The patient has also shown a consistent positive response of at least 80% relief of primary (index) pain (with the duration of relief being consistent with the agent used).    --- Flexeril sent to pharmacy to try for muscle spasms.    --- Lumbar TFESI can be repeated PRN.   --- Pain medication prescribed by Dr. Jhonathan Matos reports a pain score of 7.  Given her pain assessment as noted, treatment options were discussed and the following options were decided upon as a follow-up plan to address the patient's pain:  see plan .      --- Follow-up for Cervical MBB and 1 week post procedure                 Dictated utilizing Dragon dictation.

## 2024-03-04 ENCOUNTER — TRANSCRIBE ORDERS (OUTPATIENT)
Dept: SURGERY | Facility: SURGERY CENTER | Age: 74
End: 2024-03-04
Payer: MEDICARE

## 2024-03-04 ENCOUNTER — TELEPHONE (OUTPATIENT)
Dept: PAIN MEDICINE | Facility: CLINIC | Age: 74
End: 2024-03-04

## 2024-03-04 DIAGNOSIS — Z41.9 SURGERY, ELECTIVE: Primary | ICD-10-CM

## 2024-03-04 NOTE — TELEPHONE ENCOUNTER
"  Caller: Clarissa Matos SHAYAN \"Cathy\"    Relationship: Self    Best call back number: [phone] (home)     Who are you requesting to speak with (clinical staff, provider,  specific staff member): CLINICAL    What was the call regarding: MS MATOS WOULD LIKE A CALL BACK WHEN HER FLEXERIL RX HAS BEEN SENT TO THE PHARMACY PLEASE    Is it okay if the provider responds through [employer]: CALL    "

## 2024-03-05 RX ORDER — CYCLOBENZAPRINE HCL 10 MG
TABLET ORAL
Qty: 30 TABLET | Refills: 1 | Status: SHIPPED | OUTPATIENT
Start: 2024-03-05

## 2024-03-05 NOTE — TELEPHONE ENCOUNTER
It looks like you filled this on 3/1/24 but I can't see what pharmacy it went to. Can you take a look at this?

## 2024-03-08 NOTE — DISCHARGE INSTRUCTIONS
[facility] [place] [facility] - Post-procedure Instructions          --  While there are no absolute restrictions, it is recommended that you do not perform strenuous activity today. In the morning, you may resume your level of activity as before your block.    --  If you have a band-aid at your injection site, please remove it later today. Observe the area for any redness, swelling, pus-like drainage, or a temperature over 101°. If any of these symptoms occur, please call your doctor at [phone]. If after office hours, leave a message and the on-call provider will return your call.    --  Ice may be applied to your injection site. It is recommended you avoid direct heat (heating pad; hot tub) for 1-2 days.    --  Call [facility] [place] at [phone] if you experience persistent headache, persistent bleeding from the injection site, or severe pain not relieved by heat or oral medication.    --  Do not make important decisions today.    --  Due to the effects of the block and/or the I.V. Sedation, DO NOT drive or operate hazardous machinery for 12 hours.  Local anesthetics may cause numbness after procedure and precautions must be taken with regards to operating equipment as well as with walking, even if ambulating with assistance of another person or with an assistive device.    --  Do not drink alcohol for 12 hours.    -- You may return to work tomorrow, or as directed by your referring doctor.    --  Occasionally you may notice a slight increase in your pain after the procedure. This should start to improve within the next 24-48 hours. Radiofrequency ablation procedure pain may last 3-4 weeks.    --  It may take as long as 3-4 days before you notice a gradual improvement in your pain and/or other symptoms.    -- You may continue to take your prescribed pain medication as needed.    --  Some normal possible side effects of steroid use could include fluid retention, increased blood sugar, dull headache,  increased sweating, increased appetite, mood swings and flushing.    --  Diabetics are recommended to watch their blood glucose level closely for 24-48 hours after the injection.    --  Must stay in PACU for 20 min upon arrival and prove no leg weakness before being discharged.    --  IN THE EVENT OF A LIFE THREATENING EMERGENCY, (CHEST PAIN, BREATHING DIFFICULTIES, PARALYSIS…) YOU SHOULD GO TO YOUR NEAREST EMERGENCY ROOM.    --  You should be contacted by our office within 2-3 days to schedule follow up or next appointment date.  If not contacted within 7 days, please call the office at [phone]     If you have even 1 hour of relief, these injections are considered successful.

## 2024-03-11 ENCOUNTER — HOSPITAL ENCOUNTER (OUTPATIENT)
Facility: SURGERY CENTER | Age: 74
Setting detail: HOSPITAL OUTPATIENT SURGERY
Discharge: HOME | End: 2024-03-11
Attending: ANESTHESIOLOGY | Admitting: ANESTHESIOLOGY
Payer: MEDICARE

## 2024-03-11 ENCOUNTER — HOSPITAL ENCOUNTER (OUTPATIENT)
Dept: GENERAL RADIOLOGY | Facility: SURGERY CENTER | Age: 74
Setting detail: HOSPITAL OUTPATIENT SURGERY
End: 2024-03-11
Payer: MEDICARE

## 2024-03-11 VITALS
DIASTOLIC BLOOD PRESSURE: 73 MMHG | HEART RATE: 61 BPM | BODY MASS INDEX: 33.13 KG/M2 | TEMPERATURE: 98 F | WEIGHT: 187 LBS | RESPIRATION RATE: 16 BRPM | HEIGHT: 63 IN | OXYGEN SATURATION: 99 % | SYSTOLIC BLOOD PRESSURE: 137 MMHG

## 2024-03-11 DIAGNOSIS — Z41.9 SURGERY, ELECTIVE: ICD-10-CM

## 2024-03-11 DIAGNOSIS — M47.812 ARTHROPATHY OF CERVICAL FACET JOINT: ICD-10-CM

## 2024-03-11 PROCEDURE — 64490 INJ PARAVERT F JNT C/T 1 LEV: CPT | Performed by: ANESTHESIOLOGY

## 2024-03-11 PROCEDURE — 64491 INJ PARAVERT F JNT C/T 2 LEV: CPT | Performed by: ANESTHESIOLOGY

## 2024-03-11 PROCEDURE — 76000 FLUOROSCOPY <1 HR PHYS/QHP: CPT

## 2024-03-11 PROCEDURE — 77002 NEEDLE LOCALIZATION BY XRAY: CPT

## 2024-03-11 PROCEDURE — 25010000002 BUPIVACAINE (PF) 0.25 % SOLUTION 10 ML VIAL: Performed by: ANESTHESIOLOGY

## 2024-03-11 RX ORDER — DIAZEPAM 5 MG/1
10 TABLET ORAL ONCE
Status: COMPLETED | OUTPATIENT
Start: 2024-03-11 | End: 2024-03-11

## 2024-03-11 RX ADMIN — DIAZEPAM 10 MG: 5 TABLET ORAL at 11:06

## 2024-03-11 NOTE — OP NOTE
Cervical Medial Branch Blockade at Bilateral  C4, C5, and C6  [facility]      PREOPERATIVE DIAGNOSIS:  Cervical spondylosis without myelopathy   POSTOPERATIVE DIAGNOSIS:  Same as preoperative diagnosis    PROCEDURE:    Cervical Facet Nerve (medial branch) Blocks, with Fluoroscopy:      LEVELS: bilateral  C4, C5, and C6  First level 68129 -50  Second level 31571 -50    PRE-PROCEDURE DISCUSSION WITH PATIENT:    Risks and complications were discussed with the patient prior to starting the procedure and informed consent was obtained.  We discussed various topics, including but not limited to bleeding, infection, injury, nerve injury, paralysis, coma, death, postprocedural soreness or painful flare, worsening of clinical picture, lack of pain relief.  We discussed the diagnostic nature of medial branch blockades.      SURGEON:  Mia Chicas MD    REASON FOR PROCEDURE:    The patient complains of pain that seems to have a significant axial component    SEDATION:  Anxiolysis was used for this procedure which included PO Valium 10mg  ANESTHETIC:   Marcaine 0.25%  STEROID:  None  TOTAL VOLUME OF SOLUTION:  6 mL      DESCRIPTON OF PROCEDURE:  After obtaining informed consent, the patient was placed in the prone position. IV access was not obtained.  EKG, blood pressure, and pulse oximeter were monitored and all sedation was administered by an RN under my direct guidance.  The cervical area was prepped with Chloraprep and draped with sterile barrier.     AP fluoroscopic image was used to visualize the waists of the articular pillars on the right side at C4, C5, and C6.  A 25-gauge needle was used to localize the skin with 1% lidocaine.  A 22-gauge spinal needle was then advanced percutaneously through the skin tracts under fluoroscopic guidance in a coaxial view to contact periosteum at each of these target sites.  Lateral fluoroscopy was then used to advance the needle tips to the midpoint of the  articular pillars at each level.  All needle tips were radiographically confirmed to be posterior to the neural foramen. After negative aspiration of each needle, a volume of 1 mL of injectate was administered at each site.      The same procedure was then performed on the contralateral side in the exact same fashion.      Needles were removed intact from all levels.  Vitals were stable throughout.       ESTIMATED BLOOD LOSS:  minimal  SPECIMENS:  None    COMPLICATIONS:    No complications were noted.    PRE-PROCEDURE PAIN SCORE: 1/10 at rest, 3/10 with activity  POST-PROCEDURE PAIN SCORE 0/10    TOLERANCE & DISCHARGE CONDITION:    The patient tolerated the procedure well.  The patient was transported to the recovery area without difficulties.  The patient was discharged to home under the care of family in stable and satisfactory condition.       PLAN OF CARE:  The patient was given our standard instruction sheet.  We discussed that Cervical Medial Branch Blockade is a diagnostic procedure in consideration for radiofrequency ablation if two diagnostic procedures proved to be positive for significant benefit.  An alternative plan could be therapeutic cervical medial branch blocks on an as-needed basis.  The patient is asked to keep an account of their pain postoperatively today.  The patient will Return to clinic 1-2 wks.  The patient will resume all medications as per the medication reconciliation sheet.

## 2024-03-18 DIAGNOSIS — I10 ESSENTIAL HYPERTENSION: Chronic | ICD-10-CM

## 2024-03-19 RX ORDER — CANDESARTAN CILEXETIL AND HYDROCHLOROTHIAZIDE 16; 12.5 MG/1; MG/1
1 TABLET ORAL DAILY
Qty: 90 TABLET | Refills: 0 | Status: SHIPPED | OUTPATIENT
Start: 2024-03-19

## 2024-03-19 NOTE — TELEPHONE ENCOUNTER
Rx Refill Note  Requested Prescriptions     Pending Prescriptions Disp Refills    candesartan-hydrochlorothiazide (ATACAND HCT) 16-12.5 MG per tablet [Pharmacy Med Name: CANDESA/HCTZ 16-12.5] 90 tablet 0     Sig: TAKE 1 TABLET BY MOUTH DAILY.      Last office visit with prescribing clinician: 5/25/2023   Last telemedicine visit with prescribing clinician: Visit date not found   Next office visit with prescribing clinician: 3/21/2024                         Would you like a call back once the refill request has been completed: [] Yes [] No    If the office needs to give you a call back, can they leave a voicemail: [] Yes [] No    Alexander Ro Rep  03/19/24, 09:28 EDT

## 2024-03-25 NOTE — DISCHARGE INSTRUCTIONS
[facility] [place] [facility] - Post-procedure Instructions          --  While there are no absolute restrictions, it is recommended that you do not perform strenuous activity today. In the morning, you may resume your level of activity as before your block.    --  If you have a band-aid at your injection site, please remove it later today. Observe the area for any redness, swelling, pus-like drainage, or a temperature over 101°. If any of these symptoms occur, please call your doctor at [phone]. If after office hours, leave a message and the on-call provider will return your call.    --  Ice may be applied to your injection site. It is recommended you avoid direct heat (heating pad; hot tub) for 1-2 days.    --  Call [facility] at [phone] if you experience persistent headache, persistent bleeding from the injection site, or severe pain not relieved by heat or oral medication.    --  Do not make important decisions today.    --  Due to the effects of the block and/or the I.V. Sedation, DO NOT drive or operate hazardous machinery for 12 hours.  Local anesthetics may cause numbness after procedure and precautions must be taken with regards to operating equipment as well as with walking, even if ambulating with assistance of another person or with an assistive device.    --  Do not drink alcohol for 12 hours.    -- You may return to work tomorrow, or as directed by your referring doctor.    --  Occasionally you may notice a slight increase in your pain after the procedure. This should start to improve within the next 24-48 hours. Radiofrequency ablation procedure pain may last 3-4 weeks.    --  It may take as long as 3-4 days before you notice a gradual improvement in your pain and/or other symptoms.    -- You may continue to take your prescribed pain medication as needed.    --  Some normal possible side effects of steroid use could include fluid retention, increased blood sugar, dull headache,  increased sweating, increased appetite, mood swings and flushing.    --  Diabetics are recommended to watch their blood glucose level closely for 24-48 hours after the injection.    --  Must stay in PACU for 20 min upon arrival and prove no leg weakness before being discharged.    --  IN THE EVENT OF A LIFE THREATENING EMERGENCY, (CHEST PAIN, BREATHING DIFFICULTIES, PARALYSIS…) YOU SHOULD GO TO YOUR NEAREST EMERGENCY ROOM.    --  You should be contacted by our office within 2-3 days to schedule follow up or next appointment date.  If not contacted within 7 days, please call the office at [phone]     If you have even 1 hour of relief, these injections are considered successful.

## 2024-03-26 ENCOUNTER — PREP FOR SURGERY (OUTPATIENT)
Dept: SURGERY | Facility: SURGERY CENTER | Age: 74
End: 2024-03-26
Payer: MEDICARE

## 2024-03-26 ENCOUNTER — OFFICE VISIT (OUTPATIENT)
Dept: PAIN MEDICINE | Facility: CLINIC | Age: 74
End: 2024-03-26
Payer: MEDICARE

## 2024-03-26 VITALS
SYSTOLIC BLOOD PRESSURE: 119 MMHG | RESPIRATION RATE: 18 BRPM | TEMPERATURE: 96 F | WEIGHT: 189.2 LBS | HEART RATE: 95 BPM | OXYGEN SATURATION: 96 % | BODY MASS INDEX: 33.52 KG/M2 | HEIGHT: 63 IN | DIASTOLIC BLOOD PRESSURE: 72 MMHG

## 2024-03-26 DIAGNOSIS — M47.812 ARTHROPATHY OF CERVICAL FACET JOINT: ICD-10-CM

## 2024-03-26 DIAGNOSIS — M47.812 ARTHROPATHY OF CERVICAL FACET JOINT: Primary | ICD-10-CM

## 2024-03-26 DIAGNOSIS — M51.36 DDD (DEGENERATIVE DISC DISEASE), LUMBAR: Primary | ICD-10-CM

## 2024-03-26 DIAGNOSIS — M54.16 LUMBAR RADICULOPATHY: ICD-10-CM

## 2024-03-26 PROCEDURE — 3074F SYST BP LT 130 MM HG: CPT | Performed by: NURSE PRACTITIONER

## 2024-03-26 PROCEDURE — 3078F DIAST BP <80 MM HG: CPT | Performed by: NURSE PRACTITIONER

## 2024-03-26 PROCEDURE — 1125F AMNT PAIN NOTED PAIN PRSNT: CPT | Performed by: NURSE PRACTITIONER

## 2024-03-26 PROCEDURE — 1159F MED LIST DOCD IN RCRD: CPT | Performed by: NURSE PRACTITIONER

## 2024-03-26 PROCEDURE — 99214 OFFICE O/P EST MOD 30 MIN: CPT | Performed by: NURSE PRACTITIONER

## 2024-03-26 PROCEDURE — 1160F RVW MEDS BY RX/DR IN RCRD: CPT | Performed by: NURSE PRACTITIONER

## 2024-03-26 RX ORDER — SODIUM CHLORIDE 0.9 % (FLUSH) 0.9 %
10 SYRINGE (ML) INJECTION AS NEEDED
OUTPATIENT
Start: 2024-03-26

## 2024-03-26 RX ORDER — SODIUM CHLORIDE 0.9 % (FLUSH) 0.9 %
10 SYRINGE (ML) INJECTION EVERY 12 HOURS SCHEDULED
OUTPATIENT
Start: 2024-03-26

## 2024-03-26 NOTE — PROGRESS NOTES
CHIEF COMPLAINT  Follow-up for neck and back pain.    Subjective   Clarissa Matos is a 73 y.o. female  who presents to the office for follow-up of procedure.  She completed a Cervical Medial Branch Blockade at Bilateral  C4, C5, and C6 on  3/11/2024 performed by Dr. Chicas for management of neck pain. Patient reports 100% relief for the day of the procedure     LUMBAR/SACRAL TRANSFORAMINAL EPIDURAL. bilateral L5/S1. on  9/29/23 performed by Dr. Chicas for management of back pain. Patient reports 80% relief from the procedure, lasted for 5 months, waning.  She did experience a significant amount flushing for few days afterwards.  She said this was quite uncomfortable.  We have discussed that this is a potential side effect related to steroids.     Medication managed by Dr. Ring     Back Pain  This is a chronic problem. The current episode started more than 1 month ago. The problem occurs daily. The problem has been comes and goes since onset. The pain is present in the lumbar spine. The quality of the pain is described as aching and burning. The pain radiates to the left thigh and right thigh. The pain is at a severity of 3/10. The pain is severe. The symptoms are aggravated by standing, sitting and position. Associated symptoms include headaches. Pertinent negatives include no abdominal pain, chest pain, dysuria, fever, numbness or weakness. She has tried analgesics, ice, muscle relaxant and home exercises for the symptoms. The treatment provided mild relief.   Neck Pain   This is a chronic problem. The problem occurs daily. The problem has been waxing and waning. The pain is present in the midline, right side and left side. The quality of the pain is described as aching and burning. The pain is at a severity of 3/10. The symptoms are aggravated by position. Associated symptoms include headaches. Pertinent negatives include no chest pain, fever, numbness or weakness. She has tried oral narcotics, neck support, home  "exercises, heat and ice for the symptoms. The treatment provided mild relief.        PEG Assessment   What number best describes your pain on average in the past week?4  What number best describes how, during the past week, pain has interfered with your enjoyment of life?10  What number best describes how, during the past week, pain has interfered with your general activity?  5    Review of Pertinent Medical Data ---      The following portions of the patient's history were reviewed and updated as appropriate: allergies, current medications, past family history, past medical history, past social history, past surgical history, and problem list.    Review of Systems   Constitutional:  Negative for fever.   Cardiovascular:  Negative for chest pain.   Gastrointestinal:  Negative for abdominal pain, constipation and diarrhea.   Genitourinary:  Negative for difficulty urinating and dysuria.   Musculoskeletal:  Positive for back pain and neck pain.   Neurological:  Positive for headaches. Negative for weakness and numbness.   Psychiatric/Behavioral:  Positive for sleep disturbance. Negative for suicidal ideas. The patient is not nervous/anxious.      I have reviewed and confirmed the accuracy of the ROS as documented by the MA/LPN/RN RUBY Colin     Vitals:    03/26/24 1034   BP: 119/72   Pulse: 95   Resp: 18   Temp: 96 °F (35.6 °C)   SpO2: 96%   Weight: 85.8 kg (189 lb 3.2 oz)   Height: 160 cm (63\")   PainSc: 3  Comment: back pain 3/10   PainLoc: Neck     Objective   Physical Exam  Vitals and nursing note reviewed.   Constitutional:       General: She is not in acute distress.     Appearance: Normal appearance. She is not ill-appearing.   Pulmonary:      Effort: Pulmonary effort is normal. No respiratory distress.   Musculoskeletal:      Cervical back: Tenderness and bony tenderness present.      Comments: +cervical facet tenderness/loading    Neurological:      Mental Status: She is alert and oriented to " person, place, and time.      Motor: Motor function is intact. No weakness.      Gait: Gait normal.   Psychiatric:         Mood and Affect: Mood normal.         Behavior: Behavior normal.           Assessment & Plan   Diagnoses and all orders for this visit:    1. DDD (degenerative disc disease), lumbar (Primary)    2. Lumbar radiculopathy    3. Arthropathy of cervical facet joint      --- Cervical MBB #2 (bilateral C4-C6)    Diagnostic Facet Joint Procedure:   MBB     The confirmatory diagnostic facet joint procedure is considered medically reasonable and necessary for the diagnosis and treatment of chronic pain for this patient due to the patient meeting all of the following criteria:    - 1. Moderate to severe chronic neck or low back pain, predominantly axial, that causes functional deficit measured on pain or disability scale.  - 2. Pain present for minimum of 3 months with documented failure to respond to noninvasive conservative management (as tolerated)  - 3. Absence of untreated radiculopathy or neurogenic claudication (except for radiculopathy caused by facet joint synovial cyst)  - 4. There is no non-facet pathology per clinical assessment or radiology studies that could explain the source of the patient’s pain, including but not limited to fracture, tumor, infection, or significant deformity.    The patient has also shown a consistent positive response of at least 80% relief of primary (index) pain (with the duration of relief being consistent with the agent used).    ---     Reviewed the procedure at length with the patient.  Included in the review was expectations, complications, risk and benefits.The procedure was described in detail and the risks, benefits and alternatives were discussed with the patient (including but not limited to: bleeding, infection, nerve damage, worsening of pain, inability to perform injection, paralysis, seizures, coma, no pain relief and death) who agreed to proceed.   Discussed the potential for sedation if warranted/wanted.  The procedure will plan to be performed at [facility] with fluoroscopic guidance(unless ultrasound is indicated) and could potentially have steroids and contrast dye used. Questions were answered and in a way the patient could understand.  Patient verbalized understanding and wishes to proceed.  This intervention will be ordered.  Discussed with patient that all procedures are part of a multimodal plan of care and include either formal PT or a home exercise program.  Patient has no evidence of coagulopathy or current infection.      Clarissa Matos reports a pain score of 3.  Given her pain assessment as noted, treatment options were discussed and the following options were decided upon as a follow-up plan to address the patient's pain: continuation of current treatment plan for pain and Cervical MBB .      --- Follow-up for MBB #2 and 1 week post procedure                   Dictated utilizing Dragon dictation.

## 2024-03-26 NOTE — H&P (VIEW-ONLY)
CHIEF COMPLAINT  Follow-up for neck and back pain.    Subjective   Clarissa Matos is a 73 y.o. female  who presents to the office for follow-up of procedure.  She completed a Cervical Medial Branch Blockade at Bilateral  C4, C5, and C6 on  3/11/2024 performed by Dr. Cihcas for management of neck pain. Patient reports 100% relief for the day of the procedure     LUMBAR/SACRAL TRANSFORAMINAL EPIDURAL. bilateral L5/S1. on  9/29/23 performed by Dr. Chicas for management of back pain. Patient reports 80% relief from the procedure, lasted for 5 months, waning.  She did experience a significant amount flushing for few days afterwards.  She said this was quite uncomfortable.  We have discussed that this is a potential side effect related to steroids.     Medication managed by Dr. Ring     Back Pain  This is a chronic problem. The current episode started more than 1 month ago. The problem occurs daily. The problem has been comes and goes since onset. The pain is present in the lumbar spine. The quality of the pain is described as aching and burning. The pain radiates to the left thigh and right thigh. The pain is at a severity of 3/10. The pain is severe. The symptoms are aggravated by standing, sitting and position. Associated symptoms include headaches. Pertinent negatives include no abdominal pain, chest pain, dysuria, fever, numbness or weakness. She has tried analgesics, ice, muscle relaxant and home exercises for the symptoms. The treatment provided mild relief.   Neck Pain   This is a chronic problem. The problem occurs daily. The problem has been waxing and waning. The pain is present in the midline, right side and left side. The quality of the pain is described as aching and burning. The pain is at a severity of 3/10. The symptoms are aggravated by position. Associated symptoms include headaches. Pertinent negatives include no chest pain, fever, numbness or weakness. She has tried oral narcotics, neck support, home  "exercises, heat and ice for the symptoms. The treatment provided mild relief.        PEG Assessment   What number best describes your pain on average in the past week?4  What number best describes how, during the past week, pain has interfered with your enjoyment of life?10  What number best describes how, during the past week, pain has interfered with your general activity?  5    Review of Pertinent Medical Data ---      The following portions of the patient's history were reviewed and updated as appropriate: allergies, current medications, past family history, past medical history, past social history, past surgical history, and problem list.    Review of Systems   Constitutional:  Negative for fever.   Cardiovascular:  Negative for chest pain.   Gastrointestinal:  Negative for abdominal pain, constipation and diarrhea.   Genitourinary:  Negative for difficulty urinating and dysuria.   Musculoskeletal:  Positive for back pain and neck pain.   Neurological:  Positive for headaches. Negative for weakness and numbness.   Psychiatric/Behavioral:  Positive for sleep disturbance. Negative for suicidal ideas. The patient is not nervous/anxious.      I have reviewed and confirmed the accuracy of the ROS as documented by the MA/LPN/RN RUBY Colin     Vitals:    03/26/24 1034   BP: 119/72   Pulse: 95   Resp: 18   Temp: 96 °F (35.6 °C)   SpO2: 96%   Weight: 85.8 kg (189 lb 3.2 oz)   Height: 160 cm (63\")   PainSc: 3  Comment: back pain 3/10   PainLoc: Neck     Objective   Physical Exam  Vitals and nursing note reviewed.   Constitutional:       General: She is not in acute distress.     Appearance: Normal appearance. She is not ill-appearing.   Pulmonary:      Effort: Pulmonary effort is normal. No respiratory distress.   Musculoskeletal:      Cervical back: Tenderness and bony tenderness present.      Comments: +cervical facet tenderness/loading    Neurological:      Mental Status: She is alert and oriented to " person, place, and time.      Motor: Motor function is intact. No weakness.      Gait: Gait normal.   Psychiatric:         Mood and Affect: Mood normal.         Behavior: Behavior normal.           Assessment & Plan   Diagnoses and all orders for this visit:    1. DDD (degenerative disc disease), lumbar (Primary)    2. Lumbar radiculopathy    3. Arthropathy of cervical facet joint      --- Cervical MBB #2 (bilateral C4-C6)    Diagnostic Facet Joint Procedure:   MBB     The confirmatory diagnostic facet joint procedure is considered medically reasonable and necessary for the diagnosis and treatment of chronic pain for this patient due to the patient meeting all of the following criteria:    - 1. Moderate to severe chronic neck or low back pain, predominantly axial, that causes functional deficit measured on pain or disability scale.  - 2. Pain present for minimum of 3 months with documented failure to respond to noninvasive conservative management (as tolerated)  - 3. Absence of untreated radiculopathy or neurogenic claudication (except for radiculopathy caused by facet joint synovial cyst)  - 4. There is no non-facet pathology per clinical assessment or radiology studies that could explain the source of the patient’s pain, including but not limited to fracture, tumor, infection, or significant deformity.    The patient has also shown a consistent positive response of at least 80% relief of primary (index) pain (with the duration of relief being consistent with the agent used).    ---     Reviewed the procedure at length with the patient.  Included in the review was expectations, complications, risk and benefits.The procedure was described in detail and the risks, benefits and alternatives were discussed with the patient (including but not limited to: bleeding, infection, nerve damage, worsening of pain, inability to perform injection, paralysis, seizures, coma, no pain relief and death) who agreed to proceed.   Discussed the potential for sedation if warranted/wanted.  The procedure will plan to be performed at [facility] with fluoroscopic guidance(unless ultrasound is indicated) and could potentially have steroids and contrast dye used. Questions were answered and in a way the patient could understand.  Patient verbalized understanding and wishes to proceed.  This intervention will be ordered.  Discussed with patient that all procedures are part of a multimodal plan of care and include either formal PT or a home exercise program.  Patient has no evidence of coagulopathy or current infection.      Clarissa Matos reports a pain score of 3.  Given her pain assessment as noted, treatment options were discussed and the following options were decided upon as a follow-up plan to address the patient's pain: continuation of current treatment plan for pain and Cervical MBB .      --- Follow-up for MBB #2 and 1 week post procedure                   Dictated utilizing Dragon dictation.

## 2024-03-26 NOTE — SIGNIFICANT NOTE
Patient educated on the following :    - If you are receiving Sedation for your procedure Nothing to Eat 6 hours and only clear liquids for 2 hours prior to your procedure.    -You will need to have someone drive you home after your PROCEDURE and remain with you for 24 hours after the PROCEDURE  - The date of your procedure, your are welcome to have one visitor at bedside or remain within 10-15 minutes of [facility]  -You will need to arrive at 1030 on 3/27  PROCEDURE  -Please contact [employer] [facility] at: [phone] with any questions and/or concerns

## 2024-03-27 ENCOUNTER — HOSPITAL ENCOUNTER (OUTPATIENT)
Dept: GENERAL RADIOLOGY | Facility: SURGERY CENTER | Age: 74
Setting detail: HOSPITAL OUTPATIENT SURGERY
End: 2024-03-27
Payer: MEDICARE

## 2024-03-27 ENCOUNTER — HOSPITAL ENCOUNTER (OUTPATIENT)
Facility: SURGERY CENTER | Age: 74
Setting detail: HOSPITAL OUTPATIENT SURGERY
Discharge: HOME | End: 2024-03-27
Attending: ANESTHESIOLOGY | Admitting: ANESTHESIOLOGY
Payer: MEDICARE

## 2024-03-27 VITALS
DIASTOLIC BLOOD PRESSURE: 69 MMHG | OXYGEN SATURATION: 94 % | TEMPERATURE: 97.5 F | HEART RATE: 78 BPM | WEIGHT: 189 LBS | HEIGHT: 63 IN | BODY MASS INDEX: 33.49 KG/M2 | SYSTOLIC BLOOD PRESSURE: 100 MMHG | RESPIRATION RATE: 16 BRPM

## 2024-03-27 DIAGNOSIS — M47.812 ARTHROPATHY OF CERVICAL FACET JOINT: ICD-10-CM

## 2024-03-27 DIAGNOSIS — Z41.9 SURGERY, ELECTIVE: ICD-10-CM

## 2024-03-27 PROCEDURE — 64491 INJ PARAVERT F JNT C/T 2 LEV: CPT | Performed by: ANESTHESIOLOGY

## 2024-03-27 PROCEDURE — 77002 NEEDLE LOCALIZATION BY XRAY: CPT

## 2024-03-27 PROCEDURE — 76000 FLUOROSCOPY <1 HR PHYS/QHP: CPT

## 2024-03-27 PROCEDURE — 25010000002 BUPIVACAINE (PF) 0.25 % SOLUTION 10 ML VIAL: Performed by: ANESTHESIOLOGY

## 2024-03-27 PROCEDURE — 64490 INJ PARAVERT F JNT C/T 1 LEV: CPT | Performed by: ANESTHESIOLOGY

## 2024-03-27 RX ORDER — DIAZEPAM 5 MG/1
10 TABLET ORAL ONCE
Status: COMPLETED | OUTPATIENT
Start: 2024-03-27 | End: 2024-03-27

## 2024-03-27 RX ADMIN — DIAZEPAM 10 MG: 5 TABLET ORAL at 10:42

## 2024-03-27 NOTE — OP NOTE
Cervical Medial Branch Blockade at Bilateral C4, C5, and C6  [facility]      PREOPERATIVE DIAGNOSIS:  Cervical spondylosis without myelopathy   POSTOPERATIVE DIAGNOSIS:  Same as preoperative diagnosis    PROCEDURE:    Cervical Facet Nerve (medial branch) Blocks, with Fluoroscopy:      LEVELS: bilateral  C4, C5, and C6  First level 13453 -50  Second level 59322 -50    PRE-PROCEDURE DISCUSSION WITH PATIENT:    Risks and complications were discussed with the patient prior to starting the procedure and informed consent was obtained.  We discussed various topics, including but not limited to bleeding, infection, injury, nerve injury, paralysis, coma, death, postprocedural soreness or painful flare, worsening of clinical picture, lack of pain relief.  We discussed the diagnostic nature of medial branch blockades.      SURGEON:  Mia Chicas MD    REASON FOR PROCEDURE:    The patient complains of pain that seems to have a significant axial component Previous diagnostic positivity of a Cervical Medial Branch Blockade at the same levels    SEDATION:  Anxiolysis was used for this procedure which included PO Valium 10mg  ANESTHETIC:   Marcaine 0.25%  STEROID:  None  TOTAL VOLUME OF SOLUTION:  6 mL      DESCRIPTON OF PROCEDURE:  After obtaining informed consent, the patient was placed in the prone position. IV access was not obtained.  EKG, blood pressure, and pulse oximeter were monitored and all sedation was administered by an RN under my direct guidance.  The cervical area was prepped with Chloraprep and draped with sterile barrier.     AP fluoroscopic image was used to visualize the waists of the articular pillars on the right side at C4, C5, and C6.  A 25-gauge needle was used to localize the skin with 1% lidocaine.  A 22-gauge spinal needle was then advanced percutaneously through the skin tracts under fluoroscopic guidance in a coaxial view to contact periosteum at each of these target sites.   Lateral fluoroscopy was then used to advance the needle tips to the midpoint of the articular pillars at each level.  All needle tips were radiographically confirmed to be posterior to the neural foramen. After negative aspiration of each needle, a volume of 1 mL of injectate was administered at each site.      The same procedure was then performed on the contralateral side in the exact same fashion.      Needles were removed intact from all levels.  Vitals were stable throughout.       ESTIMATED BLOOD LOSS:  minimal  SPECIMENS:  None    COMPLICATIONS:    No complications were noted.    PRE-PROCEDURE PAIN SCORE: 0/10 at rest, 5/10 with activity  POST-PROCEDURE PAIN SCORE 0/10    TOLERANCE & DISCHARGE CONDITION:    The patient tolerated the procedure well.  The patient was transported to the recovery area without difficulties.  The patient was discharged to home under the care of family in stable and satisfactory condition.       PLAN OF CARE:  The patient was given our standard instruction sheet.  We discussed that Cervical Medial Branch Blockade is a diagnostic procedure in consideration for radiofrequency ablation if two diagnostic procedures proved to be positive for significant benefit.  An alternative plan could be therapeutic cervical medial branch blocks on an as-needed basis.  The patient is asked to keep an account of their pain postoperatively today.  The patient will Return to clinic 1-2 wks.  The patient will resume all medications as per the medication reconciliation sheet.

## 2024-03-28 ENCOUNTER — TRANSCRIBE ORDERS (OUTPATIENT)
Dept: SURGERY | Facility: SURGERY CENTER | Age: 74
End: 2024-03-28
Payer: MEDICARE

## 2024-03-28 DIAGNOSIS — Z41.9 SURGERY, ELECTIVE: Primary | ICD-10-CM

## 2024-04-10 ENCOUNTER — PREP FOR SURGERY (OUTPATIENT)
Dept: SURGERY | Facility: SURGERY CENTER | Age: 74
End: 2024-04-10
Payer: MEDICARE

## 2024-04-10 ENCOUNTER — OFFICE VISIT (OUTPATIENT)
Dept: PAIN MEDICINE | Facility: CLINIC | Age: 74
End: 2024-04-10
Payer: MEDICARE

## 2024-04-10 VITALS
WEIGHT: 187.6 LBS | TEMPERATURE: 96.9 F | RESPIRATION RATE: 12 BRPM | HEIGHT: 63 IN | HEART RATE: 80 BPM | SYSTOLIC BLOOD PRESSURE: 126 MMHG | BODY MASS INDEX: 33.24 KG/M2 | DIASTOLIC BLOOD PRESSURE: 76 MMHG | OXYGEN SATURATION: 97 %

## 2024-04-10 DIAGNOSIS — M51.36 DDD (DEGENERATIVE DISC DISEASE), LUMBAR: Primary | ICD-10-CM

## 2024-04-10 DIAGNOSIS — M54.16 LUMBAR RADICULOPATHY: ICD-10-CM

## 2024-04-10 DIAGNOSIS — M47.812 ARTHROPATHY OF CERVICAL FACET JOINT: ICD-10-CM

## 2024-04-10 DIAGNOSIS — M47.816 LUMBAR FACET ARTHROPATHY: Primary | ICD-10-CM

## 2024-04-10 PROCEDURE — 3078F DIAST BP <80 MM HG: CPT | Performed by: NURSE PRACTITIONER

## 2024-04-10 PROCEDURE — 1159F MED LIST DOCD IN RCRD: CPT | Performed by: NURSE PRACTITIONER

## 2024-04-10 PROCEDURE — 3074F SYST BP LT 130 MM HG: CPT | Performed by: NURSE PRACTITIONER

## 2024-04-10 PROCEDURE — 1125F AMNT PAIN NOTED PAIN PRSNT: CPT | Performed by: NURSE PRACTITIONER

## 2024-04-10 PROCEDURE — 1160F RVW MEDS BY RX/DR IN RCRD: CPT | Performed by: NURSE PRACTITIONER

## 2024-04-10 PROCEDURE — 99214 OFFICE O/P EST MOD 30 MIN: CPT | Performed by: NURSE PRACTITIONER

## 2024-04-10 RX ORDER — DIAZEPAM 5 MG/1
10 TABLET ORAL ONCE
OUTPATIENT
Start: 2024-04-10 | End: 2024-04-10

## 2024-04-10 NOTE — H&P (VIEW-ONLY)
CHIEF COMPLAINT  Neck Pain    Subjective   Clarissa Matos is a 73 y.o. female  who presents to the office for follow-up of procedure.  She completed a cervical MBB bilateral C4-C6 #2   on  3/27/2024 performed by Dr. Chicas for management of neck pain. Patient reports 100% relief from the procedure x 8 hours.     Cervical Medial Branch Blockade at Bilateral  C4, C5, and C6 on  3/11/2024 performed by Dr. Chicas for management of neck pain. Patient reports 100% relief for the day of the procedure     LUMBAR/SACRAL TRANSFORAMINAL EPIDURAL. bilateral L5/S1. on  9/29/23 performed by Dr. Chicas for management of back pain. Patient reports 80% relief from the procedure, lasted for 5 months, waning.  She did experience a significant amount flushing for few days afterwards.  She said this was quite uncomfortable.  We have discussed that this is a potential side effect related to steroids. She still has no radicular symptoms since having the epidural.  She complains primarily of axial type low back pain which has become worse and is quite severe.         Medication managed by Dr. Ring.      Back Pain  This is a chronic problem. The current episode started more than 1 month ago. The problem occurs daily. The problem has been comes and goes since onset. The pain is present in the lumbar spine. The quality of the pain is described as aching and burning. The pain does not radiate. The pain is at a severity of 5/10. The pain is severe. The symptoms are aggravated by standing, sitting and position. Associated symptoms include headaches and numbness (4th,5th right digit fingers). Pertinent negatives include no abdominal pain, chest pain, dysuria, fever or weakness. She has tried analgesics, ice, muscle relaxant and home exercises for the symptoms. The treatment provided mild relief.   Neck Pain   This is a chronic problem. The problem occurs daily. The problem has been waxing and waning. The pain is present in the midline, right side  and left side. The quality of the pain is described as aching and burning. The pain is at a severity of 5/10. The symptoms are aggravated by position. Associated symptoms include headaches and numbness (4th,5th right digit fingers). Pertinent negatives include no chest pain, fever or weakness. She has tried oral narcotics, neck support, home exercises, heat and ice for the symptoms. The treatment provided mild relief.      PEG Assessment   What number best describes your pain on average in the past week?5  What number best describes how, during the past week, pain has interfered with your enjoyment of life?8  What number best describes how, during the past week, pain has interfered with your general activity?  8    Review of Pertinent Medical Data ---          The following portions of the patient's history were reviewed and updated as appropriate: allergies, current medications, past family history, past medical history, past social history, past surgical history, and problem list.    Review of Systems   Constitutional:  Negative for activity change, fatigue and fever.   HENT:  Negative for congestion.    Respiratory:  Negative for cough and chest tightness.    Cardiovascular:  Negative for chest pain.   Gastrointestinal:  Negative for abdominal pain, constipation and diarrhea.   Genitourinary:  Negative for difficulty urinating and dysuria.   Musculoskeletal:  Positive for back pain and neck pain.   Neurological:  Positive for dizziness, numbness (4th,5th right digit fingers) and headaches. Negative for weakness and light-headedness.   Psychiatric/Behavioral:  Positive for sleep disturbance. Negative for agitation and suicidal ideas. The patient is not nervous/anxious.        I have reviewed and confirmed the accuracy of the ROS as documented by the MA/LPN/RN RUBY Colin     Vitals:    04/10/24 1508   BP: 126/76   BP Location: Left arm   Patient Position: Sitting   Cuff Size: Large Adult   Pulse: 80  "  Resp: 12   Temp: 96.9 °F (36.1 °C)   TempSrc: Temporal   SpO2: 97%   Weight: 85.1 kg (187 lb 9.6 oz)   Height: 160 cm (63\")   PainSc:   5         Objective   Physical Exam  Vitals and nursing note reviewed.   Constitutional:       General: She is not in acute distress.     Appearance: Normal appearance. She is not ill-appearing.   Pulmonary:      Effort: Pulmonary effort is normal. No respiratory distress.   Musculoskeletal:      Cervical back: Tenderness and bony tenderness present.      Lumbar back: Tenderness and bony tenderness present.      Comments: +cervical facet tenderness/loading   +lumbar facet tenderness/loading    Neurological:      Mental Status: She is alert and oriented to person, place, and time.      Motor: Motor function is intact. No weakness.      Gait: Gait normal.   Psychiatric:         Mood and Affect: Mood normal.         Behavior: Behavior normal.           Assessment & Plan   Diagnoses and all orders for this visit:    1. DDD (degenerative disc disease), lumbar (Primary)    2. Lumbar radiculopathy    3. Arthropathy of cervical facet joint      --- Bilateral C4-C6 RFA    Thermal Radiofrequency Destruction    This initial thermal radiofrequency destruction (RFA) of cervical, thoracic, or lumbar paravertebral facet joint (medial branch) nerves is considered medically reasonable and necessary as this patient has met the criteria of having at least two (2) medically reasonable and necessary diagnostic MBBs, with each one providing a consistent minimum of 80% sustained relief of primary (index) pain (with the duration of relief being consistent with the agent used).    Reviewed the procedure at length with the patient.  Included in the review was expectations, complications, risk and benefits.The procedure was described in detail and the risks, benefits and alternatives were discussed with the patient (including but not limited to: bleeding, infection, nerve damage, worsening of pain, " inability to perform injection, paralysis, seizures, coma, no pain relief and death) who agreed to proceed.  Discussed the potential for sedation if warranted/wanted.  The procedure will plan to be performed at [facility] with fluoroscopic guidance(unless ultrasound is indicated) and could potentially have steroids and contrast dye used. Questions were answered and in a way the patient could understand.  Patient verbalized understanding and wishes to proceed.  This intervention will be ordered.  Discussed with patient that all procedures are part of a multimodal plan of care and include either formal PT or a home exercise program.  Patient has no evidence of coagulopathy or current infection.    --- Bilateral L4-S1 MBB X 2    Diagnostic Facet Joint Procedure:   MBB     The first diagnostic facet joint procedure is considered medically reasonable and necessary for the diagnosis and treatment of chronic pain for this patient due to the patient meeting all of the following criteria:    - 1. Moderate to severe chronic neck or low back pain, predominantly axial, that causes functional deficit measured on pain or disability scale.  - 2. Pain present for minimum of 3 months with documented failure to respond to noninvasive conservative management (as tolerated)  - 3. Absence of untreated radiculopathy or neurogenic claudication (except for radiculopathy caused by facet joint synovial cyst)  - 4. There is no non-facet pathology per clinical assessment or radiology studies that could explain the source of the patient’s pain, including but not limited to fracture, tumor, infection, or significant deformity.    The confirmatory diagnostic facet joint procedure is considered medically reasonable and necessary for the diagnosis and treatment of chronic pain for this patient due to the patient meeting all of the following criteria:    - 1. Moderate to severe chronic neck or low back pain, predominantly  axial, that causes functional deficit measured on pain or disability scale.  - 2. Pain present for minimum of 3 months with documented failure to respond to noninvasive conservative management (as tolerated)  - 3. Absence of untreated radiculopathy or neurogenic claudication (except for radiculopathy caused by facet joint synovial cyst)  - 4. There is no non-facet pathology per clinical assessment or radiology studies that could explain the source of the patient’s pain, including but not limited to fracture, tumor, infection, or significant deformity.    The patient has also shown a consistent positive response of at least 80% relief of primary (index) pain (with the duration of relief being consistent with the agent used).    Clarissa Matos reports a pain score of 5.  Given her pain assessment as noted, treatment options were discussed and the following options were decided upon as a follow-up plan to address the patient's pain: continuation of current treatment plan for pain and cervical RFA, lumbar MBB .    --- Follow-up for cervical RFA, Lumbar MBB          Dictated utilizing Dragon dictation.

## 2024-04-10 NOTE — PROGRESS NOTES
CHIEF COMPLAINT  Neck Pain    Subjective   Clarissa Matos is a 73 y.o. female  who presents to the office for follow-up of procedure.  She completed a cervical MBB bilateral C4-C6 #2   on  3/27/2024 performed by Dr. Chicas for management of neck pain. Patient reports 100% relief from the procedure x 8 hours.     Cervical Medial Branch Blockade at Bilateral  C4, C5, and C6 on  3/11/2024 performed by Dr. Chicas for management of neck pain. Patient reports 100% relief for the day of the procedure     LUMBAR/SACRAL TRANSFORAMINAL EPIDURAL. bilateral L5/S1. on  9/29/23 performed by Dr. Chicas for management of back pain. Patient reports 80% relief from the procedure, lasted for 5 months, waning.  She did experience a significant amount flushing for few days afterwards.  She said this was quite uncomfortable.  We have discussed that this is a potential side effect related to steroids. She still has no radicular symptoms since having the epidural.  She complains primarily of axial type low back pain which has become worse and is quite severe.         Medication managed by Dr. Ring.      Back Pain  This is a chronic problem. The current episode started more than 1 month ago. The problem occurs daily. The problem has been comes and goes since onset. The pain is present in the lumbar spine. The quality of the pain is described as aching and burning. The pain does not radiate. The pain is at a severity of 5/10. The pain is severe. The symptoms are aggravated by standing, sitting and position. Associated symptoms include headaches and numbness (4th,5th right digit fingers). Pertinent negatives include no abdominal pain, chest pain, dysuria, fever or weakness. She has tried analgesics, ice, muscle relaxant and home exercises for the symptoms. The treatment provided mild relief.   Neck Pain   This is a chronic problem. The problem occurs daily. The problem has been waxing and waning. The pain is present in the midline, right side  and left side. The quality of the pain is described as aching and burning. The pain is at a severity of 5/10. The symptoms are aggravated by position. Associated symptoms include headaches and numbness (4th,5th right digit fingers). Pertinent negatives include no chest pain, fever or weakness. She has tried oral narcotics, neck support, home exercises, heat and ice for the symptoms. The treatment provided mild relief.      PEG Assessment   What number best describes your pain on average in the past week?5  What number best describes how, during the past week, pain has interfered with your enjoyment of life?8  What number best describes how, during the past week, pain has interfered with your general activity?  8    Review of Pertinent Medical Data ---          The following portions of the patient's history were reviewed and updated as appropriate: allergies, current medications, past family history, past medical history, past social history, past surgical history, and problem list.    Review of Systems   Constitutional:  Negative for activity change, fatigue and fever.   HENT:  Negative for congestion.    Respiratory:  Negative for cough and chest tightness.    Cardiovascular:  Negative for chest pain.   Gastrointestinal:  Negative for abdominal pain, constipation and diarrhea.   Genitourinary:  Negative for difficulty urinating and dysuria.   Musculoskeletal:  Positive for back pain and neck pain.   Neurological:  Positive for dizziness, numbness (4th,5th right digit fingers) and headaches. Negative for weakness and light-headedness.   Psychiatric/Behavioral:  Positive for sleep disturbance. Negative for agitation and suicidal ideas. The patient is not nervous/anxious.        I have reviewed and confirmed the accuracy of the ROS as documented by the MA/LPN/RN RUBY Colin     Vitals:    04/10/24 1508   BP: 126/76   BP Location: Left arm   Patient Position: Sitting   Cuff Size: Large Adult   Pulse: 80  "  Resp: 12   Temp: 96.9 °F (36.1 °C)   TempSrc: Temporal   SpO2: 97%   Weight: 85.1 kg (187 lb 9.6 oz)   Height: 160 cm (63\")   PainSc:   5         Objective   Physical Exam  Vitals and nursing note reviewed.   Constitutional:       General: She is not in acute distress.     Appearance: Normal appearance. She is not ill-appearing.   Pulmonary:      Effort: Pulmonary effort is normal. No respiratory distress.   Musculoskeletal:      Cervical back: Tenderness and bony tenderness present.      Lumbar back: Tenderness and bony tenderness present.      Comments: +cervical facet tenderness/loading   +lumbar facet tenderness/loading    Neurological:      Mental Status: She is alert and oriented to person, place, and time.      Motor: Motor function is intact. No weakness.      Gait: Gait normal.   Psychiatric:         Mood and Affect: Mood normal.         Behavior: Behavior normal.           Assessment & Plan   Diagnoses and all orders for this visit:    1. DDD (degenerative disc disease), lumbar (Primary)    2. Lumbar radiculopathy    3. Arthropathy of cervical facet joint      --- Bilateral C4-C6 RFA    Thermal Radiofrequency Destruction    This initial thermal radiofrequency destruction (RFA) of cervical, thoracic, or lumbar paravertebral facet joint (medial branch) nerves is considered medically reasonable and necessary as this patient has met the criteria of having at least two (2) medically reasonable and necessary diagnostic MBBs, with each one providing a consistent minimum of 80% sustained relief of primary (index) pain (with the duration of relief being consistent with the agent used).    Reviewed the procedure at length with the patient.  Included in the review was expectations, complications, risk and benefits.The procedure was described in detail and the risks, benefits and alternatives were discussed with the patient (including but not limited to: bleeding, infection, nerve damage, worsening of pain, " inability to perform injection, paralysis, seizures, coma, no pain relief and death) who agreed to proceed.  Discussed the potential for sedation if warranted/wanted.  The procedure will plan to be performed at [facility] with fluoroscopic guidance(unless ultrasound is indicated) and could potentially have steroids and contrast dye used. Questions were answered and in a way the patient could understand.  Patient verbalized understanding and wishes to proceed.  This intervention will be ordered.  Discussed with patient that all procedures are part of a multimodal plan of care and include either formal PT or a home exercise program.  Patient has no evidence of coagulopathy or current infection.    --- Bilateral L4-S1 MBB X 2    Diagnostic Facet Joint Procedure:   MBB     The first diagnostic facet joint procedure is considered medically reasonable and necessary for the diagnosis and treatment of chronic pain for this patient due to the patient meeting all of the following criteria:    - 1. Moderate to severe chronic neck or low back pain, predominantly axial, that causes functional deficit measured on pain or disability scale.  - 2. Pain present for minimum of 3 months with documented failure to respond to noninvasive conservative management (as tolerated)  - 3. Absence of untreated radiculopathy or neurogenic claudication (except for radiculopathy caused by facet joint synovial cyst)  - 4. There is no non-facet pathology per clinical assessment or radiology studies that could explain the source of the patient’s pain, including but not limited to fracture, tumor, infection, or significant deformity.    The confirmatory diagnostic facet joint procedure is considered medically reasonable and necessary for the diagnosis and treatment of chronic pain for this patient due to the patient meeting all of the following criteria:    - 1. Moderate to severe chronic neck or low back pain, predominantly  axial, that causes functional deficit measured on pain or disability scale.  - 2. Pain present for minimum of 3 months with documented failure to respond to noninvasive conservative management (as tolerated)  - 3. Absence of untreated radiculopathy or neurogenic claudication (except for radiculopathy caused by facet joint synovial cyst)  - 4. There is no non-facet pathology per clinical assessment or radiology studies that could explain the source of the patient’s pain, including but not limited to fracture, tumor, infection, or significant deformity.    The patient has also shown a consistent positive response of at least 80% relief of primary (index) pain (with the duration of relief being consistent with the agent used).    Clarissa Matos reports a pain score of 5.  Given her pain assessment as noted, treatment options were discussed and the following options were decided upon as a follow-up plan to address the patient's pain: continuation of current treatment plan for pain and cervical RFA, lumbar MBB .    --- Follow-up for cervical RFA, Lumbar MBB          Dictated utilizing Dragon dictation.

## 2024-04-11 ENCOUNTER — TRANSCRIBE ORDERS (OUTPATIENT)
Dept: SURGERY | Facility: SURGERY CENTER | Age: 74
End: 2024-04-11
Payer: MEDICARE

## 2024-04-11 DIAGNOSIS — Z41.9 SURGERY, ELECTIVE: Primary | ICD-10-CM

## 2024-04-12 ENCOUNTER — TELEPHONE (OUTPATIENT)
Dept: PAIN MEDICINE | Facility: CLINIC | Age: 74
End: 2024-04-12

## 2024-04-12 NOTE — TELEPHONE ENCOUNTER
"Caller: Clarissa Matos \"Cathy\"    Relationship to patient: Self    Best call back number:     Type of visit:     LUMBAR MEDIAL BRANCH BLOCK. bilateral L4-S1        Requested date: ANY OTHER DAY THE SAME WEEK     If rescheduling, when is the original appointment: 5/6/24         "

## 2024-04-22 ENCOUNTER — OFFICE VISIT (OUTPATIENT)
Dept: INTERNAL MEDICINE | Facility: CLINIC | Age: 74
End: 2024-04-22
Payer: MEDICARE

## 2024-04-22 VITALS
HEART RATE: 73 BPM | DIASTOLIC BLOOD PRESSURE: 50 MMHG | WEIGHT: 187 LBS | SYSTOLIC BLOOD PRESSURE: 120 MMHG | OXYGEN SATURATION: 95 % | BODY MASS INDEX: 33.13 KG/M2 | HEIGHT: 63 IN

## 2024-04-22 DIAGNOSIS — M51.36 DDD (DEGENERATIVE DISC DISEASE), LUMBAR: Chronic | ICD-10-CM

## 2024-04-22 DIAGNOSIS — E78.2 MIXED HYPERLIPIDEMIA: Chronic | ICD-10-CM

## 2024-04-22 DIAGNOSIS — R73.03 PREDIABETES: Chronic | ICD-10-CM

## 2024-04-22 DIAGNOSIS — G89.29 NECK PAIN, CHRONIC: Chronic | ICD-10-CM

## 2024-04-22 DIAGNOSIS — I10 ESSENTIAL HYPERTENSION: Chronic | ICD-10-CM

## 2024-04-22 DIAGNOSIS — Z23 ENCOUNTER FOR IMMUNIZATION: ICD-10-CM

## 2024-04-22 DIAGNOSIS — M54.2 NECK PAIN, CHRONIC: Chronic | ICD-10-CM

## 2024-04-22 DIAGNOSIS — K21.9 GASTROESOPHAGEAL REFLUX DISEASE, UNSPECIFIED WHETHER ESOPHAGITIS PRESENT: Chronic | ICD-10-CM

## 2024-04-22 DIAGNOSIS — R00.2 PALPITATIONS: Primary | Chronic | ICD-10-CM

## 2024-04-22 DIAGNOSIS — M79.7 FIBROMYALGIA: Chronic | ICD-10-CM

## 2024-04-22 DIAGNOSIS — F32.A ANXIETY AND DEPRESSION: Chronic | ICD-10-CM

## 2024-04-22 DIAGNOSIS — F51.01 PRIMARY INSOMNIA: Chronic | ICD-10-CM

## 2024-04-22 DIAGNOSIS — F41.9 ANXIETY AND DEPRESSION: Chronic | ICD-10-CM

## 2024-04-22 PROBLEM — G47.30 OBSERVED SLEEP APNEA: Status: RESOLVED | Noted: 2021-08-05 | Resolved: 2024-04-22

## 2024-04-22 PROBLEM — K81.9 CHOLECYSTITIS: Status: RESOLVED | Noted: 2017-11-28 | Resolved: 2024-04-22

## 2024-04-22 PROCEDURE — 1160F RVW MEDS BY RX/DR IN RCRD: CPT | Performed by: NURSE PRACTITIONER

## 2024-04-22 PROCEDURE — 3078F DIAST BP <80 MM HG: CPT | Performed by: NURSE PRACTITIONER

## 2024-04-22 PROCEDURE — 93000 ELECTROCARDIOGRAM COMPLETE: CPT | Performed by: NURSE PRACTITIONER

## 2024-04-22 PROCEDURE — 99215 OFFICE O/P EST HI 40 MIN: CPT | Performed by: NURSE PRACTITIONER

## 2024-04-22 PROCEDURE — 3074F SYST BP LT 130 MM HG: CPT | Performed by: NURSE PRACTITIONER

## 2024-04-22 PROCEDURE — 1159F MED LIST DOCD IN RCRD: CPT | Performed by: NURSE PRACTITIONER

## 2024-04-22 PROCEDURE — G0009 ADMIN PNEUMOCOCCAL VACCINE: HCPCS | Performed by: NURSE PRACTITIONER

## 2024-04-22 PROCEDURE — 90677 PCV20 VACCINE IM: CPT | Performed by: NURSE PRACTITIONER

## 2024-04-22 RX ORDER — MORPHINE SULFATE 15 MG/1
15 TABLET ORAL EVERY 6 HOURS PRN
COMMUNITY

## 2024-04-22 RX ORDER — MIDAZOLAM HYDROCHLORIDE 1 MG/ML
1 INJECTION INTRAMUSCULAR; INTRAVENOUS ONCE
Status: COMPLETED | OUTPATIENT
Start: 2024-04-22 | End: 2024-04-24

## 2024-04-22 RX ORDER — FENTANYL CITRATE 50 UG/ML
50 INJECTION, SOLUTION INTRAMUSCULAR; INTRAVENOUS ONCE
Status: COMPLETED | OUTPATIENT
Start: 2024-04-22 | End: 2024-04-24

## 2024-04-22 NOTE — ASSESSMENT & PLAN NOTE
Continues on cymbalta; using ativan PRN-- but takes this daily.   Following with Dr. Otis Walker SI.

## 2024-04-22 NOTE — ASSESSMENT & PLAN NOTE
Hypertension is stable and controlled  Continue current treatment regimen.  Dietary sodium restriction.  Regular aerobic exercise.  Blood pressure will be reassessed in 6 months.

## 2024-04-22 NOTE — ASSESSMENT & PLAN NOTE
EKG in office today  Zio patch ordered  2D echo ordered  Recommended to limit caffeine intake   Continue with stress management  Hand out provided

## 2024-04-22 NOTE — PROGRESS NOTES
"Chief Complaint  Palpitations    Subjective        Clarissa Matos presents to [facility]  History of Present Illness  This is a 74 y/o female presenting to office to establish care, chronic health management, palpitations.     Patient reports following healthy diet choices. Reports [facility].     HTN-- continues on atacand-- reports BP has been avg 120-130's/70's; denies CP. Reports she was told  by her pain management doctor that he had irregular heart beat. Reports occasional chest fullness with increased stress/anxiety. Denies any SOA. Reports familiar risk of heart disease-- mother and father both had hx of PCI's/CABG hx. Also reports mother had previous ablation. Reports brother had recent stroke in the past few years as well. Reports she has noticed some fluttering in her chest. Reports this occurs usually with activity. Reports she does like drinking tea throughout the day.     Using a different separate dose as needed for swelling.     Following with [facility] pain management and Dr. Ring with  hx of chronic back pain and chronic neck pain. Currently on morphine 15mg Q6H PRN. Was on norco but has been off of this currently.     Following with [facility] for psychiatric needs-- hx of fibromyalgia, anxiety, depression-- continues on cymbalta, ativan PRN. Denies SI.     Following with Dr. Cuello for GI needs-- hx of GERD-- continues on omeprazole.  Colonoscopy UTD 8/2023.     Hx cervicogenic h/a-- using excedrin migraine as needed. Reports she uses this sparingly.     Objective   Vital Signs:  /50 (BP Location: Left arm, Patient Position: Sitting, Cuff Size: Adult)   Pulse 73   Ht 160 cm (63\")   Wt 84.8 kg (187 lb)   SpO2 95%   BMI 33.13 kg/m²   Estimated body mass index is 33.13 kg/m² as calculated from the following:    Height as of this encounter: 160 cm (63\").    Weight as of this encounter: 84.8 kg (187 lb).         "       Physical Exam  Constitutional:       Appearance: Normal appearance. She is obese.   HENT:      Head: Normocephalic and atraumatic.      Right Ear: External ear normal.      Left Ear: External ear normal.      Nose: Nose normal.      Mouth/Throat:      Mouth: Mucous membranes are moist.      Pharynx: Oropharynx is clear.   Eyes:      Conjunctiva/sclera: Conjunctivae normal.      Pupils: Pupils are equal, round, and reactive to light.   Cardiovascular:      Rate and Rhythm: Normal rate and regular rhythm.      Pulses: Normal pulses.      Heart sounds: Normal heart sounds. No murmur heard.     No gallop.   Pulmonary:      Effort: Pulmonary effort is normal. No respiratory distress.      Breath sounds: Normal breath sounds. No stridor. No wheezing, rhonchi or rales.   Musculoskeletal:      Cervical back: Normal range of motion and neck supple.      Right lower le+      Left lower le+   Skin:     General: Skin is warm and dry.      Capillary Refill: Capillary refill takes less than 2 seconds.   Neurological:      Mental Status: She is alert and oriented to person, place, and time. Mental status is at baseline.   Psychiatric:         Mood and Affect: Mood normal.         Thought Content: Thought content normal.         Judgment: Judgment normal.        Result Review :    The following data was reviewed by: RUBY Katz on 2024:  Common labs          2023    10:56 2023    10:58 10/17/2023    14:28   Common Labs   Glucose 123  122     BUN 9  9     Creatinine 0.77  0.76     Sodium 142  141     Potassium 4.4  4.4     Chloride 101  100     Calcium 9.5  9.4     Albumin 4.1  4.0     Total Bilirubin 0.3  0.3     Alkaline Phosphatase 66  66     AST (SGOT) 18  18     ALT (SGPT) 16  16     WBC 5.21   6.34    Hemoglobin 15.3   14.9    Hematocrit 45.5   44.6    Platelets 289   260    Total Cholesterol  161     Triglycerides  173     HDL Cholesterol  49     LDL Cholesterol   83     Hemoglobin A1C   5.90     Uric Acid 4.4        Tobacco Use: Low Risk  (4/22/2024)    Patient History     Smoking Tobacco Use: Never     Smokeless Tobacco Use: Never     Passive Exposure: Not on file     Social History     Substance and Sexual Activity   Alcohol Use No     Family History   Problem Relation Age of Onset    Heart disease Mother     Hypertension Mother     Arthritis Mother     Hyperlipidemia Mother     Stroke Mother     Vision loss Mother     Colon polyps Father     Heart disease Father     Hypertension Father     Arthritis Father     Hyperlipidemia Father     Stroke Father     Vision loss Father     No Known Problems Sister     Arthritis Brother     Hyperlipidemia Brother     Stroke Brother     No Known Problems Maternal Aunt     No Known Problems Paternal Aunt     No Known Problems Maternal Grandmother     No Known Problems Paternal Grandmother     No Known Problems Daughter     No Known Problems Son     BRCA 1/2 Neg Hx     Breast cancer Neg Hx     Colon cancer Neg Hx     Endometrial cancer Neg Hx     Ovarian cancer Neg Hx     Malig Hyperthermia Neg Hx     Crohn's disease Neg Hx     Irritable bowel syndrome Neg Hx     Ulcerative colitis Neg Hx       Adult ECG Report     Name: Clarissa Matos   Age: 73 y.o.   Gender: female       Rate: 81   Rhythm: normal sinus rhythm   QRS Axis: 56   HI Interval: 156   QRS Duration: 104   QTc: 434   Voltages: -   Conduction Disturbances: none   Other Abnormalities: none     Narrative Interpretation: NSR  Compared to EKG study in 2019-- no noted PVC's on EKG today. SR           Assessment and Plan     Diagnoses and all orders for this visit:    1. Palpitations (Primary)  Assessment & Plan:  EKG in office today  Zio patch ordered  2D echo ordered  Recommended to limit caffeine intake   Continue with stress management  Hand out provided    Orders:  -     CBC & Differential; Future  -     Comprehensive metabolic panel; Future  -     TSH Rfx On Abnormal To Free T4; Future  -     Hemoglobin  A1c; Future  -     ECG 12 Lead  -     Holter Monitor - 72 Hour Up To 15 Days; Future  -     Magnesium; Future  -     Adult Transthoracic Echo Complete W/ Cont if Necessary Per Protocol; Future    2. Essential hypertension  Assessment & Plan:  Hypertension is stable and controlled  Continue current treatment regimen.  Dietary sodium restriction.  Regular aerobic exercise.  Blood pressure will be reassessed in 6 months.      3. Gastroesophageal reflux disease, unspecified whether esophagitis present  Assessment & Plan:  Continues on omeprazole  Stable  Following with GI      4. Fibromyalgia  Assessment & Plan:  Continues on duloxetine  Following with psychiatry and pain management        5. Primary insomnia  Assessment & Plan:  Using lorazepam per psychiatry        6. Anxiety and depression  Assessment & Plan:  Continues on cymbalta; using ativan PRN-- but takes this daily.   Following with Dr. Otis Walker SI.       7. Mixed hyperlipidemia  Assessment & Plan:  Recommended low fat diet choices and regular exercise    Orders:  -     Lipid panel; Future    8. Prediabetes  Assessment & Plan:  Lab ordered  Recommended low glycemic diet choices    Orders:  -     Hemoglobin A1c; Future    9. Neck pain, chronic  Assessment & Plan:  Following with pain management  Currently planned for medial branch block  Also on morphine       10. DDD (degenerative disc disease), lumbar  Assessment & Plan:  Following with pain management      11. Encounter for immunization  -     Pneumococcal Conjugate Vaccine 20-Valent (PCV20)           I spent 41 minutes caring for Clarissa on this date of service. This time includes time spent by me in the following activities:preparing for the visit, reviewing tests, obtaining and/or reviewing a separately obtained history, performing a medically appropriate examination and/or evaluation , counseling and educating the patient/family/caregiver, ordering medications, tests, or procedures, documenting  information in the medical record, and care coordination  Follow Up     Return in about 3 months (around 7/22/2024) for Medicare Wellness.  Patient was given instructions and counseling regarding her condition or for health maintenance advice. Please see specific information pulled into the AVS if appropriate.

## 2024-04-22 NOTE — DISCHARGE INSTRUCTIONS
[facility] - Post-procedure Instructions          --  While there are no absolute restrictions, it is recommended that you do not perform strenuous activity today. In the morning, you may resume your level of activity as before your block.    --  If you have a band-aid at your injection site, please remove it later today. Observe the area for any redness, swelling, pus-like drainage, or a temperature over 101°. If any of these symptoms occur, please call your doctor at [phone]. If after office hours, leave a message and the on-call provider will return your call.    --  Ice may be applied to your injection site. It is recommended you avoid direct heat (heating pad; hot tub) for 1-2 days.    --  Call [facility] at [phone] if you experience persistent headache, persistent bleeding from the injection site, or severe pain not relieved by heat or oral medication.    --  Do not make important decisions today.    --  Due to the effects of the block and/or the I.V. Sedation, DO NOT drive or operate hazardous machinery for 12 hours.  Local anesthetics may cause numbness after procedure and precautions must be taken with regards to operating equipment as well as with walking, even if ambulating with assistance of another person or with an assistive device.    --  Do not drink alcohol for 12 hours.    -- You may return to work tomorrow, or as directed by your referring doctor.    --  Occasionally you may notice a slight increase in your pain after the procedure. This should start to improve within the next 24-48 hours. Radiofrequency ablation procedure pain may last 3-4 weeks.    --  It may take as long as 3-4 days before you notice a gradual improvement in your pain and/or other symptoms.    -- You may continue to take your prescribed pain medication as needed.    --  Some normal possible side effects of steroid use could include fluid retention, increased blood sugar, dull headache,  "increased sweating, increased appetite, mood swings and flushing.    --  Diabetics are recommended to watch their blood glucose level closely for 24-48 hours after the injection.    --  Must stay in PACU for 20 min upon arrival and prove no leg weakness before being discharged.    --  IN THE EVENT OF A LIFE THREATENING EMERGENCY, (CHEST PAIN, BREATHING DIFFICULTIES, PARALYSIS…) YOU SHOULD GO TO YOUR NEAREST EMERGENCY ROOM.    --  You should be contacted by our office within 2-3 days to schedule follow up or next appointment date.  If not contacted within 7 days, please call the office at [phone]     Radiofrequency ablation (RFA):     - Radiofrequency ablation is a term used to describe cauterization or \"burning.\"   - In pain management, we can use this technique with a special needle to target and destroy areas that are causing your pain.   - In most cases, you must have TWO successful \"test injections\" before you are a candidate for RFA.    After your RFA:   - Because heat is used in this technique, it is common to have soreness after the procedure.  Sometimes \"neuritis\" occurs, which feels like tingling, prickly, or sunburn under the skin sensations.   - Ice packs are helpful in decreasing this soreness and preventing post-procedure \"neuritis\" pain.  Use an ice pack for 20 minutes at a time at least 3 times the day of and the day after your procedure.   - It is common to have arm/leg numbness or weakness the day of your procedure, but this should wear off by the following day.   - It may take up to 6 weeks to gain full benefit from this procedure.   "

## 2024-04-24 ENCOUNTER — PATIENT ROUNDING (BHMG ONLY) (OUTPATIENT)
Dept: INTERNAL MEDICINE | Facility: CLINIC | Age: 74
End: 2024-04-24
Payer: MEDICARE

## 2024-04-24 ENCOUNTER — HOSPITAL ENCOUNTER (OUTPATIENT)
Dept: GENERAL RADIOLOGY | Facility: SURGERY CENTER | Age: 74
Setting detail: HOSPITAL OUTPATIENT SURGERY
End: 2024-04-24
Payer: MEDICARE

## 2024-04-24 ENCOUNTER — HOSPITAL ENCOUNTER (OUTPATIENT)
Facility: SURGERY CENTER | Age: 74
Setting detail: HOSPITAL OUTPATIENT SURGERY
Discharge: HOME | End: 2024-04-24
Attending: ANESTHESIOLOGY | Admitting: ANESTHESIOLOGY
Payer: MEDICARE

## 2024-04-24 VITALS
OXYGEN SATURATION: 96 % | SYSTOLIC BLOOD PRESSURE: 126 MMHG | RESPIRATION RATE: 16 BRPM | HEIGHT: 63 IN | WEIGHT: 187 LBS | DIASTOLIC BLOOD PRESSURE: 74 MMHG | TEMPERATURE: 98 F | BODY MASS INDEX: 33.13 KG/M2 | HEART RATE: 81 BPM

## 2024-04-24 DIAGNOSIS — Z41.9 SURGERY, ELECTIVE: ICD-10-CM

## 2024-04-24 DIAGNOSIS — M47.812 ARTHROPATHY OF CERVICAL FACET JOINT: ICD-10-CM

## 2024-04-24 PROCEDURE — 25010000002 FENTANYL CITRATE (PF) 50 MCG/ML SOLUTION: Performed by: ANESTHESIOLOGY

## 2024-04-24 PROCEDURE — 64633 DESTROY CERV/THOR FACET JNT: CPT | Performed by: ANESTHESIOLOGY

## 2024-04-24 PROCEDURE — 64634 DESTROY C/TH FACET JNT ADDL: CPT | Performed by: ANESTHESIOLOGY

## 2024-04-24 PROCEDURE — 76000 FLUOROSCOPY <1 HR PHYS/QHP: CPT

## 2024-04-24 PROCEDURE — 25010000002 BUPIVACAINE (PF) 0.25 % SOLUTION 10 ML VIAL: Performed by: ANESTHESIOLOGY

## 2024-04-24 PROCEDURE — 25010000002 MIDAZOLAM PER 1MG: Performed by: ANESTHESIOLOGY

## 2024-04-24 RX ORDER — SODIUM CHLORIDE 0.9 % (FLUSH) 0.9 %
10 SYRINGE (ML) INJECTION AS NEEDED
Status: DISCONTINUED | OUTPATIENT
Start: 2024-04-24 | End: 2024-04-24 | Stop reason: HOSPADM

## 2024-04-24 RX ORDER — SODIUM CHLORIDE 0.9 % (FLUSH) 0.9 %
10 SYRINGE (ML) INJECTION EVERY 12 HOURS SCHEDULED
Status: DISCONTINUED | OUTPATIENT
Start: 2024-04-24 | End: 2024-04-24 | Stop reason: HOSPADM

## 2024-04-24 RX ADMIN — MIDAZOLAM HYDROCHLORIDE 1 MG: 2 INJECTION, SOLUTION INTRAMUSCULAR; INTRAVENOUS at 13:03

## 2024-04-24 RX ADMIN — FENTANYL CITRATE 50 MCG: 50 INJECTION INTRAMUSCULAR; INTRAVENOUS at 13:03

## 2024-04-24 NOTE — OP NOTE
BILATERAL Cervical Medial Branch Radiofrequency Ablation  [facility]      PREOPERATIVE DIAGNOSIS:  Cervical spondylosis without myelopathy   POSTOPERATIVE DIAGNOSIS:  Same as preoperative diagnosis    PROCEDURE:    Cervical Facet Nerve (medial branch) Radiofrequency Ablation, with Fluoroscopy:  LEVELS C4, C5, and C6  91943 - 50 Cervical Medial Branch Ablation, 1st level  55278 - 50 Cervical Medial Branch Ablation, 2nd level    PRE-PROCEDURE DISCUSSION WITH PATIENT:    Risks and complications were discussed with the patient prior to starting the procedure and informed consent was obtained.    SURGEON:  Mia Chicas MD    REASON FOR PROCEDURE:    Previous diagnostic positivity of two Cervical Medial Branch Blockades at the same levels    Time of the procedure with sedation: 1043-6474 minutes    SEDATION:  Anxiolysis was used for this procedure which included Versed 1mg & Fentanyl 50mcg  ANESTHETIC:   2% Lidocaine and 0.25% Bupivacaine  STEROID:  None  TOTAL VOLUME OF SOLUTION:  6mL    DESCRIPTON OF PROCEDURE:  After obtaining informed consent, the patient was placed in the prone position. IV access was obtained.  Heart rate, blood pressure, and pulse oximeter were monitored and all sedation was administered by an RN under my direct guidance.  The cervical area was prepped with Chloraprep and draped with sterile barrier.     AP fluoroscopic image was used to visualize the waists of the articular pillars on the BILATERAL  side at C4, C5, and C6. The skin and subcutaneous tissue over the lateral edge of each pillar was anesthetized with 1% lidocaine. A 20-gauge 100mm RF Hamilton needle was then advanced percutaneously through the anesthetized skin tracts under fluoroscopic guidance in a coaxial view to contact periosteum at each of these target sites.  Lateral fluoroscopy was then used to advance the needle tips to lie at the midpoints of the articular pillars at each level.  All needle tips were  radiographically confirmed to be posterior to the neural foramen.  Sensory stimulation was then performed with good stimulation of the neck at 0.5V or less at each level. Motor stimulation was performed up to 1.5V at each level producing stimulation of the multifidus muscles of the neck and no stimulation of the upper extremity at any level. There was also no change phonation during motor stimulation.  Each level was then anesthetized with 2% lidocaine prior to treatment with pulsed radiofrequency thermocoagulation at 42 degrees Celsius. Each level was then treated with thermal radiofrequency thermocoagulation at 80 degrees Celsius for 60 seconds in two separate cycles.  Prior to the removal of each needle, a volume of 1 mL of 0.25% bupivacaine was administered at each site.      Needles were removed intact from all levels.  Vitals were stable throughout.       ESTIMATED BLOOD LOSS:  minimal  SPECIMENS:  None    COMPLICATIONS:    No complications were noted.    TOLERANCE & DISCHARGE CONDITION:    The patient tolerated the procedure well.  The patient was transported to the recovery area without difficulties.  The patient was discharged to home under the care of family in stable and satisfactory condition.     PLAN OF CARE:  The patient was given our standard instruction sheet.  The patient will  return for lumbar medial branch blocks as scheduled .  The patient will resume all medications as per the medication reconciliation sheet.

## 2024-04-25 ENCOUNTER — TELEPHONE (OUTPATIENT)
Dept: PAIN MEDICINE | Facility: HOSPITAL | Age: 74
End: 2024-04-25

## 2024-04-25 ENCOUNTER — PREP FOR SURGERY (OUTPATIENT)
Dept: SURGERY | Facility: SURGERY CENTER | Age: 74
End: 2024-04-25
Payer: MEDICARE

## 2024-04-25 DIAGNOSIS — M54.16 LUMBAR RADICULOPATHY: Primary | ICD-10-CM

## 2024-04-25 RX ORDER — DIAZEPAM 5 MG/1
10 TABLET ORAL ONCE
OUTPATIENT
Start: 2024-04-25 | End: 2024-04-25

## 2024-05-06 ENCOUNTER — HOSPITAL ENCOUNTER (OUTPATIENT)
Facility: SURGERY CENTER | Age: 74
Setting detail: HOSPITAL OUTPATIENT SURGERY
Discharge: HOME | End: 2024-05-06
Attending: ANESTHESIOLOGY | Admitting: ANESTHESIOLOGY
Payer: MEDICARE

## 2024-05-06 ENCOUNTER — HOSPITAL ENCOUNTER (OUTPATIENT)
Dept: GENERAL RADIOLOGY | Facility: SURGERY CENTER | Age: 74
Setting detail: HOSPITAL OUTPATIENT SURGERY
End: 2024-05-06
Payer: MEDICARE

## 2024-05-06 VITALS
HEART RATE: 60 BPM | SYSTOLIC BLOOD PRESSURE: 106 MMHG | WEIGHT: 187 LBS | TEMPERATURE: 97.2 F | OXYGEN SATURATION: 97 % | BODY MASS INDEX: 33.13 KG/M2 | HEIGHT: 63 IN | DIASTOLIC BLOOD PRESSURE: 75 MMHG | RESPIRATION RATE: 16 BRPM

## 2024-05-06 DIAGNOSIS — Z41.9 SURGERY, ELECTIVE: ICD-10-CM

## 2024-05-06 DIAGNOSIS — M47.816 LUMBAR FACET ARTHROPATHY: ICD-10-CM

## 2024-05-06 DIAGNOSIS — M54.16 LUMBAR RADICULOPATHY: ICD-10-CM

## 2024-05-06 PROCEDURE — 64483 NJX AA&/STRD TFRM EPI L/S 1: CPT | Performed by: ANESTHESIOLOGY

## 2024-05-06 PROCEDURE — 25010000002 BUPIVACAINE (PF) 0.25 % SOLUTION 10 ML VIAL: Performed by: ANESTHESIOLOGY

## 2024-05-06 PROCEDURE — 25010000002 DEXAMETHASONE SODIUM PHOSPHATE 100 MG/10ML SOLUTION 10 ML VIAL: Performed by: ANESTHESIOLOGY

## 2024-05-06 PROCEDURE — 25510000001 IOPAMIDOL 61 % SOLUTION 30 ML VIAL: Performed by: ANESTHESIOLOGY

## 2024-05-06 PROCEDURE — 77002 NEEDLE LOCALIZATION BY XRAY: CPT

## 2024-05-06 PROCEDURE — 76000 FLUOROSCOPY <1 HR PHYS/QHP: CPT

## 2024-05-06 RX ORDER — DIAZEPAM 5 MG/1
10 TABLET ORAL ONCE
Status: COMPLETED | OUTPATIENT
Start: 2024-05-06 | End: 2024-05-06

## 2024-05-06 RX ORDER — DIAZEPAM 5 MG/1
10 TABLET ORAL ONCE
Status: DISCONTINUED | OUTPATIENT
Start: 2024-05-06 | End: 2024-05-06 | Stop reason: HOSPADM

## 2024-05-06 RX ADMIN — DIAZEPAM 10 MG: 5 TABLET ORAL at 09:45

## 2024-05-16 ENCOUNTER — OFFICE VISIT (OUTPATIENT)
Dept: PAIN MEDICINE | Facility: CLINIC | Age: 74
End: 2024-05-16
Payer: MEDICARE

## 2024-05-16 VITALS
TEMPERATURE: 96.9 F | BODY MASS INDEX: 31.64 KG/M2 | DIASTOLIC BLOOD PRESSURE: 80 MMHG | HEART RATE: 96 BPM | SYSTOLIC BLOOD PRESSURE: 128 MMHG | OXYGEN SATURATION: 92 % | HEIGHT: 63 IN | WEIGHT: 178.6 LBS

## 2024-05-16 DIAGNOSIS — M47.812 ARTHROPATHY OF CERVICAL FACET JOINT: ICD-10-CM

## 2024-05-16 DIAGNOSIS — M70.62 TROCHANTERIC BURSITIS OF LEFT HIP: ICD-10-CM

## 2024-05-16 DIAGNOSIS — M54.16 LUMBAR RADICULOPATHY: ICD-10-CM

## 2024-05-16 DIAGNOSIS — M51.36 DDD (DEGENERATIVE DISC DISEASE), LUMBAR: Primary | ICD-10-CM

## 2024-05-16 PROCEDURE — 3079F DIAST BP 80-89 MM HG: CPT | Performed by: NURSE PRACTITIONER

## 2024-05-16 PROCEDURE — 99213 OFFICE O/P EST LOW 20 MIN: CPT | Performed by: NURSE PRACTITIONER

## 2024-05-16 PROCEDURE — 1160F RVW MEDS BY RX/DR IN RCRD: CPT | Performed by: NURSE PRACTITIONER

## 2024-05-16 PROCEDURE — 1125F AMNT PAIN NOTED PAIN PRSNT: CPT | Performed by: NURSE PRACTITIONER

## 2024-05-16 PROCEDURE — 1159F MED LIST DOCD IN RCRD: CPT | Performed by: NURSE PRACTITIONER

## 2024-05-16 PROCEDURE — 3074F SYST BP LT 130 MM HG: CPT | Performed by: NURSE PRACTITIONER

## 2024-05-16 RX ORDER — HYDROCODONE BITARTRATE AND ACETAMINOPHEN 10; 325 MG/1; MG/1
TABLET ORAL
COMMUNITY
Start: 2024-05-13

## 2024-05-16 NOTE — PROGRESS NOTES
CHIEF COMPLAINT  Back pain  Neck pain    Subjective   Clarissa Matos is a 73 y.o. female  who presents to the office for follow-up of procedure.  She completed a RADIOFREQUENCY ABLATION CERVICAL. Bilateral C4-C6 and LUMBAR/SACRAL TRANSFORAMINAL EPIDURAL. Bilateral L5/S1.  83039 .    on  4/24/24 and 5/6/24 performed by Dr. Chicas for management of neck and back pain. Patient reports 50-60 % for her neck pain and 10% (so far)for her back pain relief from the procedure.     Left GT bursa tenderness present today.      Back Pain  This is a chronic problem. The current episode started more than 1 month ago. The problem occurs daily. The problem has been comes and goes since onset. The pain is present in the lumbar spine. The quality of the pain is described as aching and burning. The pain does not radiate. The pain is at a severity of 6/10. The pain is severe. The symptoms are aggravated by standing, sitting and position. Associated symptoms include abdominal pain, headaches, numbness and weakness. Pertinent negatives include no chest pain, dysuria or fever. She has tried analgesics, ice, muscle relaxant and home exercises for the symptoms. The treatment provided mild relief.   Neck Pain   This is a chronic problem. The problem occurs daily. The problem has been waxing and waning. The pain is present in the midline, right side and left side. The quality of the pain is described as aching and burning. The pain is at a severity of 6/10. The symptoms are aggravated by position. Associated symptoms include headaches, numbness and weakness. Pertinent negatives include no chest pain or fever. She has tried oral narcotics, neck support, home exercises, heat and ice for the symptoms. The treatment provided mild relief.     PEG Assessment   What number best describes your pain on average in the past week?6  What number best describes how, during the past week, pain has interfered with your enjoyment of life?7  What number best  "describes how, during the past week, pain has interfered with your general activity?  7    Review of Pertinent Medical Data ---    The following portions of the patient's history were reviewed and updated as appropriate: allergies, current medications, past family history, past medical history, past social history, past surgical history, and problem list.    Review of Systems   Constitutional:  Positive for activity change and fatigue. Negative for fever.   Cardiovascular:  Negative for chest pain.   Gastrointestinal:  Positive for abdominal pain. Negative for constipation and diarrhea.   Genitourinary:  Positive for difficulty urinating. Negative for dysuria.   Musculoskeletal:  Positive for back pain and neck pain.   Neurological:  Positive for dizziness, weakness, light-headedness, numbness and headaches.   Psychiatric/Behavioral:  Positive for agitation and sleep disturbance. Negative for suicidal ideas. The patient is nervous/anxious.      I have reviewed and confirmed the accuracy of the ROS as documented by the MA/LPN/RN RUBY Colin     Vitals:    05/16/24 1355   BP: 128/80   BP Location: Left arm   Patient Position: Sitting   Cuff Size: Large Adult   Pulse: 96   Temp: 96.9 °F (36.1 °C)   SpO2: 92%   Weight: 81 kg (178 lb 9.6 oz)   Height: 160 cm (63\")   PainSc:   6     Objective   Physical Exam  Vitals and nursing note reviewed.   Constitutional:       General: She is not in acute distress.     Appearance: Normal appearance. She is not ill-appearing.   Pulmonary:      Effort: Pulmonary effort is normal. No respiratory distress.   Musculoskeletal:      Cervical back: Tenderness and bony tenderness present.      Lumbar back: Tenderness and bony tenderness present.      Left hip: Tenderness present.   Neurological:      Mental Status: She is alert and oriented to person, place, and time.      Motor: Motor function is intact. No weakness.      Gait: Gait normal.   Psychiatric:         Mood and " Affect: Mood normal.         Behavior: Behavior normal.           Assessment & Plan   Diagnoses and all orders for this visit:    1. DDD (degenerative disc disease), lumbar (Primary)    2. Lumbar radiculopathy    3. Arthropathy of cervical facet joint    4. Trochanteric bursitis of left hip  -     Large Joint Arthrocentesis          --- Left GT bursa injection in clinic in 1-2 weeks    --- Consider lumbar MBB/RFA in the future if needed. For now we will give the epidural a little more time to take effect     Clarissa Matos reports a pain score of 6.  Given her pain assessment as noted, treatment options were discussed and the following options were decided upon as a follow-up plan to address the patient's pain:  see plan .      --- Follow-up for in office procedure

## 2024-05-22 ENCOUNTER — TELEPHONE (OUTPATIENT)
Dept: PAIN MEDICINE | Facility: CLINIC | Age: 74
End: 2024-05-22
Payer: MEDICARE

## 2024-05-22 NOTE — TELEPHONE ENCOUNTER
"Caller: Clarissa Matos \"Cathy\"    Relationship to patient: Self    Best call back number: [phone] (home)     Patient is needing: PATIENT HAD AN IN OFFICE PROCEDURE THAT WAS SCHEDULED FOR TODAY BUT WAS CANCELLED.   PATIENT IS WANTING TO GET THE INJECTION RE-SCHEDULED.   PLEASE CALL PATIENT ASAP AS SHE STATES SHE'S CALLED MULTIPLE TIMES.     "

## 2024-05-22 NOTE — TELEPHONE ENCOUNTER
Left message for patient to call the office to schedule her in office procedure.It has to be after 5/29/24 with Supriya Jean Baptiste.

## 2024-05-29 ENCOUNTER — HOSPITAL ENCOUNTER (OUTPATIENT)
Dept: CARDIOLOGY | Facility: HOSPITAL | Age: 74
Discharge: HOME | End: 2024-05-29
Payer: MEDICARE

## 2024-05-29 VITALS
SYSTOLIC BLOOD PRESSURE: 117 MMHG | HEART RATE: 82 BPM | BODY MASS INDEX: 29.66 KG/M2 | WEIGHT: 178 LBS | HEIGHT: 65 IN | DIASTOLIC BLOOD PRESSURE: 73 MMHG

## 2024-05-29 DIAGNOSIS — R00.2 PALPITATIONS: Chronic | ICD-10-CM

## 2024-05-29 LAB
AORTIC ARCH: 2.4 CM
AORTIC DIMENSIONLESS INDEX: 0.9 (DI)
ASCENDING AORTA: 2.8 CM
BH CV ECHO MEAS - ACS: 1.64 CM
BH CV ECHO MEAS - AO MAX PG: 7.6 MMHG
BH CV ECHO MEAS - AO MEAN PG: 4 MMHG
BH CV ECHO MEAS - AO ROOT DIAM: 2.6 CM
BH CV ECHO MEAS - AO V2 MAX: 138 CM/SEC
BH CV ECHO MEAS - AO V2 VTI: 26.2 CM
BH CV ECHO MEAS - AVA(I,D): 2.7 CM2
BH CV ECHO MEAS - EDV(CUBED): 83.6 ML
BH CV ECHO MEAS - EDV(MOD-SP2): 56 ML
BH CV ECHO MEAS - EDV(MOD-SP4): 63 ML
BH CV ECHO MEAS - EF(MOD-BP): 61.9 %
BH CV ECHO MEAS - EF(MOD-SP2): 58.9 %
BH CV ECHO MEAS - EF(MOD-SP4): 60.3 %
BH CV ECHO MEAS - ESV(CUBED): 23.6 ML
BH CV ECHO MEAS - ESV(MOD-SP2): 23 ML
BH CV ECHO MEAS - ESV(MOD-SP4): 25 ML
BH CV ECHO MEAS - FS: 34.4 %
BH CV ECHO MEAS - IVS/LVPW: 0.95 CM
BH CV ECHO MEAS - IVSD: 0.7 CM
BH CV ECHO MEAS - LAT PEAK E' VEL: 5.9 CM/SEC
BH CV ECHO MEAS - LV MASS(C)D: 93.7 GRAMS
BH CV ECHO MEAS - LV MAX PG: 4.9 MMHG
BH CV ECHO MEAS - LV MEAN PG: 3 MMHG
BH CV ECHO MEAS - LV V1 MAX: 111 CM/SEC
BH CV ECHO MEAS - LV V1 VTI: 24 CM
BH CV ECHO MEAS - LVIDD: 4.4 CM
BH CV ECHO MEAS - LVIDS: 2.9 CM
BH CV ECHO MEAS - LVOT AREA: 3 CM2
BH CV ECHO MEAS - LVOT DIAM: 1.95 CM
BH CV ECHO MEAS - LVPWD: 0.73 CM
BH CV ECHO MEAS - MED PEAK E' VEL: 6 CM/SEC
BH CV ECHO MEAS - MV A DUR: 0.11 SEC
BH CV ECHO MEAS - MV A MAX VEL: 118.1 CM/SEC
BH CV ECHO MEAS - MV DEC SLOPE: 669.2 CM/SEC2
BH CV ECHO MEAS - MV DEC TIME: 0.16 SEC
BH CV ECHO MEAS - MV E MAX VEL: 111.5 CM/SEC
BH CV ECHO MEAS - MV E/A: 0.94
BH CV ECHO MEAS - MV MAX PG: 5.3 MMHG
BH CV ECHO MEAS - MV MEAN PG: 2.1 MMHG
BH CV ECHO MEAS - MV P1/2T: 45.2 MSEC
BH CV ECHO MEAS - MV V2 VTI: 24.1 CM
BH CV ECHO MEAS - MVA(P1/2T): 4.9 CM2
BH CV ECHO MEAS - MVA(VTI): 3 CM2
BH CV ECHO MEAS - PA ACC TIME: 0.11 SEC
BH CV ECHO MEAS - PA V2 MAX: 83.8 CM/SEC
BH CV ECHO MEAS - PULM A REVS DUR: 0.11 SEC
BH CV ECHO MEAS - PULM A REVS VEL: 34.3 CM/SEC
BH CV ECHO MEAS - PULM DIAS VEL: 37 CM/SEC
BH CV ECHO MEAS - PULM S/D: 1.45
BH CV ECHO MEAS - PULM SYS VEL: 53.6 CM/SEC
BH CV ECHO MEAS - RAP SYSTOLE: 3 MMHG
BH CV ECHO MEAS - RV MAX PG: 1.01 MMHG
BH CV ECHO MEAS - RV V1 MAX: 50.1 CM/SEC
BH CV ECHO MEAS - RV V1 VTI: 10.5 CM
BH CV ECHO MEAS - SUP REN AO DIAM: 1.5 CM
BH CV ECHO MEAS - SV(LVOT): 71.8 ML
BH CV ECHO MEAS - SV(MOD-SP2): 33 ML
BH CV ECHO MEAS - SV(MOD-SP4): 38 ML
BH CV ECHO MEAS - TAPSE (>1.6): 2.03 CM
BH CV ECHO MEASUREMENTS AVERAGE E/E' RATIO: 18.74
BH CV XLRA - RV BASE: 3.2 CM
BH CV XLRA - RV LENGTH: 6.7 CM
BH CV XLRA - TDI S': 13.9 CM/SEC
LEFT ATRIUM VOLUME INDEX: 22.7 ML/M2
SINUS: 2.5 CM
STJ: 2.6 CM

## 2024-05-29 PROCEDURE — 93246 EXT ECG>7D<15D RECORDING: CPT

## 2024-05-29 PROCEDURE — 93306 TTE W/DOPPLER COMPLETE: CPT

## 2024-05-30 ENCOUNTER — PROCEDURE VISIT (OUTPATIENT)
Dept: PAIN MEDICINE | Facility: CLINIC | Age: 74
End: 2024-05-30
Payer: MEDICARE

## 2024-05-30 VITALS
DIASTOLIC BLOOD PRESSURE: 67 MMHG | HEART RATE: 84 BPM | BODY MASS INDEX: 30.22 KG/M2 | TEMPERATURE: 96.9 F | WEIGHT: 181.4 LBS | HEIGHT: 65 IN | OXYGEN SATURATION: 91 % | SYSTOLIC BLOOD PRESSURE: 143 MMHG | RESPIRATION RATE: 16 BRPM

## 2024-05-30 DIAGNOSIS — M50.30 DDD (DEGENERATIVE DISC DISEASE), CERVICAL: ICD-10-CM

## 2024-05-30 DIAGNOSIS — M54.16 LUMBAR RADICULOPATHY: ICD-10-CM

## 2024-05-30 DIAGNOSIS — M70.62 TROCHANTERIC BURSITIS OF LEFT HIP: Primary | ICD-10-CM

## 2024-05-30 DIAGNOSIS — M51.36 DDD (DEGENERATIVE DISC DISEASE), LUMBAR: ICD-10-CM

## 2024-05-30 NOTE — PROGRESS NOTES
"CHIEF COMPLAINT: Hip Pain      Clarissa Matos is a 73 y.o. female.  She is here for the following procedure:  left greater trochanteric bursa injection.     Vitals:    05/30/24 1342   BP: 143/67   BP Location: Left arm   Patient Position: Sitting   Cuff Size: Large Adult   Pulse: 84   Resp: 16   Temp: 96.9 °F (36.1 °C)   TempSrc: Temporal   SpO2: 91%   Weight: 82.3 kg (181 lb 6.4 oz)   Height: 165.1 cm (65\")   PainSc:   5       Bupivacaine 0.25% Lot#: [id number] Exp:   NDC: 22392-702-84  Depo Medrol 40 mg/ml Lot#: [id number] Exp:   NDC: 0248-4421-74     Large Joint Arthrocentesis: L greater trochanteric bursa  Date/Time: 5/30/2024 1:55 PM  Consent given by: patient  Site marked: site marked  Timeout: Immediately prior to procedure a time out was called to verify the correct patient, procedure, equipment, support staff and site/side marked as required   Supporting Documentation  Indications: pain and diagnostic evaluation   Procedure Details  Location: hip - L greater trochanteric bursa  Preparation: Patient was prepped and draped in the usual sterile fashion  Needle size: 25 G  Approach: lateral  Medication group details: 3 ml bupivicaine, 1 ml solu-medrol.  Patient tolerance: patient tolerated the procedure well with no immediate complications        Visit Diagnoses:  1. Trochanteric bursitis of left hip    2. DDD (degenerative disc disease), lumbar    3. Lumbar radiculopathy    4. DDD (degenerative disc disease), cervical      --- Left GT bursa injection in clinic today  --- Patient continues to report cervical and lumbar pain are not well controlled. This is despite medication as well as multiple injections.  She is interested in neurosurgical consultation with Dr. Levine.      RUBY Colin  Pain Management  "

## 2024-06-03 ENCOUNTER — TELEPHONE (OUTPATIENT)
Dept: INTERNAL MEDICINE | Facility: CLINIC | Age: 74
End: 2024-06-03
Payer: MEDICARE

## 2024-06-03 NOTE — TELEPHONE ENCOUNTER
Yes she should try to reapply it outside of the area where the rash developed. It may be from the adhesive. There is a number on the box she can call if she has any trouble.

## 2024-06-03 NOTE — TELEPHONE ENCOUNTER
"  Caller: Clarissa Matos I \"Cathy\"    Relationship: Self    Best call back number: [phone]     What was the call regarding: PATIENT HAD HOLTER MONITOR PLACED 5/29. IT CAME OFF 6/2 AND SHE WAS INSTRUCTED ON HOW TO PUT IT BACK ON BUT SHE STATES THAT HER SKIN IS SENSITIVE AND THERE IS A RASH WHERE IT WAS ON BEFORE. SHE WANTS TO KNOW IF PROVIDER WANTS HER TO REAPPLY IT. PLEASE CALL AND ADVISE.    "

## 2024-06-03 NOTE — TELEPHONE ENCOUNTER
Patient advised and stated she did contact the number on the box but they told her to make her pco aware.    Patient also mentioned she has ankle swelling going on 4 weeks. Patient scheduled to see Glo Wednesday

## 2024-06-05 ENCOUNTER — OFFICE VISIT (OUTPATIENT)
Dept: INTERNAL MEDICINE | Facility: CLINIC | Age: 74
End: 2024-06-05
Payer: MEDICARE

## 2024-06-05 VITALS
DIASTOLIC BLOOD PRESSURE: 80 MMHG | HEIGHT: 65 IN | BODY MASS INDEX: 29.66 KG/M2 | OXYGEN SATURATION: 98 % | SYSTOLIC BLOOD PRESSURE: 126 MMHG | HEART RATE: 87 BPM | WEIGHT: 178 LBS

## 2024-06-05 DIAGNOSIS — I89.0 LYMPHEDEMA: ICD-10-CM

## 2024-06-05 PROCEDURE — 1160F RVW MEDS BY RX/DR IN RCRD: CPT | Performed by: NURSE PRACTITIONER

## 2024-06-05 PROCEDURE — 1125F AMNT PAIN NOTED PAIN PRSNT: CPT | Performed by: NURSE PRACTITIONER

## 2024-06-05 PROCEDURE — 1159F MED LIST DOCD IN RCRD: CPT | Performed by: NURSE PRACTITIONER

## 2024-06-05 PROCEDURE — 99214 OFFICE O/P EST MOD 30 MIN: CPT | Performed by: NURSE PRACTITIONER

## 2024-06-05 PROCEDURE — 3074F SYST BP LT 130 MM HG: CPT | Performed by: NURSE PRACTITIONER

## 2024-06-05 PROCEDURE — 3079F DIAST BP 80-89 MM HG: CPT | Performed by: NURSE PRACTITIONER

## 2024-06-05 RX ORDER — HYDROCHLOROTHIAZIDE 12.5 MG/1
12.5 TABLET ORAL DAILY PRN
Qty: 90 TABLET | Refills: 2 | Status: SHIPPED | OUTPATIENT
Start: 2024-06-05

## 2024-06-05 NOTE — PROGRESS NOTES
"Chief Complaint  B/L foot swelling    Subjective        Clarissa Matos presents to [facility]  History of Present Illness  This is a 74 y/o female presenting to office for complaints of swelling to BLE. Reports she has been taking hctz 12.5mg daily. Reports she has been wearing compression garments to help with swelling. Reports she did not wear the compression today. Needs refill on hctz.     Objective   Vital Signs:  /80 (BP Location: Left arm, Patient Position: Sitting, Cuff Size: Adult)   Pulse 87   Ht 165.1 cm (65\")   Wt 80.7 kg (178 lb)   SpO2 98%   BMI 29.62 kg/m²   Estimated body mass index is 29.62 kg/m² as calculated from the following:    Height as of this encounter: 165.1 cm (65\").    Weight as of this encounter: 80.7 kg (178 lb).               Physical Exam  Constitutional:       Appearance: Normal appearance.   HENT:      Head: Normocephalic and atraumatic.      Right Ear: External ear normal.      Left Ear: External ear normal.      Nose: Nose normal.      Mouth/Throat:      Mouth: Mucous membranes are moist.      Pharynx: Oropharynx is clear.   Eyes:      Conjunctiva/sclera: Conjunctivae normal.      Pupils: Pupils are equal, round, and reactive to light.   Cardiovascular:      Comments: Trace edema BLE  Pulmonary:      Effort: Pulmonary effort is normal.   Skin:     General: Skin is warm and dry.      Capillary Refill: Capillary refill takes less than 2 seconds.   Neurological:      Mental Status: She is alert and oriented to person, place, and time. Mental status is at baseline.   Psychiatric:         Mood and Affect: Mood normal.         Thought Content: Thought content normal.         Judgment: Judgment normal.      Result Review :    The following data was reviewed by: RUBY Katz on 06/05/2024:  Common labs          7/14/2023    10:56 7/14/2023    10:58 10/17/2023    14:28 5/9/2024    09:56   Common Labs   Glucose 123  122   101    BUN 9  9   " 12    Creatinine 0.77  0.76   0.75    Sodium 142  141   141    Potassium 4.4  4.4   4.2    Chloride 101  100   100    Calcium 9.5  9.4   9.6    Total Protein    6.9    Albumin 4.1  4.0   4.5    Total Bilirubin 0.3  0.3   0.3    Alkaline Phosphatase 66  66   64    AST (SGOT) 18  18   18    ALT (SGPT) 16  16   18    WBC 5.21   6.34  5.35    Hemoglobin 15.3   14.9  14.8    Hematocrit 45.5   44.6  43.7    Platelets 289   260  305    Total Cholesterol  161      Total Cholesterol    165    Triglycerides  173   150    HDL Cholesterol  49   54    LDL Cholesterol   83   85    Hemoglobin A1C  5.90   6.00    Uric Acid 4.4         Tobacco Use: Low Risk  (6/5/2024)    Patient History     Smoking Tobacco Use: Never     Smokeless Tobacco Use: Never     Passive Exposure: Not on file     Social History     Substance and Sexual Activity   Alcohol Use No     Family History   Problem Relation Age of Onset    Heart disease Mother     Hypertension Mother     Arthritis Mother     Hyperlipidemia Mother     Stroke Mother     Vision loss Mother     Colon polyps Father     Heart disease Father     Hypertension Father     Arthritis Father     Hyperlipidemia Father     Stroke Father     Vision loss Father     No Known Problems Sister     Arthritis Brother     Hyperlipidemia Brother     Stroke Brother     No Known Problems Maternal Aunt     No Known Problems Paternal Aunt     No Known Problems Maternal Grandmother     No Known Problems Paternal Grandmother     No Known Problems Daughter     No Known Problems Son     BRCA 1/2 Neg Hx     Breast cancer Neg Hx     Colon cancer Neg Hx     Endometrial cancer Neg Hx     Ovarian cancer Neg Hx     Malig Hyperthermia Neg Hx     Crohn's disease Neg Hx     Irritable bowel syndrome Neg Hx     Ulcerative colitis Neg Hx        Adult Transthoracic Echo Complete W/ Cont if Necessary Per Protocol (05/29/2024 10:58 AM)            Assessment and Plan     Diagnoses and all orders for this visit:    1.  Lymphedema  Assessment & Plan:  Continue with compression garments daily   Continue to elevate legs when sitting for longer period of times  Can continue on hydrochlorothiazide as needed daily   Offered consultation with lymphedema clinic but patient would like to think more about this first.     Orders:  -     hydroCHLOROthiazide 12.5 MG tablet; Take 1 tablet by mouth Daily As Needed (ankle swelling).  Dispense: 90 tablet; Refill: 2           I spent 31 minutes caring for Clarissa on this date of service. This time includes time spent by me in the following activities:preparing for the visit, reviewing tests, obtaining and/or reviewing a separately obtained history, performing a medically appropriate examination and/or evaluation , counseling and educating the patient/family/caregiver, ordering medications, tests, or procedures, documenting information in the medical record, and care coordination  Follow Up     Return for Next scheduled follow up 7/23/24.  Patient was given instructions and counseling regarding her condition or for health maintenance advice. Please see specific information pulled into the AVS if appropriate.

## 2024-06-05 NOTE — ASSESSMENT & PLAN NOTE
Continue with compression garments daily   Continue to elevate legs when sitting for longer period of times  Can continue on hydrochlorothiazide as needed daily   Offered consultation with lymphedema clinic but patient would like to think more about this first.

## 2024-06-18 DIAGNOSIS — I10 ESSENTIAL HYPERTENSION: Chronic | ICD-10-CM

## 2024-06-18 RX ORDER — CANDESARTAN CILEXETIL AND HYDROCHLOROTHIAZIDE 16; 12.5 MG/1; MG/1
1 TABLET ORAL DAILY
Qty: 90 TABLET | Refills: 0 | Status: SHIPPED | OUTPATIENT
Start: 2024-06-18

## 2024-06-18 NOTE — TELEPHONE ENCOUNTER
"Caller: LouisKaren gonzalesamandeep DOWNEY \"Cathy\"    Relationship: Self    Best call back number: [phone] (Mobile)     Requested Prescriptions:   Requested Prescriptions     Pending Prescriptions Disp Refills    candesartan-hydrochlorothiazide (ATACAND HCT) 16-12.5 MG per tablet 90 tablet 0     Sig: Take 1 tablet by mouth Daily.        Pharmacy where request should be sent: [facility] [id number] [facility] [id number] [facility] [phone] [facility] [phone]      Last office visit with prescribing clinician: 6/5/2024   Last telemedicine visit with prescribing clinician: Visit date not found   Next office visit with prescribing clinician: 7/26/2024     Additional details provided by patient: PATIENT STATES TIARA CHRISTIANSONARMANDO BELTRAN USED TO PRESCRIBE THIS MEDICATION FOR HER BUT NOW THAT ARTHUR DANGELO IS HER PCP PATIENT IS ASKING FOR A REFILL BECAUSE SHE IS DOWN TO 6-8 PILLS AND NEEDS THIS REFILLED ASAP    PLEASE ADVISE PATIENT REGARDING THIS REQUEST ASAP    Does the patient have less than a 3 day supply:  [x] Yes  [] No    Would you like a call back once the refill request has been completed: [x] Yes [] No    If the office needs to give you a call back, can they leave a voicemail: [x] Yes [] No    Alexander Man   06/18/24 12:04 EDT     "

## 2024-06-19 DIAGNOSIS — R00.2 PALPITATIONS: Primary | ICD-10-CM

## 2024-06-20 ENCOUNTER — TELEPHONE (OUTPATIENT)
Dept: INTERNAL MEDICINE | Facility: CLINIC | Age: 74
End: 2024-06-20
Payer: MEDICARE

## 2024-06-20 NOTE — TELEPHONE ENCOUNTER
----- Message from Glo Dunn sent at 6/19/2024  5:54 PM EDT -----  Please let patient know--  She is throwing some extra beats with her heart rhythm. I would like for her to see a cardiologist. I am going to refer her. In the mean time, please ask her to avoid stimulants including caffeine, and to keep hydrated.

## 2024-07-09 ENCOUNTER — OFFICE VISIT (OUTPATIENT)
Dept: PAIN MEDICINE | Facility: CLINIC | Age: 74
End: 2024-07-09
Payer: MEDICARE

## 2024-07-09 VITALS
WEIGHT: 180 LBS | OXYGEN SATURATION: 98 % | HEIGHT: 65 IN | BODY MASS INDEX: 29.99 KG/M2 | DIASTOLIC BLOOD PRESSURE: 56 MMHG | RESPIRATION RATE: 18 BRPM | HEART RATE: 77 BPM | SYSTOLIC BLOOD PRESSURE: 106 MMHG | TEMPERATURE: 97.5 F

## 2024-07-09 DIAGNOSIS — M51.36 DDD (DEGENERATIVE DISC DISEASE), LUMBAR: Primary | ICD-10-CM

## 2024-07-09 DIAGNOSIS — M54.16 LUMBAR RADICULOPATHY: ICD-10-CM

## 2024-07-09 DIAGNOSIS — M47.812 ARTHROPATHY OF CERVICAL FACET JOINT: ICD-10-CM

## 2024-07-09 PROCEDURE — 3074F SYST BP LT 130 MM HG: CPT | Performed by: NURSE PRACTITIONER

## 2024-07-09 PROCEDURE — 1125F AMNT PAIN NOTED PAIN PRSNT: CPT | Performed by: NURSE PRACTITIONER

## 2024-07-09 PROCEDURE — 3078F DIAST BP <80 MM HG: CPT | Performed by: NURSE PRACTITIONER

## 2024-07-09 PROCEDURE — 99213 OFFICE O/P EST LOW 20 MIN: CPT | Performed by: NURSE PRACTITIONER

## 2024-07-09 RX ORDER — GABAPENTIN 300 MG/1
300 CAPSULE ORAL 2 TIMES DAILY
COMMUNITY

## 2024-07-09 NOTE — PROGRESS NOTES
CHIEF COMPLAINT  Follow-up for back,neck and hip.    Subjective   Clarissa Matos is a 73 y.o. female  who presents for follow-up.  She has a history of neck and back pain. Primary complaint is low back with radiation down the left posterior/lateral leg to the foot.  Last visit she was referred to Dr. Levine, her appointment is not until November.      Medication managed by Dr. Ring     Procedures ---  5/30/2023 --- left GT bursa injection --- significant improvement  RADIOFREQUENCY ABLATION CERVICAL. Bilateral C4-C6 on 4/24/24 - 50-60% improvement   LUMBAR/SACRAL TRANSFORAMINAL EPIDURAL. Bilateral L5/S1 on 5/6/24 - minimal benefit     Back Pain  This is a chronic problem. The current episode started more than 1 month ago. The problem occurs daily. The problem is unchanged. The pain is present in the lumbar spine. The quality of the pain is described as aching and burning. The pain does not radiate. The pain is at a severity of 6/10. The pain is severe. The symptoms are aggravated by standing, sitting and position. Associated symptoms include abdominal pain, headaches, numbness (bilateral hands, left leg and foot) and weakness (bilateral hands and legs). Pertinent negatives include no chest pain, dysuria or fever. She has tried analgesics, ice, muscle relaxant and home exercises for the symptoms. The treatment provided mild relief.   Neck Pain   This is a chronic problem. The problem occurs daily. The problem has been unchanged. The pain is present in the midline, right side and left side. The quality of the pain is described as aching and burning. The pain is at a severity of 6/10. The symptoms are aggravated by position. Associated symptoms include headaches, numbness (bilateral hands, left leg and foot) and weakness (bilateral hands and legs). Pertinent negatives include no chest pain or fever. She has tried oral narcotics, neck support, home exercises, heat and ice for the symptoms. The treatment provided  "mild relief.     PEG Assessment   What number best describes your pain on average in the past week?5  What number best describes how, during the past week, pain has interfered with your enjoyment of life?7  What number best describes how, during the past week, pain has interfered with your general activity?  7    Review of Pertinent Medical Data ---    The following portions of the patient's history were reviewed and updated as appropriate: allergies, current medications, past family history, past medical history, past social history, past surgical history, and problem list.    Review of Systems   Constitutional:  Negative for fever.   Cardiovascular:  Negative for chest pain.   Gastrointestinal:  Positive for abdominal pain and constipation. Negative for diarrhea.   Genitourinary:  Negative for difficulty urinating and dysuria.   Musculoskeletal:  Positive for arthralgias (hip pain), back pain, joint swelling and neck pain.   Neurological:  Positive for weakness (bilateral hands and legs), numbness (bilateral hands, left leg and foot) and headaches.   Psychiatric/Behavioral:  Positive for sleep disturbance. Negative for suicidal ideas. The patient is nervous/anxious.      I have reviewed and confirmed the accuracy of the ROS as documented by the MA/LPN/RN RUBY Colin    Vitals:    07/09/24 1540   BP: 106/56   Pulse: 77   Resp: 18   Temp: 97.5 °F (36.4 °C)   SpO2: 98%   Weight: 81.6 kg (180 lb)   Height: 165.1 cm (65\")   PainSc:   6   PainLoc: Back         Objective   Physical Exam  Vitals and nursing note reviewed.   Constitutional:       General: She is not in acute distress.     Appearance: Normal appearance. She is not ill-appearing.   Pulmonary:      Effort: Pulmonary effort is normal. No respiratory distress.   Musculoskeletal:      Cervical back: Tenderness present.      Lumbar back: Tenderness present.      Left hip: Tenderness present.   Neurological:      Mental Status: She is alert and " oriented to person, place, and time.      Motor: Motor function is intact. No weakness.      Gait: Gait normal.   Psychiatric:         Mood and Affect: Mood normal.         Behavior: Behavior normal.         Assessment & Plan   Diagnoses and all orders for this visit:    1. DDD (degenerative disc disease), lumbar (Primary)    2. Lumbar radiculopathy    3. Arthropathy of cervical facet joint      --- She will continue with neurosurgery evaluation as planned.  She may consider seeing a different surgeon but wishes to think about it.    --- We discussed SCS as an option if surgery not recommended  --- We also discussed lumbar RFA/MBB. She understands that this is unlikely to help with radicular symptoms but may help with back pain.  For now she chooses to hold off.      Clarissa Matos reports a pain score of 6.  Given her pain assessment as noted, treatment options were discussed and the following options were decided upon as a follow-up plan to address the patient's pain: continuation of current treatment plan for pain.      --- Follow-up PRN                  Dictated utilizing Dragon dictation.   I spent 21 minutes caring for Clarissa on this date of service. This time includes time spent by me in the following activities: preparing for the visit, reviewing tests, counseling and educating the patient/family/caregiver, and documenting information in the medical record

## 2024-07-22 ENCOUNTER — TELEPHONE (OUTPATIENT)
Dept: PAIN MEDICINE | Facility: CLINIC | Age: 74
End: 2024-07-22

## 2024-07-22 NOTE — TELEPHONE ENCOUNTER
"Caller: Clarissa Matos \"Cathy\"    Relationship to patient: Self    Best call back number: [phone]*    Chief complaint: BACK PAIN    Type of visit: EPIDURAL INJECTION    Requested date: ASAP     If rescheduling, when is the original appointment: N/A               "

## 2024-07-22 NOTE — TELEPHONE ENCOUNTER
Per review of Deepa's note s/p her having the epidural injection it did not provide any relief.  I would recommend her returning to see Deepa for further discussion of upcoming treatment plan as it looks like per last office visit pt was going to see neurosurgeon.

## 2024-07-26 ENCOUNTER — HOSPITAL ENCOUNTER (OUTPATIENT)
Facility: HOSPITAL | Age: 74
Discharge: HOME | End: 2024-07-26
Payer: MEDICARE

## 2024-07-26 ENCOUNTER — OFFICE VISIT (OUTPATIENT)
Dept: INTERNAL MEDICINE | Facility: CLINIC | Age: 74
End: 2024-07-26
Payer: MEDICARE

## 2024-07-26 VITALS
TEMPERATURE: 97.8 F | WEIGHT: 179 LBS | HEIGHT: 65 IN | DIASTOLIC BLOOD PRESSURE: 82 MMHG | HEART RATE: 68 BPM | OXYGEN SATURATION: 98 % | SYSTOLIC BLOOD PRESSURE: 136 MMHG | BODY MASS INDEX: 29.82 KG/M2

## 2024-07-26 DIAGNOSIS — F32.A ANXIETY AND DEPRESSION: ICD-10-CM

## 2024-07-26 DIAGNOSIS — I10 ESSENTIAL HYPERTENSION: ICD-10-CM

## 2024-07-26 DIAGNOSIS — E78.2 MIXED HYPERLIPIDEMIA: Chronic | ICD-10-CM

## 2024-07-26 DIAGNOSIS — K21.9 GASTROESOPHAGEAL REFLUX DISEASE WITHOUT ESOPHAGITIS: ICD-10-CM

## 2024-07-26 DIAGNOSIS — Z00.00 MEDICARE ANNUAL WELLNESS VISIT, SUBSEQUENT: Primary | ICD-10-CM

## 2024-07-26 DIAGNOSIS — M79.641 RIGHT HAND PAIN: ICD-10-CM

## 2024-07-26 DIAGNOSIS — F41.9 ANXIETY AND DEPRESSION: ICD-10-CM

## 2024-07-26 DIAGNOSIS — R00.2 PALPITATIONS: Chronic | ICD-10-CM

## 2024-07-26 DIAGNOSIS — M25.531 RIGHT WRIST PAIN: ICD-10-CM

## 2024-07-26 DIAGNOSIS — R73.03 PREDIABETES: Chronic | ICD-10-CM

## 2024-07-26 PROCEDURE — 73130 X-RAY EXAM OF HAND: CPT

## 2024-07-26 PROCEDURE — 99214 OFFICE O/P EST MOD 30 MIN: CPT | Performed by: NURSE PRACTITIONER

## 2024-07-26 PROCEDURE — 73110 X-RAY EXAM OF WRIST: CPT

## 2024-07-26 PROCEDURE — 3075F SYST BP GE 130 - 139MM HG: CPT | Performed by: NURSE PRACTITIONER

## 2024-07-26 PROCEDURE — 1125F AMNT PAIN NOTED PAIN PRSNT: CPT | Performed by: NURSE PRACTITIONER

## 2024-07-26 PROCEDURE — G0439 PPPS, SUBSEQ VISIT: HCPCS | Performed by: NURSE PRACTITIONER

## 2024-07-26 PROCEDURE — 1160F RVW MEDS BY RX/DR IN RCRD: CPT | Performed by: NURSE PRACTITIONER

## 2024-07-26 PROCEDURE — 1159F MED LIST DOCD IN RCRD: CPT | Performed by: NURSE PRACTITIONER

## 2024-07-26 PROCEDURE — 3079F DIAST BP 80-89 MM HG: CPT | Performed by: NURSE PRACTITIONER

## 2024-07-26 PROCEDURE — 1170F FXNL STATUS ASSESSED: CPT | Performed by: NURSE PRACTITIONER

## 2024-07-26 NOTE — PROGRESS NOTES
RM:________    Referral Provider: Glo Dunn APRN Jones, Lindsay, APRN    NEW PATIENT/ CONSULT  PREVIOUS CARDIOLOGIST: ______________________________    CARDIAC TESTING: __________________________________________________    : 1950   AGE: 73 y.o.    2024  REASON FOR VISIT/  CC:      WT: ____________ BP: __________L __________R/ HR_______________    ALLERGIES:  Percocet [oxycodone-acetaminophen], Bupropion, Kiwi, Nsaids, and Penicillins  SMOKING HISTORY  Social History     Tobacco Use    Smoking status: Never    Smokeless tobacco: Never   Vaping Use    Vaping status: Never Used   Substance Use Topics    Alcohol use: No    Drug use: No          H/O: MI_____   STROKE________   GOUT_____   ANEMIA______     CAROTID________ HIV____ CAD_______ HYPERCHOL _____    H/O: CHF _____   RF____ DM___ HTN_______PVD____THYROID DISEASE_______    PMH: GI ____   HEPATITIS ___ KIDNEY DISEASE ___ LUNG DISEASE _______     SLEEP APNEA ____ BLOOD CLOTS ____ DVT ____ VEIN STRIPPING ___________      STOP BANG _________ (CARDIO ONCOLOGY ONLY)    CANCER _________________________________ CHEMO/ RADIATION__________

## 2024-07-26 NOTE — PROGRESS NOTES
Subjective   The ABCs of the Annual Wellness Visit  Medicare Wellness Visit      Clarissa Matos is a 73 y.o. patient who presents for a Medicare Wellness Visit.    The following portions of the patient's history were reviewed and   updated as appropriate: allergies, current medications, past family history, past medical history, past social history, past surgical history, and problem list.    Compared to one year ago, the patient's physical   health is worse.  Compared to one year ago, the patient's mental   health is better.    Recent Hospitalizations:  She was not admitted to the hospital during the last year.     Current Medical Providers:  Patient Care Team:  Glo Dunn APRN as PCP - General (Internal Medicine)  Mia Chicas MD as Consulting Physician (Pain Medicine)  Kevon Amado MD as Consulting Physician (Cardiology)  Chance Berg MD (Psychiatry)  Adonay Ring MD (Pain Medicine)    Outpatient Medications Prior to Visit   Medication Sig Dispense Refill    aspirin-acetaminophen-caffeine (EXCEDRIN MIGRAINE) 250-250-65 MG per tablet Take 1 tablet by mouth Every 6 (Six) Hours As Needed for Headache. May resume on 07/07/18.      CALCIUM PO Take  by mouth.      candesartan-hydrochlorothiazide (ATACAND HCT) 16-12.5 MG per tablet Take 1 tablet by mouth Daily. 90 tablet 0    Cholecalciferol (VITAMIN D3) 5000 units capsule capsule Take 1 capsule by mouth Every Night.      cyclobenzaprine (FLEXERIL) 10 MG tablet Take half to 1 po bid prn muscle spasm 30 tablet 1    Diclofenac Sodium (VOLTAREN) 1 % gel gel Apply 4 g topically to the appropriate area as directed 2 (Two) Times a Day.      docusate sodium 100 MG capsule Take 100 mg by mouth 2 (Two) Times a Day. (Patient taking differently: Take 1 capsule by mouth As Needed.)      DULoxetine (CYMBALTA) 60 MG capsule       fexofenadine (ALLEGRA) 180 MG tablet Take 1 tablet by mouth Daily.      gabapentin (NEURONTIN) 300 MG capsule Take 1 capsule by  mouth 2 (Two) Times a Day.      hydroCHLOROthiazide 12.5 MG tablet Take 1 tablet by mouth Daily As Needed (ankle swelling). 90 tablet 2    HYDROcodone-acetaminophen (NORCO)  MG per tablet       ketotifen (ZADITOR) 0.025 % ophthalmic solution       LORazepam (ATIVAN) 0.5 MG tablet Take 1 tablet by mouth 2 (Two) Times a Day As Needed for Anxiety.      MAGNESIUM PO Take  by mouth.      Morphine (MSIR) 15 MG tablet Take 1 tablet by mouth Every 6 (Six) Hours As Needed for Severe Pain.      omeprazole (priLOSEC) 40 MG capsule TAKE 1 CAPSULE BY MOUTH DAILY. 90 capsule 2    Unable to find [facility] HEALTHY NERVE SUPPORT SUPPLEMENT       Facility-Administered Medications Prior to Visit   Medication Dose Route Frequency Provider Last Rate Last Admin    mupirocin (BACTROBAN) 2 % nasal ointment   Nasal BID Sam Bustamante MD         Opioid medication/s are on active medication list.  and I have evaluated her active treatment plan and pain score trends (see table).  There were no vitals filed for this visit.  I have reviewed the chart for potential of high risk medication and harmful drug interactions in the elderly.        Aspirin is not on active medication list.  Aspirin use is not indicated based on review of current medical condition/s. Risk of harm outweighs potential benefits.  .    Patient Active Problem List   Diagnosis    Fibromyalgia    Essential hypertension    Closed fracture of upper end of humerus    Rotator cuff arthropathy    Primary osteoarthritis of left knee    Lumbar facet arthropathy    Arthropathy of cervical facet joint    Neck pain, chronic    Chronic bilateral low back pain    Mid back pain    Chronic pain syndrome    Snoring    Class 1 obesity    Non-restorative sleep    Primary insomnia    GERD (gastroesophageal reflux disease)    Dysphagia    Epigastric pain    Positive colorectal cancer screening using Cologuard test    Colon cancer screening    DDD (degenerative disc disease), lumbar     "Lumbar radiculopathy    Anxiety and depression    Palpitations    Mixed hyperlipidemia    Prediabetes    Lymphedema     Advance Care Planning (Click this link to access ACP Navigator)  Advance Directive is on file.  ACP discussion was held with the patient during this visit. Patient has an advance directive in EMR which is still valid.         Objective   Vitals:    24 1547   BP: 136/82   BP Location: Left arm   Patient Position: Sitting   Cuff Size: Adult   Pulse: 68   Temp: 97.8 °F (36.6 °C)   SpO2: 98%   Weight: 81.2 kg (179 lb)   Height: 165.1 cm (65\")       Estimated body mass index is 29.79 kg/m² as calculated from the following:    Height as of this encounter: 165.1 cm (65\").    Weight as of this encounter: 81.2 kg (179 lb).             Does the patient have evidence of cognitive impairment? No  Lab Results   Component Value Date    CHLPL 165 2024    TRIG 150 2024    HDL 54 2024    LDL 85 2024    VLDL 26 2024    HGBA1C 6.00 (H) 2024                                                                                                Health  Risk Assessment    Smoking Status:  Social History     Tobacco Use   Smoking Status Never   Smokeless Tobacco Never     Alcohol Consumption:  Social History     Substance and Sexual Activity   Alcohol Use No     Fall Risk Screen:  STEADI Fall Risk Assessment was completed, and patient is at HIGH risk for falls. Assessment completed on:2024    Depression Screenin/26/2024     3:50 PM   PHQ-2/PHQ-9 Depression Screening   Little Interest or Pleasure in Doing Things 0-->not at all   Feeling Down, Depressed or Hopeless 0-->not at all   PHQ-9: Brief Depression Severity Measure Score 0     Health Habits and Functional and Cognitive Screenin/26/2024     3:50 PM   Functional & Cognitive Status   Do you have difficulty preparing food and eating? No   Do you have difficulty bathing yourself, getting dressed or grooming " yourself? No   Do you have difficulty using the toilet? No   Do you have difficulty moving around from place to place? No   Do you have trouble with steps or getting out of a bed or a chair? No   Current Diet Well Balanced Diet   Dental Exam Up to date   Eye Exam Up to date   Exercise (times per week) 0 times per week   Do you need help using the phone?  No   Are you deaf or do you have serious difficulty hearing?  No   Do you need help to go to places out of walking distance? No   Do you need help shopping? No   Do you need help preparing meals?  No   Do you need help with housework?  No   Do you need help with laundry? No   Do you need help taking your medications? No   Do you need help managing money? No   Do you ever drive or ride in a car without wearing a seat belt? No   Have you felt unusual stress, anger or loneliness in the last month? No   Who do you live with? Spouse   If you need help, do you have trouble finding someone available to you? No   Have you been bothered in the last four weeks by sexual problems? No   Do you have difficulty concentrating, remembering or making decisions? No             Age-appropriate Screening Schedule:  Refer to the list below for future screening recommendations based on patient's age, sex and/or medical conditions. Orders for these recommended tests are listed in the plan section. The patient has been provided with a written plan.    Health Maintenance List  Health Maintenance   Topic Date Due    COVID-19 Vaccine (2 - 2023-24 season) 10/15/2024 (Originally 9/1/2023)    INFLUENZA VACCINE  08/01/2024    BMI FOLLOWUP  09/01/2024    DXA SCAN  02/21/2025    LIPID PANEL  05/09/2025    MAMMOGRAM  05/31/2025    ANNUAL WELLNESS VISIT  07/26/2025    TDAP/TD VACCINES (2 - Td or Tdap) 12/01/2032    COLORECTAL CANCER SCREENING  08/28/2033    Pneumococcal Vaccine 65+  Completed    ZOSTER VACCINE  Completed    HEPATITIS C SCREENING  Discontinued                                            "                                                                                                     CMS Preventative Services Quick Reference  Risk Factors Identified During Encounter  Fall Risk-High or Moderate: Discussed Fall Prevention in the home  Inactivity/Sedentary: Patient was advised to exercise at least 150 minutes a week per CDC recommendations.  Dental Screening Recommended  Vision Screening Recommended    The above risks/problems have been discussed with the patient.  Pertinent information has been shared with the patient in the After Visit Summary.  An After Visit Summary and PPPS were made available to the patient.    Follow Up:   Next Medicare Wellness visit to be scheduled in 1 year.         Additional E&M Note during same encounter follows:  Patient has additional, significant, and separately identifiable condition(s)/problem(s) that require work above and beyond the Medicare Wellness Visit     Chief Complaint  Medicare Wellness-subsequent    Subjective   HPI  Cathy is also being seen today for additional medical problem/s.    Reports right wrist pain; recent fall 6 weeks ago while outside; reports she has noticed some decreased strength in right forearm; has not had any work up r/t this; has appt with [facility] next week; Reports pain in inner right wrist; reports she also is having some numbness.     Reports she is seeing podiatrist who has her on gabapentin for swelling. Reports he has been feeling sleepy from this but reports she thinks this may be helping. Patient is following with Dr. Ring for pain management needs-- currently on norco, morphine.    Continues on omeprazole for GERD; following with GI.           Objective   Vital Signs:  /82 (BP Location: Left arm, Patient Position: Sitting, Cuff Size: Adult)   Pulse 68   Temp 97.8 °F (36.6 °C)   Ht 165.1 cm (65\")   Wt 81.2 kg (179 lb)   SpO2 98%   BMI 29.79 kg/m²   Physical Exam  Constitutional:       Appearance: Normal " appearance. She is obese.   HENT:      Head: Normocephalic and atraumatic.      Right Ear: External ear normal.      Left Ear: External ear normal.      Nose: Nose normal.      Mouth/Throat:      Mouth: Mucous membranes are moist.      Pharynx: Oropharynx is clear.   Eyes:      Conjunctiva/sclera: Conjunctivae normal.      Pupils: Pupils are equal, round, and reactive to light.   Cardiovascular:      Rate and Rhythm: Normal rate and regular rhythm.      Pulses: Normal pulses.      Heart sounds: Normal heart sounds. No murmur heard.     No gallop.   Pulmonary:      Effort: Pulmonary effort is normal. No respiratory distress.      Breath sounds: Normal breath sounds. No wheezing or rales.   Musculoskeletal:         General: Tenderness present.        Arms:       Cervical back: Normal range of motion and neck supple.      Comments:  strength equal 5/5 b/l   Skin:     General: Skin is warm and dry.      Capillary Refill: Capillary refill takes less than 2 seconds.   Neurological:      Mental Status: She is alert and oriented to person, place, and time. Mental status is at baseline.   Psychiatric:         Mood and Affect: Mood normal.         Thought Content: Thought content normal.         Judgment: Judgment normal.         The following data was reviewed by: RUBY Katz on 07/26/2024:        Assessment and Plan Additional age appropriate preventative wellness advice topics were discussed during today's preventative wellness exam(some topics already addressed during AWV portion of the note above):    Physical Activity: Advised cardiovascular activity 150 minutes per week as tolerated. (example brisk walk for 30 minutes, 5 days a week).     Nutrition: Discussed nutrition plan with patient. Information shared in after visit summary. Goal is for a well balanced diet to enhance overall health.     Healthy Weight: Discussed current and goal BMI with patient. Steps to attain this goal discussed. Information shared  in after visit summary.     Injury Prevention Discussion:  Information shared in after visit summary.                    Medicare annual wellness visit, subsequent    Essential hypertension  Hypertension is stable and controlled  Continue current treatment regimen.  Dietary sodium restriction.  Weight loss.  Regular aerobic exercise.  Blood pressure will be reassessed in 6 months.  Mixed hyperlipidemia   Continue with low fat diet choices and exercise  Palpitations  Pending cardiology f/u  Prediabetes  Recommended low glycemic diet choices   Gastroesophageal reflux disease without esophagitis  Continues on omeprazole  Stable  Following with GI  Anxiety and depression  Stable  Denies SI    Right wrist pain  XR today  Ice/heat per patient preference  F/u with hand specialty next week  Right hand pain  XR today  Ice/heat per patient preference  F/u with hand specialty next week    Orders Placed This Encounter   Procedures    XR Wrist 3+ View Right     Standing Status:   Future     Number of Occurrences:   1     Standing Expiration Date:   7/26/2025     Order Specific Question:   Reason for Exam:     Answer:   right wrist pain, hand pain     Order Specific Question:   Release to patient     Answer:   Routine Release [[id number]]    XR Hand 3+ View Right     Standing Status:   Future     Number of Occurrences:   1     Standing Expiration Date:   7/26/2025     Order Specific Question:   Reason for Exam:     Answer:   right wrist pain, hand pain     Order Specific Question:   Release to patient     Answer:   Routine Release [[id number]]             Follow Up   Return in about 6 months (around 1/26/2025) for Recheck chronic conditions.  Patient was given instructions and counseling regarding her condition or for health maintenance advice. Please see specific information pulled into the AVS if appropriate.

## 2024-07-26 NOTE — PROGRESS NOTES
RM:________     PCP: Glo Dunn APRN    : 1950  AGE: 73 y.o.  EST PATIENT     REASON FOR VISIT/  CC:        BP Readings from Last 3 Encounters:   24 136/82   24 106/56   24 126/80      Wt Readings from Last 3 Encounters:   24 81.2 kg (179 lb)   24 81.6 kg (180 lb)   24 80.7 kg (178 lb)        WT: ____________ BP: __________L __________R HR______    CHEST PAIN: _____________    SOA: _____________PALPS: _______________     LIGHTHEADED: ___________FATIGUE: ________________ EDEMA __________    ALLERGIES:Percocet [oxycodone-acetaminophen], Bupropion, Kiwi, Nsaids, and Penicillins SMOKING HISTORY:  Social History     Tobacco Use    Smoking status: Never    Smokeless tobacco: Never   Vaping Use    Vaping status: Never Used   Substance Use Topics    Alcohol use: No    Drug use: No     CAFFEINE USE_________________  ALCOHOL ______________________

## 2024-07-31 ENCOUNTER — OFFICE VISIT (OUTPATIENT)
Dept: PAIN MEDICINE | Facility: CLINIC | Age: 74
End: 2024-07-31
Payer: MEDICARE

## 2024-07-31 VITALS
SYSTOLIC BLOOD PRESSURE: 101 MMHG | TEMPERATURE: 95.7 F | HEIGHT: 65 IN | DIASTOLIC BLOOD PRESSURE: 65 MMHG | HEART RATE: 72 BPM | WEIGHT: 180.4 LBS | BODY MASS INDEX: 30.06 KG/M2 | OXYGEN SATURATION: 98 %

## 2024-07-31 DIAGNOSIS — M51.36 DDD (DEGENERATIVE DISC DISEASE), LUMBAR: Primary | ICD-10-CM

## 2024-07-31 DIAGNOSIS — M50.30 DDD (DEGENERATIVE DISC DISEASE), CERVICAL: ICD-10-CM

## 2024-07-31 DIAGNOSIS — M54.16 LUMBAR RADICULOPATHY: ICD-10-CM

## 2024-07-31 PROCEDURE — 1159F MED LIST DOCD IN RCRD: CPT | Performed by: NURSE PRACTITIONER

## 2024-07-31 PROCEDURE — 99214 OFFICE O/P EST MOD 30 MIN: CPT | Performed by: NURSE PRACTITIONER

## 2024-07-31 PROCEDURE — 1160F RVW MEDS BY RX/DR IN RCRD: CPT | Performed by: NURSE PRACTITIONER

## 2024-07-31 PROCEDURE — 3078F DIAST BP <80 MM HG: CPT | Performed by: NURSE PRACTITIONER

## 2024-07-31 PROCEDURE — 1125F AMNT PAIN NOTED PAIN PRSNT: CPT | Performed by: NURSE PRACTITIONER

## 2024-07-31 PROCEDURE — 3074F SYST BP LT 130 MM HG: CPT | Performed by: NURSE PRACTITIONER

## 2024-07-31 NOTE — PROGRESS NOTES
CHIEF COMPLAINT  Back pain  Neck pain  Hip pain  Patient stated that she has back pain that is going down her left leg causing numbness and tingling .    Subjective   Clarissa Matos is a 73 y.o. female  who presents for follow-up.  She has a history of chronic back pain, neck pain.  She presents today due to acutely worsening pain.  Pain today 5/10 VAS.  Primary complaint is low back with radiation down the left posterior/lateral leg to the foot.      Previously referred to Dr. Levine, her appointment is not until November.  Discussed referral to a different neurosurgeon last visit which she declined.      Pain medication managed by Dr. Ring (MS Contin + Norco).  We treat her interventionally only.       She also takes Cymbalta 60 mg once daily, states prescribed by psychiatry, asks if we can take this over in the future.      Procedures ---  5/30/2023 --- left GT bursa injection --- significant improvement  RADIOFREQUENCY ABLATION CERVICAL. Bilateral C4-C6 on 4/24/24 - 50-60% improvement   LUMBAR/SACRAL TRANSFORAMINAL EPIDURAL. Bilateral L5/S1 on 5/6/24 - no relief   Bilateral L4-S1 MBB   on  7/28/2021 --- no relief     Back Pain  This is a chronic problem. The current episode started more than 1 month ago. The problem occurs daily. The problem is unchanged. The pain is present in the lumbar spine. The quality of the pain is described as aching and burning. The pain does not radiate. The pain is at a severity of 5/10. The pain is severe. The symptoms are aggravated by standing, sitting and position. Associated symptoms include headaches. Pertinent negatives include no abdominal pain, chest pain, dysuria, fever, numbness or weakness. She has tried analgesics, ice, muscle relaxant and home exercises for the symptoms. The treatment provided mild relief.   Neck Pain   This is a chronic problem. The problem occurs daily. The problem has been unchanged. The pain is present in the midline, right side and left side.  "The quality of the pain is described as aching and burning. The pain is at a severity of 5/10. The symptoms are aggravated by position. Associated symptoms include headaches. Pertinent negatives include no chest pain, fever, numbness or weakness. She has tried oral narcotics, neck support, home exercises, heat and ice for the symptoms. The treatment provided mild relief.   Hip Pain   Pertinent negatives include no numbness.        PEG Assessment   What number best describes your pain on average in the past week?8  What number best describes how, during the past week, pain has interfered with your enjoyment of life?7  What number best describes how, during the past week, pain has interfered with your general activity?  7    Review of Pertinent Medical Data ---    The following portions of the patient's history were reviewed and updated as appropriate: allergies, current medications, past family history, past medical history, past social history, past surgical history, and problem list.    Review of Systems   Constitutional:  Positive for activity change and fatigue. Negative for fever.   Cardiovascular:  Negative for chest pain.   Gastrointestinal:  Negative for abdominal pain, constipation and diarrhea.   Genitourinary:  Negative for difficulty urinating and dysuria.   Musculoskeletal:  Positive for back pain and neck pain.   Neurological:  Positive for headaches. Negative for dizziness, weakness, light-headedness and numbness.   Psychiatric/Behavioral:  Positive for agitation and sleep disturbance. Negative for suicidal ideas. The patient is nervous/anxious.      I have reviewed and confirmed the accuracy of the ROS as documented by the MA/LPN/RN RUBY Colin    Vitals:    07/31/24 1505   BP: 101/65   BP Location: Left arm   Patient Position: Sitting   Cuff Size: Large Adult   Pulse: 72   Temp: 95.7 °F (35.4 °C)   SpO2: 98%   Weight: 81.8 kg (180 lb 6.4 oz)   Height: 165.1 cm (65\")   PainSc:   5 "         Objective   Physical Exam  Vitals and nursing note reviewed.   Constitutional:       General: She is not in acute distress.     Appearance: Normal appearance. She is not ill-appearing.   Pulmonary:      Effort: Pulmonary effort is normal. No respiratory distress.   Musculoskeletal:      Cervical back: Tenderness present.      Lumbar back: Tenderness and bony tenderness present. Positive left straight leg raise test.      Left hip: Tenderness present.   Neurological:      Mental Status: She is alert and oriented to person, place, and time.      Motor: Motor function is intact. No weakness.      Gait: Gait normal.   Psychiatric:         Mood and Affect: Mood normal.         Behavior: Behavior normal.           Assessment & Plan   Diagnoses and all orders for this visit:    1. DDD (degenerative disc disease), lumbar (Primary)  -     Ambulatory Referral to Neurosurgery    2. Lumbar radiculopathy  -     Ambulatory Referral to Neurosurgery    3. DDD (degenerative disc disease), cervical      --- I have once again explained that we have no further interventional recommendations at this time.  Due to lack of benefit with transforaminal epidural she is not a candidate to repeat this procedure based on Medicare guidelines.  We are unable to perform an interlaminar epidural at L5-S1 due to the severe spinal stenosis present at this level.  There is also lack of diagnostic benefit with lumbar MBB and thus she is not a candidate for RFA.  Additionally she has severe flushing reaction with steroid injections which she has previously reported to be fairly intolerable, thus I do not feel that the benefit of any additional steroid injections this would outweigh the possible side effects.    --- Due to worsening complaints of pain rating down the left leg I feel that neurosurgical evaluation is the next step.  Today she states that she does not wish to wait until November for this evaluation.  We will place a new consult  order.    --- Refer to Dr. Antonio for neurosurgical evaluation     --- She also asks about our clinic taking over her pain medication in the future.  I explained that she would need to set up a follow-up with Dr. Chicas to discuss medication management as her total daily morphine equivalency is considered to be in the high range (60 MEDD).  Furthermore she does ask about methadone therapy.  I explained to her that this is not typically a medication utilized by Dr. Chicas, but once again have explained that she can discuss this with Dr. Chicas if she does wish to leave Dr. Ring's care.    Clarissa Matos reports a pain score of 5.  Given her pain assessment as noted, treatment options were discussed and the following options were decided upon as a follow-up plan to address the patient's pain: continuation of current treatment plan for pain.    --- Follow-up PRN                Dictated utilizing Dragon dictation.   I spent 32 minutes caring for Clarissa on this date of service. This time includes time spent by me in the following activities: preparing for the visit, reviewing tests, counseling and educating the patient/family/caregiver, and documenting information in the medical record

## 2024-08-01 ENCOUNTER — OFFICE VISIT (OUTPATIENT)
Dept: CARDIOLOGY | Facility: CLINIC | Age: 74
End: 2024-08-01
Payer: MEDICARE

## 2024-08-01 VITALS
SYSTOLIC BLOOD PRESSURE: 130 MMHG | DIASTOLIC BLOOD PRESSURE: 84 MMHG | WEIGHT: 180 LBS | BODY MASS INDEX: 29.99 KG/M2 | HEART RATE: 71 BPM | OXYGEN SATURATION: 98 % | HEIGHT: 65 IN

## 2024-08-01 DIAGNOSIS — R60.0 PEDAL EDEMA: ICD-10-CM

## 2024-08-01 DIAGNOSIS — I10 ESSENTIAL HYPERTENSION: ICD-10-CM

## 2024-08-01 DIAGNOSIS — R07.89 OTHER CHEST PAIN: ICD-10-CM

## 2024-08-01 DIAGNOSIS — I49.3 PVCS (PREMATURE VENTRICULAR CONTRACTIONS): Primary | ICD-10-CM

## 2024-08-01 PROCEDURE — 99204 OFFICE O/P NEW MOD 45 MIN: CPT | Performed by: INTERNAL MEDICINE

## 2024-08-01 PROCEDURE — 1160F RVW MEDS BY RX/DR IN RCRD: CPT | Performed by: INTERNAL MEDICINE

## 2024-08-01 PROCEDURE — 3075F SYST BP GE 130 - 139MM HG: CPT | Performed by: INTERNAL MEDICINE

## 2024-08-01 PROCEDURE — 1159F MED LIST DOCD IN RCRD: CPT | Performed by: INTERNAL MEDICINE

## 2024-08-01 PROCEDURE — 3079F DIAST BP 80-89 MM HG: CPT | Performed by: INTERNAL MEDICINE

## 2024-08-01 PROCEDURE — 93000 ELECTROCARDIOGRAM COMPLETE: CPT | Performed by: INTERNAL MEDICINE

## 2024-08-01 NOTE — PROGRESS NOTES
Date of Office Visit: 24  Encounter Provider: Kevon Amado MD  Place of Service: [facility] CARDIOLOGY  Patient Name: Clarissa Matos  :1950    Chief Complaint   Patient presents with    Irregular Heart Beat    Establish Care   :     HPI:     Ms. Matos is 73 y.o. and presents today for evaluation of PVCs.    She has had intermittent palpitations for a long time. She feels flutters in her chest and they come and go.  She notices them more when she is up doing things and when she is anxious.  She has not had any sustained tachycardia and they do not make her syncopal.  She does not have chest pain or shortness of breath.  She has had worsening pedal edema and she has been taking extra hydrochlorothiazide for that.    Because of the symptoms, a rhythm monitor was checked which showed an 8% burden of PVCs and one 4 beat an echocardiogram was performed and was normal. NSVT.     Past Medical History:   Diagnosis Date    Anxiety     Arthritis     Cataract     cataract surgery    Cholelithiasis surgically removed    Depression     Dyslipidemia     Fibromyalgia, primary     GERD (gastroesophageal reflux disease)     Hard to intubate     STATES TOOK 30 MINUTES TO BE INTUBATED    Headache     Hiatal hernia     HL (hearing loss)     Hypertension     Limited joint range of motion     LT KNEE    Low back pain     Lymphedema 2024    Polycythemia     Prediabetes     Raynaud disease        Past Surgical History:   Procedure Laterality Date    BREAST BIOPSY Right     CATARACT EXTRACTION W/ INTRAOCULAR LENS IMPLANT Bilateral      SECTION      x 2    CHOLECYSTECTOMY  see chart    CHOLECYSTECTOMY WITH INTRAOPERATIVE CHOLANGIOGRAM N/A 2017    Procedure: CHOLECYSTECTOMY LAPAROSCOPIC INTRAOPERATIVE CHOLANGIOGRAM;  Surgeon: Demi Madden MD;  Location: [facility];  Service:     COLONOSCOPY N/A 2023    Procedure: COLONOSCOPY TO CECUM;  Surgeon: Cuate  Gui ERNST MD;  Location: [facility];  Service: Gastroenterology;  Laterality: N/A;  DIVERTICULUM, HEMORRRHOIDS    ENDOSCOPY N/A 08/28/2023    Procedure: ESOPHAGOGASTRODUODENOSCOPY WITH DILATION;  Surgeon: Gui Cuello MD;  Location: [facility];  Service: Gastroenterology;  Laterality: N/A;  DILATION TO 20MM, GASTRITIS, SCHATSKIS RING, HIATAL HERNIA    EPIDURAL Bilateral 09/29/2023    Procedure: LUMBAR/SACRAL TRANSFORAMINAL EPIDURAL. bilateral L5/S1. 06289, 32051;  Surgeon: Mia Chicas MD;  Location: [facility];  Service: Pain Management;  Laterality: Bilateral;    EPIDURAL Bilateral 5/6/2024    Procedure: LUMBAR/SACRAL TRANSFORAMINAL EPIDURAL. Bilateral L5/S1.  18738.;  Surgeon: Mia Chicas MD;  Location: [facility];  Service: Pain Management;  Laterality: Bilateral;    FRACTURE SURGERY      HYSTERECTOMY  2010    JOINT REPLACEMENT      MEDIAL BRANCH BLOCK Bilateral 07/28/2021    Procedure: Bilateral L4-S1 MEDIAL BRANCH BLOCK;  Surgeon: Mia Chicas MD;  Location: [facility];  Service: Pain Management;  Laterality: Bilateral;    MEDIAL BRANCH BLOCK Bilateral 08/30/2021    Procedure: Bilateral cervical MEDIAL BRANCH BLOCK at approximatley C4-C6;  Surgeon: Mia Chicas MD;  Location: [facility];  Service: Pain Management;  Laterality: Bilateral;    MEDIAL BRANCH BLOCK Bilateral 03/11/2024    Procedure: MEDIAL BRANCH BLOCK CERVICAL. Bilateral C4-C6.  24722, 84119#1;  Surgeon: Mia Chicas MD;  Location: [facility];  Service: Pain Management;  Laterality: Bilateral;    MEDIAL BRANCH BLOCK Bilateral 03/27/2024    Procedure: CERVICAL/THORACIC MEDIAL BRANCH BLOCK. Bilateral C4-C6.  49444, 27246 #2.;  Surgeon: Mia Chicas MD;  Location: [facility];  Service: Pain Management;  Laterality: Bilateral;    RADIOFREQUENCY ABLATION Bilateral 4/24/2024    Procedure: RADIOFREQUENCY ABLATION CERVICAL. Bilateral C4-C6.;  Surgeon: Mia Chicas MD;  Location: [facility];  Service: Pain Management;  Laterality: Bilateral;    REPLACEMENT TOTAL KNEE Right     ROTATOR CUFF REPAIR Right 2001    TOOTH EXTRACTION Right 04/24/2023    TOOTH EXTRACTION  03/2024    TOTAL KNEE ARTHROPLASTY Left 07/22/2019    Procedure: LEFT TOTAL KNEE ARTHROPLASTY;  Surgeon: Sam Bustamante MD;  Location: [facility] OR;  Service: Orthopedics    TOTAL SHOULDER ARTHROPLASTY W/ DISTAL CLAVICLE EXCISION Left 07/05/2018    Procedure: Reverse Total Shoulder Arthroplsty for fracture;  Surgeon: Louis Prasad MD;  Location: [facility] OR;  Service: Orthopedics    TUBAL ABDOMINAL LIGATION  1986       Social History     Socioeconomic History    Marital status:      Spouse name: Jaime   Tobacco Use    Smoking status: Never    Smokeless tobacco: Never   Vaping Use    Vaping status: Never Used   Substance and Sexual Activity    Alcohol use: No    Drug use: No    Sexual activity: Not Currently       Family History   Problem Relation Age of Onset    Heart disease Mother     Hypertension Mother     Arthritis Mother     Hyperlipidemia Mother     Stroke Mother     Vision loss Mother     Colon polyps Father     Heart disease Father     Hypertension Father     Arthritis Father     Hyperlipidemia Father     Stroke Father     Vision loss Father     No Known Problems Sister     Arthritis Brother     Hyperlipidemia Brother     Stroke Brother     No Known Problems Maternal Aunt     No Known Problems Paternal Aunt     No Known Problems Maternal Grandmother     No Known Problems Paternal Grandmother     No Known Problems Daughter     No Known Problems Son     BRCA 1/2 Neg Hx     Breast cancer Neg Hx     Colon cancer Neg Hx     Endometrial cancer Neg Hx     Ovarian cancer Neg Hx     Malig Hyperthermia Neg Hx     Crohn's disease Neg Hx     Irritable bowel syndrome Neg Hx     Ulcerative colitis Neg Hx        Review of Systems   Cardiovascular:  Positive for leg swelling and palpitations.   Musculoskeletal:  Positive for  joint pain and myalgias.   All other systems reviewed and are negative.      Allergies   Allergen Reactions    Percocet [Oxycodone-Acetaminophen] Itching    Bupropion Other (See Comments)     Patient believes she had a rash or it gave her hot flashes     Kiwi Other (See Comments)     Facial and lip swelling     Nsaids GI Intolerance    Penicillins Rash         Current Outpatient Medications:     aspirin-acetaminophen-caffeine (EXCEDRIN MIGRAINE) 250-250-65 MG per tablet, Take 1 tablet by mouth Every 6 (Six) Hours As Needed for Headache. May resume on 07/07/18., Disp: , Rfl:     CALCIUM PO, Take  by mouth., Disp: , Rfl:     candesartan-hydrochlorothiazide (ATACAND HCT) 16-12.5 MG per tablet, Take 1 tablet by mouth Daily., Disp: 90 tablet, Rfl: 0    Cholecalciferol (VITAMIN D3) 5000 units capsule capsule, Take 1 capsule by mouth Every Night., Disp: , Rfl:     cyclobenzaprine (FLEXERIL) 10 MG tablet, Take half to 1 po bid prn muscle spasm, Disp: 30 tablet, Rfl: 1    Diclofenac Sodium (VOLTAREN) 1 % gel gel, Apply 4 g topically to the appropriate area as directed 2 (Two) Times a Day., Disp: , Rfl:     docusate sodium 100 MG capsule, Take 100 mg by mouth 2 (Two) Times a Day. (Patient taking differently: Take 1 capsule by mouth As Needed.), Disp: , Rfl:     DULoxetine (CYMBALTA) 60 MG capsule, , Disp: , Rfl:     fexofenadine (ALLEGRA) 180 MG tablet, Take 1 tablet by mouth Daily., Disp: , Rfl:     gabapentin (NEURONTIN) 300 MG capsule, Take 1 capsule by mouth 2 (Two) Times a Day., Disp: , Rfl:     hydroCHLOROthiazide 12.5 MG tablet, Take 1 tablet by mouth Daily As Needed (ankle swelling)., Disp: 90 tablet, Rfl: 2    HYDROcodone-acetaminophen (NORCO)  MG per tablet, , Disp: , Rfl:     ketotifen (ZADITOR) 0.025 % ophthalmic solution, , Disp: , Rfl:     LORazepam (ATIVAN) 0.5 MG tablet, Take 1 tablet by mouth 2 (Two) Times a Day As Needed for Anxiety., Disp: , Rfl:     MAGNESIUM PO, Take  by mouth., Disp: , Rfl:      "melatonin 5 MG tablet tablet, Take 1 tablet by mouth Daily., Disp: , Rfl:     Morphine (MSIR) 15 MG tablet, Take 1 tablet by mouth Every 6 (Six) Hours As Needed for Severe Pain., Disp: , Rfl:     omeprazole (priLOSEC) 40 MG capsule, TAKE 1 CAPSULE BY MOUTH DAILY., Disp: 90 capsule, Rfl: 2    Unable to find, [employer] HEALTHY NERVE SUPPORT SUPPLEMENT, Disp: , Rfl:   No current facility-administered medications for this visit.    Facility-Administered Medications Ordered in Other Visits:     mupirocin (BACTROBAN) 2 % nasal ointment, , Nasal, BID, Sam Bustamante MD      Objective:     Vitals:    08/01/24 1316   BP: 130/84   Pulse: 71   SpO2: 98%   Weight: 81.6 kg (180 lb)   Height: 165.1 cm (65\")     Body mass index is 29.95 kg/m².    Vitals reviewed.   Constitutional:       Appearance: Well-developed and not in distress.   Eyes:      Conjunctiva/sclera: Conjunctivae normal.   HENT:      Head: Normocephalic.      Nose: Nose normal.   Neck:      Thyroid: Thyroid normal.      Vascular: No JVD. JVD normal.      Lymphadenopathy: No cervical adenopathy.   Pulmonary:      Effort: Pulmonary effort is normal.      Breath sounds: Normal breath sounds.   Cardiovascular:      Normal rate. Regular rhythm.      Murmurs: There is no murmur.      Comments: + spider veins noted BLE  Pulses:     Intact distal pulses.   Edema:     Peripheral edema absent.   Abdominal:      Palpations: Abdomen is soft.      Tenderness: There is no abdominal tenderness.   Musculoskeletal: Normal range of motion.      Cervical back: Normal range of motion. Skin:     General: Skin is warm and dry.   Neurological:      General: No focal deficit present.      Mental Status: Oriented to person, place, and time.      Cranial Nerves: No cranial nerve deficit.   Psychiatric:         Behavior: Behavior normal.         Thought Content: Thought content normal.         Judgment: Judgment normal.           ECG 12 Lead    Date/Time: 8/1/2024 1:38 PM  Performed by: " Kevon Amado MD    Authorized by: Kevon mAado MD  Comparison: compared with previous ECG   Similar to previous ECG  Rhythm: sinus rhythm  Conduction: conduction normal  ST Segments: ST segments normal  T Waves: T waves normal  QRS axis: normal  Other: no other findings    Clinical impression: normal ECG            Assessment:       Diagnosis Plan   1. PVCs (premature ventricular contractions)               Plan:       Mrs Matos has intermittent palpitations and was noted to have PVCs on a monitor.  Her burden was 8%.  She has a structurally normal heart by echocardiography.  Her symptoms do bother her more with exertion than at rest, interestingly, so I would like to get a treadmill stress test, especially given her family history of heart disease in both parents.  However, I do highly suspect that these are benign PVCs.  Her symptoms do not seem severe enough to warrant antiarrhythmic therapy at this time.    Sincerely,       Kevon Amado MD

## 2024-08-06 ENCOUNTER — TELEPHONE (OUTPATIENT)
Dept: INTERNAL MEDICINE | Facility: CLINIC | Age: 74
End: 2024-08-06
Payer: MEDICARE

## 2024-08-06 NOTE — TELEPHONE ENCOUNTER
"  Caller: Clarissa Matos \"Cathy\"    Relationship: Self    Best call back number: [phone]    What is the medical concern/diagnosis: ALLERGIC TO CORTISONE    What specialty or service is being requested: ALLERGIST    "

## 2024-08-14 ENCOUNTER — PATIENT MESSAGE (OUTPATIENT)
Dept: INTERNAL MEDICINE | Facility: CLINIC | Age: 74
End: 2024-08-14
Payer: MEDICARE

## 2024-08-14 DIAGNOSIS — T78.40XA ALLERGY, INITIAL ENCOUNTER: Primary | ICD-10-CM

## 2024-08-15 NOTE — TELEPHONE ENCOUNTER
From: Clarissa Matos  To: Glo Dunn  Sent: 8/14/2024 10:57 PM EDT  Subject: Send name an Allergist that you recommend     I need to be tested for an allergy to cortisone, In the past when I had an injection of cortisone , I have had flushing of my face and neck for 3 days . Please send it to me as soon as possible. I called last week and left a message. Thank you '

## 2024-08-29 NOTE — PROGRESS NOTES
Subjective   History of Present Illness: Clarissa Matos is a 73 y.o. female is being seen for consultation today at the request of RUBY Colin for lumbar radiculopathy. Today patient reports low back and left hip and leg pain with right hip. She had a recent fall last week, she fell backwards on her left hip.  She has a history of low back pain that worsened significantly after the fall about a year ago.  She describes pain across her low back and then down her left lower extremity to her foot.  This can be associated with tingling and numbness.  She has undergone epidural injections without lasting improvement as well as physical therapy.  Patient denies any weakness or bowel or bladder issues other than constipation.    Chief Complaint   Patient presents with    Back Pain    Leg Pain          Previous treatment: Norco, Flexeril, PT, Compound pain cream, Morphine po, Norco, Gabapentin,    Previous neurosurgery:  None    Previous injections:   5/30/2023 --- left GT bursa injection --- significant improvement  RADIOFREQUENCY ABLATION CERVICAL. Bilateral C4-C6 on 4/24/24 - 50-60% improvement   LUMBAR/SACRAL TRANSFORAMINAL EPIDURAL. Bilateral L5/S1 on 5/6/24 - minimal benefit       The following portions of the patient's history were reviewed and updated as appropriate: allergies, current medications, past family history, past medical history, past social history, past surgical history, and problem list.    Review of Systems   Constitutional:  Positive for activity change.   HENT: Negative.     Eyes: Negative.    Respiratory:  Negative for chest tightness and shortness of breath.    Cardiovascular:  Negative for chest pain.   Gastrointestinal: Negative.    Endocrine: Negative.    Genitourinary: Negative.    Musculoskeletal:  Positive for arthralgias (+ left and right leg pain), back pain and myalgias.        + left leg pain   Skin: Negative.    Allergic/Immunologic: Negative.    Neurological:  Positive for  "numbness.        + tingling   Hematological: Negative.    Psychiatric/Behavioral: Negative.         Objective      Pulse 64   Temp 97.1 °F (36.2 °C)   Resp 18   Ht 165.1 cm (65\")   Wt 81.2 kg (179 lb)   LMP  (LMP Unknown)   SpO2 97%   BMI 29.79 kg/m²    Body mass index is 29.79 kg/m².  Vitals:    08/30/24 1209   PainSc:   8   PainLoc: Back         Neurologic Exam     Mental Status   Oriented to person, place, and time.     Motor Exam     Strength   Strength 5/5 throughout.     Sensory Exam   Light touch normal.     Gait, Coordination, and Reflexes     Reflexes   Right Chacon: absent  Left Chacon: absent        Assessment & Plan   Independent Review of Radiographic Studies:      I personally reviewed and interpreted the images from the following studies.    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  Patient has failed all conservative measures.  Patient would benefit from L5-S1 TLIF.  She will need cardiac clearance.      Diagnoses and all orders for this visit:    1. Spondylolisthesis of lumbar region (Primary)    2. Lumbar radiculopathy    3. Lumbar stenosis with neurogenic claudication      No follow-ups on file.    This patient was examined wearing appropriate personal protective equipment.                      Dr. Tanner Antonio IV    08/30/24  12:45 EDT  "

## 2024-08-30 ENCOUNTER — PREP FOR SURGERY (OUTPATIENT)
Dept: OTHER | Facility: HOSPITAL | Age: 74
End: 2024-08-30
Payer: MEDICARE

## 2024-08-30 ENCOUNTER — PATIENT ROUNDING (BHMG ONLY) (OUTPATIENT)
Dept: NEUROSURGERY | Facility: CLINIC | Age: 74
End: 2024-08-30
Payer: MEDICARE

## 2024-08-30 ENCOUNTER — OFFICE VISIT (OUTPATIENT)
Dept: NEUROSURGERY | Facility: CLINIC | Age: 74
End: 2024-08-30
Payer: MEDICARE

## 2024-08-30 VITALS
BODY MASS INDEX: 29.82 KG/M2 | OXYGEN SATURATION: 97 % | TEMPERATURE: 97.1 F | WEIGHT: 179 LBS | HEIGHT: 65 IN | HEART RATE: 64 BPM | RESPIRATION RATE: 18 BRPM

## 2024-08-30 DIAGNOSIS — M43.16 SPONDYLOLISTHESIS OF LUMBAR REGION: Primary | ICD-10-CM

## 2024-08-30 DIAGNOSIS — M48.062 LUMBAR STENOSIS WITH NEUROGENIC CLAUDICATION: ICD-10-CM

## 2024-08-30 DIAGNOSIS — R79.1 ABNORMAL COAGULATION PROFILE: ICD-10-CM

## 2024-08-30 DIAGNOSIS — R79.9 ABNORMAL FINDING OF BLOOD CHEMISTRY, UNSPECIFIED: ICD-10-CM

## 2024-08-30 DIAGNOSIS — M54.16 LUMBAR RADICULOPATHY: ICD-10-CM

## 2024-08-30 DIAGNOSIS — M54.16 LUMBAR RADICULOPATHY: Primary | ICD-10-CM

## 2024-08-31 DIAGNOSIS — I10 ESSENTIAL HYPERTENSION: Chronic | ICD-10-CM

## 2024-09-03 RX ORDER — CANDESARTAN CILEXETIL AND HYDROCHLOROTHIAZIDE 16; 12.5 MG/1; MG/1
1 TABLET ORAL DAILY
Qty: 90 TABLET | Refills: 0 | Status: SHIPPED | OUTPATIENT
Start: 2024-09-03

## 2024-09-06 ENCOUNTER — ANCILLARY ORDERS (OUTPATIENT)
Dept: OTHER | Facility: HOSPITAL | Age: 74
End: 2024-09-06
Payer: MEDICARE

## 2024-09-06 ENCOUNTER — TELEPHONE (OUTPATIENT)
Dept: NEUROSURGERY | Facility: CLINIC | Age: 74
End: 2024-09-06
Payer: MEDICARE

## 2024-09-06 NOTE — LETTER
September 6, 2024     Kevon Amado MD  [address]  [address] [address]    Patient: Clarissa Matos  YOB: 1950        This patient is waiting to have spine surgery with Dr. Tanner Antonio.    Please respond to this request noting your recommendations regarding clearance from a cardiology standpoint.  You may contact our office at [phone] (option 2) with any questions.    I appreciate your prompt response in this matter. Please return this form to our office as soon as possible to [phone] Cha Simpson.    ____ I approve my patient from a cardiolgy standpoint    ____ I do NOT approve my patient from a cardiology standpoint at this time    Approving physician name (please print):     _____________________________________________    Approving physician signature:       ________________________________      Date:________________      Sincerely,  [facility]   Neurosurgery

## 2024-09-06 NOTE — PROGRESS NOTES
CARDIOLOGY    Patient Name: Clarissa Matos  :1950  Age: 73 y.o.    24    To whom it may concern:    Clarissa Matos is followed in my office for benign PVCs.    From a cardiovascular standpoint, the patient is at the following risk of major cardiovascular event in the ami-operative setting:  [x] Low         [] Modifiable  [] Low-Moderate       [] Non-Modifiable   [] Moderate  [] Moderare - high  [] High    Cardiac Testing:  [] Needs further cardiac testing  [x] Does not need further cardiac testing    Anticoagulant Status:  [x] No anticoagulants    [] The patient is on  [] ASA; hold for ___ days.  [] Coumadin; hold for ___ days.  [] Plavix; hold for ___ days.  [] Eliquis; hold for ___ days.  [] Brilinta; hold for ___ days.  [] Xarelto; hold for ___ days.  [] Effient; hold for ___ days.    [] The patient requires Lovenox bridging, which has been prescribed.     Beta Blockers:  [] The patient will need pre-op beta blockers which will be prescribed by my clinic.    If there are any problems during surgery, please do not hesitate to contact my office.    Thank you for allowing me to participate in this patient's care.    Sincerely,         Kevon Amado MD  [facility]   [facility]  [address] [place] [id number]  Office: [phone]

## 2024-09-06 NOTE — TELEPHONE ENCOUNTER
"  Caller: Clarissa Matos I \"Cathy\"    Relationship: Self    Best call back number: [phone]    What form or medical record are you requesting: OVN/RECORDS    Who is requesting this form or medical record from you: DR. MENESES FOR 2ND OPINION    How would you like to receive the form or medical records (pick-up, mail, fax): FAX--ATTN 2ND OPINION DR. MENESES.   If fax, what is the fax number: [phone]    Timeframe paperwork needed: ASAP    Additional notes: THANK YOU      "

## 2024-09-13 RX ORDER — CYCLOBENZAPRINE HCL 10 MG
TABLET ORAL
Qty: 30 TABLET | Refills: 0 | Status: SHIPPED | OUTPATIENT
Start: 2024-09-13

## 2024-09-13 RX ORDER — OMEPRAZOLE 40 MG/1
40 CAPSULE, DELAYED RELEASE ORAL DAILY
Qty: 90 CAPSULE | Refills: 2 | OUTPATIENT
Start: 2024-09-13

## 2024-09-19 DIAGNOSIS — I10 ESSENTIAL HYPERTENSION: Chronic | ICD-10-CM

## 2024-09-19 RX ORDER — CANDESARTAN CILEXETIL AND HYDROCHLOROTHIAZIDE 16; 12.5 MG/1; MG/1
1 TABLET ORAL DAILY
Qty: 90 TABLET | Refills: 0 | OUTPATIENT
Start: 2024-09-19

## 2024-10-04 ENCOUNTER — TELEPHONE (OUTPATIENT)
Dept: NEUROSURGERY | Facility: CLINIC | Age: 74
End: 2024-10-04
Payer: MEDICARE

## 2024-10-04 NOTE — TELEPHONE ENCOUNTER
Called to see if patient still wanted to get the imaging we ordered as she has seen other Neurosurgery providers since us. HUB OKAY TO KEIKO.

## 2024-12-04 DIAGNOSIS — I10 ESSENTIAL HYPERTENSION: Chronic | ICD-10-CM

## 2024-12-04 RX ORDER — CANDESARTAN CILEXETIL AND HYDROCHLOROTHIAZIDE 16; 12.5 MG/1; MG/1
1 TABLET ORAL DAILY
Qty: 90 TABLET | Refills: 0 | Status: SHIPPED | OUTPATIENT
Start: 2024-12-04

## 2024-12-18 ENCOUNTER — OFFICE VISIT (OUTPATIENT)
Dept: PAIN MEDICINE | Facility: CLINIC | Age: 74
End: 2024-12-18
Payer: MEDICARE

## 2024-12-18 VITALS
SYSTOLIC BLOOD PRESSURE: 128 MMHG | OXYGEN SATURATION: 95 % | RESPIRATION RATE: 16 BRPM | HEART RATE: 79 BPM | DIASTOLIC BLOOD PRESSURE: 79 MMHG | HEIGHT: 65 IN | WEIGHT: 192 LBS | BODY MASS INDEX: 31.99 KG/M2 | TEMPERATURE: 97.5 F

## 2024-12-18 DIAGNOSIS — M50.30 DDD (DEGENERATIVE DISC DISEASE), CERVICAL: ICD-10-CM

## 2024-12-18 DIAGNOSIS — M54.16 LUMBAR RADICULOPATHY: Primary | ICD-10-CM

## 2024-12-18 PROCEDURE — 3074F SYST BP LT 130 MM HG: CPT | Performed by: NURSE PRACTITIONER

## 2024-12-18 PROCEDURE — 1125F AMNT PAIN NOTED PAIN PRSNT: CPT | Performed by: NURSE PRACTITIONER

## 2024-12-18 PROCEDURE — 3078F DIAST BP <80 MM HG: CPT | Performed by: NURSE PRACTITIONER

## 2024-12-18 PROCEDURE — 99213 OFFICE O/P EST LOW 20 MIN: CPT | Performed by: NURSE PRACTITIONER

## 2024-12-18 PROCEDURE — 1159F MED LIST DOCD IN RCRD: CPT | Performed by: NURSE PRACTITIONER

## 2024-12-18 PROCEDURE — 1160F RVW MEDS BY RX/DR IN RCRD: CPT | Performed by: NURSE PRACTITIONER

## 2024-12-18 NOTE — PROGRESS NOTES
CHIEF COMPLAINT  Back,neck,hip  Pt reports left sided low back pain goes down to knee/foot.    Subjective   Clarissa Matos is a 74 y.o. female  who presents for follow-up.  She has a history of chronic pain of the back, neck, and hip.    Pain today 5/10 VAS.  Primary complaint is low back with radiation down the left posterior/lateral leg to the foot.       Since her last visit she has been evaluated by Neurosurgery x 2 (Dr. Antonio as well as Dr. Alcaraz). Both recommended surgery.  Today she presents to ask if there are any other injections to try.       Pain medication managed by Dr. Ring (MS Contin + Norco).  We treat her interventionally only.        She also takes Cymbalta 60 mg once daily, states prescribed by psychiatry, asks if we can take this over in the future.       Flushing after steroids which she reports is quite significant. She has seen an allergist to r/o an allergy.      Procedures ---  5/30/2023 --- left GT bursa injection --- significant improvement  RADIOFREQUENCY ABLATION CERVICAL. Bilateral C4-C6 on 4/24/24 - 50-60% improvement   LUMBAR/SACRAL TRANSFORAMINAL EPIDURAL. Bilateral L5/S1 on 5/6/24 - no relief   Bilateral L4-S1 MBB   on  7/28/2021 --- no relief     Back Pain  This is a chronic problem. The current episode started more than 1 month ago. The problem occurs daily. The problem is unchanged. The pain is present in the lumbar spine. The quality of the pain is described as aching and burning. The pain does not radiate. The pain is at a severity of 5/10. The pain is severe. The symptoms are aggravated by standing, sitting and position. Associated symptoms include numbness (hands) and weakness (hands). Pertinent negatives include no abdominal pain, chest pain, dysuria, fever or headaches. She has tried analgesics, ice, muscle relaxant and home exercises for the symptoms. The treatment provided mild relief.   Neck Pain   This is a chronic problem. The problem occurs daily. The problem has  been unchanged. The pain is present in the midline, right side and left side. The quality of the pain is described as aching and burning. The pain is at a severity of 5/10. The symptoms are aggravated by position. Associated symptoms include numbness (hands) and weakness (hands). Pertinent negatives include no chest pain, fever or headaches. She has tried oral narcotics, neck support, home exercises, heat and ice for the symptoms. The treatment provided mild relief.   Hip Pain   Associated symptoms include numbness (hands).      PEG Assessment   What number best describes your pain on average in the past week?5  What number best describes how, during the past week, pain has interfered with your enjoyment of life?9    Review of Pertinent Medical Data ---  MRI Lumbar      The following portions of the patient's history were reviewed and updated as appropriate: allergies, current medications, past family history, past medical history, past social history, past surgical history, and problem list.    Review of Systems   Constitutional:  Negative for activity change (less), fatigue and fever.   Respiratory:  Negative for cough and chest tightness.    Cardiovascular:  Negative for chest pain and leg swelling.   Gastrointestinal:  Negative for abdominal pain, constipation and diarrhea.   Genitourinary:  Negative for dysuria.   Musculoskeletal:  Positive for back pain and neck pain.   Neurological:  Positive for weakness (hands) and numbness (hands). Negative for dizziness, light-headedness and headaches.   Psychiatric/Behavioral:  Positive for agitation and sleep disturbance. Negative for suicidal ideas. The patient is nervous/anxious.      I have reviewed and confirmed the accuracy of the ROS as documented by the MA/ALISSA/RN RUBY Colin    Vitals:    12/18/24 1028   BP: 128/79   BP Location: Left arm   Patient Position: Sitting   Cuff Size: Adult   Pulse: 79   Resp: 16   Temp: 97.5 °F (36.4 °C)   TempSrc:  "Temporal   SpO2: 95%   Weight: 87.1 kg (192 lb)   Height: 165.1 cm (65\")   PainSc:   5         Objective   Physical Exam  Vitals and nursing note reviewed.   Constitutional:       General: She is not in acute distress.     Appearance: Normal appearance. She is not ill-appearing.   Pulmonary:      Effort: Pulmonary effort is normal. No respiratory distress.   Musculoskeletal:      Left hip: Tenderness present.   Neurological:      Mental Status: She is alert and oriented to person, place, and time.      Motor: Motor function is intact. No weakness.      Gait: Gait normal.   Psychiatric:         Mood and Affect: Mood normal.         Behavior: Behavior normal.       Assessment & Plan   Diagnoses and all orders for this visit:    1. Lumbar radiculopathy (Primary)    2. DDD (degenerative disc disease), cervical      --- Discussed plan with Dr. Chicas.  She agrees that LESI at L5-S1 would not be an option due to severe spinal stenosis at this level.  Due to lack of benefit with transforaminal not medically appropriate to repeat this approach.  We could try a caudal epidural but it is unlikely that this will provide benefit.  She suggest the patient follow-up with neurosurgery.  ---     Clarissa Matos reports a pain score of 5.  Given her pain assessment as noted, treatment options were discussed and the following options were decided upon as a follow-up plan to address the patient's pain: continuation of current treatment plan for pain.      --- Follow-up PRN                Dictated utilizing Dragon dictation.       I spent 25 minutes caring for Clarissa on this date of service. This time includes time spent by me in the following activities: preparing for the visit, reviewing tests, counseling and educating the patient/family/caregiver, referring and communicating with other health care professionals, and documenting information in the medical record      "

## 2025-03-11 DIAGNOSIS — I10 ESSENTIAL HYPERTENSION: Chronic | ICD-10-CM

## 2025-03-12 RX ORDER — CANDESARTAN CILEXETIL AND HYDROCHLOROTHIAZIDE 16; 12.5 MG/1; MG/1
1 TABLET ORAL DAILY
Qty: 30 TABLET | Refills: 0 | Status: SHIPPED | OUTPATIENT
Start: 2025-03-12

## 2025-03-12 NOTE — TELEPHONE ENCOUNTER
Called patient to schedule overdue 6 month follow up. Scheduled patient for 3/28/25 at 1:15pm.     Changed quantity to 30 day since she is overdue for follow up

## 2025-03-28 ENCOUNTER — OFFICE VISIT (OUTPATIENT)
Dept: INTERNAL MEDICINE | Facility: CLINIC | Age: 75
End: 2025-03-28
Payer: MEDICARE

## 2025-03-28 VITALS
SYSTOLIC BLOOD PRESSURE: 130 MMHG | WEIGHT: 196 LBS | DIASTOLIC BLOOD PRESSURE: 86 MMHG | HEIGHT: 65 IN | HEART RATE: 75 BPM | OXYGEN SATURATION: 98 % | BODY MASS INDEX: 32.65 KG/M2

## 2025-03-28 DIAGNOSIS — M79.7 FIBROMYALGIA: ICD-10-CM

## 2025-03-28 DIAGNOSIS — R73.03 PREDIABETES: Chronic | ICD-10-CM

## 2025-03-28 DIAGNOSIS — F32.A ANXIETY AND DEPRESSION: ICD-10-CM

## 2025-03-28 DIAGNOSIS — E78.2 MIXED HYPERLIPIDEMIA: Chronic | ICD-10-CM

## 2025-03-28 DIAGNOSIS — G89.4 CHRONIC PAIN SYNDROME: ICD-10-CM

## 2025-03-28 DIAGNOSIS — F41.9 ANXIETY AND DEPRESSION: ICD-10-CM

## 2025-03-28 DIAGNOSIS — I10 ESSENTIAL HYPERTENSION: Primary | ICD-10-CM

## 2025-03-28 DIAGNOSIS — Z78.0 POST-MENOPAUSAL: ICD-10-CM

## 2025-03-28 DIAGNOSIS — K21.9 GASTROESOPHAGEAL REFLUX DISEASE WITHOUT ESOPHAGITIS: ICD-10-CM

## 2025-03-28 DIAGNOSIS — R07.81 RIB PAIN: ICD-10-CM

## 2025-03-28 DIAGNOSIS — Z12.31 SCREENING MAMMOGRAM FOR BREAST CANCER: ICD-10-CM

## 2025-03-28 RX ORDER — BUDESONIDE, GLYCOPYRROLATE, AND FORMOTEROL FUMARATE 160; 9; 4.8 UG/1; UG/1; UG/1
AEROSOL, METERED RESPIRATORY (INHALATION)
COMMUNITY
Start: 2025-01-06

## 2025-03-28 RX ORDER — OMEPRAZOLE 40 MG/1
40 CAPSULE, DELAYED RELEASE ORAL DAILY
Qty: 90 CAPSULE | Refills: 2 | Status: SHIPPED | OUTPATIENT
Start: 2025-03-28

## 2025-03-28 NOTE — ASSESSMENT & PLAN NOTE
Offered x-ray but patient refusing at this time  Can continue with pain management  Can use lido patches OTC PRN

## 2025-03-28 NOTE — PROGRESS NOTES
"Chief Complaint  Hypertension and Rib Pain     Subjective        Clarissa Matos presents to [facility] MEDICAL [facility] PRIMARY [facility]ARE  History of Present Illness  This is a 73 y/o female presenting to office for f/u with chronic health conditions.     HTN-- continues on atacand; denies CP/soa.   Occasionally, using hctz separately for additional swelling if present.      Following with [facility] pain management and Dr. Ring with hx of chronic back pain and chronic neck pain. Continues on norco 10/325mg, gabapentin. Reports she was told to discuss with this provider regarding her chronic pain-- reports she was recently transitioned from morphine to norco 10/325mg 4x daily. Reports she is taking this for pain control.      Following with [facility] for psychiatric needs-- hx of fibromyalgia, anxiety, depression-- continues on cymbalta, ativan.  Hx of GERD-- continues on omeprazole.    Reports recently dx with asthma-- now on breztri; following with allergist.     Reports she fell about 2 months ago; reports she is having some right rib pain. Reports some tenderness at times.         Objective   Vital Signs:  /86 (BP Location: Left arm, Patient Position: Sitting, Cuff Size: Large Adult)   Pulse 75   Ht 165.1 cm (65\")   Wt 88.9 kg (196 lb)   SpO2 98%   BMI 32.62 kg/m²   Estimated body mass index is 32.62 kg/m² as calculated from the following:    Height as of this encounter: 165.1 cm (65\").    Weight as of this encounter: 88.9 kg (196 lb).    BMI is >= 30 and <35. (Class 1 Obesity). The following options were offered after discussion;: exercise counseling/recommendations and nutrition counseling/recommendations      Physical Exam  Constitutional:       General: She is awake.      Appearance: Normal appearance. She is obese.   HENT:      Head: Normocephalic and atraumatic.      Right Ear: Hearing and external ear normal.      Left Ear: Hearing and external ear normal.      Nose: Nose normal.      " Mouth/Throat:      Lips: Pink.      Mouth: Mucous membranes are moist.      Pharynx: Oropharynx is clear.   Eyes:      Extraocular Movements: Extraocular movements intact.      Pupils: Pupils are equal, round, and reactive to light.   Cardiovascular:      Rate and Rhythm: Normal rate and regular rhythm.      Pulses: Normal pulses.      Heart sounds: Normal heart sounds. No murmur heard.     No gallop.   Pulmonary:      Effort: Pulmonary effort is normal. No respiratory distress.      Breath sounds: Normal breath sounds. No wheezing or rales.   Musculoskeletal:         General: Tenderness present.        Arms:       Cervical back: Normal range of motion and neck supple.   Skin:     General: Skin is warm and dry.      Capillary Refill: Capillary refill takes less than 2 seconds.   Neurological:      General: No focal deficit present.      Mental Status: She is alert and oriented to person, place, and time. Mental status is at baseline.      Motor: Motor function is intact.      Coordination: Coordination is intact.      Gait: Gait is intact.      Deep Tendon Reflexes: Reflexes are normal and symmetric.   Psychiatric:         Attention and Perception: Attention normal.         Mood and Affect: Mood normal.         Speech: Speech normal.         Behavior: Behavior normal. Behavior is cooperative.         Thought Content: Thought content normal.         Cognition and Memory: Cognition normal.         Judgment: Judgment normal.        Result Review :  The following data was reviewed by: RUBY Katz on 03/28/2025:  Common labs          5/9/2024    09:56   Common Labs   Glucose 101    BUN 12    Creatinine 0.75    Sodium 141    Potassium 4.2    Chloride 100    Calcium 9.6    Albumin 4.5    Total Bilirubin 0.3    Alkaline Phosphatase 64    AST (SGOT) 18    ALT (SGPT) 18    WBC 5.35    Hemoglobin 14.8    Hematocrit 43.7    Platelets 305    Total Cholesterol 165    Triglycerides 150    HDL Cholesterol 54    LDL  Cholesterol  85    Hemoglobin A1C 6.00      Tobacco Use: Low Risk  (3/28/2025)    Patient History     Smoking Tobacco Use: Never     Smokeless Tobacco Use: Never     Passive Exposure: Not on file     Social History     Substance and Sexual Activity   Alcohol Use No     Family History   Problem Relation Age of Onset    Heart disease Mother     Hypertension Mother     Arthritis Mother     Hyperlipidemia Mother     Stroke Mother     Vision loss Mother     Dementia Mother     Hearing loss Mother     Osteoporosis Mother     Colon polyps Father     Heart disease Father     Hypertension Father     Arthritis Father     Hyperlipidemia Father     Stroke Father     Vision loss Father     Cancer Father         prostate    Coronary artery disease Father     Osteoporosis Father     No Known Problems Sister     Arthritis Brother     Hyperlipidemia Brother     Stroke Brother     Hypertension Brother     Coronary artery disease Brother     Osteoporosis Brother     No Known Problems Maternal Aunt     No Known Problems Paternal Aunt     No Known Problems Maternal Grandmother     No Known Problems Paternal Grandmother     No Known Problems Daughter     No Known Problems Son     BRCA 1/2 Neg Hx     Breast cancer Neg Hx     Colon cancer Neg Hx     Endometrial cancer Neg Hx     Ovarian cancer Neg Hx     Malig Hyperthermia Neg Hx     Crohn's disease Neg Hx     Irritable bowel syndrome Neg Hx     Ulcerative colitis Neg Hx                Assessment and Plan   Diagnoses and all orders for this visit:    1. Essential hypertension (Primary)  Assessment & Plan:  Hypertension is stable and controlled  Continue current treatment regimen.  Dietary sodium restriction.  Weight loss.  Regular aerobic exercise.  Blood pressure will be reassessed in 6 months.  Continues on atacand    Orders:  -     CBC & Differential  -     Comprehensive Metabolic Panel    2. Mixed hyperlipidemia  Assessment & Plan:  Recommend following a low saturated fat, low sugar  diet and getting 150 minutes of weekly exercise.   The 10-year ASCVD risk score (Mirza GOLDEN, et al., 2019) is: 15.4%    Values used to calculate the score:      Age: 74 years      Sex: Female      Is Non- : No      Diabetic: No      Tobacco smoker: No      Systolic Blood Pressure: 130 mmHg      Is BP treated: Yes      HDL Cholesterol: 54 mg/dL      Total Cholesterol: 165 mg/dL      Orders:  -     Lipid Panel  -     TSH Rfx On Abnormal To Free T4    3. Prediabetes  Assessment & Plan:  Continue with low glycemic diet choices including reducing refined carbohydrates and simple sugars in diet. Recommended 150 minutes weekly exercise or as tolerated.       Orders:  -     Hemoglobin A1c    4. Gastroesophageal reflux disease without esophagitis  Assessment & Plan:  Stable on omeprazole    Orders:  -     omeprazole (priLOSEC) 40 MG capsule; Take 1 capsule by mouth Daily.  Dispense: 90 capsule; Refill: 2    5. Anxiety and depression  Assessment & Plan:  Following with psychiatry  Denies SI      6. Fibromyalgia  Assessment & Plan:  Continues on duloxetine  Following with psychiatry and pain management        7. Chronic pain syndrome  Assessment & Plan:  Continues on norco 10/325 QID PRN  Continues on gabapentin as prescribed  Patient reports her pain management was requesting for us to make recommendation of arthritis medication-- we briefly discussed addition of possible NSAID but she reports this always worsens her GERD  We discussed all NSAIDs have risk of gastritis or GERD increase risk   She can continue to use voltaren gel topically   Physical therapy is always an option to be used in congruent with her pain management        8. Rib pain  Assessment & Plan:  Offered x-ray but patient refusing at this time  Can continue with pain management  Can use lido patches OTC PRN      Orders:  -     Cancel: XR Ribs Right With PA Chest; Future    9. Screening mammogram for breast cancer  -     Mammo Screening  Digital Tomosynthesis Bilateral With CAD; Future    10. Post-menopausal  -     DEXA Bone Density Axial; Future             Follow Up   Return in about 6 months (around 9/28/2025) for Medicare Wellness.  Patient was given instructions and counseling regarding her condition or for health maintenance advice. Please see specific information pulled into the AVS if appropriate.

## 2025-03-28 NOTE — ASSESSMENT & PLAN NOTE
Recommend following a low saturated fat, low sugar diet and getting 150 minutes of weekly exercise.   The 10-year ASCVD risk score (Mirza GOLDEN, et al., 2019) is: 15.4%    Values used to calculate the score:      Age: 74 years      Sex: Female      Is Non- : No      Diabetic: No      Tobacco smoker: No      Systolic Blood Pressure: 130 mmHg      Is BP treated: Yes      HDL Cholesterol: 54 mg/dL      Total Cholesterol: 165 mg/dL

## 2025-03-28 NOTE — ASSESSMENT & PLAN NOTE
Continues on norco 10/325 QID PRN  Continues on gabapentin as prescribed  Patient reports her pain management was requesting for us to make recommendation of arthritis medication-- we briefly discussed addition of possible NSAID but she reports this always worsens her GERD  We discussed all NSAIDs have risk of gastritis or GERD increase risk   She can continue to use voltaren gel topically   Physical therapy is always an option to be used in congruent with her pain management

## 2025-03-28 NOTE — ASSESSMENT & PLAN NOTE
Hypertension is stable and controlled  Continue current treatment regimen.  Dietary sodium restriction.  Weight loss.  Regular aerobic exercise.  Blood pressure will be reassessed in 6 months.  Continues on atacand

## 2025-03-29 LAB
ALBUMIN SERPL-MCNC: 4.4 G/DL (ref 3.5–5.2)
ALBUMIN/GLOB SERPL: 1.6 G/DL
ALP SERPL-CCNC: 72 U/L (ref 39–117)
ALT SERPL-CCNC: 15 U/L (ref 1–33)
AST SERPL-CCNC: 15 U/L (ref 1–32)
BASOPHILS # BLD AUTO: 0.1 10*3/MM3 (ref 0–0.2)
BASOPHILS NFR BLD AUTO: 1.4 % (ref 0–1.5)
BILIRUB SERPL-MCNC: 0.2 MG/DL (ref 0–1.2)
BUN SERPL-MCNC: 11 MG/DL (ref 8–23)
BUN/CREAT SERPL: 16.4 (ref 7–25)
CALCIUM SERPL-MCNC: 9.7 MG/DL (ref 8.6–10.5)
CHLORIDE SERPL-SCNC: 99 MMOL/L (ref 98–107)
CHOLEST SERPL-MCNC: 192 MG/DL (ref 0–200)
CO2 SERPL-SCNC: 28 MMOL/L (ref 22–29)
CREAT SERPL-MCNC: 0.67 MG/DL (ref 0.57–1)
EGFRCR SERPLBLD CKD-EPI 2021: 93 ML/MIN/1.73
EOSINOPHIL # BLD AUTO: 0.3 10*3/MM3 (ref 0–0.4)
EOSINOPHIL NFR BLD AUTO: 4.3 % (ref 0.3–6.2)
ERYTHROCYTE [DISTWIDTH] IN BLOOD BY AUTOMATED COUNT: 12.5 % (ref 12.3–15.4)
GLOBULIN SER CALC-MCNC: 2.8 GM/DL
GLUCOSE SERPL-MCNC: 116 MG/DL (ref 65–99)
HBA1C MFR BLD: 5.8 % (ref 4.8–5.6)
HCT VFR BLD AUTO: 48 % (ref 34–46.6)
HDLC SERPL-MCNC: 53 MG/DL (ref 40–60)
HGB BLD-MCNC: 15.5 G/DL (ref 12–15.9)
IMM GRANULOCYTES # BLD AUTO: 0.03 10*3/MM3 (ref 0–0.05)
IMM GRANULOCYTES NFR BLD AUTO: 0.4 % (ref 0–0.5)
LDLC SERPL CALC-MCNC: 99 MG/DL (ref 0–100)
LYMPHOCYTES # BLD AUTO: 1.97 10*3/MM3 (ref 0.7–3.1)
LYMPHOCYTES NFR BLD AUTO: 28 % (ref 19.6–45.3)
MCH RBC QN AUTO: 29.5 PG (ref 26.6–33)
MCHC RBC AUTO-ENTMCNC: 32.3 G/DL (ref 31.5–35.7)
MCV RBC AUTO: 91.3 FL (ref 79–97)
MONOCYTES # BLD AUTO: 0.56 10*3/MM3 (ref 0.1–0.9)
MONOCYTES NFR BLD AUTO: 8 % (ref 5–12)
NEUTROPHILS # BLD AUTO: 4.07 10*3/MM3 (ref 1.7–7)
NEUTROPHILS NFR BLD AUTO: 57.9 % (ref 42.7–76)
NRBC BLD AUTO-RTO: 0 /100 WBC (ref 0–0.2)
PLATELET # BLD AUTO: 297 10*3/MM3 (ref 140–450)
POTASSIUM SERPL-SCNC: 3.8 MMOL/L (ref 3.5–5.2)
PROT SERPL-MCNC: 7.2 G/DL (ref 6–8.5)
RBC # BLD AUTO: 5.26 10*6/MM3 (ref 3.77–5.28)
SODIUM SERPL-SCNC: 140 MMOL/L (ref 136–145)
TRIGL SERPL-MCNC: 234 MG/DL (ref 0–150)
TSH SERPL DL<=0.005 MIU/L-ACNC: 0.46 UIU/ML (ref 0.27–4.2)
VLDLC SERPL CALC-MCNC: 40 MG/DL (ref 5–40)
WBC # BLD AUTO: 7.03 10*3/MM3 (ref 3.4–10.8)

## 2025-04-19 DIAGNOSIS — I10 ESSENTIAL HYPERTENSION: Chronic | ICD-10-CM

## 2025-04-21 RX ORDER — CANDESARTAN CILEXETIL AND HYDROCHLOROTHIAZIDE 16; 12.5 MG/1; MG/1
1 TABLET ORAL DAILY
Qty: 30 TABLET | Refills: 5 | Status: SHIPPED | OUTPATIENT
Start: 2025-04-21

## 2025-06-06 ENCOUNTER — OFFICE VISIT (OUTPATIENT)
Dept: INTERNAL MEDICINE | Facility: CLINIC | Age: 75
End: 2025-06-06
Payer: MEDICARE

## 2025-06-06 VITALS
HEART RATE: 94 BPM | OXYGEN SATURATION: 98 % | SYSTOLIC BLOOD PRESSURE: 133 MMHG | HEIGHT: 65 IN | BODY MASS INDEX: 31.99 KG/M2 | WEIGHT: 192 LBS | DIASTOLIC BLOOD PRESSURE: 80 MMHG

## 2025-06-06 DIAGNOSIS — M79.7 FIBROMYALGIA: ICD-10-CM

## 2025-06-06 DIAGNOSIS — F41.9 ANXIETY AND DEPRESSION: ICD-10-CM

## 2025-06-06 DIAGNOSIS — G89.29 NECK PAIN, CHRONIC: ICD-10-CM

## 2025-06-06 DIAGNOSIS — E78.2 MIXED HYPERLIPIDEMIA: Chronic | ICD-10-CM

## 2025-06-06 DIAGNOSIS — F32.A ANXIETY AND DEPRESSION: ICD-10-CM

## 2025-06-06 DIAGNOSIS — K21.9 GASTROESOPHAGEAL REFLUX DISEASE WITHOUT ESOPHAGITIS: Chronic | ICD-10-CM

## 2025-06-06 DIAGNOSIS — G89.4 CHRONIC PAIN SYNDROME: Chronic | ICD-10-CM

## 2025-06-06 DIAGNOSIS — R73.03 PREDIABETES: Chronic | ICD-10-CM

## 2025-06-06 DIAGNOSIS — I25.10 CORONARY ARTERY DISEASE INVOLVING NATIVE CORONARY ARTERY OF NATIVE HEART WITHOUT ANGINA PECTORIS: Primary | Chronic | ICD-10-CM

## 2025-06-06 DIAGNOSIS — M54.2 NECK PAIN, CHRONIC: ICD-10-CM

## 2025-06-06 RX ORDER — SEMAGLUTIDE 0.25 MG/.5ML
0.25 INJECTION, SOLUTION SUBCUTANEOUS WEEKLY
Qty: 2 ML | Refills: 0 | Status: SHIPPED | OUTPATIENT
Start: 2025-06-06

## 2025-06-06 NOTE — ASSESSMENT & PLAN NOTE
Recommended close f/u psychiatry and discussion of re-initiation of MHT in their office  Denies SI

## 2025-06-06 NOTE — ASSESSMENT & PLAN NOTE
Start wegovy 0.25mg weekly for CAD prevention  Recommended 150-300 minutes weekly exercise including 2 full body strength training exercise sessions, that includes all muscles in the body, weekly per current CDC guidelines  Continue with healthy diet choices including prioritizing protein, lower refined carbohydrate, lower glycemic diet choices   Advised most common side effects include nausea; if experiencing worsening s/e-- advised to contact our office  RTO in 4 weeks

## 2025-06-06 NOTE — ASSESSMENT & PLAN NOTE
Continues on norco 10/325 QID PRN  Continues on gabapentin as prescribed-- recommended to discuss if pain management could take over if podiatry is unwilling to continue prescribing as this is a pain modality that is a controlled substance in the [facility]

## 2025-06-06 NOTE — PROGRESS NOTES
"Chief Complaint  Weight Loss    Subjective        Clarissa Matos presents to [facility] PRIMARY CARE  History of Present Illness  This is a 75 y/o female presenting to office for concerns of chronic pain  She is following with podiatry -- Dr. Yobani Palma-- reports she was prescribed   She reports she is currently taking her husbands gabapentin as she reports she needed the lower dose due to not tolerating her current dose prescribed which is 300mg TID.   She reports she thought our office would take over the gabapentin-- she reports she is taking this to help with her chronic pain including neck pain, carpal tunnel pain, and her plantar fasciitis.   She reports she is still seeing Dr. Ring who is helping her manage her chronic pain.   She continues on Norco 10/325mg as prescribed  Continues following with Dr. Lau for hx of anxiety, depression--  on ativan and cymbalta.   Denies current SI; Reports she previously saw therapist but plans to discuss with Dr. Lau about starting therapy services again due to stressors with her family members  Reports she is feeling better this week       Objective   Vital Signs:  /80 (BP Location: Left arm, Patient Position: Sitting, Cuff Size: Adult)   Pulse 94   Ht 165.1 cm (65\")   Wt 87.1 kg (192 lb)   SpO2 98%   BMI 31.95 kg/m²   Estimated body mass index is 31.95 kg/m² as calculated from the following:    Height as of this encounter: 165.1 cm (65\").    Weight as of this encounter: 87.1 kg (192 lb).            Physical Exam  Constitutional:       General: She is awake.      Appearance: Normal appearance. She is obese.   HENT:      Head: Normocephalic and atraumatic.      Right Ear: Hearing and external ear normal.      Left Ear: Hearing and external ear normal.      Nose: Nose normal.      Mouth/Throat:      Lips: Pink.      Mouth: Mucous membranes are moist.      Pharynx: Oropharynx is clear.   Eyes:      Extraocular Movements: Extraocular " movements intact.      Conjunctiva/sclera: Conjunctivae normal.      Pupils: Pupils are equal, round, and reactive to light.   Cardiovascular:      Rate and Rhythm: Normal rate and regular rhythm.      Pulses: Normal pulses.      Heart sounds: Normal heart sounds. No murmur heard.     No gallop.   Pulmonary:      Effort: Pulmonary effort is normal. No respiratory distress.      Breath sounds: Normal breath sounds. No stridor. No wheezing, rhonchi or rales.   Musculoskeletal:         General: Tenderness present.      Cervical back: Neck supple. Tenderness present. Decreased range of motion.      Lumbar back: Spasms and tenderness present. Decreased range of motion.   Skin:     General: Skin is warm and dry.      Capillary Refill: Capillary refill takes less than 2 seconds.   Neurological:      General: No focal deficit present.      Mental Status: She is alert and oriented to person, place, and time. Mental status is at baseline.      Motor: Motor function is intact.      Coordination: Coordination is intact.      Gait: Gait is intact.      Deep Tendon Reflexes: Reflexes are normal and symmetric.   Psychiatric:         Attention and Perception: Attention normal.         Mood and Affect: Mood normal.         Speech: Speech normal.         Behavior: Behavior normal. Behavior is cooperative.         Thought Content: Thought content normal.         Cognition and Memory: Cognition normal.         Judgment: Judgment normal.        Result Review :  The following data was reviewed by: RUBY Katz on 06/06/2025:  Common labs          3/28/2025    13:42   Common Labs   Glucose 116    BUN 11    Creatinine 0.67    Sodium 140    Potassium 3.8    Chloride 99    Calcium 9.7    Albumin 4.4    Total Bilirubin 0.2    Alkaline Phosphatase 72    AST (SGOT) 15    ALT (SGPT) 15    WBC 7.03    Hemoglobin 15.5    Hematocrit 48.0    Platelets 297    Total Cholesterol 192    Triglycerides 234    HDL Cholesterol 53    LDL Cholesterol   99    Hemoglobin A1C 5.80      Tobacco Use: Low Risk  (6/6/2025)    Patient History     Smoking Tobacco Use: Never     Smokeless Tobacco Use: Never     Passive Exposure: Not on file     Social History     Substance and Sexual Activity   Alcohol Use No     Family History   Problem Relation Age of Onset    Heart disease Mother     Hypertension Mother     Arthritis Mother     Hyperlipidemia Mother     Stroke Mother     Vision loss Mother     Dementia Mother     Hearing loss Mother     Osteoporosis Mother     Colon polyps Father     Heart disease Father     Hypertension Father     Arthritis Father     Hyperlipidemia Father     Stroke Father     Vision loss Father     Cancer Father         prostate    Coronary artery disease Father     Osteoporosis Father     No Known Problems Sister     Arthritis Brother     Hyperlipidemia Brother     Stroke Brother     Hypertension Brother     Coronary artery disease Brother     Osteoporosis Brother     No Known Problems Maternal Aunt     No Known Problems Paternal Aunt     No Known Problems Maternal Grandmother     No Known Problems Paternal Grandmother     No Known Problems Daughter     No Known Problems Son     BRCA 1/2 Neg Hx     Breast cancer Neg Hx     Colon cancer Neg Hx     Endometrial cancer Neg Hx     Ovarian cancer Neg Hx     Malig Hyperthermia Neg Hx     Crohn's disease Neg Hx     Irritable bowel syndrome Neg Hx     Ulcerative colitis Neg Hx       CT Abdomen Pelvis With Contrast (11/28/2017 11:31 AM)          Assessment and Plan   Diagnoses and all orders for this visit:    1. Coronary artery disease involving native coronary artery of native heart without angina pectoris (Primary)  Assessment & Plan:  Start wegovy 0.25mg weekly for CAD prevention  Recommended 150-300 minutes weekly exercise including 2 full body strength training exercise sessions, that includes all muscles in the body, weekly per current CDC guidelines  Continue with healthy diet choices including  prioritizing protein, lower refined carbohydrate, lower glycemic diet choices   Advised most common side effects include nausea; if experiencing worsening s/e-- advised to contact our office  RTO in 4 weeks      Orders:  -     Semaglutide-Weight Management (Wegovy) 0.25 MG/0.5ML solution auto-injector; Inject 0.5 mL under the skin into the appropriate area as directed 1 (One) Time Per Week.  Dispense: 2 mL; Refill: 0    2. Mixed hyperlipidemia  Assessment & Plan:  Recommend following a low saturated fat, low sugar diet and getting 150 minutes of weekly exercise.       Orders:  -     Semaglutide-Weight Management (Wegovy) 0.25 MG/0.5ML solution auto-injector; Inject 0.5 mL under the skin into the appropriate area as directed 1 (One) Time Per Week.  Dispense: 2 mL; Refill: 0    3. Gastroesophageal reflux disease without esophagitis  Assessment & Plan:  Continues on omeprazole        4. Chronic pain syndrome  Assessment & Plan:  Continues on norco 10/325 QID PRN  Continues on gabapentin as prescribed-- recommended to discuss if pain management could take over if podiatry is unwilling to continue prescribing as this is a pain modality that is a controlled substance in the [facility]         5. Neck pain, chronic  Assessment & Plan:  Continues following with pain management      6. Fibromyalgia  Assessment & Plan:  Continues on duloxetine  Following with psychiatry and pain management        7. Anxiety and depression  Assessment & Plan:  Recommended close f/u psychiatry and discussion of re-initiation of MHT in their office  Denies SI        8. Prediabetes  Assessment & Plan:  Continue with low glycemic diet choices including reducing refined carbohydrates and simple sugars in diet. Recommended 150 minutes weekly exercise or as tolerated.                  Follow Up   Return in about 4 weeks (around 7/4/2025) for Recheck CAD.  Patient was given instructions and counseling regarding her condition or for health  maintenance advice. Please see specific information pulled into the AVS if appropriate.

## 2025-06-11 ENCOUNTER — PRIOR AUTHORIZATION (OUTPATIENT)
Dept: INTERNAL MEDICINE | Facility: CLINIC | Age: 75
End: 2025-06-11
Payer: MEDICARE

## 2025-07-18 ENCOUNTER — TELEPHONE (OUTPATIENT)
Dept: INTERNAL MEDICINE | Facility: CLINIC | Age: 75
End: 2025-07-18
Payer: MEDICARE

## 2025-07-18 NOTE — TELEPHONE ENCOUNTER
"Relay     \"You are overdue to have your Bone Density and Mammo Screening done. Please call scheduling at [phone] to schedule. If you do not want to have these done, please let the hub know so they can send me a message.\"  "

## 2025-08-05 ENCOUNTER — PATIENT MESSAGE (OUTPATIENT)
Dept: INTERNAL MEDICINE | Facility: CLINIC | Age: 75
End: 2025-08-05
Payer: MEDICARE

## 2025-08-15 ENCOUNTER — HOSPITAL ENCOUNTER (OUTPATIENT)
Dept: MAMMOGRAPHY | Facility: HOSPITAL | Age: 75
Discharge: HOME | End: 2025-08-15
Payer: MEDICARE

## 2025-08-15 ENCOUNTER — HOSPITAL ENCOUNTER (OUTPATIENT)
Dept: BONE DENSITY | Facility: HOSPITAL | Age: 75
Discharge: HOME | End: 2025-08-15
Payer: MEDICARE

## 2025-08-15 DIAGNOSIS — Z12.31 SCREENING MAMMOGRAM FOR BREAST CANCER: ICD-10-CM

## 2025-08-15 DIAGNOSIS — Z78.0 POST-MENOPAUSAL: ICD-10-CM

## 2025-08-15 PROCEDURE — 77063 BREAST TOMOSYNTHESIS BI: CPT

## 2025-08-15 PROCEDURE — 77080 DXA BONE DENSITY AXIAL: CPT

## 2025-08-15 PROCEDURE — 77067 SCR MAMMO BI INCL CAD: CPT

## 2025-08-27 PROBLEM — M51.369 DDD (DEGENERATIVE DISC DISEASE), LUMBAR: Chronic | Status: ACTIVE | Noted: 2023-09-07

## 2025-08-27 PROBLEM — M54.42 CHRONIC BILATERAL LOW BACK PAIN WITH LEFT-SIDED SCIATICA: Chronic | Status: ACTIVE | Noted: 2021-07-20

## 2025-08-27 PROBLEM — R73.01 IMPAIRED FASTING GLUCOSE: Chronic | Status: ACTIVE | Noted: 2024-04-22

## 2025-08-27 PROBLEM — M54.42 CHRONIC BILATERAL LOW BACK PAIN WITH LEFT-SIDED SCIATICA: Status: ACTIVE | Noted: 2021-07-20

## 2025-08-27 PROBLEM — R13.10 DYSPHAGIA: Status: RESOLVED | Noted: 2023-05-01 | Resolved: 2025-08-27

## 2025-08-27 PROBLEM — M54.2 CHRONIC NECK PAIN: Chronic | Status: ACTIVE | Noted: 2021-07-20

## 2025-08-27 PROBLEM — R00.2 HEART PALPITATIONS: Chronic | Status: ACTIVE | Noted: 2025-08-27

## 2025-08-27 PROBLEM — G56.03 BILATERAL CARPAL TUNNEL SYNDROME: Chronic | Status: ACTIVE | Noted: 2025-08-27

## 2025-08-27 PROBLEM — F41.8 DEPRESSION WITH ANXIETY: Chronic | Status: ACTIVE | Noted: 2025-08-27

## 2025-08-27 PROBLEM — G89.4 CHRONIC PAIN SYNDROME: Status: RESOLVED | Noted: 2021-07-20 | Resolved: 2025-08-27

## 2025-08-27 PROBLEM — G56.03 BILATERAL CARPAL TUNNEL SYNDROME: Status: ACTIVE | Noted: 2025-08-27

## 2025-08-27 PROBLEM — Z91.09 MULTIPLE ENVIRONMENTAL ALLERGIES: Status: ACTIVE | Noted: 2025-08-27

## 2025-08-27 PROBLEM — R19.5 POSITIVE COLORECTAL CANCER SCREENING USING COLOGUARD TEST: Status: RESOLVED | Noted: 2023-06-09 | Resolved: 2025-08-27

## 2025-08-27 PROBLEM — E66.9 NON MORBID OBESITY: Chronic | Status: ACTIVE | Noted: 2025-08-27

## 2025-08-27 PROBLEM — S42.209A CLOSED FRACTURE OF UPPER END OF HUMERUS: Status: RESOLVED | Noted: 2018-06-29 | Resolved: 2025-08-27

## 2025-08-27 PROBLEM — G89.29 CHRONIC NECK PAIN: Chronic | Status: ACTIVE | Noted: 2021-07-20

## 2025-08-27 PROBLEM — M47.812 ARTHROPATHY OF CERVICAL FACET JOINT: Status: RESOLVED | Noted: 2021-07-20 | Resolved: 2025-08-27

## 2025-08-27 PROBLEM — F51.04 CHRONIC INSOMNIA: Status: ACTIVE | Noted: 2021-08-05

## 2025-08-27 PROBLEM — I10 BENIGN ESSENTIAL HYPERTENSION: Chronic | Status: ACTIVE | Noted: 2017-12-07

## 2025-08-27 PROBLEM — E66.811 CLASS 1 OBESITY: Status: RESOLVED | Noted: 2021-08-05 | Resolved: 2025-08-27

## 2025-08-27 PROBLEM — M79.7 FIBROMYALGIA: Chronic | Status: ACTIVE | Noted: 2017-12-07

## 2025-08-27 PROBLEM — E66.9 NON MORBID OBESITY: Status: ACTIVE | Noted: 2025-08-27

## 2025-08-27 PROBLEM — G89.29 CHRONIC BILATERAL LOW BACK PAIN WITH LEFT-SIDED SCIATICA: Chronic | Status: ACTIVE | Noted: 2021-07-20

## 2025-08-27 PROBLEM — Z91.09 MULTIPLE ENVIRONMENTAL ALLERGIES: Chronic | Status: ACTIVE | Noted: 2025-08-27

## 2025-08-27 PROBLEM — R07.81 RIB PAIN: Status: RESOLVED | Noted: 2025-03-28 | Resolved: 2025-08-27

## 2025-08-27 PROBLEM — R00.2 HEART PALPITATIONS: Status: ACTIVE | Noted: 2025-08-27

## 2025-08-27 PROBLEM — Z12.11 COLON CANCER SCREENING: Status: RESOLVED | Noted: 2023-06-09 | Resolved: 2025-08-27

## 2025-08-27 PROBLEM — M54.16 LUMBAR RADICULOPATHY: Status: RESOLVED | Noted: 2023-09-07 | Resolved: 2025-08-27

## 2025-08-27 PROBLEM — M54.9 MID BACK PAIN: Status: RESOLVED | Noted: 2021-07-20 | Resolved: 2025-08-27

## 2025-08-27 PROBLEM — M17.12 PRIMARY OSTEOARTHRITIS OF LEFT KNEE: Chronic | Status: ACTIVE | Noted: 2019-04-04

## 2025-08-27 PROBLEM — R73.01 IMPAIRED FASTING GLUCOSE: Status: ACTIVE | Noted: 2024-04-22

## 2025-08-27 PROBLEM — F32.A ANXIETY AND DEPRESSION: Chronic | Status: ACTIVE | Noted: 2024-04-22

## 2025-08-27 PROBLEM — M50.90 CERVICAL DISC DISEASE: Chronic | Status: ACTIVE | Noted: 2025-08-27

## 2025-08-27 PROBLEM — F41.9 ANXIETY AND DEPRESSION: Chronic | Status: ACTIVE | Noted: 2024-04-22

## 2025-08-27 PROBLEM — Z51.81 THERAPEUTIC DRUG MONITORING: Status: ACTIVE | Noted: 2025-08-27

## 2025-08-27 PROBLEM — M12.819 ROTATOR CUFF ARTHROPATHY: Status: RESOLVED | Noted: 2018-07-05 | Resolved: 2025-08-27

## 2025-08-27 PROBLEM — M50.90 CERVICAL DISC DISEASE: Status: ACTIVE | Noted: 2025-08-27

## 2025-08-27 PROBLEM — F41.8 DEPRESSION WITH ANXIETY: Status: ACTIVE | Noted: 2025-08-27

## 2025-08-27 PROBLEM — R06.83 SNORING: Status: RESOLVED | Noted: 2021-08-05 | Resolved: 2025-08-27

## 2025-08-27 PROBLEM — M47.816 LUMBAR FACET ARTHROPATHY: Status: RESOLVED | Noted: 2021-07-20 | Resolved: 2025-08-27

## 2025-08-27 PROBLEM — F51.04 CHRONIC INSOMNIA: Chronic | Status: ACTIVE | Noted: 2021-08-05

## 2025-08-27 PROBLEM — R00.2 PALPITATIONS: Chronic | Status: RESOLVED | Noted: 2024-04-22 | Resolved: 2025-08-27

## 2025-08-27 PROBLEM — M17.0 PRIMARY OSTEOARTHRITIS OF BOTH KNEES: Chronic | Status: ACTIVE | Noted: 2019-04-04

## 2025-08-27 PROBLEM — R10.13 EPIGASTRIC PAIN: Status: RESOLVED | Noted: 2023-05-01 | Resolved: 2025-08-27

## 2025-08-27 PROBLEM — M17.0 PRIMARY OSTEOARTHRITIS OF BOTH KNEES: Status: ACTIVE | Noted: 2019-04-04

## 2025-08-27 PROBLEM — F41.1 GENERALIZED ANXIETY DISORDER: Status: ACTIVE | Noted: 2025-08-27

## 2025-08-27 PROBLEM — F41.1 GENERALIZED ANXIETY DISORDER: Chronic | Status: ACTIVE | Noted: 2025-08-27

## 2025-08-28 PROBLEM — M47.816 LUMBAR FACET ARTHROPATHY: Status: ACTIVE | Noted: 2024-04-10

## 2025-08-29 DIAGNOSIS — I10 ESSENTIAL HYPERTENSION: Chronic | ICD-10-CM

## 2025-08-29 DIAGNOSIS — I10 BENIGN ESSENTIAL HYPERTENSION: Chronic | ICD-10-CM

## 2025-08-29 RX ORDER — CANDESARTAN CILEXETIL AND HYDROCHLOROTHIAZIDE 16; 12.5 MG/1; MG/1
1 TABLET ORAL DAILY
Qty: 90 TABLET | OUTPATIENT
Start: 2025-08-29

## (undated) DEVICE — MAT FLR ABSORBENT LG 4FT 10 2.5FT

## (undated) DEVICE — 2108 SERIES SAGITTAL BLADE (19.5 X 1.27 X 81.0MM)

## (undated) DEVICE — SUT VIC 1 CT1 36IN J947H

## (undated) DEVICE — PIN STNMAN 3.2MM 9IN
Type: IMPLANTABLE DEVICE | Site: SHOULDER | Status: NON-FUNCTIONAL
Removed: 2018-07-05

## (undated) DEVICE — CANN O2 ETCO2 FITS ALL CONN CO2 SMPL A/ 7IN DISP LF

## (undated) DEVICE — GOWN ,SIRUS,NONREINFORCED SMALL: Brand: MEDLINE

## (undated) DEVICE — TUBING, SUCTION, 1/4" X 20', STRAIGHT: Brand: MEDLINE INDUSTRIES, INC.

## (undated) DEVICE — APPL HEMOS FOR DELIVERY FLOSEAL

## (undated) DEVICE — NDL SPINE 22G 31/2IN BLK

## (undated) DEVICE — SUT SILK 2/0 TIES 18IN A185H

## (undated) DEVICE — KT DRN EVAC WND PVC PCH WTROC RND 10F400

## (undated) DEVICE — STPLR SKIN VISISTAT WD 35CT

## (undated) DEVICE — SOL ISO/ALC RUB 70PCT 4OZ

## (undated) DEVICE — Device: Brand: PORTEX

## (undated) DEVICE — GLV SURG SENSICARE PI PF LF 7 GRN STRL

## (undated) DEVICE — GLV SURG TRIUMPH PF LTX 7.5 STRL

## (undated) DEVICE — EPIDURAL TRAY: Brand: MEDLINE INDUSTRIES, INC.

## (undated) DEVICE — GLV SURG SENSICARE W/ALOE PF LF 8 STRL

## (undated) DEVICE — DRSNG BURN ACTICOAT FLEX 7 1X24IN

## (undated) DEVICE — APPL CHLORAPREP W/TINT 26ML ORNG

## (undated) DEVICE — 3M™ STERI-STRIP™ COMPOUND BENZOIN TINCTURE 40 BAGS/CARTON 4 CARTONS/CASE C1544: Brand: 3M™ STERI-STRIP™

## (undated) DEVICE — SUT VIC 0 CT1 36IN J946H

## (undated) DEVICE — DRSNG TELFA PAD NONADH STR 1S 3X8IN

## (undated) DEVICE — VIAL FORMLN CAP 10PCT 20ML

## (undated) DEVICE — GLV SURG TRIUMPH PF LTX 7 STRL

## (undated) DEVICE — SUT VIC 2/0 CT2 27IN J269H

## (undated) DEVICE — ENDOPATH XCEL UNIVERSAL TROCAR STABLILITY SLEEVES: Brand: ENDOPATH XCEL

## (undated) DEVICE — DRAPE,REIN 53X77,STERILE: Brand: MEDLINE

## (undated) DEVICE — DUAL CUT SAGITTAL BLADE

## (undated) DEVICE — ENDOPATH XCEL BLADELESS TROCARS WITH STABILITY SLEEVES: Brand: ENDOPATH XCEL

## (undated) DEVICE — STPCK 3WY D201 DISCOFIX

## (undated) DEVICE — POOLE SUCTION HANDLE: Brand: CARDINAL HEALTH

## (undated) DEVICE — DRSNG SURESITE WNDW 4X4.5

## (undated) DEVICE — PK SHLDR OPN 40

## (undated) DEVICE — 3M™ IOBAN™ 2 ANTIMICROBIAL INCISE DRAPE 6640EZ: Brand: IOBAN™ 2

## (undated) DEVICE — CATH IV INSYTE AUTOGARD 14G 1 1/2IN ORNG

## (undated) DEVICE — GLV SURG TRIUMPH CLASSIC PF LTX 8.5 STRL

## (undated) DEVICE — Device

## (undated) DEVICE — GLV SURG SENSICARE W/ALOE PF LF 7.5 STRL

## (undated) DEVICE — DRAPE,U/ SHT,SPLIT,PLAS,STERIL: Brand: MEDLINE

## (undated) DEVICE — FLEX ADVANTAGE 1500CC: Brand: FLEX ADVANTAGE

## (undated) DEVICE — NDL SPINE 22G 5IN BLK

## (undated) DEVICE — LOU LAP CHOLE: Brand: MEDLINE INDUSTRIES, INC.

## (undated) DEVICE — BITEBLOCK OMNI BLOC

## (undated) DEVICE — MEDI-VAC YANKAUER SUCTION HANDLE W/BULBOUS TIP: Brand: CARDINAL HEALTH

## (undated) DEVICE — ADHS SKIN DERMABOND TOP ADVANCED

## (undated) DEVICE — ENDOCUT SCISSOR TIP, DISPOSABLE: Brand: RENEW

## (undated) DEVICE — PREMIUM WET SKIN PREP TRAY: Brand: MEDLINE INDUSTRIES, INC.

## (undated) DEVICE — SUT VIC 0 TN 27IN DYED JTN0G

## (undated) DEVICE — GOWN ,SIRUS,NONREINFORCED 3XL: Brand: MEDLINE

## (undated) DEVICE — GLV SURG PREMIERPRO ORTHO LTX PF SZ7.5 BRN

## (undated) DEVICE — ENCORE® LATEX ORTHO SIZE 6.5, STERILE LATEX POWDER-FREE SURGICAL GLOVE: Brand: ENCORE

## (undated) DEVICE — IMMOB SHLDR PAD2 UNIV LG

## (undated) DEVICE — BNDG ADHS CURAD FLX/FABRC 2X4IN STRL LF

## (undated) DEVICE — TOWEL,OR,DSP,ST,BLUE,STD,4/PK,20PK/CS: Brand: MEDLINE

## (undated) DEVICE — LAB CORP CLOTEST UREASE

## (undated) DEVICE — DRSNG TELFA ILND ADH 4X6IN

## (undated) DEVICE — SOL NACL 0.9PCT 100ML SGL

## (undated) DEVICE — BNDG ELAS ELITE V/CLOSE 6IN 5YD LF STRL

## (undated) DEVICE — GLV SURG BIOGEL LTX PF 8 1/2

## (undated) DEVICE — DRP C/ARM 41X74IN

## (undated) DEVICE — PREP SOL POVIDONE/IODINE BT 4OZ

## (undated) DEVICE — CANN NASL O2 INF

## (undated) DEVICE — GLV SURG SENSICARE MICRO PF LF 7 STRL

## (undated) DEVICE — PK KN TOTL 40

## (undated) DEVICE — HANDPIECE SET WITH COAXIAL HIGH FLOW TIP AND SUCTION TUBE: Brand: INTERPULSE

## (undated) DEVICE — ADAPT CLN SCPE ENDO PORPOISE BX/50 DISP

## (undated) DEVICE — MSK ENDO PORT O2 POM ELITE CURAPLEX A/

## (undated) DEVICE — ESOPHAGEAL BALLOON DILATATION CATHETER: Brand: CRE FIXED WIRE

## (undated) DEVICE — SKIN PREP TRAY W/CHG: Brand: MEDLINE INDUSTRIES, INC.

## (undated) DEVICE — EXTENSION SET, MALE LUER LOCK ADAPTER WITH RETRACTABLE COLLAR

## (undated) DEVICE — PAD GRND CATHAY W/CABL DISP

## (undated) DEVICE — UNDERCAST PADDING: Brand: DEROYAL

## (undated) DEVICE — 3M™ STERI-STRIP™ REINFORCED ADHESIVE SKIN CLOSURES, R1547, 1/2 IN X 4 IN (12 MM X 100 MM), 6 STRIPS/ENVELOPE: Brand: 3M™ STERI-STRIP™

## (undated) DEVICE — CATH CHOLANG 4.5F18IN BRGNDY

## (undated) DEVICE — ENDOPATH PNEUMONEEDLE INSUFFLATION NEEDLES WITH LUER LOCK CONNECTORS 120MM: Brand: ENDOPATH

## (undated) DEVICE — STERILE PATIENT PROTECTIVE PAD FOR IMP® KNEE POSITIONERS & COHESIVE WRAP (10 / CASE): Brand: DE MAYO KNEE POSITIONER®

## (undated) DEVICE — SYR LUERLOK 30CC

## (undated) DEVICE — KT ORCA ORCAPOD DISP STRL

## (undated) DEVICE — HUMERAL NOZZLE: Brand: REVOLUTION

## (undated) DEVICE — APPL DURAPREP IODOPHOR APL 26ML

## (undated) DEVICE — NEEDLE, QUINCKE, 22GX5": Brand: MEDLINE

## (undated) DEVICE — ANTIBACTERIAL UNDYED BRAIDED (POLYGLACTIN 910), SYNTHETIC ABSORBABLE SUTURE: Brand: COATED VICRYL

## (undated) DEVICE — SINGLE-USE BIOPSY FORCEPS: Brand: RADIAL JAW 4

## (undated) DEVICE — CONTAINER,SPECIMEN,OR STERILE,4OZ: Brand: MEDLINE

## (undated) DEVICE — GOWN ISOL W/THUMB UNIV BLU BX/15